# Patient Record
Sex: FEMALE | Race: BLACK OR AFRICAN AMERICAN | NOT HISPANIC OR LATINO | Employment: UNEMPLOYED | URBAN - METROPOLITAN AREA
[De-identification: names, ages, dates, MRNs, and addresses within clinical notes are randomized per-mention and may not be internally consistent; named-entity substitution may affect disease eponyms.]

---

## 2019-05-20 ENCOUNTER — OFFICE VISIT (OUTPATIENT)
Dept: FAMILY MEDICINE | Facility: CLINIC | Age: 29
End: 2019-05-20
Payer: COMMERCIAL

## 2019-05-20 ENCOUNTER — RECORDS - HEALTHEAST (OUTPATIENT)
Dept: ADMINISTRATIVE | Facility: OTHER | Age: 29
End: 2019-05-20

## 2019-05-20 VITALS
HEART RATE: 94 BPM | OXYGEN SATURATION: 96 % | TEMPERATURE: 98.7 F | WEIGHT: 241 LBS | DIASTOLIC BLOOD PRESSURE: 76 MMHG | SYSTOLIC BLOOD PRESSURE: 115 MMHG | RESPIRATION RATE: 16 BRPM

## 2019-05-20 DIAGNOSIS — Z12.4 ENCOUNTER FOR SCREENING FOR CERVICAL CANCER: ICD-10-CM

## 2019-05-20 DIAGNOSIS — Z32.00 PREGNANCY EXAMINATION OR TEST, PREGNANCY UNCONFIRMED: Primary | ICD-10-CM

## 2019-05-20 DIAGNOSIS — R42 DIZZINESS: ICD-10-CM

## 2019-05-20 DIAGNOSIS — Z11.3 ROUTINE SCREENING FOR STI (SEXUALLY TRANSMITTED INFECTION): ICD-10-CM

## 2019-05-20 DIAGNOSIS — R11.0 NAUSEA: ICD-10-CM

## 2019-05-20 DIAGNOSIS — Z87.820 HISTORY OF TRAUMATIC BRAIN INJURY: ICD-10-CM

## 2019-05-20 LAB
HCG UR QL: NEGATIVE
HIV 1+2 AB+HIV1 P24 AG SERPL QL IA: NEGATIVE

## 2019-05-20 NOTE — LETTER
May 24, 2019      [facility] [address]  [address]  [place] [address]        Dear Tri,    Your results for syphilis, HIV, chlamydia, and gonorrhoeae were all negative.    Please see below for your test results.    Resulted Orders   HCG Qualitative Urine (UPT)  ([facility])   Result Value Ref Range    HCG Qual Urine NEGATIVE Negative   Chlamydia/Gono Amplified ([facility])   Result Value Ref Range    Chlamydia trac,Amplified Prb Negative Negative    N gonorrhoeae,Amplified Prb Negative Negative    Narrative    Test performed by:  [facility] [address] [place] [address]   Syphilis Screen [place] ([facility])   Result Value Ref Range    Treponema Antibody (Syphilis) Negative Negative    Narrative    Test performed by:  [facility] [address] [place] [address]   HIV Ag/Ab Screen [place] ([facility])   Result Value Ref Range    HIV Antigen/Antibody Negative Negative    Narrative    Test performed by:  [facility] [address] [place] [address]  Method is Abbott HIV Ag/Ab for the detection of HIV p24 antigen, HIV-1   antibodies and HIV-2 antibodies.       If you have any questions, please call the clinic to make an appointment.    Sincerely,    Sanjeev Vila MD
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
## 2019-05-20 NOTE — PROGRESS NOTES
Preceptor Attestation:   Patient seen, evaluated and discussed with the resident. I have verified the content of the note, which accurately reflects my assessment of the patient and the plan of care.  Supervising Physician:Jonnie Bowers MD  [facility]

## 2019-05-21 LAB
C TRACH RRNA SPEC QL NAA+PROBE: NEGATIVE
N GONORRHOEA RRNA SPEC QL NAA+PROBE: NEGATIVE
TREPONEMA ANTIBODY (SYPHILIS): NEGATIVE

## 2019-05-22 NOTE — RESULT ENCOUNTER NOTE
Please call the patient and let her know that her syphilis, HIV, chlamydia, and gonorrhoeae were all negative. She was planning on going out of town so wanted to be notified by phone.    Thanks!

## 2019-05-24 ENCOUNTER — TELEPHONE (OUTPATIENT)
Dept: FAMILY MEDICINE | Facility: CLINIC | Age: 29
End: 2019-05-24

## 2019-05-24 NOTE — TELEPHONE ENCOUNTER
Normal results from 05/20/2019 given to patient. Gave results per MD documentation as Patient is going out of town. Patient understands.

## 2019-06-03 ENCOUNTER — RECORDS - HEALTHEAST (OUTPATIENT)
Dept: ADMINISTRATIVE | Facility: OTHER | Age: 29
End: 2019-06-03

## 2019-06-03 ENCOUNTER — OFFICE VISIT (OUTPATIENT)
Dept: FAMILY MEDICINE | Facility: CLINIC | Age: 29
End: 2019-06-03
Payer: COMMERCIAL

## 2019-06-03 VITALS
OXYGEN SATURATION: 96 % | RESPIRATION RATE: 18 BRPM | TEMPERATURE: 98.2 F | SYSTOLIC BLOOD PRESSURE: 117 MMHG | DIASTOLIC BLOOD PRESSURE: 79 MMHG | WEIGHT: 247 LBS | HEART RATE: 81 BPM

## 2019-06-03 DIAGNOSIS — Z87.820 HISTORY OF TRAUMATIC BRAIN INJURY: ICD-10-CM

## 2019-06-03 DIAGNOSIS — N76.0 BACTERIAL VAGINOSIS: ICD-10-CM

## 2019-06-03 DIAGNOSIS — Z12.4 ENCOUNTER FOR SCREENING FOR CERVICAL CANCER: ICD-10-CM

## 2019-06-03 DIAGNOSIS — Z00.00 ROUTINE GENERAL MEDICAL EXAMINATION AT A HEALTH CARE FACILITY: Primary | ICD-10-CM

## 2019-06-03 DIAGNOSIS — B96.89 BACTERIAL VAGINOSIS: ICD-10-CM

## 2019-06-03 DIAGNOSIS — R42 DIZZINESS: ICD-10-CM

## 2019-06-03 DIAGNOSIS — F32.A ANXIETY AND DEPRESSION: ICD-10-CM

## 2019-06-03 DIAGNOSIS — R11.0 NAUSEA: ICD-10-CM

## 2019-06-03 DIAGNOSIS — F41.9 ANXIETY AND DEPRESSION: ICD-10-CM

## 2019-06-03 DIAGNOSIS — N89.8 VAGINAL DISCHARGE: ICD-10-CM

## 2019-06-03 LAB
BACTERIA: NORMAL
CLUE CELLS: NORMAL
MOTILE TRICHOMONAS: NEGATIVE
ODOR: POSITIVE
PH WET PREP: NORMAL (ref 3.8–4.5)
WBC WET PREP: NORMAL (ref 2–5)
YEAST: NORMAL

## 2019-06-03 RX ORDER — METRONIDAZOLE 500 MG/1
500 TABLET ORAL 2 TIMES DAILY
Qty: 14 TABLET | Refills: 0 | Status: SHIPPED | OUTPATIENT
Start: 2019-06-03 | End: 2019-07-15

## 2019-06-03 RX ORDER — ONDANSETRON 4 MG/1
4 TABLET, FILM COATED ORAL EVERY 8 HOURS PRN
Qty: 30 TABLET | Refills: 0 | Status: SHIPPED | OUTPATIENT
Start: 2019-06-03 | End: 2020-04-29

## 2019-06-03 ASSESSMENT — ANXIETY QUESTIONNAIRES
3. WORRYING TOO MUCH ABOUT DIFFERENT THINGS: MORE THAN HALF THE DAYS
7. FEELING AFRAID AS IF SOMETHING AWFUL MIGHT HAPPEN: NOT AT ALL
6. BECOMING EASILY ANNOYED OR IRRITABLE: MORE THAN HALF THE DAYS
5. BEING SO RESTLESS THAT IT IS HARD TO SIT STILL: MORE THAN HALF THE DAYS
1. FEELING NERVOUS, ANXIOUS, OR ON EDGE: MORE THAN HALF THE DAYS
2. NOT BEING ABLE TO STOP OR CONTROL WORRYING: MORE THAN HALF THE DAYS
GAD7 TOTAL SCORE: 12

## 2019-06-03 ASSESSMENT — PATIENT HEALTH QUESTIONNAIRE - PHQ9: 5. POOR APPETITE OR OVEREATING: MORE THAN HALF THE DAYS

## 2019-06-03 NOTE — PATIENT INSTRUCTIONS
Preventive Health Recommendations  Female Ages 26 - 39  Yearly exam:   See your health care provider every year in order to    Review health changes.     Discuss preventive care.      Review your medicines if you your doctor has prescribed any.    Until age 30: Get a Pap test every three years (more often if you have had an abnormal result).    After age 30: Talk to your doctor about whether you should have a Pap test every 3 years or have a Pap test with HPV screening every 5 years.   You do not need a Pap test if your uterus was removed (hysterectomy) and you have not had cancer.  You should be tested each year for STDs (sexually transmitted diseases), if you're at risk.   Talk to your provider about how often to have your cholesterol checked.  If you are at risk for diabetes, you should have a diabetes test (fasting glucose).  Shots: Get a flu shot each year. Get a tetanus shot every 10 years.   Nutrition:     Eat at least 5 servings of fruits and vegetables each day.    Eat whole-grain bread, whole-wheat pasta and brown rice instead of white grains and rice.    Get adequate Calcium and Vitamin D.     Lifestyle    Exercise at least 150 minutes a week (30 minutes a day, 5 days of the week). This will help you control your weight and prevent disease.    Limit alcohol to one drink per day.    No smoking.     Wear sunscreen to prevent skin cancer.    See your dentist every six months for an exam and cleaning.        Referral for ( TEST )  :      Holter Monitor   LOCATION/PLACE/Provider :    [facility] [place]   DATE & TIME :     6-4-2019 at 3:00  PHONE :     [phone]  FAX :     [phone]  Appointment made by clinic staff/:    Crissy        Referral for ( TEST )  :      CT Head w/o Contrast   LOCATION/PLACE/Provider :    [facility]   DATE & TIME :     6-4-2019 at 4:00  PHONE :     [phone]  FAX :     [phone]  Appointment made by clinic staff/:    Crissy

## 2019-06-03 NOTE — LETTER
June 7, 2019      Lucinda Ji  [address]  [place] [address]        Dear Lucinda,    Your Pap smear was normal and will not need to be repeated until 2022. We discussed your wet prep results in clinic and treated you accordingly.    Please see below for your test results.    Resulted Orders   GYN Cytology ([facility])   Result Value Ref Range    Lab AP Case Report SEE RESULTS BELOW       Comment:      Gynecologic Cytology Report                       Case: [id number]                                     Authorizing Provider:  Adolfo Eid III, MD      Collected:             06/03/2019 1439              Ordering Location:      [facility] Lab Received:              06/04/2019 1024              First Screen:          Jessie Arellano CT                                                                             (ASCP)                                                                         Rescreen:              Arminda Moore CT                                                                              (ASCP)                                                                         Specimen:    SUREPATH PAP, SCREENING, Endocervical/cervical                                               Lab AP Gyn Interpretation SEE RESULTS BELOW Negative for squamous intraepithelial le      Comment:      Negative for squamous intraepithelial lesion or malignancy  Electronically signed by Arminda Moore CT (ASCP) on 6/5/2019 at  1:32   PM      Lab AP Malignant? Normal Normal    Lab AP Gyn Other Findings       Shift in vaginal mansi suggestive of bacterial vaginosis    Specimen adequacy:       Satisfactory for evaluation, endocervical/transformation zone component absent    HPV Reflex? Yes if Abnormal     High Risk? No     Last Mens Period 5/20/2019     Lab AP Abnormal Bleeding No     Lab AP Patient Status n/a     Lab AP Birth Control/Hormones None     Lab AP Previous Normal unknown     Lab AP  Previous Abnl none known     Lab AP Cervical Appearance normal     Narrative    Test performed by:  [facility]  [address] [place] [address]   Wet Prep ([facility])   Result Value Ref Range    Yeast Wet Prep None none    Motile Trichomonas Wet Prep Negative Negative    Clue Cells Wet Prep Present <20% NONE    WBC WET PREP 10-25 2 - 5    Bacteria Wet Prep Many None    pH Wet Prep Not performed 3.8 - 4.5    Odor Wet Prep Positive NONE       If you have any questions, please call the clinic to make an appointment.    Sincerely,    Sanjeev Vila MD
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
## 2019-06-04 ENCOUNTER — HOSPITAL ENCOUNTER (OUTPATIENT)
Dept: CT IMAGING | Facility: HOSPITAL | Age: 29
Discharge: HOME | End: 2019-06-04
Attending: FAMILY MEDICINE

## 2019-06-04 ENCOUNTER — HOSPITAL ENCOUNTER (OUTPATIENT)
Dept: CARDIOLOGY | Facility: HOSPITAL | Age: 29
Discharge: HOME | End: 2019-06-04
Attending: STUDENT IN AN ORGANIZED HEALTH CARE EDUCATION/TRAINING PROGRAM

## 2019-06-04 DIAGNOSIS — R42 DIZZINESS: ICD-10-CM

## 2019-06-04 DIAGNOSIS — Z87.820 HISTORY OF TRAUMATIC BRAIN INJURY: ICD-10-CM

## 2019-06-04 NOTE — PROGRESS NOTES
Preceptor Attestation:   Patient seen, evaluated and discussed with the resident. I personally viewed the EKG and agree with the interpretation documented by the resident. I have verified the content of the note, which accurately reflects my assessment of the patient and the plan of care.  Supervising Physician:Adolfo Eid MD  [facility]

## 2019-06-05 ENCOUNTER — RECORDS - HEALTHEAST (OUTPATIENT)
Dept: ADMINISTRATIVE | Facility: OTHER | Age: 29
End: 2019-06-05

## 2019-06-05 ENCOUNTER — DOCUMENTATION ONLY (OUTPATIENT)
Dept: PSYCHOLOGY | Facility: CLINIC | Age: 29
End: 2019-06-05

## 2019-06-05 LAB
CYTOLOGY CVX/VAG DOC THIN PREP: NORMAL
HIGH RISK?: NO
HPV REFLEX?: NORMAL
LAB AP ABNORMAL BLEEDING: NO
LAB AP BIRTH CONTROL/HORMONES: NORMAL
LAB AP CERVICAL APPEARANCE: NORMAL
LAB AP GYN OTHER FINDINGS: NORMAL
LAB AP PATIENT STATUS: NORMAL
LAB AP PREVIOUS ABNL: NORMAL
LAB AP PREVIOUS NORMAL: NORMAL
LAST MENS PERIOD: NORMAL
PATH REPORT.COMMENTS IMP SPEC: NORMAL
PATH REPORT.COMMENTS IMP SPEC: NORMAL
SPECIMEN ADEQUACY:: NORMAL

## 2019-06-05 NOTE — PROGRESS NOTES
Patient can be scheduled with any of the following for PTSD.     [facility]  [address]  [address]  [place], [place] [address]  [phone] Phone  [phone] Fax  Hours: M-F 7:30-5:30pm    [facility] [place] [facility]  [address]   [address]  [place] [address]  [phone] (for appointments)  Fax: [phone]  7:30 am - 5 pm M-F, appointments available on       [facility]  [address]  [address]  [place] [address]  [phone] Phone  [phone] Fax  - 7:30AM-7PM  Saturday 8AM-2PM    [facility]  [address] [place] [address] [address],   [place] [address]  [phone] Phone  [phone] Fax  -Th: 8:30-5pm  F: 8:30-4:30pm    [facility]  [address] [place] [address]  [phone] Phone  [phone] Fax  Monday-Friday: 9am -9pm  : 9am- 2pm        ===View-only below this line===    ----- Message -----  From: Sanjeev Vila MD  Sent: 6/3/2019   2:53 PM  To:  Mental Health  Subject: Order for LEONCIO MOREAU                [id number]               : 1990  F     [address] [place] [address]                           PCP: [id number]    [place] [address]                                CTR: [facility]        Name: LEONCIO MOREAU Date: 6/3/2019    Home: [phone]    Payor:              [employer]  Plan:                 [employer] [place]  Sponsor Code:       [id number]  Subscriber ID:      [id number]  Subscriber Name:    LEONCIO MOREAU  Subscriber Address: [address] [place] [address]    Effective From:     19  Effective To:         Group Number:       [id number]  Group Name  : Not Available      Date       Provider                   Department   Center         6/3/2019   [id number] VILA      [facility]    [facility]    Order Date:6/3/2019  Ordering User:SANJEEV BELL [[id number]]  Encounter Provider:Sanjeev Bell MD [[id number]]  Authorizing Provider: Adolfo Eid MD [[id number]]  Department:[facility][[id number]]    Ordering Provider NPI: [id number]  Adolfo Eid  [facility]~[address] [id number]  Phone: [phone]        Procedure Requested    9087     [facility] REFERRAL  -             [#[id number]]      Priority: Routine  Class: External referral      Comment:Use this form for behavioral health consults and assessments. The               referral coordinator will help to determine whether patients are               best served by clinic behavioral health staff or by community               providers.                              Type of referral(s) requested (indicate all t  hat apply):               Adult Psychotherapy--for diagnosis and non-pharmacological                treatment                              Reason for referral: anxiety, depression, PTSD following traumatic                death of fiance 6 years ago                              Currently receiving mental health services (if 'Yes', what                services and why today's referral?): No               Currently having suicidal thoughts: No                 Previous psych hospitalization: No                              Please provide data for below screening tools if available.                PHQ-9 Score: 10               GAD7 Score: 12               PC-PTSD Score:               Bipolar Screen:               [place] (ADHD):                MCHAT (Autism Screen):                Pediatric Symptom Checklist (PSC):                                needed: No               Language:   English    Associated Diagnoses      F41.9, F32.9 Anxiety and depression      Adult or Child/Adolescent:  Adult         Location:  [facility]                [id number]               : 1990  F      [address]                           PCP: [id number]    [place] [address]                                CTR: [facility]      Comments

## 2019-06-06 NOTE — RESULT ENCOUNTER NOTE
Please mail a letter to the patient with the following:    Ivonne,    Below are your lab results from your recent visit. Your Pap smear was normal and will not need to be repeated until 2022. We discussed your wet prep results in clinic and treated you accordingly.    If you have any further questions or concerns, please do not hesitate to reach out to my office.    I look forward to seeing you in the future!    Take care,  Sanjeev Vila MD

## 2019-06-10 ENCOUNTER — TELEPHONE (OUTPATIENT)
Dept: FAMILY MEDICINE | Facility: CLINIC | Age: 29
End: 2019-06-10

## 2019-06-10 ENCOUNTER — OFFICE VISIT (OUTPATIENT)
Dept: FAMILY MEDICINE | Facility: CLINIC | Age: 29
End: 2019-06-10
Payer: COMMERCIAL

## 2019-06-10 VITALS
OXYGEN SATURATION: 97 % | DIASTOLIC BLOOD PRESSURE: 80 MMHG | HEART RATE: 91 BPM | SYSTOLIC BLOOD PRESSURE: 123 MMHG | WEIGHT: 242 LBS | TEMPERATURE: 98.1 F | RESPIRATION RATE: 16 BRPM

## 2019-06-10 DIAGNOSIS — G43.111 INTRACTABLE MIGRAINE WITH AURA WITH STATUS MIGRAINOSUS: Primary | ICD-10-CM

## 2019-06-10 DIAGNOSIS — R42 DIZZINESS: ICD-10-CM

## 2019-06-10 DIAGNOSIS — Z87.820 HISTORY OF TRAUMATIC BRAIN INJURY: ICD-10-CM

## 2019-06-10 DIAGNOSIS — F32.A ANXIETY AND DEPRESSION: ICD-10-CM

## 2019-06-10 DIAGNOSIS — F41.9 ANXIETY AND DEPRESSION: ICD-10-CM

## 2019-06-10 RX ORDER — VENLAFAXINE 37.5 MG/1
37.5 TABLET ORAL DAILY
Qty: 60 TABLET | Refills: 0 | Status: SHIPPED | OUTPATIENT
Start: 2019-06-10 | End: 2019-07-15

## 2019-06-10 ASSESSMENT — ANXIETY QUESTIONNAIRES
7. FEELING AFRAID AS IF SOMETHING AWFUL MIGHT HAPPEN: NOT AT ALL
GAD7 TOTAL SCORE: 12
5. BEING SO RESTLESS THAT IT IS HARD TO SIT STILL: MORE THAN HALF THE DAYS
6. BECOMING EASILY ANNOYED OR IRRITABLE: MORE THAN HALF THE DAYS
1. FEELING NERVOUS, ANXIOUS, OR ON EDGE: MORE THAN HALF THE DAYS
IF YOU CHECKED OFF ANY PROBLEMS ON THIS QUESTIONNAIRE, HOW DIFFICULT HAVE THESE PROBLEMS MADE IT FOR YOU TO DO YOUR WORK, TAKE CARE OF THINGS AT HOME, OR GET ALONG WITH OTHER PEOPLE: SOMEWHAT DIFFICULT
2. NOT BEING ABLE TO STOP OR CONTROL WORRYING: MORE THAN HALF THE DAYS
3. WORRYING TOO MUCH ABOUT DIFFERENT THINGS: MORE THAN HALF THE DAYS

## 2019-06-10 ASSESSMENT — PATIENT HEALTH QUESTIONNAIRE - PHQ9
5. POOR APPETITE OR OVEREATING: MORE THAN HALF THE DAYS
SUM OF ALL RESPONSES TO PHQ QUESTIONS 1-9: 16

## 2019-06-10 NOTE — TELEPHONE ENCOUNTER
Given CT results via phone,normal, no significant abnormality seen. C/o continued headaches. Recommend an appt in clinic today. Ivonne agreed, scheduled an appt for this afternoon with PCP, per pt ok to wait until appt time. Lorena COVARRUBIAS

## 2019-06-10 NOTE — TELEPHONE ENCOUNTER
[facility] phone call message- patient requesting results:    Test: CT Results  Date of test: 6/4/19 or 6/5/19    Additional Comments: Patient states she had a CT to brain done on Tuesday or Wednesday of last week, Patient has not receive any call and wants to know if it is in to go over results. Patient states she is very anxious to know the CT results. Please call and advise.    OK to leave a message on voice mail? Yes    Primary language: English      needed? No    Call taken on Briseida 10, 2019 at 8:20 AM by Pedro Galaviz

## 2019-06-10 NOTE — PATIENT INSTRUCTIONS
Thank you for coming to clinic today, it has been my pleasure to serve you.    Your current medication list is printed and included with this document.  Please keep this list with you - it is helpful to bring this current list to any other medical appointments and if you ever go to the emergency room or hospital.    If you had lab testing today or will be undergoing other testing outside the clinic such as an imaging test, we will be sharing the results with you by mail, phone call, or MyChart whenever they are available and I have reviewed them.     The phone number we would call with results is # [phone] (home) . If this is not the best number to reach you, please call our clinic to have this updated.    If you need any refills, please call your pharmacy and they will contact us.    If you have any further concerns or wish to schedule another appointment, please call our office at [phone] during normal business hours (8AM-5PM, Monday-Friday)    Another useful resource we offer our patients here at [facility] is our after hours care line. If you ever have questions or concerns outside of regular business hours and are unsure if you should seek care at an Urgent Care or in the Emergency Department, one of our physicians is on-call 24 hours a day to assist you.  Just call our regular clinic number at [phone] and a staff member will page the on-call physician with your concern.  This physician will attempt to return your call promptly although they are also working in the hospital and other urgent medical tasks may delay this call back.    If you have a medical emergency, please call [phone].    Thank you for coming to [facility], I look forward to caring for you in the future!    Take care,    Sanjeev Vila MD

## 2019-06-10 NOTE — PROGRESS NOTES
Assessment and Plan   1. Intractable migraine with aura with status migrainosus  Will attempt trial of the below which will help with #2 as well. Almost daily headache, but could try a triptan for abortive therapy next time if Tylenol/Ibuprofen fail in addition to HA journal.  - venlafaxine (EFFEXOR) 37.5 MG tablet; Take 1 tablet (37.5 mg) by mouth daily  Dispense: 60 tablet; Refill: 0    2. Anxiety and depression  PHQ-9 of 16 and SANDRA-7 of 12 indicative of this.    3. History of traumatic brain injury  Normal CT result reviewed.    4. Dizziness  Completed Holter, needs to turn in still.    Follow up in 1 month.    Options for treatment and follow-up care were reviewed with the patient and/or guardian. Lucinda Ji and/or guardian engaged in the decision making process and verbalized understanding of the options discussed and agreed with the final plan.    Sanjeev Vila MD  [facility], PGY-2    Precepted with: Dr. Adolfo Parry.         HPI:   Lucinda Ji is a 28 year old female who presents to clinic today for   Chief Complaint   Patient presents with     Headache     recheck migraine.condition worsening since last visit. dizzy spells. anxiety attack this morning, concern of issue. mom  of brain tumor     Medication Reconciliation     complete     Her mother passed away from brain cancer at age 23 - unknown type.    Had an anxiety attack this morning after her scan. She did her Holter, but still needs to return it.    HA in the front of her forehead and at the base of the neck. Feels like it is pulsing. Gets blurry vision. HA sometimes last all day, but intermittent. Light and sound sensitive. Has tried Tylenol and Ibuprofen but none helped. Feels she has an aura, gets agitated.         Review of Systems:     A comprehensive 12 point review of systems was negative unless otherwise noted in the HPI.          PMHX:   Active Problems  List  Patient Active Problem List   Diagnosis     Nausea     Dizziness     History of traumatic brain injury     Encounter for screening for cervical cancer      Anxiety and depression     Intractable migraine with aura with status migrainosus     Active problem list reviewed and updated.    Current Medications  Current Outpatient Medications   Medication Sig Dispense Refill     metroNIDAZOLE (FLAGYL) 500 MG tablet Take 1 tablet (500 mg) by mouth 2 times daily for 7 days 14 tablet 0     ondansetron (ZOFRAN) 4 MG tablet Take 1 tablet (4 mg) by mouth every 8 hours as needed for nausea or vomiting 30 tablet 0     venlafaxine (EFFEXOR) 37.5 MG tablet Take 1 tablet (37.5 mg) by mouth daily 60 tablet 0     Medication list reviewed and updated.    Social History  Social History     Tobacco Use     Smoking status: Current Every Day Smoker     Years: 12.00     Types: Cigarettes     Smokeless tobacco: Never Used     Tobacco comment: 3 cigs per day   Substance Use Topics     Alcohol use: Yes     Comment: occasional     Drug use: Never     History   Drug Use Unknown     Allergies  No Known Allergies  Allergies and Medication Intolerances Updated         Physical Exam:     Vitals:    06/10/19 1553   BP: 123/80   Pulse: 91   Resp: 16   Temp: 98.1  F (36.7  C)   TempSrc: Oral   SpO2: 97%   Weight: 109.8 kg (242 lb)     There is no height or weight on file to calculate BMI.    GENERAL APPEARANCE: alert, appears stated age, no acute distress  HEENT: Eyes grossly normal to inspection, nares normal, and mouth and throat without erythema, ulcers, or lesions  RESP: lungs clear to auscultation - no rales, rhonchi, or wheezes  CV: regular rate and rhythm, no murmur, click, rub, or gallop  ABDOMEN: soft, nontender   MSK: extremities normal, no gross deformities noted, no lower extremity edema  SKIN: no suspicious lesions or rashes   NEURO: Normal strength and tone, sensory exam grossly normal, mentation appears intact and speech  normal  PSYCH: mood and affect normal

## 2019-06-10 NOTE — PROGRESS NOTES
Preceptor Attestation:   Patient seen, evaluated and discussed with the resident. I have verified the content of the note, which accurately reflects my assessment of the patient and the plan of care.   Supervising Physician:  Adolfo Parry MD

## 2019-06-11 ENCOUNTER — TELEPHONE (OUTPATIENT)
Dept: FAMILY MEDICINE | Facility: CLINIC | Age: 29
End: 2019-06-11

## 2019-06-11 ASSESSMENT — ANXIETY QUESTIONNAIRES: GAD7 TOTAL SCORE: 12

## 2019-06-11 NOTE — LETTER
June 11, 2019       TO: Lucinda ACOSTA [place]  [address]  [place] [address]         Dear Lucinda,      Here is the mental health referral information we had discussed 6/11/2019.  Please call with any additional questions, concerns or if you would like assistance with scheduling the appointment.      [facility]  [address]  [address]  [place], [place] [address]  [phone] Phone  [phone] Fax  Hours: M-F 7:30-5:30pm     [facility] [place]  [address]   [address]  [place] [address]  [phone] (for appointments)  Fax: [phone]  7:30 am - 5 pm M-F, appointments available on Saturdays        [facility]  [address]  [place] [address]  [phone] Phone  [phone] Fax  M-F 7:30AM-7PM  Saturday 8AM-2PM         [facility]  [address] [place] [address] [address],   [place] [address]  [phone] Phone  [phone] Fax  M-Th: 8:30-5pm  F: 8:30-4:30pm     [facility]  [address] [place] [address]  [phone] Phone  [phone] Fax  Monday-Friday: 9am -9pm  Saturdays: 9am- 2pm        Sincerely,      JAMMIE Kelly CMA  Home Health Care Coordinator  Direct Phone: [phone]

## 2019-06-11 NOTE — TELEPHONE ENCOUNTER
Out going call made to discuss mental health referral and assist with scheduling. Patient requested a letter be sent to her home addressed listed in chart for review to determine which facility would work best for her.

## 2019-06-18 NOTE — TELEPHONE ENCOUNTER
Out going follow up call made. Ivonne reports she did receive the letter that included the mental health referral. Ivonne confirmed she had received the letter and will schedule her own appointment. Patient will call should change her mind and want assistance.

## 2019-06-25 ASSESSMENT — ANXIETY QUESTIONNAIRES: GAD7 TOTAL SCORE: 12

## 2019-07-15 ENCOUNTER — OFFICE VISIT (OUTPATIENT)
Dept: FAMILY MEDICINE | Facility: CLINIC | Age: 29
End: 2019-07-15
Payer: COMMERCIAL

## 2019-07-15 VITALS
HEART RATE: 80 BPM | DIASTOLIC BLOOD PRESSURE: 72 MMHG | OXYGEN SATURATION: 97 % | RESPIRATION RATE: 18 BRPM | SYSTOLIC BLOOD PRESSURE: 112 MMHG | WEIGHT: 240 LBS | TEMPERATURE: 98.3 F

## 2019-07-15 DIAGNOSIS — Z72.51 UNPROTECTED SEXUAL INTERCOURSE: ICD-10-CM

## 2019-07-15 DIAGNOSIS — G43.111 INTRACTABLE MIGRAINE WITH AURA WITH STATUS MIGRAINOSUS: Primary | ICD-10-CM

## 2019-07-15 DIAGNOSIS — R42 DIZZINESS: ICD-10-CM

## 2019-07-15 LAB
BACTERIA: NORMAL
CLUE CELLS: NORMAL
HIV 1+2 AB+HIV1 P24 AG SERPL QL IA: NEGATIVE
MOTILE TRICHOMONAS: NEGATIVE
ODOR: NORMAL
PH WET PREP: >4.5 (ref 3.8–4.5)
WBC WET PREP: NORMAL (ref 2–5)
YEAST: NORMAL

## 2019-07-15 RX ORDER — SUMATRIPTAN 50 MG/1
50 TABLET, FILM COATED ORAL
Qty: 90 TABLET | Refills: 0 | Status: SHIPPED | OUTPATIENT
Start: 2019-07-15 | End: 2020-04-29

## 2019-07-15 SDOH — HEALTH STABILITY: MENTAL HEALTH: HOW OFTEN DO YOU HAVE A DRINK CONTAINING ALCOHOL?: MONTHLY OR LESS

## 2019-07-15 NOTE — PROGRESS NOTES
Assessment and Plan   1. Intractable migraine with aura and status migrainosus, follow-up  Can try sumatriptan 50 mg to have on hand for abortive therapy. Continue consistent exercise regimen. Did not start Venlafaxine due to concern over increased SI as counciled by the pharmacist - could be an option in the future.    2. Anxiety and depression  Stable at this time.     3. Dizziness  Refer to neurology for further workup as needed. Negative CT, Holter, and history not suggestive of a typical disease course - I feel these episodes are likely somewhat related to her anxiety/depression. Offered continued observation vs Neuro referral and she chose Neuro referral.    4. STD, BV, yeast   Sent for wet prep, GC culture, syphilis, HIV.    Wet prep negative for yeast, trichomonas. Moderate bacteria, clue cells <20%, 10-25 WBCs, pH >4.5, no odor.     Will await further results.    Follow up as needed, after seeing neurology.    Options for treatment and follow-up care were reviewed with the patient and/or guardian. Lucinda Ji and/or guardian engaged in the decision making process and verbalized understanding of the options discussed and agreed with the final plan.    I was present with the medical student who participated in the service and in the documentation of this note. I have verified the history and personally performed the physical exam and medical decision making, and have verified the content of the note, which accurately reflects my assessment of the patient and the plan of care.  Sanjeev Vila MD  [facility], PGY-3    Precepted patient with Dr. James Quijano       HPI:   Lucinda Ji is a 28 year old female who presents to clinic today for   Chief Complaint   Patient presents with     Follow Up     MIGRAINES. Patient did not start Venlafaxine due to the side effects of possible suicidal ideation. Patient states headaches are better now  "with exercise and keeping herself busy.      Medication Reconciliation     completed.      STD     Check.     Migraines:  Pharmacist said venlafaxine has suicidal ideation as side effect so patient did not pick it up. Patient has had decreased frequency of migraines recently. Before had 2x migraines per day, now reduced to 1x per week. Believes her increase in exercise is helping.    Mood:   Feels \"more free\" and recently got her house back, so feels a burden lifted off her shoulders. No panic attacks since last visit.    Dizziness:  Got Holter monitor results, within normal limits  Dizziness, especially when exercising, body shakes when stands up  Had prior normal head CT  Discussed if she wants to continue further workup, may refer to neurology    New concern:  Last week had unprotected sex with new partner, would like STI testing  Period ended yesterday so not concerned for pregnancy  No sx of vaginal discharge, itching         Review of Systems:     Constitutional, HEENT, cardiovascular, pulmonary, GI, musculoskeletal, neuro, skin, and psych systems are negative, except as otherwise noted.          PMHX:   Active Problems List  Patient Active Problem List   Diagnosis     Nausea     Dizziness     History of traumatic brain injury     Encounter for screening for cervical cancer      Anxiety and depression     Intractable migraine with aura with status migrainosus     Active problem list reviewed and updated.    Current Medications  Current Outpatient Medications   Medication Sig Dispense Refill     ondansetron (ZOFRAN) 4 MG tablet Take 1 tablet (4 mg) by mouth every 8 hours as needed for nausea or vomiting 30 tablet 0     SUMAtriptan (IMITREX) 50 MG tablet Take 1 tablet (50 mg) by mouth at onset of headache for migraine May repeat in 2 hours. Max 4 tablets/24 hours. 90 tablet 0     Medication list reviewed and updated.    Social History  Social History     Tobacco Use     Smoking status: Current Every Day Smoker "     Years: 12.00     Types: Cigarettes     Smokeless tobacco: Never Used     Tobacco comment: 3 cigs/day   Substance Use Topics     Alcohol use: Yes     Frequency: Monthly or less     Comment: occasional     Drug use: Yes     Types: Marijuana     History   Drug Use     Types: Marijuana     Family History  Family History   Problem Relation Age of Onset     Brain Tumor Mother      Heart Disease Father      Thyroid Disease Sister      Asthma Brother      Allergies  No Known Allergies         Physical Exam:     Vitals:    07/15/19 1407   BP: 112/72   Pulse: 80   Resp: 18   Temp: 98.3  F (36.8  C)   TempSrc: Oral   SpO2: 97%   Weight: 108.9 kg (240 lb)     There is no height or weight on file to calculate BMI.    GENERAL APPEARANCE: alert, appears stated age, no acute distress  HEENT: Eyes grossly normal to inspection, nares normal   MSK: moves all extremities normally and equally, no gross deformities noted  SKIN: no suspicious lesions or rashes   NEURO: Normal strength and tone, sensory exam grossly normal, mentation appears intact and speech normal  PSYCH: mood and affect normal/bright   GU_female: normal cervix without masses or discharge and whiff test negative

## 2019-07-15 NOTE — PROGRESS NOTES
I have personally reviewed the history and examination as documented by Dr. Vila.  I was present during key portions of the visit and agree with the assessment and plan as documented for 28 yr old female with migraines, depression here for follow-up, exertional dizziness. Reviewed tx regimen. Will refer to neuro per pt request for her dizziness. Precautions given. Anticipatory guidance given.     James Quijano MD  July 15, 2019  5:08 PM

## 2019-07-15 NOTE — NURSING NOTE
Chief Complaint   Patient presents with     Follow Up     MIGRAINES. Patient did not start Venlafaxine due to the side effects of possible suicidal ideation. Patient states headaches are better now with exercise and keeping herself busy.      Medication Reconciliation     completed.        /72   Pulse 80   Temp 98.3  F (36.8  C) (Oral)   Resp 18   Wt 108.9 kg (240 lb)   LMP 07/11/2019   SpO2 97%

## 2019-07-15 NOTE — LETTER
July 16, 2019      [facility]  [address]  [place] [address]        Dear Tri,    Below are your lab results from your recent visit. Your syphilis, HIV, chlamydia/gonorrhoeae, and wet prep were are negative (normal).    If you have any further questions or concerns, please do not hesitate to reach out to my office.    I look forward to seeing you in the future!    Resulted Orders   Wet Prep ([facility])   Result Value Ref Range    Yeast Wet Prep None none    Motile Trichomonas Wet Prep Negative Negative    Clue Cells Wet Prep Present <20% NONE    WBC WET PREP 10-25 2 - 5    Bacteria Wet Prep Moderate None    pH Wet Prep >4.5 3.8 - 4.5    Odor Wet Prep None NONE   Chlamydia/Gono Amplified ([facility])   Result Value Ref Range    Chlamydia trac,Amplified Prb Negative Negative    N gonorrhoeae,Amplified Prb Negative Negative    Narrative    Test performed by:  [facility] [address] [place] [address]   Syphilis Screen [place] (RPR/VDRL) ([facility])   Result Value Ref Range    Treponema Antibody (Syphilis) Negative Negative    Narrative    Test performed by:  [facility] [address] [place] [address]   HIV Ag/Ab Screen [place] ([facility])   Result Value Ref Range    HIV Antigen/Antibody Negative Negative    Narrative    Test performed by:  [facility] [address] [place] [address]  Method is Abbott HIV Ag/Ab for the detection of HIV p24 antigen, HIV-1   antibodies and HIV-2 antibodies.       If you have any questions, please call the clinic to make an appointment.    Sincerely,    Sanjeev Vila MD

## 2019-07-16 LAB
C TRACH RRNA CVX QL NAA+PROBE: NEGATIVE
N GONORRHOEA RRNA SPEC QL NAA+PROBE: NEGATIVE
TREPONEMA ANTIBODY (SYPHILIS): NEGATIVE

## 2019-07-16 NOTE — RESULT ENCOUNTER NOTE
Please mail a letter to the patient with the following:    Ivonne,    Below are your lab results from your recent visit. Your syphilis, HIV, chlamydia/gonorrhoeae, and wet prep were are negative (normal).    If you have any further questions or concerns, please do not hesitate to reach out to my office.    I look forward to seeing you in the future!    Take care,  Sanjeev Vila MD

## 2019-07-29 NOTE — PATIENT INSTRUCTIONS
Referral for :     Neurology    LOCATION/PLACE/Provider :    [facility]  DATE & TIME :    Location and primary have called patient, no success in scheduling patient for referral   PHONE :     [phone]  FAX :    [phone]  Appointment made by clinic staff/:    Pati

## 2020-03-25 ENCOUNTER — VIRTUAL VISIT (OUTPATIENT)
Dept: FAMILY MEDICINE | Facility: CLINIC | Age: 30
End: 2020-03-25
Payer: MEDICAID

## 2020-03-25 DIAGNOSIS — Z32.01 PREGNANCY TEST POSITIVE: Primary | ICD-10-CM

## 2020-03-25 RX ORDER — DIPHENHYDRAMINE HCL 25 MG
25-50 CAPSULE ORAL
COMMUNITY
Start: 2020-02-09 | End: 2020-04-29

## 2020-03-25 RX ORDER — PRENATAL VIT/IRON FUM/FOLIC AC 27MG-0.8MG
1 TABLET ORAL DAILY
Qty: 90 TABLET | Refills: 3 | Status: SHIPPED | OUTPATIENT
Start: 2020-03-25 | End: 2022-09-06

## 2020-03-25 RX ORDER — GUAIFENESIN AND DEXTROMETHORPHAN HYDROBROMIDE 100; 10 MG/5ML; MG/5ML
10 SOLUTION ORAL
COMMUNITY
Start: 2019-10-15 | End: 2020-04-29

## 2020-03-25 RX ORDER — ACETAMINOPHEN 325 MG/1
650 TABLET ORAL
COMMUNITY
Start: 2020-02-09 | End: 2024-09-23

## 2020-03-25 RX ORDER — ALBUTEROL SULFATE 90 UG/1
1-2 AEROSOL, METERED RESPIRATORY (INHALATION)
COMMUNITY
Start: 2020-02-09 | End: 2022-01-14

## 2020-03-25 NOTE — PROGRESS NOTES
"Family Medicine Telephone Visit Note           Telephone Visit Consent   Patient was verbally read the following and verbal consent was obtained.  \"I understand that I may revoke this request for a phone visit at any time.  This consent will automatically  3 months from the signed date and time.\"    Name person giving consent:  Patient   Date verbal consent given:  3/25/2020  Time verbal consent given:  1:40 PM          Chief Complaint   Patient presents with     Pregnancy Test     Postive home pregnancy test, Pt has been spotting for the last few days     Current Outpatient Medications   Medication Sig Dispense Refill     acetaminophen (TYLENOL) 325 MG tablet Take 650 mg by mouth       albuterol (PROAIR HFA/PROVENTIL HFA/VENTOLIN HFA) 108 (90 Base) MCG/ACT inhaler Inhale 1-2 puffs into the lungs       Dextromethorphan-guaiFENesin  MG/5ML syrup Take 10 mLs by mouth       diphenhydrAMINE (BENADRYL) 25 MG capsule Take 25-50 mg by mouth       Prenatal Vit-Fe Fumarate-FA (PRENATAL MULTIVITAMIN W/IRON) 27-0.8 MG tablet Take 1 tablet by mouth daily 90 tablet 3     ondansetron (ZOFRAN) 4 MG tablet Take 1 tablet (4 mg) by mouth every 8 hours as needed for nausea or vomiting (Patient not taking: Reported on 3/25/2020) 30 tablet 0     SUMAtriptan (IMITREX) 50 MG tablet Take 1 tablet (50 mg) by mouth at onset of headache for migraine May repeat in 2 hours. Max 4 tablets/24 hours. (Patient not taking: Reported on 3/25/2020) 90 tablet 0     No Known Allergies          HPI   Patients name: Ivonne  Appointment start time:  1:44 PM    Patient had a positive UPT.  Has been experiencing some bleeding but nothing heavy.  Had two miscarriages before.  Those were painful.  First one she felt like she was about to die.    Patient did the home test.  She was late 10-11 days.  No pain/N/V.      LMP .    Bleeding started 3 days ago.  Has not been a large amount.  Every time she goes to the bathroom she has noticed it " in the bowl when she urinates.  It has not soaked through any clothing.    Medications: None.  Was on ibuprofen and Valium for a muscle spasm but has not been using them since last week because they made her throw up.  She has not been taking a prenatal vitamin.  This is her first medical appointment for this pregnancy.    Patient is feeling shocked.  She is planning on keeping the pregnancy, however.  She has a 5 year old daughter.       used via [employer] or similar service.  number: None.          Assessment and Plan   (Z32.01) Pregnancy test positive  (primary encounter diagnosis)  Comment: Patient's home pregnancy test was positive and she is late for her period, indicating that she is likely pregnant.  Recommended following up in 2-4 weeks for initial dating US, as she is likely at about 6 weeks based upon LMP.  Recommended initial OB visit in late April/early May in the 10-12 week range.  Recommended starting prenatal vitamin.  Counseled that brief and minor spotting is often normal early on in a pregnancy, but that signs of miscarriage would be pain/cramping/increased bleeding.  Recommended that she call the clinic or go to the ED if she begins to bleed heavily, such as a pad per hour.  Plan: Prenatal Vit-Fe Fumarate-FA (PRENATAL         MULTIVITAMIN W/IRON) 27-0.8 MG tablet, US OB         <14 Weeks w Transvaginal Single          Refilled medications that would be required in the next 3 months.     After Visit Information:  Will print and mail AVS     Appointment end time: 2:08 PM  This is a telephone visit that took 21 minutes.      Clinician location:  [facility].    GREGG LANDA    Precepted patient with Dr. Deny Claudio.

## 2020-03-25 NOTE — PROGRESS NOTES
Preceptor Attestation:   Patient discussed with the resident. I have verified the content of the note, which accurately reflects my assessment of the patient and the plan of care.  Supervising Physician:Deny Claudio MD  [facility]

## 2020-04-16 ENCOUNTER — OFFICE VISIT (OUTPATIENT)
Dept: FAMILY MEDICINE | Facility: CLINIC | Age: 30
End: 2020-04-16
Payer: COMMERCIAL

## 2020-04-16 VITALS
OXYGEN SATURATION: 99 % | RESPIRATION RATE: 18 BRPM | SYSTOLIC BLOOD PRESSURE: 123 MMHG | TEMPERATURE: 98.2 F | DIASTOLIC BLOOD PRESSURE: 80 MMHG | HEART RATE: 68 BPM

## 2020-04-16 DIAGNOSIS — Z32.01 POSITIVE URINE PREGNANCY TEST: ICD-10-CM

## 2020-04-16 DIAGNOSIS — Z32.01 PREGNANCY TEST POSITIVE: ICD-10-CM

## 2020-04-16 DIAGNOSIS — N93.9 VAGINAL BLEEDING: Primary | ICD-10-CM

## 2020-04-16 LAB — B-HCG SERPL-ACNC: 6 MLU/ML (ref 0–4)

## 2020-04-16 NOTE — PATIENT INSTRUCTIONS
Patient Education     Understanding Miscarriage: During a Miscarriage  No two miscarriages are alike. Because of this, your healthcare provider will talk with you about the most appropriate treatment for you. If you re in good health and early in the pregnancy, your body may expel all the pregnancy tissue on its own. If your body doesn t expel all the tissue, your healthcare provider may recommend treatment to prevent infection and severe bleeding (hemorrhaging).  What happens during miscarriage  Some miscarriages happen without any signs or symptoms. Most miscarriages, however, start with bleeding and cramping, which may increase over time. The cramps may get very strong. This is normal when a miscarriage is happening. Cramping widens the passage (cervix) that tissue from the uterus must pass through to leave your body. Your healthcare provider may ask you for a sample of the tissue for lab testing. This is to make sure that the cells being shed from your body are normal.  Diagnosis  To confirm the miscarriage, your healthcare provider will do a pelvic exam. Your healthcare provider might order a blood test to measure the levels of a pregnancy hormone (hCG).  He or she may also have you get an ultrasound test to find out if all of the tissue has passed from the uterus. If a miscarriage happens very early in the pregnancy, an ultrasound is not needed.  Treatment  If any tissue remains in the uterus, your healthcare provider may suggest the following measures depending on your particular situation:    Medicine. This is prescribed for you to take at home. The medicine causes the uterus to expel any remaining tissue. Take the medicine exactly as directed.    Dilation and curettage (D & C). This procedure is done in your healthcare provider s office or at the hospital. You are given medicine to prevent pain or to allow you to relax or sleep during the procedure. The healthcare provider uses instruments to widen the  cervix (dilate). Tissue and blood that line the uterus are then removed (curettage).  Be sure you talk to your healthcare provider about the risks and benefits of these treatments.  If you have Rh-negative blood  If your blood is Rh negative, you may need treatment with Rho(D) immune globulin. This injection prevents substances in your blood from attacking the baby s blood during a future pregnancy. Your healthcare provider can tell you more.   Follow-up care  Keep all follow-up appointments. These are needed to make sure that you are healing well. During these visits, mention if you re feeling very sad or depressed. Your healthcare provider can suggest counseling or other resources to help you.  When to seek medical care  Contact your healthcare provider if you have any of the following:    Severe pain in the stomach, pelvis, or low back    Vaginal discharge that has a bad odor    Bleeding that soaks a new sanitary pad each hour    Fever of 100.4 F (38 C) or higher, or as directed by your healthcare provider   Date Last Reviewed: 6/1/2016 2000-2019 [employer]. [address] [place] [address]. All rights reserved. This information is not intended as a substitute for professional medical care. Always follow your healthcare professional's instructions.

## 2020-04-16 NOTE — PROGRESS NOTES
HPI:       Lucinda Ji is a 29 year old  female who presents for follow up of concern(s) listed below    1) positive UPT with vaginal bleeding  -Recap: LMP 2/10/20.  Late by 10 days for this period.  Patient had positive UPT.  Seen by Dr. Ramirez on 3/25.  Vaginal spotting with small clots from 3/22-3/28.  Patient scheduled for <14 week US.  -Interval Hx:  --Three past miscarriages, and this didn't feel like that  --Increased appetite, periods of nausea, vomiting with strong smells, breast tenderness, more aggarwal, dizziness  --Denies abdominal pain, shortness of breath, chest pain, palpitations, lightheadedness, headache, new vaginal bleeding  --no ectopic history  --History of uterine fibroids, gonorrhea and chlamydia  --no past instrumentation           PMHX:     Patient Active Problem List   Diagnosis     Nausea     Dizziness     History of traumatic brain injury     Encounter for screening for cervical cancer      Anxiety and depression     Intractable migraine with aura with status migrainosus       No past surgical history on file.    Current Outpatient Medications   Medication Sig Dispense Refill     albuterol (PROAIR HFA/PROVENTIL HFA/VENTOLIN HFA) 108 (90 Base) MCG/ACT inhaler Inhale 1-2 puffs into the lungs       Prenatal Vit-Fe Fumarate-FA (PRENATAL MULTIVITAMIN W/IRON) 27-0.8 MG tablet Take 1 tablet by mouth daily 90 tablet 3     acetaminophen (TYLENOL) 325 MG tablet Take 650 mg by mouth       Dextromethorphan-guaiFENesin  MG/5ML syrup Take 10 mLs by mouth       diphenhydrAMINE (BENADRYL) 25 MG capsule Take 25-50 mg by mouth       ondansetron (ZOFRAN) 4 MG tablet Take 1 tablet (4 mg) by mouth every 8 hours as needed for nausea or vomiting (Patient not taking: Reported on 3/25/2020) 30 tablet 0     SUMAtriptan (IMITREX) 50 MG tablet Take 1 tablet (50 mg) by mouth at onset of headache for migraine May repeat in 2 hours. Max 4 tablets/24 hours. (Patient not taking: Reported on 3/25/2020) 90  tablet 0        No Known Allergies    Social History     Socioeconomic History     Marital status: Single     Spouse name: Not on file     Number of children: 1     Years of education: Not on file     Highest education level: Not on file   Occupational History     Occupation: unemployed   Social Needs     Financial resource strain: Not on file     Food insecurity     Worry: Not on file     Inability: Not on file     Transportation needs     Medical: Not on file     Non-medical: Not on file   Tobacco Use     Smoking status: Former Smoker     Years: 12.00     Types: Cigarettes     Smokeless tobacco: Never Used     Tobacco comment: 3 cigs/day   Substance and Sexual Activity     Alcohol use: Yes     Frequency: Monthly or less     Comment: occasional     Drug use: Yes     Types: Marijuana     Sexual activity: Yes     Partners: Male     Birth control/protection: Condom   Lifestyle     Physical activity     Days per week: Not on file     Minutes per session: Not on file     Stress: Not on file   Relationships     Social connections     Talks on phone: Not on file     Gets together: Not on file     Attends Yarsanism service: Not on file     Active member of club or organization: Not on file     Attends meetings of clubs or organizations: Not on file     Relationship status: Not on file     Intimate partner violence     Fear of current or ex partner: Not on file     Emotionally abused: Not on file     Physically abused: Not on file     Forced sexual activity: Not on file   Other Topics Concern     Not on file   Social History Narrative     Not on file              Review of Systems:     7-point ROS reviewed and negative unless otherwise noted in HPI            Physical Exam:     Vitals:    04/16/20 1437   BP: 123/80   Pulse: 68   Resp: 18   Temp: 98.2  F (36.8  C)   TempSrc: Oral   SpO2: 99%     There is no height or weight on file to calculate BMI.    GENERAL: healthy, alert and no distress  EYES: Eyes grossly normal to  inspection  HENT: nose and mouth without ulcers or lesions  NECK: no asymmetry, masses, or scars  RESP: breathing and speaking comfortably, normal RR  CV: acyanotic  MS: extremities normal- no gross deformities noted  SKIN: no suspicious lesions or rashes  NEURO: mentation intact, speech normal and A&Ox3  PSYCH: affect/mood appropriate      Assessment and Plan     (N93.9) Vaginal bleeding  (primary encounter diagnosis)  (Z32.01) Positive urine pregnancy test  Comment: Painless vaginal bleeding with positive UPT.  Confirmation TVUS showed no gestation sac or fetal heartbeat.  No products of conception seen either.  VS WNL.  Discussed possible diagnoses, prognoses, and options for treatment.  Will check serum B-HCG and go from there to determine when to recheck.    Plan:   -Beta-HCG Quantitative ([facility])  -Recheck date depending on value        Options for treatment and follow-up care were reviewed with the patient and/or guardian. Tri S Garrison and/or guardian engaged in the decision making process and verbalized understanding of the options discussed and agreed with the final plan.      Abhi Gusman MD    Precepted today with: Dr. Martinez

## 2020-04-17 ENCOUNTER — TELEPHONE (OUTPATIENT)
Dept: FAMILY MEDICINE | Facility: CLINIC | Age: 30
End: 2020-04-17

## 2020-04-17 NOTE — TELEPHONE ENCOUNTER
[facility] phone call message- general phone call:    Reason for call: Patient called stating she was seen yesterday and is wondering if her results are back and would like to know them. Please call and advise.    Return call needed: Yes    OK to leave a message on voice mail? Yes    Primary language: English      needed? No    Call taken on April 17, 2020 at 3:22 PM by Yareli Lozano

## 2020-04-18 DIAGNOSIS — Z32.01 PREGNANCY TEST POSITIVE: Primary | ICD-10-CM

## 2020-04-18 DIAGNOSIS — N93.9 VAGINAL BLEEDING: ICD-10-CM

## 2020-04-19 NOTE — TELEPHONE ENCOUNTER
Spoke with Ivonne.    Told Ivonne results, and that this makes miscarriage more likely considering atypical, painless vaginal bleeding after positive UPT.  However, will recheck CG this coming Thursday to chart out change.  It is still possible for early IUP or ectopic, although it is much less likely.    Patient understands this and will come back this week for blood work.

## 2020-04-20 ENCOUNTER — TELEPHONE (OUTPATIENT)
Dept: FAMILY MEDICINE | Facility: CLINIC | Age: 30
End: 2020-04-20

## 2020-04-20 NOTE — PROGRESS NOTES
Preceptor Attestation:  Patient's case reviewed and discussed with the resident, Abhi Gusman MD, and I personally evaluated the patient. I agree with written assessment and plan of care.    Supervising Physician:  Savita Martinez MD   [facility]

## 2020-04-24 DIAGNOSIS — Z32.01 PREGNANCY TEST POSITIVE: ICD-10-CM

## 2020-04-24 DIAGNOSIS — N93.9 VAGINAL BLEEDING: ICD-10-CM

## 2020-04-24 LAB — B-HCG SERPL-ACNC: 361 MLU/ML (ref 0–4)

## 2020-04-27 ENCOUNTER — TELEPHONE (OUTPATIENT)
Dept: FAMILY MEDICINE | Facility: CLINIC | Age: 30
End: 2020-04-27

## 2020-04-27 DIAGNOSIS — Z32.01 POSITIVE URINE PREGNANCY TEST: Primary | ICD-10-CM

## 2020-04-27 DIAGNOSIS — N93.9 VAGINAL BLEEDING: ICD-10-CM

## 2020-04-28 DIAGNOSIS — Z32.01 POSITIVE URINE PREGNANCY TEST: ICD-10-CM

## 2020-04-28 DIAGNOSIS — N93.9 VAGINAL BLEEDING: ICD-10-CM

## 2020-04-28 LAB — B-HCG SERPL-ACNC: 1733 MLU/ML (ref 0–4)

## 2020-04-28 NOTE — RESULT ENCOUNTER NOTE
"Attempted to call patient, but got only voicemail.  Left VM.  Please call with results.     \"Your pregnancy hormone has actually continued to increase, making a pregnancy inside the uterus much more likely.  The bleeding may have been from implantation of the embryo into the uterus wall.  We will need you to come back as soon as possible to clinic for a lab visit to check the level again.  Once the hormone is high enough, we can recheck an ultrasound to see baby.\"     Abhi Gusman MD"

## 2020-04-28 NOTE — TELEPHONE ENCOUNTER
Called patient, but no answer.  Left message for patient to call back clinic for lab result.    Patient's serum B-HCG has increased appropriately for an IUP.  She should have a recheck tomorrow (4/28) or whenever next available.  Will need to continue to get serial B-HCG labs until around [id number], at which point a TVUS can be attempted again.  Need to know also if having vaginal bleeding again or abd/pelvic pain.    Order placed for serum B-HCG

## 2020-04-29 ENCOUNTER — OFFICE VISIT (OUTPATIENT)
Dept: FAMILY MEDICINE | Facility: CLINIC | Age: 30
End: 2020-04-29
Payer: COMMERCIAL

## 2020-04-29 ENCOUNTER — TELEPHONE (OUTPATIENT)
Dept: FAMILY MEDICINE | Facility: CLINIC | Age: 30
End: 2020-04-29

## 2020-04-29 VITALS
TEMPERATURE: 98.7 F | RESPIRATION RATE: 16 BRPM | HEART RATE: 91 BPM | WEIGHT: 267.6 LBS | SYSTOLIC BLOOD PRESSURE: 104 MMHG | BODY MASS INDEX: 43.01 KG/M2 | HEIGHT: 66 IN | OXYGEN SATURATION: 99 % | DIASTOLIC BLOOD PRESSURE: 74 MMHG

## 2020-04-29 DIAGNOSIS — E66.01 CLASS 3 SEVERE OBESITY DUE TO EXCESS CALORIES WITHOUT SERIOUS COMORBIDITY WITH BODY MASS INDEX (BMI) OF 40.0 TO 44.9 IN ADULT (H): ICD-10-CM

## 2020-04-29 DIAGNOSIS — E66.813 CLASS 3 SEVERE OBESITY DUE TO EXCESS CALORIES WITHOUT SERIOUS COMORBIDITY WITH BODY MASS INDEX (BMI) OF 40.0 TO 44.9 IN ADULT (H): ICD-10-CM

## 2020-04-29 DIAGNOSIS — Z32.01 POSITIVE URINE PREGNANCY TEST: Primary | ICD-10-CM

## 2020-04-29 DIAGNOSIS — Z32.00 POSSIBLE PREGNANCY, NOT YET CONFIRMED: Primary | ICD-10-CM

## 2020-04-29 LAB — B-HCG SERPL-ACNC: 2114 MLU/ML (ref 0–4)

## 2020-04-29 ASSESSMENT — MIFFLIN-ST. JEOR: SCORE: 1955.58

## 2020-04-29 NOTE — PROGRESS NOTES
Assessment and Plan   1. Possible pregnancy, not yet confirmed  Beta-HCG trend as per HPI - appears that she is indeed pregnant although we need to confirm this. Educated her that she should act as though she is pregnant in terms of medications, behaviors, etc - she is already on a prenatal.  - Beta-HCG Quantitative ([facility])  - US OB TRANSVAGINAL; Future    2. Class 3 severe obesity due to excess calories without serious comorbidity with body mass index (BMI) of 40.0 to 44.9 in adult (H)  Problem list updated with the above.    Follow up tomorrow for US.    Options for treatment and follow-up care were reviewed with the patient and/or guardian. Lucinda Ji and/or guardian engaged in the decision making process and verbalized understanding of the options discussed and agreed with the final plan.    Sanjeev Vila MD  [facility], PGY-3    Precepted patient with Dr. Joie Pérez       HPI:   Lucinda Ji is a 29 year old female who presents to clinic today for   Chief Complaint   Patient presents with     Vaginal Bleeding     bleeding has stopped, pt stated she is suppose to get an ultra sound done here today     Medication Reconciliation     needs attention     LMP 2/10/2020 but she developed vaginal bleeding around 3/20. Bleeding lasted for a couple of days, but then stopped.    Beta hCG 6  -> 361  -> 1,733 . US on  did not visualize IUP or EUP, just a fibroid uterus.    Patient has had 2 miscarriages - 4.5 months and 6-7 weeks. These were her first 2 pregnancies at 14 and 16 years of age.    Third pregnancy was an , unsure if medical or instrumental.    Fourth and fifth pregnancies were also abortions, she thinks they might have had to do a D&C for these.    She has a 6 year old daughter at home. This was her most recent one.    She is indifferent to this pregnancy in terms of if it is desired or not.          Review of Systems:     Constitutional, HEENT, cardiovascular, pulmonary, GI, musculoskeletal, neuro, skin, and psych systems are negative, except as otherwise noted.          PMHX:   Active Problems List  Patient Active Problem List   Diagnosis     Nausea     Dizziness     History of traumatic brain injury     Encounter for screening for cervical cancer      Anxiety and depression     Intractable migraine with aura with status migrainosus     Active problem list reviewed and updated.    Current Medications  Current Outpatient Medications   Medication Sig Dispense Refill     acetaminophen (TYLENOL) 325 MG tablet Take 650 mg by mouth       albuterol (PROAIR HFA/PROVENTIL HFA/VENTOLIN HFA) 108 (90 Base) MCG/ACT inhaler Inhale 1-2 puffs into the lungs       Prenatal Vit-Fe Fumarate-FA (PRENATAL MULTIVITAMIN W/IRON) 27-0.8 MG tablet Take 1 tablet by mouth daily 90 tablet 3     Dextromethorphan-guaiFENesin  MG/5ML syrup Take 10 mLs by mouth       diphenhydrAMINE (BENADRYL) 25 MG capsule Take 25-50 mg by mouth       ondansetron (ZOFRAN) 4 MG tablet Take 1 tablet (4 mg) by mouth every 8 hours as needed for nausea or vomiting (Patient not taking: Reported on 3/25/2020) 30 tablet 0     SUMAtriptan (IMITREX) 50 MG tablet Take 1 tablet (50 mg) by mouth at onset of headache for migraine May repeat in 2 hours. Max 4 tablets/24 hours. (Patient not taking: Reported on 3/25/2020) 90 tablet 0     Medication list reviewed and updated.    Social History  Social History     Tobacco Use     Smoking status: Former Smoker     Years: 12.00     Types: Cigarettes     Smokeless tobacco: Never Used     Tobacco comment: 3 cigs/day   Substance Use Topics     Alcohol use: Yes     Frequency: Monthly or less     Comment: occasional     Drug use: Yes     Types: Marijuana     History   Drug Use     Types: Marijuana     Family History  Family History   Problem Relation Age of Onset     Brain Tumor Mother      Heart Disease Father      Thyroid  "Disease Sister      Asthma Brother      Allergies  No Known Allergies       Physical Exam:     Vitals:    04/29/20 1021   BP: 104/74   Pulse: 91   Resp: 16   Temp: 98.7  F (37.1  C)   TempSrc: Oral   SpO2: 99%   Weight: 121.4 kg (267 lb 9.6 oz)   Height: 1.676 m (5' 6\")     Body mass index is 43.19 kg/m .    GENERAL APPEARANCE: alert, appears stated age, no acute distress  HEENT: Eyes grossly normal to inspection, nares normal  RESP: lungs clear to auscultation - no rales, rhonchi, or wheezes  CV: regular rate and rhythm, no murmur, click, rub, or gallop  ABDOMEN: soft, obese, nontender   MSK: extremities normal, no gross deformities noted, no lower extremity edema  NEURO: sensory exam grossly normal, mentation appears intact and speech normal  PSYCH: mood and affect normal/bright      "

## 2020-04-29 NOTE — TELEPHONE ENCOUNTER
Spoke with patient.    B-HCG level points towards likely IUP.  Patient has no bleeding or abd pain.  Patient already contacted about TVUS scheduling.  Again, patient wants to go forward with pregnancy.    Placed order for TVUS.

## 2020-04-30 NOTE — RESULT ENCOUNTER NOTE
Called and discussed result with patient - still indicates probable pregnancy.    All questions answered.    Sanjeev Vila MD  [facility], PGY-3

## 2020-05-01 NOTE — PROGRESS NOTES
Preceptor Attestation:  Patient's case reviewed and discussed with  Patient seen and discussed with the resident..  I agree with written assessment and plan of care.  Supervising Physician:  Joie Pérez MD  [facility]

## 2020-05-07 DIAGNOSIS — Z32.01 POSITIVE URINE PREGNANCY TEST: ICD-10-CM

## 2020-07-28 ENCOUNTER — OFFICE VISIT (OUTPATIENT)
Dept: FAMILY MEDICINE | Facility: CLINIC | Age: 30
End: 2020-07-28
Payer: COMMERCIAL

## 2020-07-28 ENCOUNTER — TELEPHONE (OUTPATIENT)
Dept: FAMILY MEDICINE | Facility: CLINIC | Age: 30
End: 2020-07-28

## 2020-07-28 VITALS
OXYGEN SATURATION: 99 % | HEART RATE: 98 BPM | RESPIRATION RATE: 18 BRPM | BODY MASS INDEX: 41.93 KG/M2 | TEMPERATURE: 98 F | SYSTOLIC BLOOD PRESSURE: 123 MMHG | DIASTOLIC BLOOD PRESSURE: 85 MMHG | WEIGHT: 259.8 LBS

## 2020-07-28 DIAGNOSIS — N89.8 VAGINAL DISCHARGE: Primary | ICD-10-CM

## 2020-07-28 LAB
BACTERIA: NORMAL
CLUE CELLS: NORMAL
MOTILE TRICHOMONAS: NEGATIVE
ODOR: NORMAL
PH WET PREP: 5.3
WBC WET PREP: <2 (ref 2–5)
YEAST: NORMAL

## 2020-07-28 NOTE — TELEPHONE ENCOUNTER
Left message for patient to call me back, will need to complete OB intake and start prenatal care as patient would like to continue receiving prenatal care from [facility]. Has not had prenatal care thus far in pregnancy. Lorena COVARRUBIAS

## 2020-07-28 NOTE — PROGRESS NOTES
Assessment and Plan   ***    Follow up in {Follow Up:178618} {CDB Follow Up 2:014263}.    Options for treatment and follow-up care were reviewed with the patient and/or guardian. Lucinda Ji and/or guardian engaged in the decision making process and verbalized understanding of the options discussed and agreed with the final plan.    Kalpesh Hand DO, MEMO  [facility], PGY-2    Precepted patient with {[facility] Preceptors:701067}       HPI:   Lucinda Ji is a 29 year old female who presents to clinic today for   Chief Complaint   Patient presents with     Vaginal Problem     vaginal discharge     ***    {:978097}         Review of Systems:     A complete 12 point ROS was negative unless otherwise noted in HPI.          PMHX:   Active Problems List  Patient Active Problem List   Diagnosis     Nausea     Dizziness     History of traumatic brain injury     Encounter for screening for cervical cancer      Anxiety and depression     Intractable migraine with aura with status migrainosus     Class 3 severe obesity due to excess calories without serious comorbidity with body mass index (BMI) of 40.0 to 44.9 in adult (H)       Current Medications  Current Outpatient Medications   Medication Sig Dispense Refill     Prenatal Vit-Fe Fumarate-FA (PRENATAL MULTIVITAMIN W/IRON) 27-0.8 MG tablet Take 1 tablet by mouth daily 90 tablet 3     acetaminophen (TYLENOL) 325 MG tablet Take 650 mg by mouth       albuterol (PROAIR HFA/PROVENTIL HFA/VENTOLIN HFA) 108 (90 Base) MCG/ACT inhaler Inhale 1-2 puffs into the lungs         Social History  Social History     Tobacco Use     Smoking status: Former Smoker     Years: 12.00     Types: Cigarettes     Smokeless tobacco: Never Used     Tobacco comment: 3 cigs/day   Substance Use Topics     Alcohol use: Yes     Frequency: Monthly or less     Comment: occasional     Drug use: Yes     Types: Marijuana     History  "  Drug Use     Types: Marijuana       Family History  Family History   Problem Relation Age of Onset     Brain Tumor Mother      Heart Disease Father      Thyroid Disease Sister      Asthma Brother        Allergies  No Known Allergies         Physical Exam:     Vitals:    07/28/20 1144   BP: 123/85   Pulse: 98   Resp: 18   Temp: 98  F (36.7  C)   TempSrc: Oral   SpO2: 99%   Weight: 117.8 kg (259 lb 12.8 oz)     Body mass index is 41.93 kg/m .    GENERAL APPEARANCE: alert, appears stated age, no acute distress  HEENT: Eyes grossly normal to inspection, nares normal, and mouth and throat without erythema, ulcers, or lesions  RESP: lungs clear to auscultation - no rales, rhonchi, or wheezes  CV: regular rate and rhythm, no murmur, click, rub, or gallop  ABDOMEN: soft, nontender   MSK: extremities normal, no gross deformities noted, no lower extremity edema  SKIN: no suspicious lesions or rashes   NEURO: Normal strength and tone, sensory exam grossly normal, mentation appears intact and speech normal  PSYCH: mood and affect normal/bright   ***    {EXAM -OPEN/NORMAL/ABNL:709092::\"GENERAL APPEARANCE: healthy, alert and no distress,\"}      "

## 2020-07-28 NOTE — PROGRESS NOTES
Assessment and Plan     (N89.8) Vaginal discharge  (primary encounter diagnosis)  Comment: Cervix closed and some white fluid on speculum exam. No concern for  at this time. Will call patient with results.  Plan: Wet Prep ([facility]), Chlamydia/Gono Amplified         ([facility])    Pregnant:   - Follow up for 1st OB in 1 week        Options for treatment and follow-up care were reviewed with the patient and/or guardian. Lucinda Ji and/or guardian engaged in the decision making process and verbalized understanding of the options discussed and agreed with the final plan.    Kalpesh Hand DO, MEMO  [facility], PGY-2    Precepted patient with Dr. Damaris Thomas       HPI:   Lucinda Ji is a 29 year old female who presents to clinic today for   Chief Complaint   Patient presents with     Vaginal Problem     vaginal discharge     Pimple near vagina   - appeared 2 weeks ago  - painful     Vaginal discharge:  - this AM after bath   - no cramps, no bleeding  - clear fluid  - no odor  - SA last on     Denies F, dysuria           Review of Systems:     A complete 12 point ROS was negative unless otherwise noted in HPI.          PMHX:   Active Problems List  Patient Active Problem List   Diagnosis     Nausea     Dizziness     History of traumatic brain injury     Encounter for screening for cervical cancer      Anxiety and depression     Intractable migraine with aura with status migrainosus     Class 3 severe obesity due to excess calories without serious comorbidity with body mass index (BMI) of 40.0 to 44.9 in adult (H)       Current Medications  Current Outpatient Medications   Medication Sig Dispense Refill     Prenatal Vit-Fe Fumarate-FA (PRENATAL MULTIVITAMIN W/IRON) 27-0.8 MG tablet Take 1 tablet by mouth daily 90 tablet 3     acetaminophen (TYLENOL) 325 MG tablet Take 650 mg by mouth       albuterol (PROAIR HFA/PROVENTIL HFA/VENTOLIN  HFA) 108 (90 Base) MCG/ACT inhaler Inhale 1-2 puffs into the lungs         Social History  Social History     Tobacco Use     Smoking status: Former Smoker     Years: 12.00     Types: Cigarettes     Smokeless tobacco: Never Used     Tobacco comment: 3 cigs/day   Substance Use Topics     Alcohol use: Yes     Frequency: Monthly or less     Comment: occasional     Drug use: Yes     Types: Marijuana     History   Drug Use     Types: Marijuana       Family History  Family History   Problem Relation Age of Onset     Brain Tumor Mother      Heart Disease Father      Thyroid Disease Sister      Asthma Brother        Allergies  No Known Allergies         Physical Exam:     Vitals:    07/28/20 1144   BP: 123/85   Pulse: 98   Resp: 18   Temp: 98  F (36.7  C)   TempSrc: Oral   SpO2: 99%   Weight: 117.8 kg (259 lb 12.8 oz)     Body mass index is 41.93 kg/m .    GENERAL APPEARANCE: alert, appears stated age, no acute distress  HEENT: Eyes grossly normal to inspection, nares normal, and mouth and throat without erythema, ulcers, or lesions  RESP: lungs clear to auscultation - no rales, rhonchi, or wheezes  CV: regular rate and rhythm, no murmur, click, rub, or gallop  ABDOMEN: obese, soft, nontender   MSK: extremities normal, no gross deformities noted, no lower extremity edema  SKIN: no suspicious lesions or rashes   : Cervix closed on speculum exam. Some white fluid noted however.  NEURO: Normal strength and tone, sensory exam grossly normal, mentation appears intact and speech normal  PSYCH: mood and affect normal/bright

## 2020-07-31 ENCOUNTER — TELEPHONE (OUTPATIENT)
Dept: FAMILY MEDICINE | Facility: CLINIC | Age: 30
End: 2020-07-31

## 2020-07-31 LAB
C TRACH RRNA SPEC QL NAA+PROBE: NEGATIVE
N GONORRHOEA RRNA SPEC QL NAA+PROBE: NEGATIVE

## 2020-07-31 NOTE — TELEPHONE ENCOUNTER
[facility] phone call message- patient requesting results:    Test: Lab    Date of test: 07/28/2020    Additional Comments: Calling to get her lab result. Please call and advise.     OK to leave a message on voice mail?     Primary language: English      needed? No    Call taken on July 31, 2020 at 10:36 AM by Rober Lozano

## 2020-07-31 NOTE — TELEPHONE ENCOUNTER
Patient returning Sultana's call, let her know that she is not available right now and will call her back in 15 mins. She also called and I did a different message for her lab results as she wants to know her result. Please call and advise.

## 2020-07-31 NOTE — TELEPHONE ENCOUNTER
OB intake completed with patient via phone on 2020 at 11am. Lucinda (Ivonne) is a 28 yo, American female,  history of two miscarriages( age 14 and 16) and three elective  (age 19, 21 and 24). First born was full term, uncomplicated pregnancy, delivered via . Current pregnancy was unplanned but desired by patient, per pt unknown how involved fob/boyfriend will be. FOB has been informed of pregnancy, Ivonne does not want to disclose name/ information about FOB.  FOB/boyfriend also has children of his own with another women. Of note: boyfriend has a 5 years old son who has not been able to walk since age appropriate.  Ivonne does not know the medical background of boyfriend's son nor much about FOB side of the family. Otherwise no known genetic disorders in Ivonne's side of the family. Ivonne reports being diagnosed with hypertension at age 22 prescribed anti- hypertensive x only 1 month.  States blood pressures have been within normal range since not on medication.  LMP unknown, has had early dating US done at [facility] in May 2020 @ 6 weeks 2 days gestation, [facility]ENCION based on US is 2020. Ivonne has not yet any prenatal care thus far in pregnancy.  Has been seen in ED x 3, in May and July for hyperemesis. Ivonne has been established with [facility] since 2020.  Will be seen by Dr Hand for prenatal care, NOB is scheduled 2020.     Average Risk Category  No significant risk factors: No    At Risk Category (up to 3)  Teen pregnancy: No  Poor social situation: No  Domestic abuse: No  Financial difficulties: No  Smoker: No  H/O  deliver: No  H/O drug abuse: No  Non-English speaking: No  Advanced maternal age: No  GDM risks: No  Previous C/S: No  H/O PIH: No  H/O STIs: Yes,reports with different partner, dx Trichomonas and Chlamydia > 10 years ago.  H/O mental health concerns: No  Onset care > 20 weeks: No  Other: NONE    High Risk Category (4 or more At Risk or)  Diabetes/GDM: No  Multiple gestation: No  Chronic  hypertension: No  Significant hx of asthma: No  Fetal demise > 20 weeks: No  Positive tox screen: No  Current mental health treatment: No  Other: NONE    Risk: Average Risk   Date determined: 7/31/2020  Lorena COVARRUBIAS

## 2020-07-31 NOTE — TELEPHONE ENCOUNTER
Patient informed ,results not available yet. Please call when resulted. Thank you.  Lorena COVARRUBIAS

## 2020-08-05 ENCOUNTER — OFFICE VISIT (OUTPATIENT)
Dept: FAMILY MEDICINE | Facility: CLINIC | Age: 30
End: 2020-08-05
Payer: COMMERCIAL

## 2020-08-05 VITALS
DIASTOLIC BLOOD PRESSURE: 80 MMHG | TEMPERATURE: 97.9 F | HEART RATE: 69 BPM | RESPIRATION RATE: 24 BRPM | WEIGHT: 256 LBS | BODY MASS INDEX: 41.32 KG/M2 | OXYGEN SATURATION: 97 % | SYSTOLIC BLOOD PRESSURE: 143 MMHG

## 2020-08-05 DIAGNOSIS — H53.8 BLURRED VISION: ICD-10-CM

## 2020-08-05 DIAGNOSIS — O21.9 NAUSEA AND VOMITING DURING PREGNANCY PRIOR TO 22 WEEKS GESTATION: Primary | ICD-10-CM

## 2020-08-05 DIAGNOSIS — R42 DIZZINESS: ICD-10-CM

## 2020-08-05 NOTE — PROGRESS NOTES
Assessment and Plan     OB w/ Vomiting/Blurry vision/Abdominal Pain:  Vomiting for a couple weeks, now with dizziness/blurry vision and abdominal pain that is new as of this AM. /80 in clinic. Given concern for pre-eclampsia and hyperemesis gravidarum, amongst others, I feel it's best to transfer her to [facility] for further work up.  - Send pt to ED at [facility].    OB GA 19w1d  - ASCENCION 12/29/20  - Today was supposed to be first prenatal visit, so will need to reschedule.     Options for treatment and follow-up care were reviewed with the patient and/or guardian. Lucinda Ji and/or guardian engaged in the decision making process and verbalized understanding of the options discussed and agreed with the final plan.    Kalpesh Hand DO, MEMO  [facility], PGY-2    Precepted patient with Dr. Antony Stanley       HPI:   Lucinda Ji is a 29 year old female who presents to clinic today for First OB Visit.     First OB Visit today, ASCENCION 12/19/20, however patient is complaining of terrible dizziness/light headedness/blurry vision that began this AM. She did drive her self to clinic. Endorses weeks of intermittent N/V, but has worsened in past week. Has tried unisom but hasn't helped much. Endorses abdominal pain, mid/epigastric starting today, 7/10, achey/sharp.     Pt did vomit about 750mL in the room, looked very uncomfortable, having to change positions frequently, and did urinate herself.    Denies F, CP, SOB, changes in hearing, numbness/tingling, changes in GI/, or any other concerns.           Review of Systems:     A complete 12 point ROS was negative unless otherwise noted in HPI.          PMHX:   Active Problems List  Patient Active Problem List   Diagnosis     Nausea     Dizziness     History of traumatic brain injury     Encounter for screening for cervical cancer      Anxiety and depression     Intractable migraine  with aura with status migrainosus     Class 3 severe obesity due to excess calories without serious comorbidity with body mass index (BMI) of 40.0 to 44.9 in adult (H)       Current Medications  Current Outpatient Medications   Medication Sig Dispense Refill     acetaminophen (TYLENOL) 325 MG tablet Take 650 mg by mouth       albuterol (PROAIR HFA/PROVENTIL HFA/VENTOLIN HFA) 108 (90 Base) MCG/ACT inhaler Inhale 1-2 puffs into the lungs       Prenatal Vit-Fe Fumarate-FA (PRENATAL MULTIVITAMIN W/IRON) 27-0.8 MG tablet Take 1 tablet by mouth daily 90 tablet 3       Social History  Social History     Tobacco Use     Smoking status: Former Smoker     Years: 12.00     Types: Cigarettes     Smokeless tobacco: Never Used     Tobacco comment: 3 cigs/day   Substance Use Topics     Alcohol use: Yes     Frequency: Monthly or less     Comment: occasional     Drug use: Yes     Types: Marijuana     History   Drug Use     Types: Marijuana       Family History  Family History   Problem Relation Age of Onset     Brain Tumor Mother      Heart Disease Father      Thyroid Disease Sister      Asthma Brother        Allergies  No Known Allergies         Physical Exam:     Vitals:    08/05/20 1020   BP: (!) 143/80   BP Location: Right arm   Patient Position: Sitting   Cuff Size: Adult Large   Pulse: 69   Resp: 24   Temp: 97.9  F (36.6  C)   TempSrc: Oral   SpO2: 97%   Weight: 116.1 kg (256 lb)     Body mass index is 41.32 kg/m .    GENERAL APPEARANCE: Alert, diaphoretic, uncomfortable, vomiting  CV: regular rate and rhythm  ABDOMEN: TTP mid/epigastric  MSK: no lower extremity edema  SKIN: no suspicious lesions or rashes   NEURO: Normal strength and tone, sensory exam grossly normal, mentation appears intact and speech normal  PSYCH: mood and affect normal/bright

## 2020-08-12 NOTE — PROGRESS NOTES
Preceptor Attestation:  Patient's case reviewed and discussed with Seth Hand MD resident and I evaluated the patient. I agree with written assessment and plan of care.  Supervising Physician:  Antony Stanley MD, MD [facility]  [facility]

## 2020-08-13 ENCOUNTER — OFFICE VISIT (OUTPATIENT)
Dept: FAMILY MEDICINE | Facility: CLINIC | Age: 30
End: 2020-08-13
Payer: COMMERCIAL

## 2020-08-13 VITALS
OXYGEN SATURATION: 97 % | RESPIRATION RATE: 22 BRPM | HEART RATE: 106 BPM | DIASTOLIC BLOOD PRESSURE: 72 MMHG | BODY MASS INDEX: 40.43 KG/M2 | WEIGHT: 257.6 LBS | TEMPERATURE: 98.4 F | HEIGHT: 67 IN | SYSTOLIC BLOOD PRESSURE: 106 MMHG

## 2020-08-13 DIAGNOSIS — Z32.01 PREGNANCY TEST POSITIVE: Primary | ICD-10-CM

## 2020-08-13 DIAGNOSIS — Z32.01 PREGNANCY TEST POSITIVE: ICD-10-CM

## 2020-08-13 DIAGNOSIS — Z34.82 ENCOUNTER FOR SUPERVISION OF OTHER NORMAL PREGNANCY IN SECOND TRIMESTER: ICD-10-CM

## 2020-08-13 DIAGNOSIS — R11.2 NON-INTRACTABLE VOMITING WITH NAUSEA, UNSPECIFIED VOMITING TYPE: ICD-10-CM

## 2020-08-13 LAB
BILIRUBIN UR: ABNORMAL MG/DL
BLOOD UR: ABNORMAL MG/DL
GLUCOSE URINE: NEGATIVE
HCT VFR BLD AUTO: 40 % (ref 35–47)
HEMOGLOBIN: 12.8 G/DL (ref 11.7–15.7)
HIV 1+2 AB+HIV1 P24 AG SERPL QL IA: NEGATIVE
KETONES UR QL: ABNORMAL MG/DL
LEUKOCYTE ESTERASE UR: NEGATIVE
MCH RBC QN AUTO: 29.6 PG (ref 26.5–35)
MCHC RBC AUTO-ENTMCNC: 32 G/DL (ref 32–36)
MCV RBC AUTO: 92.3 FL (ref 78–100)
NITRITE UR QL STRIP: NEGATIVE MG/DL
PH UR STRIP: 7 [PH] (ref 4.5–8)
PLATELET # BLD AUTO: 368 K/UL (ref 150–450)
PROTEIN UR: ABNORMAL MG/DL
RBC # BLD AUTO: 4.33 M/UL (ref 3.8–5.2)
SP GR UR STRIP: 1.02 (ref 1–1.03)
UROBILINOGEN UR STRIP-ACNC: 1 E.U./DL
WBC # BLD AUTO: 8.2 K/UL (ref 4–11)

## 2020-08-13 ASSESSMENT — MIFFLIN-ST. JEOR: SCORE: 1918.71

## 2020-08-13 NOTE — LETTER
August 20, 2020      [facility] DAVE Ji  [id number]  [place] [address]        Dear ,    We are writing to inform you of your test results.    I have personally reviewed the results of your prenatal laboratory testing. They are within normal limits. No further testing is needed at this time.    Resulted Orders   ABO/Rh Type-HML ([facility])   Result Value Ref Range    ABO/Rh(D) B POS     Repeat ABO/Rh Typing ([facility]) B POS     Narrative    Test performed by:   [facility]  [address], [place] [address]   Antibody Screen ([facility])   Result Value Ref Range    Antibody Screen Negative Negative    Narrative    Test performed by:   [facility]  [address], [place] [address]   Chlamydia/Gono Amplified ([facility])   Result Value Ref Range    Chlamydia trac,Amplified Prb Negative Negative    N gonorrhoeae,Amplified Prb Negative Negative    Narrative    Test performed by:  [facility]  [address] [place] [address]   CBC with Plt ([facility])   Result Value Ref Range    WBC 8.2 4.0 - 11.0 K/uL    RBC 4.33 3.80 - 5.20 M/uL    Hemoglobin 12.8 11.7 - 15.7 g/dL    Hematocrit 40.0 35.0 - 47.0 %    MCV 92.3 78.0 - 100.0 fL    MCH 29.6 26.5 - 35.0 pg    MCHC 32.0 32.0 - 36.0 g/dL    Platelets 368.0 150.0 - 450.0 K/uL   Culture Urine ([facility])   Result Value Ref Range    Culture SEE RESULTS BELOW       Comment:      CULTURE, URINE   SOURCE: Urine, Clean Catch   CULTURE RESULTS:    Mixture of urogenital organisms with    Org 1: 10,000-50,000 col/ml Group B Streptococcus      Narrative    Test performed by:  [facility]  [address] [place] [address]   Hepatitis B Surface Ag ([facility])   Result Value Ref Range    Hepatitis B Surface Antigen Negative Negative    Narrative    Test performed by:  [facility] [address] [place] [address]   HIV Ag/Ab Screen [place] ([facility])   Result Value Ref Range    HIV Antigen/Antibody Negative Negative    Narrative    Test  performed by:  [facility]  [address] [place] [address]  Method is Abbott HIV Ag/Ab for the detection of HIV p24 antigen, HIV-1   antibodies and HIV-2 antibodies.   Rubella  IgG ([facility])   Result Value Ref Range    Rubella IgG Positive     Narrative    Test performed by:  [facility] [address] [place] [address]  Negative: Absence of detectable rubella virus IgG antibodies. A negative   result presumes that immunity has not been acquired.   Equivocal: Suggest recollection.  Positive: Considered positive for IgG antibodies to rubella virus.   Syphilis Screen [place] ([facility])   Result Value Ref Range    Treponema Antibody (Syphilis) Negative Negative    Narrative    Test performed by:  [facility] [address] [place] [address]   Urinalysis([facility])   Result Value Ref Range    Specific Gravity Urine 1.025 1.005 - 1.030    pH Urine 7.0 4.5 - 8.0    Leukocyte Esterase UR NEGATIVE NEGATIVE    Nitrite Urine NEGATIVE NEGATIVE mg/dL    Protein UR 1+ (A) NEGATIVE mg/dL    Glucose Urine NEGATIVE NEGATIVE    Ketones Urine 2+ (A) NEGATIVE mg/dL    Urobilinogen mg/dL 1.0 E.U./dL    Bilirubin UR 1+ (A) NEGATIVE mg/dL    Blood UR 1+ (A) NEGATIVE mg/dL       If you have any questions or concerns, please call the clinic at the number listed above.       Sincerely,        Kathy Butler MD

## 2020-08-13 NOTE — PROGRESS NOTES
"Hospitalization Follow-up Visit         HPI       Hospital Follow-up Visit:    Hospital:  [facility]   Date of Visit: 8/5/2020  Reason(s) for Visit: Nausea/emesis    Summary of hospitalization:  [facility] discharge summary reviewed  Patient states that she had been vomiting every other day. Patient is 20 weeks 2 days pregnant, and she has been struggling with nausea and emesis pretty severely. Patient also notes that she has been having \"spasms\" that come from the back of her buttock all the way down the back of her leg consistent with sciatica. During ED visit, all vitals were stable and within normal limits.Labs all looked stable. Patient was then instructed to continue taking B6 and unisom and was then prescribed unisom. Patient ran out of unisom which usually helps her sleep and has not been able to sleep since then. Patient takes reglan in the mornings when the nausea is really bad, and this helps. Patient feels that her nausea has improved slightly, though \"when it comes up, it comes up.\" Patient was initially 267 lb prior to pregnancy, and then she dropped to 257 when she first became pregnant, and she has been at this steady weight since then. Patient has emesis every day, multiple times daily. Patient is able to drink fluids and drinks plenty of water. Patient is definitely able to still eat, and eating helps her nausea.   Diagnostic Tests/Treatments reviewed.  Follow up needed: none  Other Healthcare Providers Involved in Patient s Care:         None  Update since discharge: stable.   Plan of care communicated with patient            No  was used for  this visit.             Review of Systems:   CONSTITUTIONAL: no fatigue, no unexpected change in weight  SKIN: no worrisome rashes, no worrisome moles, no worrisome lesions  EYES: no acute vision problems or changes  ENT: no ear problems, no mouth problems, no throat problems  RESP: no significant cough, no shortness of breath  CV: no chest " "pain, no palpitations, no new or worsening peripheral edema  GI: no nausea, no vomiting, no constipation, no diarrhea            Physical Exam:     Vitals:    08/13/20 1447   BP: 106/72   BP Location: Right arm   Patient Position: Sitting   Cuff Size: Adult Large   Pulse: 106   Resp: 22   Temp: 98.4  F (36.9  C)   TempSrc: Oral   SpO2: 97%   Weight: 116.8 kg (257 lb 9.6 oz)   Height: 1.69 m (5' 6.54\")     Body mass index is 40.91 kg/m .    GENERAL: healthy, alert and no distress  RESP: lungs clear to auscultation - no rales, no rhonchi, no wheezes  CV: regular rates and rhythm, normal S1 S2, no S3 or S4 and no murmur, no click or rub -  ABDOMEN: soft, no tenderness, no  hepatosplenomegaly, no masses; uterine fundus at approximately 20 cm  MS: extremities- no gross deformities noted, no edema  SKIN: no suspicious lesions, no rashes  NEURO: strength and tone- normal, sensory exam- grossly normal, mentation- intact, speech- normal, reflexes- symmetric  PSYCH: Alert and oriented times 3; speech- coherent , normal rate and volume; able to articulate logical thoughts, able to abstract reason, no tangential thoughts, no hallucinations or delusions, affect- normal         Results:     Results from the last 24 hours   Results for orders placed or performed in visit on 08/13/20 (from the past 24 hour(s))   CBC with Plt ([facility])   Result Value Ref Range    WBC 8.2 4.0 - 11.0 K/uL    RBC 4.33 3.80 - 5.20 M/uL    Hemoglobin 12.8 11.7 - 15.7 g/dL    Hematocrit 40.0 35.0 - 47.0 %    MCV 92.3 78.0 - 100.0 fL    MCH 29.6 26.5 - 35.0 pg    MCHC 32.0 32.0 - 36.0 g/dL    Platelets 368.0 150.0 - 450.0 K/uL   Urinalysis([facility])   Result Value Ref Range    Specific Gravity Urine 1.025 1.005 - 1.030    pH Urine 7.0 4.5 - 8.0    Leukocyte Esterase UR NEGATIVE NEGATIVE    Nitrite Urine NEGATIVE NEGATIVE mg/dL    Protein UR 1+ (A) NEGATIVE mg/dL    Glucose Urine NEGATIVE NEGATIVE    Ketones Urine 2+ (A) NEGATIVE mg/dL    Urobilinogen mg/dL " 1.0 E.U./dL    Bilirubin UR 1+ (A) NEGATIVE mg/dL    Blood UR 1+ (A) NEGATIVE mg/dL       Assessment and Plan      Tri was seen today for er f/u and medication reconciliation.    Diagnoses and all orders for this visit:    Pregnancy test positive  -     Cancel: US OB > 14 Weeks  -     ABO/Rh Type-HML ([facility])  -     Antibody Screen ([facility])  -     Chlamydia/Gono Amplified ([facility])  -     CBC with Plt ([facility])  -     Culture Urine ([facility])  -     Hepatitis B Surface Ag ([facility])  -     HIV Ag/Ab Screen [place] ([facility])  -     Rubella  IgG ([facility])  -     Syphilis Screen [place] ([facility])  -     Urinalysis([facility])    Encounter for supervision of other normal pregnancy in second trimester    Non-intractable vomiting with nausea, unspecified vomiting type        1. Nausea and emesis: Patient was seen in clinic and then sent to the ER for nausea and emesis on 8/5/2020, presumed hyperemesis at that time. Labs and vitals all within normal limits then, and patient discharged with reglan in addition to B6 and unisom, which she was previously taking. Patient states that her nausea and emesis continues but is manageable with these medications, and she is able to hydrate adequately and eat normally. No additional weight loss.   - Continue with reglan, vitamin B6, and Unisom  - Will follow up as needed if nausea/emesis worsens or she is no longer able to eat and drink normally    2. Supervision of normal pregnancy: Patient follows with Dr. Burleson but was unable to get her prenatal labs last time as she was vomiting and sent to the ED. Still has not had her 20 week US.  - Fetal anatomy ultrasound ordered  - Prenatal labs ordered as listed above    E&M code to be billed if TCM cannot be: 13691    Type of decision making: Moderate complexity (02692)      Options for treatment and follow-up care were reviewed with the patient  Tri S Garrison   engaged in the decision making process and verbalized  understanding of the options discussed and agreed with the final plan.      Kathy Butler MD      Precepted with Dr. Quijano.

## 2020-08-14 LAB
ABO + RH BLD: NORMAL
BLD GP AB SCN SERPL QL: NEGATIVE
HBV SURFACE AG SERPL QL IA: NEGATIVE
REPEAT ABO/RH TYPING (HML): NORMAL
RUBV IGG SERPL QL IA: POSITIVE
TREPONEMA ANTIBODY (SYPHILIS): NEGATIVE

## 2020-08-15 LAB — CULTURE: NORMAL

## 2020-08-18 LAB
C TRACH RRNA SPEC QL NAA+PROBE: NEGATIVE
N GONORRHOEA RRNA SPEC QL NAA+PROBE: NEGATIVE

## 2020-08-18 NOTE — PROGRESS NOTES
Preceptor Attestation:   Patient seen, evaluated and discussed with the resident. I have verified the content of the note, which accurately reflects my assessment of the patient and the plan of care.  Supervising Physician:Damaris Thomas MD  [facility]

## 2020-08-19 ENCOUNTER — HOSPITAL ENCOUNTER (OUTPATIENT)
Dept: OBGYN | Facility: HOSPITAL | Age: 30
Discharge: HOME | End: 2020-08-19
Attending: FAMILY MEDICINE | Admitting: FAMILY MEDICINE

## 2020-08-19 DIAGNOSIS — O23.599 BACTERIAL VAGINOSIS IN PREGNANCY: ICD-10-CM

## 2020-08-19 DIAGNOSIS — R11.2 INTRACTABLE NAUSEA AND VOMITING: ICD-10-CM

## 2020-08-19 DIAGNOSIS — B96.89 BACTERIAL VAGINOSIS IN PREGNANCY: ICD-10-CM

## 2020-08-19 DIAGNOSIS — R82.71 GROUP B STREPTOCOCCAL BACTERIURIA: ICD-10-CM

## 2020-08-19 DIAGNOSIS — R11.2 NON-INTRACTABLE VOMITING WITH NAUSEA, UNSPECIFIED VOMITING TYPE: ICD-10-CM

## 2020-08-19 LAB
ALBUMIN UR-MCNC: ABNORMAL MG/DL
AMORPH CRY #/AREA URNS HPF: ABNORMAL /[HPF]
AMPHETAMINES UR QL SCN: ABNORMAL
APPEARANCE UR: ABNORMAL
BACTERIA #/AREA URNS HPF: ABNORMAL HPF
BARBITURATES UR QL: ABNORMAL
BENZODIAZ UR QL: ABNORMAL
BILIRUB UR QL STRIP: NEGATIVE
CANNABINOIDS UR QL SCN: ABNORMAL
CLUE CELLS: ABNORMAL
COCAINE UR QL: ABNORMAL
COLOR UR AUTO: YELLOW
CREAT UR-MCNC: 256.5 MG/DL
ETHANOL UR CFM-MCNC: <10 MG/DL
GLUCOSE UR STRIP-MCNC: NEGATIVE MG/DL
HGB UR QL STRIP: ABNORMAL
KETONES UR STRIP-MCNC: NEGATIVE MG/DL
LEUKOCYTE ESTERASE UR QL STRIP: NEGATIVE
METHADONE UR QL SCN: ABNORMAL
MUCOUS THREADS #/AREA URNS LPF: ABNORMAL LPF
NITRATE UR QL: NEGATIVE
OPIATES UR QL SCN: ABNORMAL
OXYCODONE UR QL: ABNORMAL
PCP UR QL SCN: ABNORMAL
PH UR STRIP: 8.5 [PH] (ref 4.5–8)
RBC #/AREA URNS AUTO: ABNORMAL HPF
SP GR UR STRIP: 1.02 (ref 1–1.03)
SQUAMOUS #/AREA URNS AUTO: ABNORMAL LPF
TRICHOMONAS, WET PREP: ABNORMAL
UROBILINOGEN UR STRIP-ACNC: ABNORMAL
WBC #/AREA URNS AUTO: ABNORMAL HPF
YEAST, WET PREP: ABNORMAL

## 2020-08-19 NOTE — RESULT ENCOUNTER NOTE
Please mail the results to the patient:     Dear Ms. Ji,    I have personally reviewed the results of your prenatal laboratory testing. They are within normal limits. No further testing is needed at this time.    Sincerely,    Kathy Butler MD

## 2020-08-20 ENCOUNTER — TELEPHONE (OUTPATIENT)
Dept: FAMILY MEDICINE | Facility: CLINIC | Age: 30
End: 2020-08-20

## 2020-08-20 NOTE — TELEPHONE ENCOUNTER
[facility] phone call message- medication clarification/question:    Full Medication Name: Bladder Infection Medication      Question: Patient states she was told in the emergency room that she has a bladder infection. She states medication was prescribed but pharmacy states it's not covered by insurance. She states pharmacy states to call clinic to have medication changed to an alternative medication. Please call and advise.     Pharmacy confirmed as [id number] - [place], [place] - [address] AT [facility]: Yes    OK to leave a message on voice mail? Yes    Primary language: English      needed? No    Call taken on August 20, 2020 at 9:44 AM by Eve Baeza

## 2020-08-20 NOTE — TELEPHONE ENCOUNTER
Will route to PCP who will be in clinic this afternoon. Will call pharmacy to see which medication is not covered. Lorena COVARRUBIAS

## 2020-08-20 NOTE — TELEPHONE ENCOUNTER
Received ED discharge summary for follow up.  Tri was seen in ED for Group B streptococcal bacteriuria, BV and nausea/vomiting in pregnancy. Tri has been seen in ED a few times in the last two months.  On 8/19/2020 she was prescribed Metronidazole oral for BV. Tried contacting Tri to assist scheduling follow up in clinic before the week end, would recommend sooner follow up as one of the common side effect of Metronidazole is nausea, concerned this may increase nausea/ vomiting secondary to pregnancy.     Left a message for Tri to call me back. Lorena COVARRUBIAS

## 2020-08-20 NOTE — TELEPHONE ENCOUNTER
Called [facility] to verify which medication is not covered. Pharmacist informs me Cephalexin 500 mg #15 taking 1 capsule three times a day was phoned in on 8/19/2020. No copay charge. Tri informed of above information. No further action is needed.  Lorena COVARRUBIAS

## 2020-08-21 ENCOUNTER — OFFICE VISIT (OUTPATIENT)
Dept: FAMILY MEDICINE | Facility: CLINIC | Age: 30
End: 2020-08-21
Payer: COMMERCIAL

## 2020-08-21 VITALS
SYSTOLIC BLOOD PRESSURE: 118 MMHG | DIASTOLIC BLOOD PRESSURE: 70 MMHG | RESPIRATION RATE: 20 BRPM | WEIGHT: 257 LBS | HEART RATE: 89 BPM | OXYGEN SATURATION: 100 % | TEMPERATURE: 98 F | BODY MASS INDEX: 40.82 KG/M2

## 2020-08-21 DIAGNOSIS — F12.10 MILD TETRAHYDROCANNABINOL (THC) ABUSE: ICD-10-CM

## 2020-08-21 DIAGNOSIS — O23.599 BACTERIAL VAGINOSIS IN PREGNANCY: ICD-10-CM

## 2020-08-21 DIAGNOSIS — R11.0 NAUSEA: Primary | ICD-10-CM

## 2020-08-21 DIAGNOSIS — B96.89 BACTERIAL VAGINOSIS IN PREGNANCY: ICD-10-CM

## 2020-08-21 DIAGNOSIS — R82.71 GROUP B STREPTOCOCCAL BACTERIURIA: ICD-10-CM

## 2020-08-21 LAB — CARBOXYTHC UR-MCNC: >500 NG/ML

## 2020-08-21 RX ORDER — DIPHENHYDRAMINE HCL 25 MG
25 TABLET ORAL EVERY 6 HOURS PRN
Qty: 12 TABLET | Refills: 0 | Status: SHIPPED | OUTPATIENT
Start: 2020-08-21 | End: 2022-09-06

## 2020-08-21 NOTE — PROGRESS NOTES
Preceptor Attestation:   Patient seen and discussed with the resident. Assessment and plan reviewed with resident and agreed upon.   Supervising Physician:  Jorge Roque MD  Family Medicine

## 2020-08-21 NOTE — PROGRESS NOTES
"       HPI:       Lucinda Ji is a 29 year old  female   at 21w3d who presents for follow up of hospital L&D triage visit on 20. Highlights from previous visit below. Since her previous visit:     -Abdominal/pelvic pain much improved.   -Still gets lower back spasms but fewer and less severe. Has not been using acetaminophen.   -Regarding bacteriuria, cefpodoxime was switched to cephalexin (due to insurance coverage). She picked up the medication and is taking it.   -Regarding the bacterial vaginosis, she is taking her metronidazole. She does feel nauseous with it but it is tolerable.   -Regarding her nausea and vomiting, nausea slightly worse with metronidazole but tolerable. Worse just prior to bedtime and some trouble sleeping. She has her anti-emetics as previously prescribed, and they are controlling her nausea satisfactorily.       Seen at [facility] L&D triage by myself on 20. Note below:  OBSTETRICS TRIAGE ASSESSMENT NOTE  Lucinda Ji is a 29 y.o.  at 21w1d gestation based on LMP and ultrasound who has presented to maternity care for further evaluation of abdominal/pelvic/back pain. Patient reports \"crampy\" mostly LLQ abdominal and back pain that started when she woke up from a nap. Regarding her back pain, it will sometimes shoot down her whole leg, and this is consistent with her known sciatica. Progressively over the evening and into this morning, she developed pelvic discomfort and was concerned that her \"baby was dropping down\". Given previous lost pregnancy (at about age 14), she was concerned for the same. Did not try any pain medication or other intervention. No abdominal trauma. No bleeding, leakage of fluids, contractions. Baby is moving normally. Urine is cloudy but no hematuria or dysuria. One episode of nausea/vomiting this morning; this is much better controlled since she started receiving medication from the ED/[facility]. Denies fever or chills. No known COVID exposure. " Seems to have a cough when getting out of the shower but otherwise not much of cough. No chest pain, shortness of breath, headache, vision changes, leg swelling.   She did have a urine culture done with her initial prenatal labs that came back positive for ~50,000 col/ml of group B strep. This has not been treated to this point.   Of note, she has a previous episode of BV during this pregnancy. She denies marijuana and other substance use.     ASSESSMENT/PLAN:   29 y.o.  at 21w1d gestation presenting to labor & delivery for abdominal/back/pelvic pain.     #Acute abdominal/back/pelvic pain   Patient presenting with less than 1 day of acute pain as above. Differential diagnosis is broad and includes but is not limited to fetal loss, pelvic inflammatory disease, ovarian pathology, bowel pathology such as diverticulitis, renal stone, pyelonephritis, among others. With previous reassuring ultrasound, cervical examination consistent with no progression of labor, and reassuring fetal heart tones today, low concern for fetal loss. Overtly inflammatory/infectious cause, such as diverticulitis or pyelonephritis, is felt to be less likely in the absence of fever at home or here. Wet prep obtained today showing clue cells, likely consistent with BV (as below). This is likely a large contributor to her current pain/symptoms. Possible signs of infection seen on UA, though UA today is contaminated with squamous cells. Still, cystitis may be contributing and will treat as below. Other causes of pain felt to be less likely / harm outweighs benefit of working up further. Regarding pain management, patient was well-controlled here without intervention. She slept multiple times without complaint of pain. She was urged to take acetaminophen (not exceeding 3g) at home. Acetaminophen script offered but declined as patient has enough at home.      #Bacterial vaginosis  No obvious vaginal discharge, though pain pattern is potentially  consistent with BV. Wet prep obtained, revealing clue cells. Given this, ordered Flagyl as below to treat likely BV.   -Flagyl 500 mg two times a day for 7 days.      #Bacteriuria of pregnancy    No obvious urinary symptoms, though some suprapubic tenderness on exam here today. UA here is contaminated with squamous epithelium but previous culture from clinic is positive for ~50,000 col/ml of Group B Strep. Factors for and against cystitis but in setting of previous positive culture with current suprapubic tenderness (in the setting of current pregnancy), we elected to treat with antibiotics here today. She does have back pain, but again, without fever, low suspicion for pyelonephritis.   -Cefpodoxime 100 mg two times a day for 7 days.      #Persistent nausea/vomiting related to pregnancy   #THC in urine   Patient initially denied all substance use, including marijuana though when this came back positive on UDS, she admitted to using marijuana for her nausea. She denies other substance use and no further evidence of other substances on UDS. It was stated that marijuana is not medically recommended for anti-emetic effect. Patient verbalized understanding.   She has struggled with severe nausea/vomiting in this pregnancy, though improved since she started taking medications prescribed as an outpatient. She reports Unisom as helping but has run out. I had tried to fill this for her but only Benadryl available. This was sent to her pharmacy, as well.          History:     Patient Active Problem List   Diagnosis     Nausea     Dizziness     History of traumatic brain injury     Encounter for screening for cervical cancer      Anxiety and depression     Intractable migraine with aura with status migrainosus     Class 3 severe obesity due to excess calories without serious comorbidity with body mass index (BMI) of 40.0 to 44.9 in adult (H)       Current Outpatient Medications   Medication Sig Dispense Refill      acetaminophen (TYLENOL) 325 MG tablet Take 650 mg by mouth       albuterol (PROAIR HFA/PROVENTIL HFA/VENTOLIN HFA) 108 (90 Base) MCG/ACT inhaler Inhale 1-2 puffs into the lungs       Prenatal Vit-Fe Fumarate-FA (PRENATAL MULTIVITAMIN W/IRON) 27-0.8 MG tablet Take 1 tablet by mouth daily 90 tablet 3       Social History     Social History Narrative     Not on file         No Known Allergies    No results found for this or any previous visit (from the past 24 hour(s)).         Review of Systems:     10 point ROS neg other than the symptoms noted above in the HPI.          Physical Exam:     Vitals:    08/21/20 1300   BP: 118/70   Pulse: 89   Resp: 20   Temp: 98  F (36.7  C)   SpO2: 100%   Weight: 116.6 kg (257 lb)     Body mass index is 40.82 kg/m .    Gen: Pleasant and comfortable. No acute distress  HEENT: No scleral icterus. moist mucous membranes.   CV: regular rate and rhythm without murmur.   Lungs: clear to ausculation, good air movement throughout  Abdomen: Gravid, non-tender, fundus just inferior to umbilicus. No tenderness to palpation.   MSK: no tenderness to palpation of left lower back / buttock.   Extremities: no lower extremity edema    Fetal heart tones 130.          Assessment and Plan:     Patient is a 29 year old female seen for Hospital L&D triage follow up:     #Acute abdominal/back/pelvic pain, resolved    Patient had presented with less than 1 day of acute pain. Unknown etiology, though perhaps BV/bacteruria/cystitis contributing as below. Low suspicion for other more serious etiologies (i.e. diverticulitis, colitis, ovarian pathology, among others). Pain is now much improved and well-managed, even without acetaminophen. With reassuring fetal heart tones today, continued low concern for fetal loss.      #Bacterial vaginosis  No obvious vaginal discharge, though previous pain pattern is potentially consistent with BV. Wet prep obtained, revealing clue cells at the hospital. Given this, Flagyl was  ordered 500 mg two times a day for 7 days. Patient is compliant and slightly nauseous but tolerating.      #Bacteriuria of pregnancy    No obvious urinary symptoms, though had some suprapubic tenderness exam at the hospital. UA at that time was contaminated with squamous epithelium but previous culture from clinic is positive for ~50,000 col/ml of Group B Strep. This was discussed with the patient and treatment of the bacteruria was recommended. She was agreeable to this. Cefpodoxime was initially prescribed but not covered by insurance. She was prescribed Keflex and is taking this now, as well.      #Persistent nausea/vomiting related to pregnancy   #THC in urine   Patient initially denied all substance use, including marijuana though when this came back positive on UDS, she admitted to using marijuana for her nausea. She denies other substance use and no further evidence of other substances on UDS. It was stated that marijuana is not medically recommended for anti-emetic effect. Patient verbalized understanding.   She has struggled with severe nausea/vomiting in this pregnancy, though improved since she started taking medications previously prescribed. She reports Unisom as helping but has run out. I had tried to fill this for her but only Benadryl available. This was sent to her pharmacy, as well.     Follow up: Set up for 9/2/20 for routine prenatal visit. Suggest glucose tolerance test at 24 weeks (rather than 28 weeks), given previous lost pregnancies.      Options for treatment and follow-up care were reviewed with the patient and/or guardian. Tri S Garrison and/or guardian engaged in the decision making process and verbalized understanding of the options discussed and agreed with the final plan.    Roxie Prado DO   [facility] Family Medicine Resident   Pager#[phone]    Precepted with: Jorge Roque MD.

## 2020-08-25 ENCOUNTER — TELEPHONE (OUTPATIENT)
Dept: FAMILY MEDICINE | Facility: CLINIC | Age: 30
End: 2020-08-25

## 2020-08-25 NOTE — TELEPHONE ENCOUNTER
Called to follow up on how she is feeling or doing with taking Metronidazole. Is she experiencing any worsening of nausea and vomiting with taking Metronidazole? Left a message for Tri to call me back. Will also assist in scheduling follow up with Dr Hand for 9/2/2020. Lorena COVARRUBIAS

## 2020-08-28 NOTE — TELEPHONE ENCOUNTER
"Spoke with Ivonne who informs me she had to go into ED yesterday morning. Woke up and felt had difficulty breathing. Taking Metronidazole without difficulty. \"Feeling better today\".  Ivonne agrees to schedule follow up with Dr Hand for 9/2/2020. Discussed due to her unwell feeling which brings her quite frequently to ED, would recommend more often follow up in clinic to ensure she is doing well and feeling ok for remaining of pregnancy. Lorena COVARRUBIAS  "

## 2020-09-02 ENCOUNTER — OFFICE VISIT (OUTPATIENT)
Dept: FAMILY MEDICINE | Facility: CLINIC | Age: 30
End: 2020-09-02
Payer: COMMERCIAL

## 2020-09-02 VITALS
OXYGEN SATURATION: 97 % | WEIGHT: 262 LBS | RESPIRATION RATE: 16 BRPM | HEIGHT: 67 IN | SYSTOLIC BLOOD PRESSURE: 124 MMHG | DIASTOLIC BLOOD PRESSURE: 80 MMHG | TEMPERATURE: 98 F | HEART RATE: 95 BPM | BODY MASS INDEX: 41.12 KG/M2

## 2020-09-02 DIAGNOSIS — O09.90 SUPERVISION OF HIGH RISK PREGNANCY, ANTEPARTUM: Primary | ICD-10-CM

## 2020-09-02 PROBLEM — Z87.09 HISTORY OF ASTHMA: Status: ACTIVE | Noted: 2020-05-26

## 2020-09-02 PROBLEM — R82.71 GBS BACTERIURIA: Status: ACTIVE | Noted: 2020-07-23

## 2020-09-02 RX ORDER — LANOLIN ALCOHOL/MO/W.PET/CERES
50 CREAM (GRAM) TOPICAL DAILY
Qty: 60 TABLET | Refills: 3 | Status: SHIPPED | OUTPATIENT
Start: 2020-09-02 | End: 2020-10-26

## 2020-09-02 RX ORDER — METOCLOPRAMIDE 5 MG/1
5 TABLET ORAL 2 TIMES DAILY PRN
Qty: 30 TABLET | Refills: 2 | Status: SHIPPED | OUTPATIENT
Start: 2020-09-02 | End: 2021-09-22

## 2020-09-02 ASSESSMENT — MIFFLIN-ST. JEOR: SCORE: 1946.05

## 2020-09-02 NOTE — PROGRESS NOTES
Preceptor Attestation:  Patient's case reviewed and discussed with Seth Hand MD resident and I evaluated the patient. I agree with written assessment and plan of care.    Will arrange for follow up urine culture.  Supervising Physician:  RADHA ALEXANDRA MD  [facility]

## 2020-09-02 NOTE — PROGRESS NOTES
"Lucinda Ji is a 29 year old  female who presents to clinic for a follow up OB visit. She is currently Unknown with an estimated date of delivery of Dec 29, 2020 via US. She denies headache, chest pain, shortness of breath, abdominal pain/contractions, vaginal bleeding, or abnormal discharge. She has felt baby move.     New concerns today: None    OB History    Para Term  AB Living   7 1 1 0 3 1   SAB TAB Ectopic Multiple Live Births   2 0 0 0 0      # Outcome Date GA Lbr Daquan/2nd Weight Sex Delivery Anes PTL Lv   7 Current            6             5             4 Term            3 AB            2 SAB            1 SAB                Physical exam:  /80 (BP Location: Right arm, Cuff Size: Adult Large)   Pulse 95   Temp 98  F (36.7  C) (Oral)   Resp 16   Ht 1.702 m (5' 7\")   Wt 118.8 kg (262 lb)   LMP 02/10/2020   SpO2 97%   BMI 41.04 kg/m      General: alert, female in no acute distress  Abdomen: gravid uterus appropriate for gestation age at 22 cm above pubic symphysis, non-tender, FHTs WNL  Extremities: no edema    Assessment/Plan:  Patient Active Problem List   Diagnosis     Nausea     Dizziness     History of traumatic brain injury     Encounter for screening for cervical cancer      Anxiety and depression     Intractable migraine with aura with status migrainosus     Class 3 severe obesity due to excess calories without serious comorbidity with body mass index (BMI) of 40.0 to 44.9 in adult (H)       Fetal survey ultrasound completed previously.   Patient is not planning to complete childbirth education classes.   If boy, would to have him circumcised.  Discussed pre-term labor signs and symptoms and when to call/come in.   Discussed expectations for gestational diabetes screen to be completed at next visit.    N/V:   Has been in ED a few times for this. Last visit with me about 1 month ago, she had to be sent to the ED due to this from clinic. Reglan, and B6 help. "   - Refilled Reglan PRN  - Refilled B6 every day     THC use:  - Advised not to use for N/V, as it can actually cause the opposite of desired effect.   - Gave referral to Mothers     Follow up in 4 weeks for routine prenatal visit.     Kalpesh Hand DO, MBA  [facility] Family Medicine Resident    Precepted with: Hilaria Elliott MD

## 2020-09-10 ENCOUNTER — HOSPITAL ENCOUNTER (OUTPATIENT)
Dept: OBGYN | Facility: HOSPITAL | Age: 30
Discharge: HOME | End: 2020-09-10
Attending: FAMILY MEDICINE | Admitting: FAMILY MEDICINE

## 2020-09-10 DIAGNOSIS — R11.2 NON-INTRACTABLE VOMITING WITH NAUSEA, UNSPECIFIED VOMITING TYPE: ICD-10-CM

## 2020-09-10 DIAGNOSIS — N94.9 ROUND LIGAMENT PAIN: ICD-10-CM

## 2020-09-10 DIAGNOSIS — F41.9 ANXIETY: ICD-10-CM

## 2020-09-10 LAB
ABO/RH(D): NORMAL
ALBUMIN SERPL-MCNC: 2.8 G/DL (ref 3.5–5)
ALBUMIN SERPL-MCNC: 2.8 G/DL (ref 3.5–5)
ALBUMIN UR-MCNC: ABNORMAL MG/DL
ALP SERPL-CCNC: 96 U/L (ref 45–120)
ALP SERPL-CCNC: 96 U/L (ref 45–120)
ALT SERPL W P-5'-P-CCNC: 18 U/L (ref 0–45)
ALT SERPL W P-5'-P-CCNC: 18 U/L (ref 0–45)
AMPHETAMINES UR QL SCN: ABNORMAL
ANION GAP SERPL CALCULATED.3IONS-SCNC: 8 MMOL/L (ref 5–18)
ANTIBODY SCREEN: NEGATIVE
APPEARANCE UR: ABNORMAL
APTT PPP: 29 SECONDS (ref 24–37)
AST SERPL W P-5'-P-CCNC: 18 U/L (ref 0–40)
AST SERPL W P-5'-P-CCNC: 18 U/L (ref 0–40)
BACTERIA #/AREA URNS HPF: ABNORMAL HPF
BARBITURATES UR QL: ABNORMAL
BASOPHILS # BLD AUTO: 0 THOU/UL (ref 0–0.2)
BASOPHILS NFR BLD AUTO: 0 % (ref 0–2)
BENZODIAZ UR QL: ABNORMAL
BILIRUB DIRECT SERPL-MCNC: 0.1 MG/DL
BILIRUB SERPL-MCNC: 0.3 MG/DL (ref 0–1)
BILIRUB SERPL-MCNC: 0.3 MG/DL (ref 0–1)
BILIRUB UR QL STRIP: NEGATIVE
BUN SERPL-MCNC: 4 MG/DL (ref 8–22)
CALCIUM SERPL-MCNC: 9 MG/DL (ref 8.5–10.5)
CANNABINOIDS UR QL SCN: ABNORMAL
CHLORIDE BLD-SCNC: 105 MMOL/L (ref 98–107)
CLUE CELLS: NORMAL
CO2 SERPL-SCNC: 22 MMOL/L (ref 22–31)
COCAINE UR QL: ABNORMAL
COLOR UR AUTO: YELLOW
CREAT SERPL-MCNC: 0.58 MG/DL (ref 0.6–1.1)
CREAT UR-MCNC: 107.1 MG/DL
EOSINOPHIL # BLD AUTO: 0.2 THOU/UL (ref 0–0.4)
EOSINOPHIL NFR BLD AUTO: 2 % (ref 0–6)
ERYTHROCYTE [DISTWIDTH] IN BLOOD BY AUTOMATED COUNT: 13.2 % (ref 11–14.5)
ETHANOL UR CFM-MCNC: <10 MG/DL
FIBRONECTIN FETAL VAG QL: NEGATIVE
GFR SERPL CREATININE-BSD FRML MDRD: >60 ML/MIN/1.73M2
GLUCOSE BLD-MCNC: 99 MG/DL (ref 70–125)
GLUCOSE UR STRIP-MCNC: NEGATIVE MG/DL
HCT VFR BLD AUTO: 37.3 % (ref 35–47)
HGB BLD-MCNC: 12.3 G/DL (ref 12–16)
HGB UR QL STRIP: ABNORMAL
IMM GRANULOCYTES # BLD: 0.1 THOU/UL
IMM GRANULOCYTES NFR BLD: 1 %
INR PPP: 0.95 (ref 0.9–1.1)
KETONES UR STRIP-MCNC: NEGATIVE MG/DL
LEUKOCYTE ESTERASE UR QL STRIP: NEGATIVE
LYMPHOCYTES # BLD AUTO: 1.4 THOU/UL (ref 0.8–4.4)
LYMPHOCYTES NFR BLD AUTO: 16 % (ref 20–40)
MCH RBC QN AUTO: 30.4 PG (ref 27–34)
MCHC RBC AUTO-ENTMCNC: 33 G/DL (ref 32–36)
MCV RBC AUTO: 92 FL (ref 80–100)
METHADONE UR QL SCN: ABNORMAL
MONOCYTES # BLD AUTO: 0.4 THOU/UL (ref 0–0.9)
MONOCYTES NFR BLD AUTO: 5 % (ref 2–10)
MUCOUS THREADS #/AREA URNS LPF: ABNORMAL LPF
NEUTROPHILS # BLD AUTO: 6.6 THOU/UL (ref 2–7.7)
NEUTROPHILS NFR BLD AUTO: 77 % (ref 50–70)
NITRATE UR QL: NEGATIVE
OPIATES UR QL SCN: ABNORMAL
OXYCODONE UR QL: ABNORMAL
PCP UR QL SCN: ABNORMAL
PH UR STRIP: 8.5 [PH] (ref 4.5–8)
PLATELET # BLD AUTO: 362 THOU/UL (ref 140–440)
PMV BLD AUTO: 9.1 FL (ref 8.5–12.5)
POTASSIUM BLD-SCNC: 3.7 MMOL/L (ref 3.5–5)
PROT SERPL-MCNC: 7 G/DL (ref 6–8)
PROT SERPL-MCNC: 7 G/DL (ref 6–8)
RBC # BLD AUTO: 4.04 MILL/UL (ref 3.8–5.4)
RBC #/AREA URNS AUTO: ABNORMAL HPF
SARS-COV-2 PCR COMMENT: NORMAL
SARS-COV-2 RNA SPEC QL NAA+PROBE: NEGATIVE
SARS-COV-2 VIRUS SPECIMEN SOURCE: NORMAL
SODIUM SERPL-SCNC: 135 MMOL/L (ref 136–145)
SP GR UR STRIP: 1.01 (ref 1–1.03)
SQUAMOUS #/AREA URNS AUTO: ABNORMAL LPF
TRICHOMONAS, WET PREP: NORMAL
UROBILINOGEN UR STRIP-ACNC: ABNORMAL
WBC #/AREA URNS AUTO: ABNORMAL HPF
WBC: 8.6 THOU/UL (ref 4–11)
YEAST, WET PREP: NORMAL

## 2020-09-10 ASSESSMENT — MIFFLIN-ST. JEOR: SCORE: 1929.71

## 2020-09-11 ENCOUNTER — TELEPHONE (OUTPATIENT)
Dept: FAMILY MEDICINE | Facility: CLINIC | Age: 30
End: 2020-09-11

## 2020-09-11 ENCOUNTER — COMMUNICATION - HEALTHEAST (OUTPATIENT)
Dept: SCHEDULING | Facility: CLINIC | Age: 30
End: 2020-09-11

## 2020-09-11 LAB
ALLERGIC TO PENICILLIN: NO
C TRACH DNA SPEC QL PROBE+SIG AMP: NEGATIVE
CARBOXYTHC UR-MCNC: 412 NG/ML
GP B STREP DNA SPEC QL NAA+PROBE: POSITIVE
N GONORRHOEA DNA SPEC QL NAA+PROBE: NEGATIVE

## 2020-09-14 NOTE — TELEPHONE ENCOUNTER
Encounter opened to document an attempt to contact patient for EDF follow up. An appt has been scheduled for Ivonne, 9/17/2020. Lorena COVARRUBIAS

## 2020-09-17 ENCOUNTER — NURSE TRIAGE (OUTPATIENT)
Dept: FAMILY MEDICINE | Facility: CLINIC | Age: 30
End: 2020-09-17

## 2020-09-17 ENCOUNTER — HOSPITAL ENCOUNTER (OUTPATIENT)
Dept: OBGYN | Facility: HOSPITAL | Age: 30
Discharge: HOME | End: 2020-09-17
Attending: FAMILY MEDICINE | Admitting: FAMILY MEDICINE

## 2020-09-17 DIAGNOSIS — R11.2 NON-INTRACTABLE VOMITING WITH NAUSEA, UNSPECIFIED VOMITING TYPE: ICD-10-CM

## 2020-09-17 LAB
ALBUMIN UR-MCNC: ABNORMAL MG/DL
APPEARANCE UR: ABNORMAL
BACTERIA #/AREA URNS HPF: ABNORMAL HPF
BILIRUB UR QL STRIP: NEGATIVE
COLOR UR AUTO: YELLOW
GLUCOSE UR STRIP-MCNC: NEGATIVE MG/DL
HGB UR QL STRIP: ABNORMAL
KETONES UR STRIP-MCNC: ABNORMAL MG/DL
LEUKOCYTE ESTERASE UR QL STRIP: NEGATIVE
MUCOUS THREADS #/AREA URNS LPF: ABNORMAL LPF
NITRATE UR QL: NEGATIVE
PH UR STRIP: 7.5 [PH] (ref 4.5–8)
RBC #/AREA URNS AUTO: ABNORMAL HPF
SP GR UR STRIP: 1.02 (ref 1–1.03)
SQUAMOUS #/AREA URNS AUTO: ABNORMAL LPF
UROBILINOGEN UR STRIP-ACNC: ABNORMAL
WBC #/AREA URNS AUTO: ABNORMAL HPF

## 2020-09-17 NOTE — TELEPHONE ENCOUNTER
"    Additional Information    Negative: Vaginal bleeding and pregnant < 20 weeks    Negative: Abdominal pain and pregnant < 20 weeks    Negative: Headache is the main complaint    Negative: Sounds like a life-threatening emergency to the triager    Negative: Vomiting red blood or black (coffee ground) material    Negative: Insulin-dependent diabetes (Type I) and glucose > 400 mg/dL (22 mmol/L)    Negative: Recent head injury (within last 3 days)    Negative: Recent abdominal injury (within last 3 days)    Negative: Severe pain in one eye    SEVERE vomiting (e.g., > 10 times / day) and present > 8 hours    Answer Assessment - Initial Assessment Questions  1. VOMITING SEVERITY: \"How many times have you vomited  in the past 24 hours?\"      SEVERE:  8 or more times/day, vomits everything or nearly everything, weight loss         2. ONSET: ongoing, Reglan and VitB6 taken but comes back up via vomit    3. FLUIDS: \"What fluids or food have you vomited up today?\" \"Are you able to keep any liquids down?\"      Not able to keep anything down    4. TREATMENT: \"What have you been doing so far to treat this?\"      Unable to allow Reglan and Vitamin B6 to be effective, vomit back out after take medication.    5. DEHYDRATION: c/o lightheadedness and reported weakness    6. PREGNANCY: \"How many weeks pregnant are you?\" \"How has the pregnancy been going?\"      25w 2d  7. ASCENCION: 12/29/2020    8. MEDICATIONS: Reglan and Vitamin B6    9. OTHER SYMPTOMS: c/o dizziness, lightheadedness and weakness. Tearful on the phone.    Protocols used: PREGNANCY - MORNING SICKNESS (NAUSEA AND VOMITING OF PREGNANCY)-A-[place]      "

## 2020-10-06 ENCOUNTER — HOSPITAL ENCOUNTER (OUTPATIENT)
Dept: OBGYN | Facility: HOSPITAL | Age: 30
Discharge: HOME | End: 2020-10-06
Attending: FAMILY MEDICINE | Admitting: FAMILY MEDICINE

## 2020-10-06 DIAGNOSIS — O23.42 URINARY TRACT INFECTION IN MOTHER DURING SECOND TRIMESTER OF PREGNANCY: ICD-10-CM

## 2020-10-06 LAB
ALBUMIN SERPL-MCNC: 2.8 G/DL (ref 3.5–5)
ALBUMIN UR-MCNC: ABNORMAL MG/DL
ALP SERPL-CCNC: 116 U/L (ref 45–120)
ALT SERPL W P-5'-P-CCNC: 11 U/L (ref 0–45)
AMPHETAMINES UR QL SCN: ABNORMAL
ANION GAP SERPL CALCULATED.3IONS-SCNC: 10 MMOL/L (ref 5–18)
APPEARANCE UR: ABNORMAL
AST SERPL W P-5'-P-CCNC: 14 U/L (ref 0–40)
BACTERIA #/AREA URNS HPF: ABNORMAL HPF
BARBITURATES UR QL: ABNORMAL
BASOPHILS # BLD AUTO: 0 THOU/UL (ref 0–0.2)
BASOPHILS NFR BLD AUTO: 0 % (ref 0–2)
BENZODIAZ UR QL: ABNORMAL
BILIRUB SERPL-MCNC: 0.6 MG/DL (ref 0–1)
BILIRUB UR QL STRIP: ABNORMAL
BUN SERPL-MCNC: 5 MG/DL (ref 8–22)
CALCIUM SERPL-MCNC: 9.2 MG/DL (ref 8.5–10.5)
CANNABINOIDS UR QL SCN: ABNORMAL
CHLORIDE BLD-SCNC: 106 MMOL/L (ref 98–107)
CLUE CELLS: NORMAL
CO2 SERPL-SCNC: 21 MMOL/L (ref 22–31)
COCAINE UR QL: ABNORMAL
COLOR UR AUTO: YELLOW
CREAT SERPL-MCNC: 0.58 MG/DL (ref 0.6–1.1)
CREAT UR-MCNC: 352.1 MG/DL
EOSINOPHIL # BLD AUTO: 0.1 THOU/UL (ref 0–0.4)
EOSINOPHIL NFR BLD AUTO: 1 % (ref 0–6)
ERYTHROCYTE [DISTWIDTH] IN BLOOD BY AUTOMATED COUNT: 12.8 % (ref 11–14.5)
FIBRONECTIN FETAL VAG QL: NEGATIVE
GFR SERPL CREATININE-BSD FRML MDRD: >60 ML/MIN/1.73M2
GLUCOSE BLD-MCNC: 82 MG/DL (ref 70–125)
GLUCOSE UR STRIP-MCNC: NEGATIVE MG/DL
HCT VFR BLD AUTO: 37 % (ref 35–47)
HGB BLD-MCNC: 12.2 G/DL (ref 12–16)
HGB UR QL STRIP: ABNORMAL
IMM GRANULOCYTES # BLD: 0.1 THOU/UL
IMM GRANULOCYTES NFR BLD: 1 %
KETONES UR STRIP-MCNC: ABNORMAL MG/DL
LEUKOCYTE ESTERASE UR QL STRIP: ABNORMAL
LYMPHOCYTES # BLD AUTO: 1.9 THOU/UL (ref 0.8–4.4)
LYMPHOCYTES NFR BLD AUTO: 20 % (ref 20–40)
MCH RBC QN AUTO: 30.2 PG (ref 27–34)
MCHC RBC AUTO-ENTMCNC: 33 G/DL (ref 32–36)
MCV RBC AUTO: 92 FL (ref 80–100)
METHADONE UR QL SCN: ABNORMAL
MONOCYTES # BLD AUTO: 0.5 THOU/UL (ref 0–0.9)
MONOCYTES NFR BLD AUTO: 5 % (ref 2–10)
MUCOUS THREADS #/AREA URNS LPF: ABNORMAL LPF
NEUTROPHILS # BLD AUTO: 7.2 THOU/UL (ref 2–7.7)
NEUTROPHILS NFR BLD AUTO: 73 % (ref 50–70)
NITRATE UR QL: NEGATIVE
OPIATES UR QL SCN: ABNORMAL
OXYCODONE UR QL: ABNORMAL
PCP UR QL SCN: ABNORMAL
PH UR STRIP: 8 [PH] (ref 4.5–8)
PLATELET # BLD AUTO: 357 THOU/UL (ref 140–440)
PMV BLD AUTO: 8.9 FL (ref 8.5–12.5)
POTASSIUM BLD-SCNC: 3.7 MMOL/L (ref 3.5–5)
PROT SERPL-MCNC: 7.3 G/DL (ref 6–8)
RBC # BLD AUTO: 4.04 MILL/UL (ref 3.8–5.4)
RBC #/AREA URNS AUTO: ABNORMAL HPF
RENAL EPI CELLS #/AREA URNS HPF: ABNORMAL LPF
SODIUM SERPL-SCNC: 137 MMOL/L (ref 136–145)
SP GR UR STRIP: 1.02 (ref 1–1.03)
SQUAMOUS #/AREA URNS AUTO: ABNORMAL LPF
TRICHOMONAS, WET PREP: NORMAL
UROBILINOGEN UR STRIP-ACNC: ABNORMAL
WBC #/AREA URNS AUTO: ABNORMAL HPF
WBC: 9.8 THOU/UL (ref 4–11)
YEAST, WET PREP: NORMAL

## 2020-10-06 ASSESSMENT — MIFFLIN-ST. JEOR: SCORE: 1947.85

## 2020-10-07 LAB — BACTERIA SPEC CULT: NORMAL

## 2020-10-15 ENCOUNTER — OFFICE VISIT (OUTPATIENT)
Dept: FAMILY MEDICINE | Facility: CLINIC | Age: 30
End: 2020-10-15
Payer: COMMERCIAL

## 2020-10-15 VITALS
OXYGEN SATURATION: 99 % | WEIGHT: 288.6 LBS | SYSTOLIC BLOOD PRESSURE: 128 MMHG | BODY MASS INDEX: 45.3 KG/M2 | HEART RATE: 96 BPM | HEIGHT: 67 IN | DIASTOLIC BLOOD PRESSURE: 80 MMHG

## 2020-10-15 DIAGNOSIS — Z09 HOSPITAL DISCHARGE FOLLOW-UP: Primary | ICD-10-CM

## 2020-10-15 DIAGNOSIS — Z13.9 SCREENING FOR CONDITION: ICD-10-CM

## 2020-10-15 PROBLEM — N94.9 ROUND LIGAMENT PAIN: Status: ACTIVE | Noted: 2020-09-10

## 2020-10-15 LAB — GLU GEST SCREEN 1HR 50G: 104 (ref 0–129)

## 2020-10-15 PROCEDURE — 36415 COLL VENOUS BLD VENIPUNCTURE: CPT | Performed by: STUDENT IN AN ORGANIZED HEALTH CARE EDUCATION/TRAINING PROGRAM

## 2020-10-15 PROCEDURE — 82950 GLUCOSE TEST: CPT | Performed by: STUDENT IN AN ORGANIZED HEALTH CARE EDUCATION/TRAINING PROGRAM

## 2020-10-15 PROCEDURE — 99213 OFFICE O/P EST LOW 20 MIN: CPT | Mod: GC | Performed by: STUDENT IN AN ORGANIZED HEALTH CARE EDUCATION/TRAINING PROGRAM

## 2020-10-15 RX ORDER — PROMETHAZINE HYDROCHLORIDE 25 MG/1
TABLET ORAL
COMMUNITY
Start: 2020-09-02 | End: 2023-08-21

## 2020-10-15 RX ORDER — DEXTROMETHORPHAN HBR. AND GUAIFENESIN 10; 100 MG/5ML; MG/5ML
10 SOLUTION ORAL
COMMUNITY
Start: 2019-10-15 | End: 2021-03-12

## 2020-10-15 RX ORDER — NITROFURANTOIN 25; 75 MG/1; MG/1
CAPSULE ORAL
COMMUNITY
Start: 2020-07-10 | End: 2021-03-12

## 2020-10-15 RX ORDER — METRONIDAZOLE 500 MG/1
TABLET ORAL
COMMUNITY
Start: 2020-08-19 | End: 2022-09-06

## 2020-10-15 ASSESSMENT — MIFFLIN-ST. JEOR: SCORE: 2061.71

## 2020-10-15 NOTE — PROGRESS NOTES
Assessment and Plan   (Z09) Hospital discharge follow-up  (primary encounter diagnosis)  Comment: Patient seems like she is having some issues with anxiety which pre-dated pregnancy.  She has adequate anti-nausea medication and feels like she is at her baseline for this problem at this time.  We discussed anxiety and I provided counseling on it and recommended follow-up on this with her PCP as well as follow-up prenatal visit.  Plan: Recommended f/u for anxiety.  No need for medication changes or refills at this time.    (Z13.9) Screening for condition  Comment: OGTT due, patient will complete today.  Plan: Glucose Challenge  1 Hr  ([facility])          Follow up in 1 Week(s).    Options for treatment and follow-up care were reviewed with the patient and/or guardian. Lucinda Ji and/or guardian engaged in the decision making process and verbalized understanding of the options discussed and agreed with the final plan.    Anish Ramirez MD  [facility], PGY-2    Precepted patient with Dr. James Quijano       HPI:   Lucinda Ji is a 30 year old female who presents to clinic today for   Chief Complaint   Patient presents with     Prenatal Care     [place]     RECHECK     follow up ed     Patient was in the ED on Monday for N/V but states she thinks it was more anxiety.  She was discharged without additional therapy after some fluids and anti-emetics.  She has been vomiting about once every day but otherwise feels like her nausea is controlled on her current medication regimen and does not require a refill.  She was prescribed a medication for anxiety at one point but never picked it up because she was concerned about side effects.         Review of Systems:     10 point ROS negative except as noted in HPI.         PMHX:   Active Problems List  Patient Active Problem List   Diagnosis     Nausea     Dizziness     History of traumatic brain injury      Encounter for screening for cervical cancer      Anxiety and depression     Intractable migraine with aura with status migrainosus     Class 3 severe obesity due to excess calories without serious comorbidity with body mass index (BMI) of 40.0 to 44.9 in adult (H)     GBS bacteriuria     History of asthma     Round ligament pain     Active problem list reviewed and updated.    Current Medications  Current Outpatient Medications   Medication Sig Dispense Refill     dextromethorphan-guaiFENesin (TUSSIN DM)  MG/5ML liquid Take 10 mLs by mouth       doxylamine (UNISOM) 25 MG TABS tablet Take 25 mg by mouth       acetaminophen (TYLENOL) 325 MG tablet Take 650 mg by mouth       albuterol (PROAIR HFA/PROVENTIL HFA/VENTOLIN HFA) 108 (90 Base) MCG/ACT inhaler Inhale 1-2 puffs into the lungs       diphenhydrAMINE (BENADRYL) 25 MG tablet Take 1 tablet (25 mg) by mouth every 6 hours as needed for sleep or other (Nausea) (Patient not taking: Reported on 9/2/2020) 12 tablet 0     metoclopramide (REGLAN) 5 MG tablet Take 1 tablet (5 mg) by mouth 2 times daily as needed (nausea vomiting) 30 tablet 2     metroNIDAZOLE (FLAGYL) 500 MG tablet        nitroFURantoin macrocrystal-monohydrate (MACROBID) 100 MG capsule TK 1 C PO BID FOR 5 DAYS       Prenatal Vit-Fe Fumarate-FA (PRENATAL MULTIVITAMIN W/IRON) 27-0.8 MG tablet Take 1 tablet by mouth daily 90 tablet 3     promethazine (PHENERGAN) 25 MG tablet        vitamin B6 (PYRIDOXINE) 50 MG TABS Take 1 tablet (50 mg) by mouth daily 60 tablet 3     Medication list reviewed and updated.    Social History  Social History     Tobacco Use     Smoking status: Former Smoker     Years: 12.00     Types: Cigarettes     Smokeless tobacco: Never Used   Substance Use Topics     Alcohol use: Not Currently     Frequency: Monthly or less     Comment: occasional     Drug use: Not Currently     Types: Marijuana     History   Drug Use Unknown       Family History  Family History   Problem Relation Age  "of Onset     Brain Tumor Mother      Heart Disease Father      Thyroid Disease Sister      Asthma Brother        Allergies  No Known Allergies         Physical Exam:     Vitals:    10/15/20 1554   BP: 128/80   Pulse: 96   SpO2: 99%   Weight: 130.9 kg (288 lb 9.6 oz)   Height: 1.702 m (5' 7\")     Body mass index is 45.2 kg/m .    GENERAL APPEARANCE: alert, appears stated age, no acute distress  HEENT: Eyes grossly normal to inspection, nares normal, and mouth and throat without erythema, ulcers, or lesions  RESP: lungs clear to auscultation - no rales, rhonchi, or wheezes  CV: regular rate and rhythm, no murmurs  ABDOMEN: soft, nontender, gravid  MSK: extremities normal, no gross deformities noted, no lower extremity edema  SKIN: no suspicious lesions or rashes   NEURO: Normal strength and tone, sensory exam grossly normal, mentation appears intact and speech normal  PSYCH: mood and affect appropriate  "

## 2020-10-15 NOTE — PROGRESS NOTES
I have personally reviewed the history and examination as documented by Dr. Ramirez.  I was present during key portions of the visit and agree with the assessment and plan as documented for 30 yr old  @ 29w2d here for ED follow-up for nausea. Now stable. Checking 1hr glucola today. CBC at ED was normal. Precautions given. Prenatal visit scheduled for next week.     James Quijano MD  October 15, 2020  4:11 PM

## 2020-10-18 ENCOUNTER — HOSPITAL ENCOUNTER (OUTPATIENT)
Dept: OBGYN | Facility: HOSPITAL | Age: 30
Discharge: HOME | End: 2020-10-18
Attending: FAMILY MEDICINE | Admitting: FAMILY MEDICINE

## 2020-10-18 LAB
ALBUMIN SERPL-MCNC: 2.6 G/DL (ref 3.5–5)
ALBUMIN UR-MCNC: ABNORMAL MG/DL
ALP SERPL-CCNC: 118 U/L (ref 45–120)
ALT SERPL W P-5'-P-CCNC: 18 U/L (ref 0–45)
ANION GAP SERPL CALCULATED.3IONS-SCNC: 9 MMOL/L (ref 5–18)
APPEARANCE UR: CLEAR
AST SERPL W P-5'-P-CCNC: 18 U/L (ref 0–40)
BACTERIA #/AREA URNS HPF: ABNORMAL HPF
BASOPHILS # BLD AUTO: 0 THOU/UL (ref 0–0.2)
BASOPHILS NFR BLD AUTO: 1 % (ref 0–2)
BILIRUB SERPL-MCNC: 0.5 MG/DL (ref 0–1)
BILIRUB UR QL STRIP: NEGATIVE
BUN SERPL-MCNC: 4 MG/DL (ref 8–22)
CALCIUM SERPL-MCNC: 8.8 MG/DL (ref 8.5–10.5)
CHLORIDE BLD-SCNC: 106 MMOL/L (ref 98–107)
CO2 SERPL-SCNC: 21 MMOL/L (ref 22–31)
COLOR UR AUTO: YELLOW
CREAT SERPL-MCNC: 0.56 MG/DL (ref 0.6–1.1)
EOSINOPHIL # BLD AUTO: 0.3 THOU/UL (ref 0–0.4)
EOSINOPHIL NFR BLD AUTO: 3 % (ref 0–6)
ERYTHROCYTE [DISTWIDTH] IN BLOOD BY AUTOMATED COUNT: 13.1 % (ref 11–14.5)
GFR SERPL CREATININE-BSD FRML MDRD: >60 ML/MIN/1.73M2
GLUCOSE BLD-MCNC: 85 MG/DL (ref 70–125)
GLUCOSE UR STRIP-MCNC: NEGATIVE MG/DL
HCT VFR BLD AUTO: 37 % (ref 35–47)
HGB BLD-MCNC: 12.2 G/DL (ref 12–16)
HGB UR QL STRIP: ABNORMAL
IMM GRANULOCYTES # BLD: 0 THOU/UL
IMM GRANULOCYTES NFR BLD: 1 %
KETONES UR STRIP-MCNC: ABNORMAL MG/DL
LEUKOCYTE ESTERASE UR QL STRIP: NEGATIVE
LYMPHOCYTES # BLD AUTO: 1.7 THOU/UL (ref 0.8–4.4)
LYMPHOCYTES NFR BLD AUTO: 19 % (ref 20–40)
MCH RBC QN AUTO: 30.6 PG (ref 27–34)
MCHC RBC AUTO-ENTMCNC: 33 G/DL (ref 32–36)
MCV RBC AUTO: 93 FL (ref 80–100)
MONOCYTES # BLD AUTO: 0.6 THOU/UL (ref 0–0.9)
MONOCYTES NFR BLD AUTO: 6 % (ref 2–10)
MUCOUS THREADS #/AREA URNS LPF: ABNORMAL LPF
NEUTROPHILS # BLD AUTO: 6.1 THOU/UL (ref 2–7.7)
NEUTROPHILS NFR BLD AUTO: 70 % (ref 50–70)
NITRATE UR QL: NEGATIVE
PH UR STRIP: 8 [PH] (ref 4.5–8)
PLATELET # BLD AUTO: 347 THOU/UL (ref 140–440)
PMV BLD AUTO: 9.2 FL (ref 8.5–12.5)
POTASSIUM BLD-SCNC: 3.8 MMOL/L (ref 3.5–5)
PROT SERPL-MCNC: 6.9 G/DL (ref 6–8)
RBC # BLD AUTO: 3.99 MILL/UL (ref 3.8–5.4)
RBC #/AREA URNS AUTO: ABNORMAL HPF
SODIUM SERPL-SCNC: 136 MMOL/L (ref 136–145)
SP GR UR STRIP: 1.01 (ref 1–1.03)
SQUAMOUS #/AREA URNS AUTO: ABNORMAL LPF
UROBILINOGEN UR STRIP-ACNC: ABNORMAL
WBC #/AREA URNS AUTO: ABNORMAL HPF
WBC: 8.7 THOU/UL (ref 4–11)

## 2020-10-20 LAB
BACTERIA SPEC CULT: ABNORMAL
BACTERIA SPEC CULT: ABNORMAL
C TRACH DNA SPEC QL PROBE+SIG AMP: NEGATIVE
N GONORRHOEA DNA SPEC QL NAA+PROBE: NEGATIVE

## 2020-10-25 NOTE — PROGRESS NOTES
History   Lucinda Ji is a 30 year old  female who presents to clinic for a follow up OB visit.   - 30w6d with ASCENCION Dec 29, 2020  - No headache, chest pain, shortness of breath, abdominal pain/contractions, vaginal bleeding, or abnormal discharge. has been feeling baby move every day.     Current concerns:  - None.     Nausea/vomiting:   - Has been to ED for N/V multiple times during pregnancy. Most recently .  - Current medications: Reglan PRN and Vitamin B6   - Was using Unisom with relief but ran out of medication  - Patient reports still feeling nauseated and vomiting x1 day  - Feels like medications are helping     Wt Readings from Last 4 Encounters:   10/26/20 117 kg (258 lb)   10/15/20 130.9 kg (288 lb 9.6 oz)   20 118.8 kg (262 lb)   20 116.6 kg (257 lb)     THC:  - Was previously using to control nausea, discouraged at visit on  as it can have opposite effect  - No longer using it     Anxiety:   - Evaluated in ED on 10/12 for anxiety about vomiting   - States she was having panic attacks while she tried to stop herself from vomiting   - Improved with Lorazepam   - No further panic attacks     Medical and social history and medications reviewed with patient.   Exam   LMP 02/10/2020   General: NAD  Abdomen: Measuring 31 cm. FHT tones 120. Baby in breech position.    See flowsheet for abdominal, gyn, extremities, neuro exams  Medical Decision-Making     Patient Active Problem List   Diagnosis     Nausea     Dizziness     History of traumatic brain injury     Encounter for screening for cervical cancer      Anxiety and depression     Intractable migraine with aura with status migrainosus     Class 3 severe obesity due to excess calories without serious comorbidity with body mass index (BMI) of 40.0 to 44.9 in adult (H)     GBS bacteriuria     History of asthma     Round ligament pain     - TDAP given (27+wk). Declines TDAP at this visit- would like to have it at next visit.   - Rhogam  not indicated.  - Discussed signs and symptoms of  labor..     Labor/postpartum planning  - Breastfeeding: Bottle-feeding. She has tried breast-feeding with previous children and did not like it.   - Contraception: Does want more kids. Prefers to use condoms. Does not want to use BC. Is familiar with other types and does   - Delivery: She is interested in having a water birth. Referral to [facility] for this and encouraged her to make appointment quickly given delivery approaching.     1. Supervision of high risk pregnancy, antepartum  Will follow up in 2 weeks with her OB Dr. Burleson. Mentioned she is pretty set on having a water birth delivery. We discussed that we do not do water births, and she was wondering if we could refer to a system that does. Will refer to [facility].   - vitamin B6 (PYRIDOXINE) 50 MG TABS; Take 1 tablet (50 mg) by mouth 2 times daily  Dispense: 60 tablet; Refill: 3  - OB/GYN REFERRAL; Future    2. Nausea  Continues to have nausea throughout pregnancy. Will increase B6 to BID and will refill unisom as this helped at the beginning of pregnancy.   - vitamin B6 (PYRIDOXINE) 50 MG TABS; Take 1 tablet (50 mg) by mouth 2 times daily  Dispense: 60 tablet; Refill: 3  - doxylamine (UNISOM) 25 MG TABS tablet; Take 0.5 tablets (12.5 mg) by mouth 2 times daily  Dispense: 30 tablet; Refill: 3    3. Engagement of fetus in breech position, single or unspecified fetus  On quicklook US, baby in breech presentation. This should be checked at next visit as well.       Follow up: 2 weeks for routine prenatal visit  Readdress: Baby in breech position at this visit- refer to OB at 37 weeks for version if not corrected. TDAP at next visit- declined today. Follow-up on nausea/vomiting. Check if patient has had [facility] appointment for water birth discussion.     I was present with the medical student who participated in the service and in the documentation of this note. I have verified the history and personally  performed the physical exam and medical decision making, and have verified the content of the note, which accurately reflects my assessment of the patient and the plan of care    Arminda Barrera, MS3    Vic Bridges MD  [facility] Resident, PGY3    Options for treatment and follow-up care were reviewed with the patient and/or guardian. Tri S Garrison and/or guardian engaged in the decision making process and verbalized understanding of the options discussed and agreed with the final plan.    Preceptor Attestation:  I saw and evaluated the patient.  I reviewed the resident physician's history, exam, and treatment plan; and I agree with the documentation by the resident physician.  Supervising Physician:  Dean Kessler MD

## 2020-10-26 ENCOUNTER — OFFICE VISIT (OUTPATIENT)
Dept: FAMILY MEDICINE | Facility: CLINIC | Age: 30
End: 2020-10-26
Payer: COMMERCIAL

## 2020-10-26 VITALS
HEART RATE: 97 BPM | OXYGEN SATURATION: 100 % | BODY MASS INDEX: 40.41 KG/M2 | DIASTOLIC BLOOD PRESSURE: 75 MMHG | SYSTOLIC BLOOD PRESSURE: 114 MMHG | TEMPERATURE: 97.9 F | RESPIRATION RATE: 20 BRPM | WEIGHT: 258 LBS

## 2020-10-26 DIAGNOSIS — R11.0 NAUSEA: ICD-10-CM

## 2020-10-26 DIAGNOSIS — O09.90 SUPERVISION OF HIGH RISK PREGNANCY, ANTEPARTUM: Primary | ICD-10-CM

## 2020-10-26 PROCEDURE — 99207 PR PRENATAL VISIT: CPT | Mod: GC | Performed by: STUDENT IN AN ORGANIZED HEALTH CARE EDUCATION/TRAINING PROGRAM

## 2020-10-26 RX ORDER — LANOLIN ALCOHOL/MO/W.PET/CERES
50 CREAM (GRAM) TOPICAL 2 TIMES DAILY
Qty: 60 TABLET | Refills: 3 | Status: SHIPPED | OUTPATIENT
Start: 2020-10-26 | End: 2023-08-21

## 2020-10-27 ENCOUNTER — COMMUNICATION - HEALTHEAST (OUTPATIENT)
Dept: ADMINISTRATIVE | Facility: CLINIC | Age: 30
End: 2020-10-27

## 2020-10-27 ENCOUNTER — APPOINTMENT (OUTPATIENT)
Dept: ULTRASOUND IMAGING | Facility: CLINIC | Age: 30
End: 2020-10-27
Attending: OBSTETRICS & GYNECOLOGY
Payer: COMMERCIAL

## 2020-10-27 ENCOUNTER — HOSPITAL ENCOUNTER (OUTPATIENT)
Facility: CLINIC | Age: 30
End: 2020-10-27
Admitting: OBSTETRICS & GYNECOLOGY
Payer: COMMERCIAL

## 2020-10-27 ENCOUNTER — HOSPITAL ENCOUNTER (OUTPATIENT)
Facility: CLINIC | Age: 30
Discharge: HOME | End: 2020-10-27
Attending: OBSTETRICS & GYNECOLOGY | Admitting: OBSTETRICS & GYNECOLOGY
Payer: COMMERCIAL

## 2020-10-27 ENCOUNTER — HOSPITAL ENCOUNTER (OUTPATIENT)
Dept: OBGYN | Facility: HOSPITAL | Age: 30
Discharge: HOME | End: 2020-10-27
Attending: FAMILY MEDICINE | Admitting: FAMILY MEDICINE

## 2020-10-27 VITALS
SYSTOLIC BLOOD PRESSURE: 126 MMHG | RESPIRATION RATE: 18 BRPM | DIASTOLIC BLOOD PRESSURE: 68 MMHG | TEMPERATURE: 98.4 F | HEART RATE: 112 BPM

## 2020-10-27 LAB
LABORATORY COMMENT REPORT: NORMAL
RUPTURE OF FETAL MEMBRANES BY ROM PLUS: NEGATIVE
SARS-COV-2 RNA SPEC QL NAA+PROBE: NEGATIVE
SARS-COV-2 RNA SPEC QL NAA+PROBE: NORMAL
SPECIMEN SOURCE: NORMAL
SPECIMEN SOURCE: NORMAL

## 2020-10-27 PROCEDURE — U0003 INFECTIOUS AGENT DETECTION BY NUCLEIC ACID (DNA OR RNA); SEVERE ACUTE RESPIRATORY SYNDROME CORONAVIRUS 2 (SARS-COV-2) (CORONAVIRUS DISEASE [COVID-19]), AMPLIFIED PROBE TECHNIQUE, MAKING USE OF HIGH THROUGHPUT TECHNOLOGIES AS DESCRIBED BY CMS-2020-01-R: HCPCS | Performed by: STUDENT IN AN ORGANIZED HEALTH CARE EDUCATION/TRAINING PROGRAM

## 2020-10-27 PROCEDURE — 76815 OB US LIMITED FETUS(S): CPT | Mod: 26 | Performed by: RADIOLOGY

## 2020-10-27 PROCEDURE — C9803 HOPD COVID-19 SPEC COLLECT: HCPCS

## 2020-10-27 PROCEDURE — 99202 OFFICE O/P NEW SF 15 MIN: CPT | Mod: GC | Performed by: OBSTETRICS & GYNECOLOGY

## 2020-10-27 PROCEDURE — 76815 OB US LIMITED FETUS(S): CPT

## 2020-10-27 PROCEDURE — G0463 HOSPITAL OUTPT CLINIC VISIT: HCPCS | Mod: 25

## 2020-10-27 RX ORDER — DIPHENOXYLATE HCL/ATROPINE 2.5-.025MG
2 TABLET ORAL EVERY 4 HOURS PRN
Status: CANCELLED | OUTPATIENT
Start: 2020-10-27

## 2020-10-27 RX ORDER — ALBUTEROL SULFATE 90 UG/1
1-2 AEROSOL, METERED RESPIRATORY (INHALATION) EVERY 4 HOURS PRN
Status: CANCELLED | OUTPATIENT
Start: 2020-10-27

## 2020-10-27 RX ORDER — DIPHENOXYLATE HCL/ATROPINE 2.5-.025MG
2 TABLET ORAL ONCE
Status: CANCELLED | OUTPATIENT
Start: 2020-10-27 | End: 2020-10-27

## 2020-10-27 RX ORDER — ACETAMINOPHEN 325 MG/1
650 TABLET ORAL EVERY 4 HOURS PRN
Status: CANCELLED | OUTPATIENT
Start: 2020-10-27

## 2020-10-27 ASSESSMENT — MIFFLIN-ST. JEOR: SCORE: 1907.03

## 2020-10-27 ASSESSMENT — ACTIVITIES OF DAILY LIVING (ADL)
FALL_HISTORY_WITHIN_LAST_SIX_MONTHS: NO
TOILETING_ISSUES: NO

## 2020-10-27 NOTE — PLAN OF CARE
Data: Patient presented to [facility] at 1720.   Reason for maternal/fetal assessment per patient is possible IUFD.  Patient is a . Prenatal record reviewed.      OB History    Para Term  AB Living   7 1 1 0 5 1   SAB TAB Ectopic Multiple Live Births   3 2 0 0 1      # Outcome Date GA Lbr Daquan/2nd Weight Sex Delivery Anes PTL Lv   7 Current            6 Term 2015        LENI   5 TAB 2011     AB, COMPLETE         Birth Comments: elective    4 TAB 2009     AB, COMPLETE         Birth Comments: elective    3 SAB  7w0d    SAB      2 SAB  22w0d       FD      Birth Comments: resulting with D & C   1 SAB            . Medical history:   Past Medical History:   Diagnosis Date     2012   Gestational Age 31w0d. VSS. States feel fetal movement present. Patient denies cramping, backache, vaginal discharge, pelvic pressure, UTI symptoms, GI problems, bloody show, vaginal bleeding, edema, headache, visual disturbances, epigastric or URQ pain, abdominal pain, rupture of membranes. Support persons are present.  Action:  Triage assessment completed.  Response: Dr. Han informed of arrival.

## 2020-10-27 NOTE — PATIENT INSTRUCTIONS
Faxed referral, prenatal episode, labs and all ultrasounds to [facility] [phone] as they have midwives who perform water births. Spoke with  to confirm they provide this type of delivery. Confirmed and their deliveries are done at [facility] and or [facility].  did iterate there is one tub only at [facility] and [facility] and plan will be for water birth but depending on vacancy and availability at the time of delivery, plan may change. Per  they will call patient to schedule an appointment.    Called Ivonne, informed her of above information and remaining of prenatal care will be done by [facility], located in [place] on [facility] [address]

## 2020-10-28 NOTE — DISCHARGE INSTRUCTIONS
If you change your mind about timing of induction(want to move the date up), please call labor and delivery at [phone]. The [facility] doctors will be taking care of you.

## 2020-10-28 NOTE — PROGRESS NOTES
Obstetrics Triage Note    Lucinda Ji  : 1990  MRN: [id number]    HPI:  Lucinda Ji is a 30 year old  female at 31w0d here for second opinion regarding recent diagnosis of IUFD at [facility]. The patient went into triage at [facility] today for evaluation of rupture of membranes. The ROM+ was negative but she was diagnosed with fetal demise by formal US confirming no fetal cardiac activity.  The patient was continuing to feel fetal movement so she came here for a second evaluation.     After repeat US confirming findings of IUFD, the patient expressed desire to have time to process this information and return on 10/30 for induction. Endorses intermittent abdominal pain but no regular contractions.       OB History    Para Term  AB Living   7 1 1 0 5 1   SAB TAB Ectopic Multiple Live Births   3 2 0 0 1      # Outcome Date GA Lbr Daquan/2nd Weight Sex Delivery Anes PTL Lv   7 Current            6 Term         LENI   5 TAB 2011     AB, COMPLETE         Birth Comments: elective    4 TAB      AB, COMPLETE         Birth Comments: elective    3 SAB  7w0d    SAB      2 SAB  22w0d       FD      Birth Comments: resulting with D & C   1 SAB                ROS:  Negative except as mentioned in HPI.    PMH:  Past Medical History:   Diagnosis Date     2012       PSHx:  No past surgical history on file.    Medications:  Medications Prior to Admission   Medication Sig Dispense Refill Last Dose     acetaminophen (TYLENOL) 325 MG tablet Take 650 mg by mouth        albuterol (PROAIR HFA/PROVENTIL HFA/VENTOLIN HFA) 108 (90 Base) MCG/ACT inhaler Inhale 1-2 puffs into the lungs        dextromethorphan-guaiFENesin (TUSSIN DM)  MG/5ML liquid Take 10 mLs by mouth        diphenhydrAMINE (BENADRYL) 25 MG tablet Take 1 tablet (25 mg) by mouth every 6 hours as needed for sleep or other (Nausea) (Patient not taking: Reported on 2020) 12 tablet 0      doxylamine (UNISOM)  25 MG TABS tablet Take 0.5 tablets (12.5 mg) by mouth 2 times daily 30 tablet 3      metoclopramide (REGLAN) 5 MG tablet Take 1 tablet (5 mg) by mouth 2 times daily as needed (nausea vomiting) 30 tablet 2      metroNIDAZOLE (FLAGYL) 500 MG tablet         nitroFURantoin macrocrystal-monohydrate (MACROBID) 100 MG capsule TK 1 C PO BID FOR 5 DAYS        Prenatal Vit-Fe Fumarate-FA (PRENATAL MULTIVITAMIN W/IRON) 27-0.8 MG tablet Take 1 tablet by mouth daily 90 tablet 3      promethazine (PHENERGAN) 25 MG tablet         vitamin B6 (PYRIDOXINE) 50 MG TABS Take 1 tablet (50 mg) by mouth 2 times daily 60 tablet 3        Allergies:  No Known Allergies    Physical Exam:   Patient Vitals for the past 24 hrs:   BP Temp Temp src Pulse Resp   10/27/20 1718 126/68 98.4  F (36.9  C) Oral 112 18       Gen: resting comfortably, in NAD  CV: Tachycardic   Pulm: no increased work of breathing      Labs:  Results for orders placed or performed during the hospital encounter of 10/27/20   US OB >14 Weeks Limited wo Fetal Measurement     Status: None    Narrative    EXAMINATION: US OB >14 WEEKS LIMITED WO FETAL MEASUREMENT, 10/27/2020  5:46 PM     COMPARISON: None.    HISTORY: confirm IUFD. Gestational age: 31 weeks, 0 days. An exam was  done at outside hospital where no fetal heart tones were present.    FINDINGS:     Fetal Presentation:  Fetal lie: Cephalic  Fetal motion: Absent  Placental location: Anterior  No fetal heart tones are present.      Impression    IMPRESSION:      1.  Confirmation of fetal demise with no heart tones present. Cephalic  lie with anterior placenta.    Findings discussed with Nasreen Han at 6:00 PM on 10/27/2020 by  Dr. Tim Reed.    I have personally reviewed the examination and initial interpretation  and I agree with the findings.    JEANNE ROSAS MD       Assessment/Plan:   Lucinda Ji is a 30 year old female  at 31w0d who is here for confirmation of IUFD diagnosed at outside hospital.  Ultrasound performed here confirms IUFD and patient is electing to undergo induction here on 10/30.   - COVID collected in anticipation of admission   - Discussed warning signs and indication to return to care.    Staffed with Dr. Han.    Melissa Rios MD  Obstetrics & Gynecology, PGY-2  10/27/20 8:05 PM     Physician Attestation   I, RAEGAN HAN MD, saw this patient with the resident and agree with the resident/fellow's findings and plan of care as documented in the note.      I personally reviewed vital signs and imaging.    Key findings: pt here from another hospital to have 2nd opinion on IUFD. This was confirmed with formal ultrasound today. 31 wk cephalic without cardiac activity. She is not having any symptoms of labor or infection. Would like to go home and figure out how to tell family, return later this week for induction. Knows she can call and move up induction date if desires. Questions answered and expressed condolences. Will get social work involved when she returns and covid test sent today.    RAEGAN HAN MD  Date of Service (when I saw the patient): 10/27/20

## 2020-10-29 ENCOUNTER — HOSPITAL ENCOUNTER (OUTPATIENT)
Facility: CLINIC | Age: 30
End: 2020-10-29
Attending: OBSTETRICS & GYNECOLOGY | Admitting: OBSTETRICS & GYNECOLOGY
Payer: COMMERCIAL

## 2020-10-30 ENCOUNTER — COMMUNICATION - HEALTHEAST (OUTPATIENT)
Dept: SCHEDULING | Facility: CLINIC | Age: 30
End: 2020-10-30

## 2020-11-06 ENCOUNTER — OFFICE VISIT (OUTPATIENT)
Dept: FAMILY MEDICINE | Facility: CLINIC | Age: 30
End: 2020-11-06
Payer: COMMERCIAL

## 2020-11-06 VITALS
OXYGEN SATURATION: 98 % | SYSTOLIC BLOOD PRESSURE: 103 MMHG | DIASTOLIC BLOOD PRESSURE: 63 MMHG | HEART RATE: 126 BPM | RESPIRATION RATE: 20 BRPM | TEMPERATURE: 98.6 F

## 2020-11-06 DIAGNOSIS — O03.9 SPONTANEOUS ABORTION: Primary | ICD-10-CM

## 2020-11-06 LAB — GLUCOSE CASUAL: 131 MG/DL (ref 51–200)

## 2020-11-06 PROCEDURE — 36415 COLL VENOUS BLD VENIPUNCTURE: CPT | Performed by: STUDENT IN AN ORGANIZED HEALTH CARE EDUCATION/TRAINING PROGRAM

## 2020-11-06 PROCEDURE — 99214 OFFICE O/P EST MOD 30 MIN: CPT | Mod: GC | Performed by: STUDENT IN AN ORGANIZED HEALTH CARE EDUCATION/TRAINING PROGRAM

## 2020-11-06 PROCEDURE — 82947 ASSAY GLUCOSE BLOOD QUANT: CPT | Performed by: STUDENT IN AN ORGANIZED HEALTH CARE EDUCATION/TRAINING PROGRAM

## 2020-11-06 NOTE — PROGRESS NOTES
HPI:       Lucinda Ji is a 30 year old  female with PMH of IUFD recently undergone an induction and D&C and  who presents for follow up of this issue.    IUFD on 10/30 with induction of labor with vaginal delivery of demised fetus and D&C and discharged on 10/31.      Patient presented to clinic and a rapid response was called in the lobby. Patient felt dizzy and pre-syncopal at the time, no vertigo. She states she is eating and drinking well. She denies any chest pain, no palpitations. She states that she is not experiencing a large amount of vaginal blood loss and describes it as light. Patient states that she is still experiencing contraction like pain that is 10/10, the same pain she had during her admission. She also staes that it is worse in the right lower quadrant and radiates to R buttocks and down right groin. She denies fevers or chills.            PMHX:     Patient Active Problem List   Diagnosis     Nausea     Dizziness     History of traumatic brain injury     Encounter for screening for cervical cancer      Anxiety and depression     Intractable migraine with aura with status migrainosus     Class 3 severe obesity due to excess calories without serious comorbidity with body mass index (BMI) of 40.0 to 44.9 in adult (H)     GBS bacteriuria     History of asthma     Round ligament pain       Current Outpatient Medications   Medication Sig Dispense Refill     acetaminophen (TYLENOL) 325 MG tablet Take 650 mg by mouth       albuterol (PROAIR HFA/PROVENTIL HFA/VENTOLIN HFA) 108 (90 Base) MCG/ACT inhaler Inhale 1-2 puffs into the lungs       dextromethorphan-guaiFENesin (TUSSIN DM)  MG/5ML liquid Take 10 mLs by mouth       diphenhydrAMINE (BENADRYL) 25 MG tablet Take 1 tablet (25 mg) by mouth every 6 hours as needed for sleep or other (Nausea) (Patient not taking: Reported on 9/2/2020) 12 tablet 0     doxylamine (UNISOM) 25 MG TABS tablet Take 0.5 tablets (12.5 mg) by mouth 2 times daily 30  tablet 3     metoclopramide (REGLAN) 5 MG tablet Take 1 tablet (5 mg) by mouth 2 times daily as needed (nausea vomiting) 30 tablet 2     metroNIDAZOLE (FLAGYL) 500 MG tablet        nitroFURantoin macrocrystal-monohydrate (MACROBID) 100 MG capsule TK 1 C PO BID FOR 5 DAYS       Prenatal Vit-Fe Fumarate-FA (PRENATAL MULTIVITAMIN W/IRON) 27-0.8 MG tablet Take 1 tablet by mouth daily 90 tablet 3     promethazine (PHENERGAN) 25 MG tablet        vitamin B6 (PYRIDOXINE) 50 MG TABS Take 1 tablet (50 mg) by mouth 2 times daily 60 tablet 3        No Known Allergies    Results for orders placed or performed in visit on 20 (from the past 24 hour(s))   Glucose Casual ([facility])   Result Value Ref Range    Glucose Casual 131.0 51.0 - 200.0 mg/dL            Review of Systems:     Review of systems negative unless stated in HPI           Physical Exam:     Vitals:    20 1435 20 1438   BP: 103/63    BP Location:  Right arm   Pulse: 126    Resp:  20   Temp: 98.6  F (37  C) 98.6  F (37  C)   TempSrc: Oral Oral   SpO2: 99% 98%     There is no height or weight on file to calculate BMI.    GENERAL APPEARANCE: healthy, alert, in distress, doubled over due to pain  MS: extremities normal- no gross deformities noted  SKIN: no suspicious lesions or rashes  NEURO: Normal strength and tone, sensory exam grossly normal, mentation appears intact and speech normal    Labs:  Results for DENILSON MOREAU (MRN [id number]) as of 2020 14:55   Ref. Range 2020 14:50   Glucose Casual Latest Ref Range: 51.0 - 200.0 mg/dL 131.0     Assessment and Plan     1. Spontaneous   - Glucose 131.   Sent patient to the ED by EMS. Spoke with the [facility] ED physician. Recommended to get access, start IVF, no need for labs as they will not be back in time. Not hypoglycemic, patient with soft BP, tachycardic.    Precepted today with: MD Brenda Crump MD  [facility] Resident PGY-2  [facility]  [place]  Pager: [phone]

## 2020-11-10 NOTE — PROGRESS NOTES
I have personally reviewed the history and examination as documented by Dr. Hernandez.  I was present during key portions of the visit and agree with the assessment and plan as documented for 30 yr old female with recent dx of miscarraige here for inc abdominal pain, lightheadedness. Near syncopal episode in our waiting room. Vitals/ blood glucose stable.  Ambulance called to transport her to the ED.    James Quijano MD  November 10, 2020  3:57 PM

## 2020-12-13 ENCOUNTER — HEALTH MAINTENANCE LETTER (OUTPATIENT)
Age: 30
End: 2020-12-13

## 2020-12-22 ENCOUNTER — OFFICE VISIT (OUTPATIENT)
Dept: FAMILY MEDICINE | Facility: CLINIC | Age: 30
End: 2020-12-22
Payer: COMMERCIAL

## 2020-12-22 VITALS
OXYGEN SATURATION: 99 % | RESPIRATION RATE: 22 BRPM | DIASTOLIC BLOOD PRESSURE: 84 MMHG | BODY MASS INDEX: 41.47 KG/M2 | WEIGHT: 264.8 LBS | HEART RATE: 68 BPM | TEMPERATURE: 98.3 F | SYSTOLIC BLOOD PRESSURE: 128 MMHG

## 2020-12-22 DIAGNOSIS — R11.0 NAUSEA: Primary | ICD-10-CM

## 2020-12-22 DIAGNOSIS — R19.7 DIARRHEA, UNSPECIFIED TYPE: ICD-10-CM

## 2020-12-22 LAB
SARS-COV-2 RNA SPEC QL NAA+PROBE: NOT DETECTED
SPECIMEN SOURCE: NORMAL

## 2020-12-22 PROCEDURE — 99213 OFFICE O/P EST LOW 20 MIN: CPT | Mod: GC | Performed by: STUDENT IN AN ORGANIZED HEALTH CARE EDUCATION/TRAINING PROGRAM

## 2020-12-22 PROCEDURE — 87635 SARS-COV-2 COVID-19 AMP PRB: CPT | Performed by: STUDENT IN AN ORGANIZED HEALTH CARE EDUCATION/TRAINING PROGRAM

## 2020-12-22 NOTE — PROGRESS NOTES
HPI:       Lucinda Ji is a 30 year old  female with unrelated PMH who presents for diarrhea.    Usually has looser bowel movements.  Has had a lot more diarrhea in the last few days.  Patient thinks this is improving spontaneously.   Denies cough, shortness of breath, fevers, chills, loss of taste/smell.  No recent COVID exposures.  Wants to go to a  tomorrow for her best friend.  Patient has had unprotected sex recently and is wanting to discuss her concerns.           PMHX:     Patient Active Problem List   Diagnosis     Nausea     Dizziness     History of traumatic brain injury     Encounter for screening for cervical cancer      Anxiety and depression     Intractable migraine with aura with status migrainosus     Class 3 severe obesity due to excess calories without serious comorbidity with body mass index (BMI) of 40.0 to 44.9 in adult (H)     GBS bacteriuria     History of asthma     Round ligament pain       Current Outpatient Medications   Medication Sig Dispense Refill     acetaminophen (TYLENOL) 325 MG tablet Take 650 mg by mouth       albuterol (PROAIR HFA/PROVENTIL HFA/VENTOLIN HFA) 108 (90 Base) MCG/ACT inhaler Inhale 1-2 puffs into the lungs       dextromethorphan-guaiFENesin (TUSSIN DM)  MG/5ML liquid Take 10 mLs by mouth       diphenhydrAMINE (BENADRYL) 25 MG tablet Take 1 tablet (25 mg) by mouth every 6 hours as needed for sleep or other (Nausea) (Patient not taking: Reported on 2020) 12 tablet 0     doxylamine (UNISOM) 25 MG TABS tablet Take 0.5 tablets (12.5 mg) by mouth 2 times daily 30 tablet 3     metoclopramide (REGLAN) 5 MG tablet Take 1 tablet (5 mg) by mouth 2 times daily as needed (nausea vomiting) 30 tablet 2     metroNIDAZOLE (FLAGYL) 500 MG tablet        nitroFURantoin macrocrystal-monohydrate (MACROBID) 100 MG capsule TK 1 C PO BID FOR 5 DAYS       Prenatal Vit-Fe Fumarate-FA (PRENATAL MULTIVITAMIN W/IRON) 27-0.8 MG tablet Take 1 tablet by mouth daily 90 tablet 3      promethazine (PHENERGAN) 25 MG tablet        vitamin B6 (PYRIDOXINE) 50 MG TABS Take 1 tablet (50 mg) by mouth 2 times daily 60 tablet 3        No Known Allergies    No results found for this or any previous visit (from the past 24 hour(s)).         Review of Systems:   12 point review of systems negative unless stated in HPI.           Physical Exam:     Vitals:    20 1620   BP: 128/84   BP Location: Right arm   Patient Position: Sitting   Cuff Size: Adult Large   Pulse: 68   Resp: 22   Temp: 98.3  F (36.8  C)   TempSrc: Oral   SpO2: 99%   Weight: 120.1 kg (264 lb 12.8 oz)     Body mass index is 41.47 kg/m .    GENERAL APPEARANCE: healthy, alert and no distress  EYES: Eyes grossly normal to inspection,  PERRL  RESP: Breathing unlabored on room air  MS: extremities normal- no gross deformities noted  SKIN: no suspicious lesions or rashes  NEURO: Normal strength and tone, sensory exam grossly normal, mentation appears intact and speech normal  PSYCH: mood and affect normal/bright      Assessment and Plan     Lucinda Ji is a 30 year-old female with no relevant PMH presenting to clinic with the following concern: diarrhea and abdominal pain.    (R11.0) Nausea  (primary encounter diagnosis)  (R19.7) Diarrhea, unspecified type  Comment: Patient has what sounds like worsening of chronic diarrhea over the last few days. Also has had recent unprotected sex and is interested in discussing this at additional visit. Discussed with patient that she does not have many of the classic symptoms of COVID but many people present with GI symptoms only, and it is of paramount importance for her to get tested for COVID, especially if she is planning on going to a  tomorrow.  Plan: COVID-19 Virus PCR [facility] ([facility])        Schedule patient for follow up visit to discuss possible STI testing within the next few days.        Options for treatment and follow-up care were reviewed with the patient and/or guardian. Lucinda Ji  and/or guardian engaged in the decision making process and verbalized understanding of the options discussed and agreed with the final plan.    Kathy Butler MD      Precepted today with: Yuli Figueroa MD

## 2020-12-22 NOTE — PROGRESS NOTES
Preceptor Attestation:   Patient seen, evaluated and discussed with the resident. I have verified the content of the note, which accurately reflects my assessment of the patient and the plan of care.  Supervising Physician:Yuil Figueroa MD  [facility]

## 2021-03-12 ENCOUNTER — OFFICE VISIT (OUTPATIENT)
Dept: FAMILY MEDICINE | Facility: CLINIC | Age: 31
End: 2021-03-12
Payer: COMMERCIAL

## 2021-03-12 VITALS
HEART RATE: 82 BPM | TEMPERATURE: 97.8 F | SYSTOLIC BLOOD PRESSURE: 116 MMHG | WEIGHT: 259.6 LBS | DIASTOLIC BLOOD PRESSURE: 76 MMHG | BODY MASS INDEX: 40.74 KG/M2 | OXYGEN SATURATION: 96 % | HEIGHT: 67 IN | RESPIRATION RATE: 20 BRPM

## 2021-03-12 DIAGNOSIS — B37.31 YEAST INFECTION OF THE VAGINA: Primary | ICD-10-CM

## 2021-03-12 DIAGNOSIS — Z11.3 ROUTINE SCREENING FOR STI (SEXUALLY TRANSMITTED INFECTION): Primary | ICD-10-CM

## 2021-03-12 LAB
BACTERIA: NORMAL
CLUE CELLS: NORMAL
HCG UR QL: NEGATIVE
HIV 1+2 AB+HIV1 P24 AG SERPL QL IA: NEGATIVE
MOTILE TRICHOMONAS: NEGATIVE
ODOR: NORMAL
PH WET PREP: NORMAL (ref 3.8–4.5)
WBC WET PREP: NORMAL (ref 2–5)
YEAST: PRESENT

## 2021-03-12 PROCEDURE — 87210 SMEAR WET MOUNT SALINE/INK: CPT | Performed by: STUDENT IN AN ORGANIZED HEALTH CARE EDUCATION/TRAINING PROGRAM

## 2021-03-12 PROCEDURE — 81025 URINE PREGNANCY TEST: CPT | Performed by: STUDENT IN AN ORGANIZED HEALTH CARE EDUCATION/TRAINING PROGRAM

## 2021-03-12 PROCEDURE — 36415 COLL VENOUS BLD VENIPUNCTURE: CPT | Performed by: STUDENT IN AN ORGANIZED HEALTH CARE EDUCATION/TRAINING PROGRAM

## 2021-03-12 PROCEDURE — 99213 OFFICE O/P EST LOW 20 MIN: CPT | Mod: GC | Performed by: STUDENT IN AN ORGANIZED HEALTH CARE EDUCATION/TRAINING PROGRAM

## 2021-03-12 RX ORDER — FLUCONAZOLE 150 MG/1
150 TABLET ORAL ONCE
Qty: 1 TABLET | Refills: 0 | Status: SHIPPED | OUTPATIENT
Start: 2021-03-12 | End: 2021-03-12

## 2021-03-12 ASSESSMENT — MIFFLIN-ST. JEOR: SCORE: 1930.17

## 2021-03-12 NOTE — PROGRESS NOTES
Preceptor Attestation:  Patient's case reviewed and discussed with the resident, Kathy Butler MD, and I personally evaluated the patient. I agree with written assessment and plan of care.    Supervising Physician:  Savita Martinez MD   [facility]

## 2021-03-12 NOTE — LETTER
March 17, 2021      [facility] DAVE Ji  [id number]  [place] [address]        Dear ,    We are writing to inform you of your test results.    Attached are the results of your STI testing. All are within normal limits. No further testing is necessary at this time.    Resulted Orders   HCG Qualitative Urine (UPT)  ([facility])   Result Value Ref Range    HCG Qual Urine Negative Negative   Chlamydia/Gono Amplified ([facility])   Result Value Ref Range    Chlamydia trac,Amplified Prb Negative Negative    N gonorrhoeae,Amplified Prb Negative Negative    Narrative    Test performed by:  [facility]  [address] [place] [address]   Wet Prep ([facility])   Result Value Ref Range    Yeast Wet Prep Present none    Motile Trichomonas Wet Prep Negative Negative    Clue Cells Wet Prep Present >20% NONE    WBC WET PREP None 2 - 5    Bacteria Wet Prep Few None    pH Wet Prep Not performed 3.8 - 4.5    Odor Wet Prep None NONE   Syphilis Screen [place] ([facility])   Result Value Ref Range    Treponema Antibody (Syphilis) Negative Negative    Narrative    Test performed by:  [facility]  [address] [place] [address]   HIV Ag/Ab Screen [place] ([facility])   Result Value Ref Range    HIV Antigen/Antibody Negative Negative    Narrative    Test performed by:  [facility] [address] [place] [address]  Method is Abbott HIV Ag/Ab for the detection of HIV p24 antigen, HIV-1   antibodies and HIV-2 antibodies.       If you have any questions or concerns, please call the clinic at the number listed above.       Sincerely,      Kathy Butler MD

## 2021-03-12 NOTE — RESULT ENCOUNTER NOTE
Called patient and informed her of the results for the positive yeast on her wet prep. Will send oral fluconazole to her pharmacy for treatment of this.    Also recommended that patient retest for pregnancy in another 2 weeks to ensure that it is negative, as it may still be too early to tell.    Kathy Butler MD

## 2021-03-12 NOTE — PROGRESS NOTES
"     HPI:       Lucinda Ji is a 30 year old  female with PMH of anxiety/depression who presents for STI check and pregnancy test.    Last unprotected intercourse was yesterday. Patient has been feeling \"icky\" for the last few weeks. She states that her period \"came for one day and then left\" on March 3rd, 9 days ago. Flow was very light like spotting. Denies fevers, chills, body aches. Patient is having breast tenderness. Denies abnormal vaginal discharge. She has been urinating quite frequently.          PMHX:     Patient Active Problem List   Diagnosis     Nausea     Dizziness     History of traumatic brain injury     Encounter for screening for cervical cancer      Anxiety and depression     Intractable migraine with aura with status migrainosus     Class 3 severe obesity due to excess calories without serious comorbidity with body mass index (BMI) of 40.0 to 44.9 in adult (H)     GBS bacteriuria     History of asthma     Round ligament pain       Current Outpatient Medications   Medication Sig Dispense Refill     acetaminophen (TYLENOL) 325 MG tablet Take 650 mg by mouth       albuterol (PROAIR HFA/PROVENTIL HFA/VENTOLIN HFA) 108 (90 Base) MCG/ACT inhaler Inhale 1-2 puffs into the lungs       diphenhydrAMINE (BENADRYL) 25 MG tablet Take 1 tablet (25 mg) by mouth every 6 hours as needed for sleep or other (Nausea) 12 tablet 0     metoclopramide (REGLAN) 5 MG tablet Take 1 tablet (5 mg) by mouth 2 times daily as needed (nausea vomiting) 30 tablet 2     vitamin B6 (PYRIDOXINE) 50 MG TABS Take 1 tablet (50 mg) by mouth 2 times daily 60 tablet 3     dextromethorphan-guaiFENesin (TUSSIN DM)  MG/5ML liquid Take 10 mLs by mouth       doxylamine (UNISOM) 25 MG TABS tablet Take 0.5 tablets (12.5 mg) by mouth 2 times daily (Patient not taking: Reported on 3/12/2021) 30 tablet 3     metroNIDAZOLE (FLAGYL) 500 MG tablet        nitroFURantoin macrocrystal-monohydrate (MACROBID) 100 MG capsule TK 1 C PO BID FOR 5 DAYS   "     Prenatal Vit-Fe Fumarate-FA (PRENATAL MULTIVITAMIN W/IRON) 27-0.8 MG tablet Take 1 tablet by mouth daily (Patient not taking: Reported on 3/12/2021) 90 tablet 3     promethazine (PHENERGAN) 25 MG tablet          Social History     Socioeconomic History     Marital status: Single     Spouse name: Not on file     Number of children: 1     Years of education: Not on file     Highest education level: Not on file   Occupational History     Occupation: unemployed   Social Needs     Financial resource strain: Not on file     Food insecurity     Worry: Not on file     Inability: Not on file     Transportation needs     Medical: Not on file     Non-medical: Not on file   Tobacco Use     Smoking status: Former Smoker     Years: 12.00     Types: Cigarettes     Smokeless tobacco: Never Used   Substance and Sexual Activity     Alcohol use: Not Currently     Frequency: Monthly or less     Comment: occasional     Drug use: Not Currently     Types: Marijuana     Sexual activity: Yes     Partners: Male     Birth control/protection: Condom   Lifestyle     Physical activity     Days per week: Not on file     Minutes per session: Not on file     Stress: Not on file   Relationships     Social connections     Talks on phone: Not on file     Gets together: Not on file     Attends Jew service: Not on file     Active member of club or organization: Not on file     Attends meetings of clubs or organizations: Not on file     Relationship status: Not on file     Intimate partner violence     Fear of current or ex partner: Not on file     Emotionally abused: Not on file     Physically abused: Not on file     Forced sexual activity: Not on file   Other Topics Concern     Not on file   Social History Narrative     Not on file        No Known Allergies    No results found for this or any previous visit (from the past 24 hour(s)).         Review of Systems:   12 point review of systems negative unless stated in HPI.          Physical  "Exam:     Vitals:    03/12/21 0930   BP: 116/76   Pulse: 82   Resp: 20   Temp: 97.8  F (36.6  C)   TempSrc: Oral   SpO2: 96%   Weight: 117.8 kg (259 lb 9.6 oz)   Height: 1.702 m (5' 7\")     Body mass index is 40.66 kg/m .    GENERAL APPEARANCE: healthy, alert and no distress.  EYES: Eyes grossly normal to inspection,  PERRL  RESP: Breathing unlabored on room air  : External labia appear normal. Physiologic discharge present on speculum exam. No cervical tenderness and appearance normal.  MS: extremities normal- no gross deformities noted  SKIN: no suspicious lesions or rashes  NEURO: Normal strength and tone, sensory exam grossly normal, mentation appears intact and speech normal  PSYCH: mood and affect normal/bright      Assessment and Plan     Ivonne Ji is a 30 year-old female with PMH of anxiety/depression and recent fetal demise presenting to clinic with the following concern: STI check and pregnancy test.    (Z11.3) Routine screening for STI (sexually transmitted infection)  (primary encounter diagnosis)  Comment: Patient has had unprotected sex several times in the last month and is here for pregnancy test and STI testing. She is also reporting symptoms consistent with pregnancy including nausea, breast tenderness, and spotting. No infectious symptoms or discharge that is concerning for infection, but given unprotected sex it is reasonable to test for these things.  Plan:   - HCG Qualitative Urine (UPT)  ([facility]) - negative, patient informed   - Chlamydia/Gono Amplified ([facility])  - Wet Prep ([facility])  - Syphilis Screen [place] ([facility])  - HIV Ag/Ab Screen [place] ([facility])    Options for treatment and follow-up care were reviewed with the patient and/or guardian. Lucinda Ji and/or guardian engaged in the decision making process and verbalized understanding of the options discussed and agreed with the final plan.    Kathy Butler MD      Precepted today with: Savita Martinez MD    "

## 2021-03-13 LAB — TREPONEMA ANTIBODY (SYPHILIS): NEGATIVE

## 2021-03-16 LAB
C TRACH RRNA CVX QL NAA+PROBE: NEGATIVE
N GONORRHOEA RRNA SPEC QL NAA+PROBE: NEGATIVE

## 2021-03-17 NOTE — RESULT ENCOUNTER NOTE
Please mail the results to the patient:    Dear. Ms Ji,    Attached are the results of your STI testing. All are within normal limits. No further testing is necessary at this time.    Sincerely,    Kathy Butler MD

## 2021-06-05 VITALS — WEIGHT: 263 LBS | BODY MASS INDEX: 42.27 KG/M2 | HEIGHT: 66 IN

## 2021-06-05 VITALS — HEIGHT: 66 IN | BODY MASS INDEX: 41.46 KG/M2 | WEIGHT: 258 LBS

## 2021-06-05 VITALS — BODY MASS INDEX: 42.91 KG/M2 | WEIGHT: 267 LBS | HEIGHT: 66 IN

## 2021-06-10 ENCOUNTER — OFFICE VISIT (OUTPATIENT)
Dept: FAMILY MEDICINE | Facility: CLINIC | Age: 31
End: 2021-06-10
Payer: COMMERCIAL

## 2021-06-10 VITALS
HEART RATE: 108 BPM | DIASTOLIC BLOOD PRESSURE: 77 MMHG | SYSTOLIC BLOOD PRESSURE: 119 MMHG | WEIGHT: 278 LBS | BODY MASS INDEX: 39.8 KG/M2 | TEMPERATURE: 98.1 F | RESPIRATION RATE: 16 BRPM | OXYGEN SATURATION: 96 % | HEIGHT: 70 IN

## 2021-06-10 DIAGNOSIS — R30.0 DYSURIA: Primary | ICD-10-CM

## 2021-06-10 DIAGNOSIS — N30.00 ACUTE CYSTITIS WITHOUT HEMATURIA: ICD-10-CM

## 2021-06-10 PROBLEM — O36.4XX0 FETAL DEMISE, GREATER THAN 22 WEEKS, ANTEPARTUM, SINGLE GESTATION: Status: ACTIVE | Noted: 2020-10-29

## 2021-06-10 PROBLEM — R82.71 GBS BACTERIURIA: Status: RESOLVED | Noted: 2020-07-23 | Resolved: 2021-06-10

## 2021-06-10 PROBLEM — O36.4XX0 FETAL DEMISE, GREATER THAN 22 WEEKS, ANTEPARTUM, SINGLE GESTATION: Status: RESOLVED | Noted: 2020-10-29 | Resolved: 2021-06-10

## 2021-06-10 PROBLEM — D25.9 UTERINE LEIOMYOMA: Status: ACTIVE | Noted: 2020-11-06

## 2021-06-10 PROBLEM — K44.9 HIATAL HERNIA: Status: ACTIVE | Noted: 2020-11-06

## 2021-06-10 LAB
BACTERIA: NORMAL
BILIRUBIN UR: NEGATIVE MG/DL
BLOOD UR: ABNORMAL MG/DL
CASTS: NORMAL /LPF
CLARITY, URINE: CLEAR
COLOR UR: YELLOW
CRYSTAL URINE: NORMAL /LPF
EPITHELIAL CELLS UR: NORMAL /LPF (ref 0–2)
GLUCOSE URINE: NEGATIVE
HCG UR QL: NEGATIVE
KETONES UR QL: NEGATIVE MG/DL
LEUKOCYTE ESTERASE UR: ABNORMAL
MUCOUS URINE: NORMAL LPF
NITRITE UR QL STRIP: NEGATIVE MG/DL
PH UR STRIP: 6.5 [PH] (ref 4.5–8)
PROTEIN UR: ABNORMAL MG/DL
RBC URINE: NORMAL /HPF
SP GR UR STRIP: 1.02 (ref 1–1.03)
UROBILINOGEN UR STRIP-ACNC: ABNORMAL E.U./DL
WBC URINE: NORMAL /HPF

## 2021-06-10 PROCEDURE — 81001 URINALYSIS AUTO W/SCOPE: CPT | Performed by: STUDENT IN AN ORGANIZED HEALTH CARE EDUCATION/TRAINING PROGRAM

## 2021-06-10 PROCEDURE — 99213 OFFICE O/P EST LOW 20 MIN: CPT | Mod: GC | Performed by: STUDENT IN AN ORGANIZED HEALTH CARE EDUCATION/TRAINING PROGRAM

## 2021-06-10 PROCEDURE — 81025 URINE PREGNANCY TEST: CPT | Performed by: STUDENT IN AN ORGANIZED HEALTH CARE EDUCATION/TRAINING PROGRAM

## 2021-06-10 ASSESSMENT — MIFFLIN-ST. JEOR: SCORE: 2059.38

## 2021-06-10 NOTE — PROGRESS NOTES
Dr Jeff attending a birth in the department. Updated regarding lab results and will see patient once delivery is complete. Tri informed of lab results. She is coping better and trying to rest. Still experiencing some low back discomfort.

## 2021-06-10 NOTE — PROGRESS NOTES
"Tri arrived to L&D for evaluation at 21w1d of cramping, pelvic and rectal pressure \"like the baby is coming\" since yesterday. States she decided to come in after she was unable to sleep last night and the pain was still present this morning. She denies LOF, vaginal bleeding, recent intercourse, any abdominal or pelvic trauma, fever, chills, shortness of breath, headache, visual changes, headache, vaginal discharge or discomfort or UTI-type symptoms. Dr Stephens requested and is at bedside. External monitors applied and vital signs obtained. SVE as noted. Discussed plan of care with Tri and specimens obtained and sent to lab. Monitors removed and Tri was settled in bed with warm blankets and dim lighting-preferring to try and sleep if able while lab results are pending.  "

## 2021-06-10 NOTE — PROGRESS NOTES
Assessment and Plan     UTI:  Couple of weeks with frequent urination and pressure sensation of bladder. No fever, or flank pain. Does have some increased pressure sensation when palpating bladder. UA positive for leukocytes. UPT negative. Symptoms not concerning for STI.  - Bactrim   - Follow-up in 1 week if no resolution    Options for treatment and follow-up care were reviewed with the patient and/or guardian. Lucinda Ji and/or guardian engaged in the decision making process and verbalized understanding of the options discussed and agreed with the final plan.    Kalpesh Hand DO, MBA  [facility], PGY-2    Precepted patient with Dr. Deny Claudio       HPI:   Lucinda Ji is a 30 year old female who presents to clinic today for   Chief Complaint   Patient presents with     STD     frequent urination- always urine present- pain is pressure on bladder     Medication Reconciliation     Urinary problem:  - Pressure on bladder for a couple weeks. Feels like peeing even when not?  - No leaking  - 2 weeks now  - Sexually active 1 week ago, no condoms, no vaginal discharge/bleeding/itching  - Increased urination frequency, some dysuria  - No meds  - similar to previous UTIs     Denies Fever, Chest Pain, shortness of breath , changes in vision/hearing/GI//diet, numbness/tingling, or any other concerns.         PMHX:   Active Problems List  Patient Active Problem List   Diagnosis     Nausea     Dizziness     History of traumatic brain injury     Encounter for screening for cervical cancer      Anxiety and depression     Intractable migraine with aura with status migrainosus     Class 3 severe obesity due to excess calories without serious comorbidity with body mass index (BMI) of 40.0 to 44.9 in adult (H)     GBS bacteriuria     History of asthma     Round ligament pain       Current Medications  Current Outpatient Medications   Medication Sig  "Dispense Refill     acetaminophen (TYLENOL) 325 MG tablet Take 650 mg by mouth       albuterol (PROAIR HFA/PROVENTIL HFA/VENTOLIN HFA) 108 (90 Base) MCG/ACT inhaler Inhale 1-2 puffs into the lungs       diphenhydrAMINE (BENADRYL) 25 MG tablet Take 1 tablet (25 mg) by mouth every 6 hours as needed for sleep or other (Nausea) 12 tablet 0     doxylamine (UNISOM) 25 MG TABS tablet Take 0.5 tablets (12.5 mg) by mouth 2 times daily (Patient not taking: Reported on 3/12/2021) 30 tablet 3     metoclopramide (REGLAN) 5 MG tablet Take 1 tablet (5 mg) by mouth 2 times daily as needed (nausea vomiting) 30 tablet 2     metroNIDAZOLE (FLAGYL) 500 MG tablet        Prenatal Vit-Fe Fumarate-FA (PRENATAL MULTIVITAMIN W/IRON) 27-0.8 MG tablet Take 1 tablet by mouth daily (Patient not taking: Reported on 3/12/2021) 90 tablet 3     promethazine (PHENERGAN) 25 MG tablet        vitamin B6 (PYRIDOXINE) 50 MG TABS Take 1 tablet (50 mg) by mouth 2 times daily 60 tablet 3       Social History  Social History     Tobacco Use     Smoking status: Former Smoker     Years: 12.00     Types: Cigarettes     Smokeless tobacco: Never Used   Substance Use Topics     Alcohol use: Not Currently     Frequency: Monthly or less     Comment: occasional     Drug use: Not Currently     Types: Marijuana     History   Drug Use Unknown       Family History  Family History   Problem Relation Age of Onset     Brain Tumor Mother      Heart Disease Father      Thyroid Disease Sister      Asthma Brother        Allergies  No Known Allergies         Physical Exam:     Vitals:    06/10/21 1623   BP: 119/77   BP Location: Right arm   Patient Position: Sitting   Cuff Size: Adult Large   Pulse: 108   Resp: 16   Temp: 98.1  F (36.7  C)   TempSrc: Oral   SpO2: 96%   Weight: 126.1 kg (278 lb)   Height: 1.775 m (5' 9.88\")     Body mass index is 40.02 kg/m .    GENERAL APPEARANCE: alert, appears stated age, no acute distress  RESP: lungs clear to auscultation - no rales, rhonchi, " "or wheezes  CV: regular rate and rhythm, no murmur, click, rub, or gallop  ABDOMEN: soft, nontender, but some \"pressure\" sensation on palpation  MSK: extremities normal, no gross deformities noted, no lower extremity edema  SKIN: no suspicious lesions or rashes   NEURO: mentation appears intact and speech normal  PSYCH: mood and affect normal/bright       "

## 2021-06-10 NOTE — PROGRESS NOTES
Reviewed discharge orders, medications, warning signs and follow up plan of care. Tri verbalized understanding and was discharged ambulatory to home.

## 2021-06-10 NOTE — PROGRESS NOTES
Preceptor Attestation:   Patient seen, evaluated and discussed with the resident. I have verified the content of the note, which accurately reflects my assessment of the patient and the plan of care.  Supervising Physician:Deny Claudio MD  [facility]

## 2021-06-11 RX ORDER — SULFAMETHOXAZOLE/TRIMETHOPRIM 800-160 MG
1 TABLET ORAL 2 TIMES DAILY
Qty: 6 TABLET | Refills: 0 | Status: SHIPPED | OUTPATIENT
Start: 2021-06-11 | End: 2022-09-06

## 2021-06-11 NOTE — DISCHARGE SUMMARY
Continue taking Reglan as needed for nausea/vomiting.  Take Tylenol as needed for pain.  Take Phenergan as needed to sleep or for anxiety (prescription provided).  Continue to ensure good hydration.  Follow-up with [facility] in 1-2 weeks.

## 2021-06-11 NOTE — PROGRESS NOTES
Progress Note    Assessment/Plan   at 24 weeks and 2 days presented this am by ambulance with worsening abdominal pain, nausea and vomiting.  No evidence of  labor or abruption.  On admit, given her discomfort betamethasone, magnesium and labs were initiated.  Since then she is feeling better.  Normal CBC, normal LFTs, neg wet prep, neg FFN, neg UA.  Suspect musculoskeletal.  Case reviewed with primary ob providers, [facility].  Will send home with vistaril.  Active Problems:    Round ligament pain      Subjective  Patient presents by ambulance with pain that worsened overnight, not improved with tylenol.  Comes in waves and is 10/10 at its worst.  Does endorse decreased appetite and emesis.  Denies bleeding, denies loss of fluid, denies dysuria.  Has had this pain before.  Good fm.  No recent COVID exposures.    Objective    Vital signs in last 24 hours  Temp:  [97.7  F (36.5  C)-97.8  F (36.6  C)] 97.8  F (36.6  C)  Heart Rate:  [54-90] 81  Resp:  [16-20] 16  BP: ()/(49-75) 131/66  Weight:   (!) 263 lb (119.3 kg)    Intake/Output last 3 shifts  No intake/output data recorded.  Intake/Output this shift:  I/O this shift:  In: 360 [P.O.:360]  Out: 300 [Emesis/NG output:300]    Physical Exam  Alert, uncomfortable, throwing up  CV RRR  Resp CTA  Ab obese, tender in left lower quadrant  No CVA tenderness  Bedside usn live fetus in transverse presentation, normal FHR  SVE closed/firm /high    Pertinent Labs   Lab Results: personally reviewed.      Time at bedside 30 minutes.     Joie Celis MD FACOG  [facility]  [phone]

## 2021-06-11 NOTE — CONSULTS
SW consulted for [employer] screen. SW reviewed results which are positive for THC. SW faxed results to [facility] per mandated reporting guidelines. SW will follow for plan of care.  STEPH Camara, LICSW

## 2021-06-11 NOTE — PROGRESS NOTES
"Tri sitting in high fowlers in bed on phone. States she has no pain or nausea at this time. 360 ml of clear liquids in with no nausea or emesis. States \"I wanna wait and eat at home\" and went on to describe wanting to stop an get egg rolls on her way home. This writer sat at bedside holding EFM on for tracing due to active fetus. No uterine activity per palpation or reported by patient. Tri states she has had this pain before in her prior pregnancy as well. She did wince once while this writer was at bedside and admitted to an isolated sharp shooting pain in her LLQ that radiated to her groin then immediately resolved with no intervention. Blood pressures as noted. Dr Barkley in department and updated regarding level of comfort, reviewed EFM tracing, PO intake and blood pressures. Tri states her next appointment in the clinic is on 10/2/20. Will provide update to Dr Celis.   "

## 2021-06-11 NOTE — PROGRESS NOTES
Tri sleeping at frequent intervals and snoring while this writer at bedside but easily arouses to voice. She denies any discomfort or nausea at this time. She is eating italian ice and drinking apple juice without difficulty. Magnesium sulfate turned off at 1206 as ordered. Pulse oximetry removed.

## 2021-06-11 NOTE — PROGRESS NOTES
Patient came in via ambulance. Stated Left sided lower pain, constant since last night. Severe 10/10 cramping pain. Patient also nauseated and vomiting. Dr. Celis found heart tones on the ultra sound right away and found cervix to be closed. Doppler found heart tones to be in the 140's,  Abdomen soft. Patient stated former smoker and current use of THC. Labs and swabs collected. Resident paged and is at bedside.

## 2021-06-11 NOTE — PROGRESS NOTES
"Care assumed from SATISH GREEN. Tri sleeping when this writer entered the room. Tri awoke to adjust external monitors and perform assessment. Tri was restless in bed and repositioning every few seconds while this writer was trying to adjust EFM. Able to auscultate FHR in mid-130s before Tri stated she needed to use the bathroom \"right away.\" Up to bathroom with stand-by assist and voided large amount of urine before hat was able to be placed in toilet for I & 0. Returned to bed and settled with warm blankets. Vital signs obtained and external monitors applied, assessment as noted. IV patent and infusing. Tri denies shortness of breath, chest pain, nausea, vomiting, headache,visual changes or any contractions or abdominal pain at this time. Discussed plan of care for continued fetal surveillance and turning off magnesium sulfate at noon as ordered earlier this morning. Tri is agreeable to plan of care.   "

## 2021-06-11 NOTE — PROGRESS NOTES
0935: Patient roomed, Dr Barkley Resident at bedside for initial assessment. Patient in tears when up trying to move into bed. Points to lower abdomen on each side and states has been intense since last night when baby moved. Denies any trauma, intercourse, bleeding or increase in discharge. Has had occasional nausea and vomiting since this started. Doptones achieved per Resident, Resident finished assessment and consulting with Faculty for Treatment POC for anticipated intense round ligament pains. No previous Uterine or abdominal incisions. Patient states feels fine to rest in room while we consult for POC.

## 2021-06-11 NOTE — PROGRESS NOTES
Patient discharged home to keep schedule appointment for tomorrow morning. States feels much better in regards to the pain. Was sleeping in room for the past hour, RN woke patient for discharge instruction. Abdominal binder placed, patient stated it helped with discomfort. Prescription for vistaril given, patient verbalized understanding, questions and concerns answered.     While wheeling patient back down to ER where her vehicle is parked had multiple episodes of rapid onset emesis. Clear water fluid expelled with forceful emesis. Discussed with patient the option of continuing to be discharged or to come up to be assessed further. Patient wants to go home but says would prefer to rest more upstairs following emesis. Dr Barkley informed of re arrival to room. POC rest and give Zofran in 15-20min since patient is feeling better following emesis. Will watch and reassess prior to attempting to discharge again.

## 2021-06-11 NOTE — PROGRESS NOTES
In to see how patient is feeling post Zofran, states better but wishes to continue to sleep a bit longer. Will reassess for discharge again at 1300.

## 2021-06-11 NOTE — PROGRESS NOTES
Patient sleeping soundly. Updated Dr Barkley. POC proceed with patients discharge. Tolerated wheel chair ride, reiterated discharge instructions, patient verbalized understanding, questions and concerns answered.

## 2021-06-12 NOTE — PROGRESS NOTES
Liter of IV fluid complete upon my arrival for this shift.  Patient reports feeling well enough to return home.  She reports she has an appointment with [facility] already scheduled for next week.  Call placed to Dr. Graham.  Patient status given.  Orders received for discharge and for patient to call clinic in AM to move appointment up to sometime this week. Patient is discharged home ambulatory and is escorted to front entrance where her ride is awaiting her.

## 2021-06-12 NOTE — TELEPHONE ENCOUNTER
Pt wants to transfer her care to us. Pt desires a waterbirth.  [facility] will be faxing her records to [facility]. Referral will say [facility] but [facility] does not do waterbirth so clinic staff said just ignore that. Can records be reviewed first and OK'd for transfer before we call pt to schedule her?

## 2021-06-12 NOTE — PROGRESS NOTES
"Lucinda presents to [facility] from ED with c/o dizziness, nausea and vomiting and lower abdominal pain.  Upon arrival she had emesis and involuntarily voided with emesis.  Unable to collect urine at this time due to this.  EFM applied and able to auscultate heart tones in 140's-150's but difficult to trace due to maternal habitus and patient's frequent movements.  Denies cough, shortness of breath or exposure to COVID.  Denies LOF or bleeding or intercourse within the last 24 hours.  Abdomen soft to palpation but she describes pain of 10/10 in lower abdomen. She describes pain as constant but it seems to be exacerbated in waves.  She is a poor historian and it is difficult to see when the onset of symptoms occurred.  She reports nausea and vomiting yesterday but describes the pain as starting \"earlier\".  Dr. Graham called to evaluate.  VE as noted and FFN and wet prep collected.  GBS was positive in urine during pregnancy.  Urine collected eventually and sent for UA/UC and urine tox.  Unable to start IV and PICC team paged.  To US at 1810.  Blood work drawn and pending at this time.  "

## 2021-06-12 NOTE — PROGRESS NOTES
Dr Grahma updated on patient status. Presenting with nausea and lower abdominal pains and spasms. Denies contractions, positive fetal movement noted per patient and palpated per RN. Difficult to trace at 29weeks while mother in fetal position per comfort on her side. Patient Is able to have rest periods without discomfort where she talks and laughs with the RN. States she has had this all pregnancy (see previous notes) and has had this with her previous pregnancy which was much worse then this pregnancy. Dr Graham states she saw her earlier this month when she presented with similar symptoms. Patient states has had intercourse with a new partner last month, urine GC&Clamydia ordered, patient voided on way in, POC to collect urine 2hrs post previous void. MD reviewing chart and placing orders. Requesting call with results and will come see patient at that time. Patient appears to be resting more comfortably now, warm blankets given, verbalized understanding of POC, will try to rest until urine collection and MD comes to see pending results.

## 2021-06-12 NOTE — PROGRESS NOTES
Pt expressing desire to go home. Dr. Toro present, and is recommending pt to wait until [facility] results have returned.

## 2021-06-12 NOTE — PROGRESS NOTES
[facility] FAMILY MEDICINE RESIDENCY PROGRESS NOTE - OB TRIAGE NOTE      Chief Complaint: <principal problem not specified>     OBSTETRICAL / DATING HISTORY:  Estimated Date of Delivery: Estimated Date of Delivery: 20  Gestational Age: 29w5d    PRENATAL HISTORY:  Clinic: [facility]  Number of visits: 3 + (last JAKE visit 2020); last seen at [facility] on 10/15/2020 for Hospital F/u  Glucose test: Passed 1-hour GTT on 10/15/2020 at [facility]   GBS: note done    OB History    Para Term  AB Living   7 1 1   5 1   SAB TAB Ectopic Multiple Live Births   2 3     1      # Outcome Date GA Lbr Daquan/2nd Weight Sex Delivery Anes PTL Lv   7 Current            6 Term 14    F    LENI   5 TAB            4 TAB            3 TAB            2 SAB            1 SAB                HPI:      SUBJECTIVE  Tri DAVE Ji is a 30 y.o.  at 29w5d of gestation based on dating ultrasound with ASCENCION 2020 who has presented to maternity care for further evaluation of acute lower abdominal pain and nausea that started earlier today.      -How many episodes of abdominal pain? Several admissions to labor & delivery and ED for these symptoms  -Location: lower without radiation  -Quality: burning  -Quantity: 10/10 in intensity  -Aggravating factors: unknown  -Alleviating factors: unknown  -Associated symptoms: nausea and vomiting  -Vaginal bleeding? none    Fetal movement is present.  No vaginal bleeding no leakage    Review of Systems  A 12 point comprehensive review of systems was negative except as noted.       PRENATAL CARE: Seen by Dr. Seth Quintanilla at [facility].    PAST MEDICAL HISTORY: Gastritis, Axiety    HISTORY OF COMPLICATIONS : None     PAST SURGICAL HISTORY: Reviewed and none    MEDICATIONS:  Prenatal vitamins, antiemetics     SOCIAL HISTORY:   -Smoking: None  -Alcohol: None  -Illicit drug use: None    OBJECTIVE: PHYSICAL EXAMINATION  /79    GENERAL:  Pleasant pregnant female,  alert, well groomed.  LUNGS:  Clear to auscultation.   BREAST:  Symmetrical. No masses noted. No skin or nipple changes or axillary nodes.   HEART:  RRR without murmur.   ABDOMEN: Soft without masses , tenderness or organomegaly.  No CVA tenderness. No scars. Fundus palpable above symphysis pubis. FHT 140s  MUSCULOSKELETAL:  Full range of motion.   EXTREMITIES:  No edema. No significant varicosities.     OBGyn Exam    Fetal assessment:  -Heart Rate baseline classification: Baseline Range/Rate: 145  -Variability: Variability: Moderate Variability 6-25bpm    Cervical exam: deferred since no active vaginal dis  Uterine activity:  -Mode: Monitor Mode: External, Palpation  -Contraction frequency: Contraction Frequency (min): None Noted on EFM   -Resting Tone Palpated: Resting Tone Palpated: Soft    LAB RESULTS: Prenatal labs:   Hemoglobin   Date Value Ref Range Status   10/18/2020 12.2 12.0 - 16.0 g/dL Final       ASSESSMENT/PLAN:    Lucinda Ji is a 30 y.o. year  female at 29w5d weeks Not in labor. Fetal heart tones are reassuring. Laboratory work-up was reassuring: CBC with without evidence of leukocytosis to suggest systemic infectious/inflammatory process; CMP without evidence of acidosis, electrolyte derangement, or acute kidney injury. UA not suggestive of UTI in pregnancy. Abdominal pain and nausea most likely related to hyperemesis gravidarum.     Plan:  -Give 1 L of normal saline  -Tylenol  -One time dose of hydroxyzine 25 mg  -Most likely there is NO indication currently to hospitalize the patient; instead, she can follow-up with her PCP.   -Will reassess in 1-2 hours    Precepted/discussed patient with Dr. Jessica Graham MD, MPH (PGY 3)  [facility] Family Medicine Resident  Pager: [phone]  10/18/2020  6:07 PM    ADDENDUM at 1930 hrs  Lucinda is feeling much better and reports improvement of her symptoms. She is requesting to go home    /76  (Patient Position: Semi-hoffman, Cuff Size: Adult Large)   Pulse 100   Temp 98  F (36.7  C) (Oral)   Resp 20   SpO2 100%       Plan:  -No indication for hospitalization; patient is stable to go home  -No indication for additional medication  -Encouraged follow-up with Dr. Seth Hand at [facility] (JAKE and urine culture) in 2-4 days  -Anticipatory guidance given  -Continue with prenatals    Matt Graham MD, MPH (PGY 3)  [facility] Family Medicine Resident  Pager: [phone]  10/18/2020  7:30 PM

## 2021-06-12 NOTE — PROGRESS NOTES
Patient states she feels a little better after the zofran. I informed her that we will give her the complete liter of fluids. Dr Graham will come in and talk to her soon about the results of her ultrasound and lab work.

## 2021-06-12 NOTE — PROGRESS NOTES
1630: In to see patient about obtaining urine samples. Patient appears to be resting comfortably, stated would not be able to pee and does not want to get up. MD notified and states will come see patient following collection and giving MD hand off report. Encouraged patient to try for sample to better assess patients pains and medical presentations. Ambulated to restroom standby assist with moaning noted with movement. Was able to give dirty GC&Chlamydia sample and cleaned then to give UA sample. Returned to bed and comforted with new warm blankets patient returning to rest. appears comfortable at this time

## 2021-06-12 NOTE — PROGRESS NOTES
Dr. Toro and Dr. Leija present in room. Pt states desire to go to [facility] to get a second opinion for evaluation of IUFD, as she states she has been feeling fetal movements. Negative Rom+ results, risks and benefits of leaving hospital discussed with pt per Dr. Leija. Pt states she would still like to get a second opinion. Pt discharged.

## 2021-06-12 NOTE — PROGRESS NOTES
[facility] MEDICINE RESIDENCY PROGRESS NOTE - OB TRIAGE NOTE      Chief Complaint: Abdominal pain   OBSTETRICAL / DATING HISTORY:  Estimated Date of Delivery: Estimated Date of Delivery: 20  Gestational Age: 28w0d    PRENATAL HISTORY:  Clinic: [facility]   Number of visits: 2 +  Glucose test: not done  GBS: not done    OB History    Para Term  AB Living   7 1 1   5 1   SAB TAB Ectopic Multiple Live Births   2 3     1      # Outcome Date GA Lbr Daquan/2nd Weight Sex Delivery Anes PTL Lv   7 Current            6 Term 14    F    LENI   5 TAB            4 TAB            3 TAB            2 SAB            1 SAB                HPI:      SUBJECTIVE  Lucinda Ji is a 30 y.o.  at 28w0d of gestation based on unknown with ASCENCION 2020 who has presented to maternity care for further evaluation of acute lower abdominal pain that started around 1500 hrs.  She admits to nausea, vomiting, and headache.  She denies fever,chest pain, shortness of breath, no vaginal bleeding, no contractions/severe cramping, no leakage of fluid, headache, vision changes.  Fetal movement is present.    -Onset: since around 3pm  -How many episodes of abdominal pain? 1st time  -Location: lower abdominal, without radiation  -Quality: shooting  -Quantity: 10/10 in intensity  -Aggravating factors: none  -Alleviating factors: none  -Associated symptoms: nausea and vomiting  -Cheese hotdogs this AM, and vomited the food out  -Previous episodes: none; feels less compared to previous pregnancy  -History of sick contacts? no  -Any recent travels? no  -Last BM this morning  -No history of C-sec  -No history of trauma    Review of Systems  A 12 point comprehensive review of systems was negative except as noted.       PRENATAL CARE: Seen by Dr. Seth Hand at [facility].    PAST MEDICAL HISTORY: Gastritis    PAST SURGICAL HISTORY: Reviewed and none    MEDICATIONS:  Prenatal vitamins    SOCIAL HISTORY:   -Smoking:  "None  -Alcohol: None  -Illicit drug use: none    OBJECTIVE: PHYSICAL EXAMINATION  Temp:  [98.1  F (36.7  C)] 98.1  F (36.7  C)  Heart Rate:  [111] 111  Resp:  [20] 20  BP: (121)/(68) 121/68     /68 (Patient Position: Lying, Cuff Size: Adult Large)   Pulse (!) 111   Temp 98.1  F (36.7  C) (Oral)   Resp 20   Ht 5' 6\" (1.676 m)   Wt (!) 267 lb (121.1 kg)   BMI 43.09 kg/m     GENERAL:  Agonizing in pain, tearful  SKIN:  Warm and dry, without lesions or rashes   LUNGS:  Clear to auscultation.   HEART:  RRR without murmur.   ABDOMEN: Soft without masses , tenderness or organomegaly.  No CVA tenderness. Fundus palpable above symphysis pubis. -150s  MUSCULOSKELETAL:  Full range of motion.   EXTREMITIES:  No edema. No significant varicosities.   GENITALIA:  Normal appearing anatomy, no lesions noted.  CERVIX:  Closed posterior thick      OBGyn Exam  Membrane Status: Membrane Status: Intact  Fetal assessment: 140-150  Uterine activity:  -Mode: Monitor Mode: Palpation, External  -Contraction frequency: Contraction Frequency (min): none   -Resting Tone Palpated: Resting Tone Palpated: Soft    LAB RESULTS: Prenatal labs:   Hemoglobin   Date Value Ref Range Status   2020 12.9 12.0 - 16.0 g/dL Final       ASSESSMENT/PLAN:    Lucinda Ji is a 30 y.o. year old  female at 28w0d weeks not in labor but presents with acute abdominal pain. Vitals reassuring. Physical examination significant for: tenderness to palpation of the lower abdomen; vaginal exam revealed a closed cervix with no active bleeding from the cervix and no blood seen in the vaginal canal - so unlikely threatened . Differential diagnosis is broad but includes: ovarian cyst, ovarian torsion, appendicitis, urinary tract infection, PID.    Plan:  -Will order: Fetal fibronectin, wet prep, UA/UC, CBC, CMP   -Imaging ordered: OB US, BPP, abdominal US  -Tylenol for the abdominal pain  -Zofran   -Will update the note as the above results " come in and discuss with attending    Matt Graham MD, MPH (PGY 3)  [facility] Family Medicine Resident  Pager: [phone]  10/6/2020  5:31 PM    ADDENDUM: 10/6/2020  8:20 PM  Discussed the patient concerns and following lab results with Dr. Escobar    LAB REVIEW:  Results from last 7 days   Lab Units 10/06/20  1813   LN-SODIUM mmol/L 137   LN-POTASSIUM mmol/L 3.7   LN-CHLORIDE mmol/L 106   LN-CO2 mmol/L 21*   LN-BLOOD UREA NITROGEN mg/dL 5*   LN-CREATININE mg/dL 0.58*   LN-CALCIUM mg/dL 9.2   LN-ALBUMIN g/dL 2.8*   LN-PROTEIN TOTAL g/dL 7.3   LN-BILIRUBIN TOTAL mg/dL 0.6   LN-ALKALINE PHOSPHATASE U/L 116   LN-ALT (SGPT) U/L 11   LN-AST (SGOT) U/L 14     Results from last 7 days   Lab Units 10/06/20  1813   LN-WHITE BLOOD CELL COUNT thou/uL 9.8   LN-HEMOGLOBIN g/dL 12.2   LN-HEMATOCRIT % 37.0   LN-PLATELET COUNT thou/uL 357     Urine dipstick shows positive for RBC's (25-50), WBC 5-10, and positive for leukocytes.      OBJECTIVE    Vital signs in last 24 hours: Temp:  [98.1  F (36.7  C)] 98.1  F (36.7  C)  Heart Rate:  [111] 111  Resp:  [20] 20  BP: (121)/(68) 121/68     Weight: (!) 267 lb (121.1 kg)      ASSESSMENT/PLAN:  Lucinda Ji is a 30 y.o. year old  female at 28w0d weeks not in labor but presents with acute abdominal pain. Vitals reassuring. UA suggestive of UTI in pregnancy. Difficult to explain the sudden onset of abdominal pain, but will give IVF in view of emesis and nausea    1. Give 1 L of RL  2. Tylenol  3. PO Amoxicillin 875 mg two times a day for 5 days  4. If symptoms persist, will reach out to OB  5. Will consider discharge once the patient is stable  6. Continue with management and monitor vitals per floor protocol  7. Feel free to contact us if any other issues arise    Matt Graham MD, MPH (PGY 3)  [facility] Family Medicine Resident  Pager: [phone]  10/6/2020  8:20 PM        ADDENDUM: 10/6/2020  8:50  PM  Lucinda still reports abdominal pain, slightly improved. Given Zofran. Has not taken acetaminophen due to concerns of vomiting it out. IV RL is running. She appears more comfortable then before.    OBJECTIVE    Vital signs in last 24 hours: Temp:  [98.1  F (36.7  C)] 98.1  F (36.7  C)  Heart Rate:  [111] 111  Resp:  [20] 20  BP: (121)/(68) 121/68     Weight: (!) 267 lb (121.1 kg)  Intake/Output last 3 shift No intake/output data recorded.      Intake/Output this shift:No intake/output data recorded.    ASSESSMENT/PLAN:  1. IM hydroxyzine 25 mg now  2. Encouraged patient to take Tylenol, she agreed  3. She can get a ride home considering she is taking hydroxyzine  4. Will observe for another hour     Matt Graham MD, MPH (PGY 3)  [facility] Medicine Resident  Pager: [phone]  10/6/2020  8:50 PM        ADDENDUM: 10/6/2020  10:33 PM  Tri reports improvement of symptoms. She feels like she is able to go home now.    ASSESSMENT/PLAN:    Lucinda Ji is a 30 y.o. year old  female at 28w0d weeks not in labor but presented with acute abdominal pain. This has resolved with Tylenol, IVF, and hydroxyzine. Vitals reassuring. UA concerning of UTI in pregnancy. Urine culture pending     Plan  -No indication for hospitalization  -Will prescribe PO Amoxicillin 875 mg two times a day for 5 days  -Follow-up on urine culture with her PCP (Dr. Seth Hand) at [facility]   -Communicated with Dr. Seth Hand via phone & text to update him of his patient  -Anticipatory guidance given   -Continue taking prenatal vitamin daily.  -Healthy habits including not using tobacco or alcohol, exercising regularly and maintaining healthy diet  -Information given on tips for dealing with nausea, healthy habits, exposures, safety, prenatal appointment schedule, and when to call the doctor.    Matt Graham MD, MPH (PGY 3)  [facility] Medicine  Resident  Pager: [phone]  10/6/2020  10:34 PM          Matt Graham MD, MPH (PGY 3)  [facility] Family Medicine Resident  Pager: [phone]  10/6/2020  10:33 PM

## 2021-06-12 NOTE — PROGRESS NOTES
Patient presented via ambulance for loss of fluid around noon today.  I was called as IHOB to triage as RN's had difficulty obtaining FHT's.  BSUS performed by me, no visible fetal heart tones, no color flow seen.  Subjectively normal fluid, no fetal movements seen.  Discussed with patient concern that fetal heart tones were no longer present.  Stat formal US performed, I was present when sonographer performed US confirming the absence of heart tones.  Discussed findings with patient, discussed that the baby no longer had a heart beat meaning that it has passed away.  Patient appropriately tearful and calling family.  Patient is established with [facility], discussed with Dr Escobar and they will continue management of IUFD.    MD Abi

## 2021-06-12 NOTE — TELEPHONE ENCOUNTER
Records received from [facility].  Per clinic CNM, patient ineligible for water birth d/t BMI.  Patient is currently admitted to [facility] to try calling patient tomorrow, 10/28/2020, if discharged.

## 2021-06-12 NOTE — PROGRESS NOTES
Pt arrived via ambulance for evaluation of possible PPROM. Pt states she started feeling leaking of fluid around 1200 and has had some contractions starting since then. Denies having any bleeding. This writer having difficult time finding fht on monitor and on ultrasound. Ob resident called to room, Dr. Cottrell called to room to assess.

## 2021-06-12 NOTE — PROGRESS NOTES
Tri presents from ED with c/o constant abdominal pain.  She had incident of emesis and involuntary urination while in transport.  Brought directly to patient room, monitors applied and turned over patient care to Ely Peters RN

## 2021-06-12 NOTE — PROGRESS NOTES
Rom+ which was recently collected has been sent under direction of Dr. Cottrell. Dr. Cottrell discussing plan of care with Dr. Toro, ob resident, and Dr. Leija.

## 2021-06-12 NOTE — TELEPHONE ENCOUNTER
Per Care Everywhere IUFD documented 10/27/20. No further action on transfer of care is warranted at this time.

## 2021-06-29 NOTE — PROGRESS NOTES
"Progress Notes by Stiven Barkley MD at 9/10/2020  9:49 AM     Author: Stiven Barkley MD Service: Resident Author Type: Resident    Filed: 9/10/2020  1:59 PM Date of Service: 9/10/2020  9:49 AM Status: Attested    : Stiven Barkley MD (Resident) Cosigner: Spencer Leija MD at 9/10/2020  3:47 PM    Attestation signed by Spencer Leija MD at 9/10/2020  3:47 PM                     OBSTETRICS TRIAGE ASSESSMENT NOTE    Lucinda Ji is a 29 y.o.  at 24w2d gestation based on LMP who has presented to maternity care for further evaluation of abdominal pain and nausea/vomiting. States it started somewhat abruptly last night; describes it as a severe crampy, nonradiating sensation in her left groin. Pain is worse with movement and better with rest. Shortly after the pain started she began experience nausea with multiple bouts of non bloody vomiting.  She tried taking some tylenol and some zofran which she states helped to some extent but still had symptoms. She rested in bed throughout the night but decided she couldn't take it anymore and came to the ED.   Per chart review, patient has presented for nausea/vomiting and lower abdominal pain ~8 times since May; each episode being determined to be 2/2 hyperemesis gravidarum and patient being sent home on antiemetics. She states that she feels like this left groin/lower abdominal pain is more severe than the lower abdominal pain has been in the past though. At home she typically takes 1 Zofran daily as needed.  She additionally endorses marijuana and cigarette however states that she stopped both last week.   No bleeding, leakage of fluids, contractions; has been baby move.  She says before this started she had normal appetite and has been drinking and eating \"plenty.\" Lucinda denies any constipation, diarrhea, blood in the stool, dysuria, hematuria, change in urinary frequency, fever/chills.  She denies history of kidney stones, abdominal " surgeries, or preeclampsia, however does state that she had multiple small fibroids on an MRI that was done earlier this year. Has history of treated bacterial vaginosis during this pregnancy with remote hx of GC/chlamydia.    [place] recently? No      PRENATAL CARE  Seen by Dr. Kathy Butler at [facility].    OB History        7    Para   1    Term   1            AB   5    Living   1       SAB   2    TAB   3    Ectopic        Multiple        Live Births   1                 PAST MEDICAL HISTORY  Past Medical History:   Diagnosis Date   ? Asthma    ? Bacterial vaginosis in pregnancy 2020   ? Gastritis    ? Nausea & vomiting 2020   ? Obesity    ? Pregnancy 2020       PAST SURGICAL HISTORY   History reviewed. No pertinent surgical history.    MEDICATIONS    Current Facility-Administered Medications:   ?  betamethasone acetate-betamethasone sodium phosphate injection 12 mg (CELESTONE), 12 mg, Intramuscular, Q24H, Joie Celis MD, 12 mg at 09/10/20 0832  ?  calcium gluconate 100 mg/mL (10%) injection 1 g, 1 g, Intravenous, PRN, Joie Celis MD  ?  lactated Ringers, 0-500 mL/hr, Intravenous, Continuous, Joie Celis MD, Last Rate: 500 mL/hr at 09/10/20 0910, 500 mL/hr at 09/10/20 0910  ?  magnesium sulfate IV infusion 40 g/1000 ml, 2 g/hr, Intravenous, Continuous, Joie Celis MD, Last Rate: 50 mL/hr at 09/10/20 0945, 2 g/hr at 09/10/20 0945  ?  naloxone injection 0.2-0.4 mg (NARCAN), 0.2-0.4 mg, Intravenous, PRN **OR** naloxone injection 0.2-0.4 mg (NARCAN), 0.2-0.4 mg, Intramuscular, PRN, Joie Celis MD    SOCIAL HISTORY:   Social History     Socioeconomic History   ? Marital status: Single     Spouse name: Not on file   ? Number of children: Not on file   ? Years of education: Not on file   ? Highest education level: Not on file   Occupational History   ? Not on file   Social Needs   ? Financial resource strain: Not  "on file   ? Food insecurity     Worry: Not on file     Inability: Not on file   ? Transportation needs     Medical: Not on file     Non-medical: Not on file   Tobacco Use   ? Smoking status: Former Smoker   ? Smokeless tobacco: Never Used   ? Tobacco comment: last week 9-3-20   Substance and Sexual Activity   ? Alcohol use: Not Currently     Frequency: Never   ? Drug use: Yes     Types: Marijuana   ? Sexual activity: Yes     Partners: Male   Lifestyle   ? Physical activity     Days per week: Not on file     Minutes per session: Not on file   ? Stress: Not on file   Relationships   ? Social connections     Talks on phone: Not on file     Gets together: Not on file     Attends Sikhism service: Not on file     Active member of club or organization: Not on file     Attends meetings of clubs or organizations: Not on file     Relationship status: Not on file   ? Intimate partner violence     Fear of current or ex partner: Not on file     Emotionally abused: Not on file     Physically abused: Not on file     Forced sexual activity: Not on file   Other Topics Concern   ? Not on file   Social History Narrative   ? Not on file       PHYSICAL EXAMINATION   /60   Pulse 75   Temp 97.7  F (36.5  C) (Oral)   Resp 20   Ht 5' 6\" (1.676 m)   Wt (!) 263 lb (119.3 kg)   SpO2 99%   BMI 42.45 kg/m    Gen: appears comfortable, no acute distress  CV: regular rate and rhythm, no murmur appreciated  Lungs: clear to ausculation, good air movement throughout  Abdomen: Gravid, non-tender fundus  Extremities: no lower extremity edema  Cervix: 0/-/-  Membranes: are intact  FHT: Category 1 tracing; baseline 140 with moderate variability, no accelerations, no decelerations  Contractions: none    LAB RESULTS  Personally reviewed.  Recent Results (from the past 24 hour(s))   Collect and send fetal fibronectin sample if indicated for any patient who is between 22-34 weeks gestation with complaints of contractions with sterile speculum " exam.    Collection Time: 09/10/20  7:56 AM   Result Value Ref Range    Fetal Fibronectin Negative Negative   Wet Prep, Vaginal    Collection Time: 09/10/20  7:56 AM    Specimen: Genital   Result Value Ref Range    Yeast Result No yeast seen No yeast seen    Trichomonas No Trichomonas seen No Trichomonas seen    Clue Cells, Wet Prep No Clue cells seen No Clue cells seen   Urinalysis-UC if Indicated    Collection Time: 09/10/20  7:57 AM   Result Value Ref Range    Color, UA Yellow Colorless, Yellow, Straw, Light Yellow    Clarity, UA Cloudy (!) Clear    Glucose, UA Negative Negative    Bilirubin, UA Negative Negative    Ketones, UA Negative Negative, 60 mg/dL    Specific Gravity, UA 1.014 1.001 - 1.030    Blood, UA Trace (!) Negative    pH, UA 8.5 (H) 4.5 - 8.0    Protein, UA Trace (!) Negative mg/dL    Urobilinogen, UA <2.0 E.U./dL <2.0 E.U./dL, 2.0 E.U./dL    Nitrite, UA Negative Negative    Leukocytes, UA Negative Negative    Bacteria, UA None Seen None Seen hpf    RBC, UA 0-2 None Seen, 0-2 hpf    WBC, UA 0-5 None Seen, 0-5 hpf    Squam Epithel, UA 25-50 (!) None Seen, 0-5 lpf    Mucus, UA Few (!) None Seen lpf   Drug Abuse 1+, Urine    Collection Time: 09/10/20  8:11 AM   Result Value Ref Range    Amphetamines Screen Negative Screen Negative    Benzodiazepines Screen Negative Screen Negative    Opiates Screen Negative Screen Negative    Phencyclidine Screen Negative Screen Negative    THC (!) Screen Negative     Screen Positive-Confirmatory test automatically ordered per OB protocol    Barbiturates Screen Negative Screen Negative    Cocaine Metabolite Screen Negative Screen Negative    Methadone Screen Negative Screen Negative    Oxycodone Screen Negative Screen Negative    Creatinine, Urine 107.1 mg/dL   Alcohol, Ethyl, Urine Screen    Collection Time: 09/10/20  8:11 AM   Result Value Ref Range    Alcohol, Urine <10 None detected mg/dL   Hepatic Profile    Collection Time: 09/10/20  8:27 AM   Result Value Ref  Range    Bilirubin, Total 0.3 0.0 - 1.0 mg/dL    Bilirubin, Direct 0.1 <=0.5 mg/dL    Protein, Total 7.0 6.0 - 8.0 g/dL    Albumin 2.8 (L) 3.5 - 5.0 g/dL    Alkaline Phosphatase 96 45 - 120 U/L    AST 18 0 - 40 U/L    ALT 18 0 - 45 U/L   Comprehensive Metabolic Panel    Collection Time: 09/10/20  8:27 AM   Result Value Ref Range    Sodium 135 (L) 136 - 145 mmol/L    Potassium 3.7 3.5 - 5.0 mmol/L    Chloride 105 98 - 107 mmol/L    CO2 22 22 - 31 mmol/L    Anion Gap, Calculation 8 5 - 18 mmol/L    Glucose 99 70 - 125 mg/dL    BUN 4 (L) 8 - 22 mg/dL    Creatinine 0.58 (L) 0.60 - 1.10 mg/dL    GFR MDRD Af Amer >60 >60 mL/min/1.73m2    GFR MDRD Non Af Amer >60 >60 mL/min/1.73m2    Bilirubin, Total 0.3 0.0 - 1.0 mg/dL    Calcium 9.0 8.5 - 10.5 mg/dL    Protein, Total 7.0 6.0 - 8.0 g/dL    Albumin 2.8 (L) 3.5 - 5.0 g/dL    Alkaline Phosphatase 96 45 - 120 U/L    AST 18 0 - 40 U/L    ALT 18 0 - 45 U/L   APTT(PTT)    Collection Time: 09/10/20  8:27 AM   Result Value Ref Range    PTT 29 24 - 37 seconds   INR    Collection Time: 09/10/20  8:27 AM   Result Value Ref Range    INR 0.95 0.90 - 1.10   Type and Screen    Collection Time: 09/10/20  8:27 AM   Result Value Ref Range    ABORh B POS     Antibody Screen Negative Negative   HM1 (CBC with Diff)    Collection Time: 09/10/20  8:27 AM   Result Value Ref Range    WBC 8.6 4.0 - 11.0 thou/uL    RBC 4.04 3.80 - 5.40 mill/uL    Hemoglobin 12.3 12.0 - 16.0 g/dL    Hematocrit 37.3 35.0 - 47.0 %    MCV 92 80 - 100 fL    MCH 30.4 27.0 - 34.0 pg    MCHC 33.0 32.0 - 36.0 g/dL    RDW 13.2 11.0 - 14.5 %    Platelets 362 140 - 440 thou/uL    MPV 9.1 8.5 - 12.5 fL    Neutrophils % 77 (H) 50 - 70 %    Lymphocytes % 16 (L) 20 - 40 %    Monocytes % 5 2 - 10 %    Eosinophils % 2 0 - 6 %    Basophils % 0 0 - 2 %    Immature Granulocyte % 1 (H) <=0 %    Neutrophils Absolute 6.6 2.0 - 7.7 thou/uL    Lymphocytes Absolute 1.4 0.8 - 4.4 thou/uL    Monocytes Absolute 0.4 0.0 - 0.9 thou/uL     Eosinophils Absolute 0.2 0.0 - 0.4 thou/uL    Basophils Absolute 0.0 0.0 - 0.2 thou/uL    Immature Granulocyte Absolute 0.1 (H) <=0.0 thou/uL       ASSESSMENT:  Lucinda Ji is a 29 y.o. year old at 24w2d weeks in Not in labor., presenting with n/v and left groin pain consistent with round ligament pain with concomitant hyperemesis gravidarum vs less likely kidney stone vs less likely uterine fibroid degeneration vs much less likely acute diverticulitis. Bedside abdominal ultrasound reassuring for benign process; patient not in labor.  Patient did not receive full dose of betamethasone; in future if patient returns for , ensure that full dose is given as this was incomplete dose.    PLAN:    Continue antiemetics as needed and tylenol with instructions to follow-up in clinic if re-experiencing pain    Continue taking daily B6     Prescribed vistaril 50mg q6h as needed    Do not give additional betamethasone dose in 24 hours, just note it was incomplete dose for future reference    [facility] 1-2 weeks    Stiven Barkley MD  Family Medicine PGY-1    Precepted patient with Dr. Brennon Leija who agrees with the plan above.

## 2021-06-29 NOTE — PROGRESS NOTES
"Progress Notes by Stiven Barkley MD at 2020 10:03 AM     Author: Stiven Barkley MD Service: Resident Author Type: Resident    Filed: 2020 11:20 AM Date of Service: 2020 10:03 AM Status: Attested    : Stiven Barkley MD (Resident)    Related Notes: Original Note by Stiven Barkley MD (Resident) filed at 2020 11:19 AM    Cosigner: Jessica Tavera DO at 2020 12:24 PM    Attestation signed by Jessica Tavera DO at 2020 12:24 PM    Faculty Supervision of Residents    I have examined this patient and the medical care has been evaluated and discussed with the resident.  I am in agreement with resident documentation on  2020 which reflects our discussion and decision making.  Patient was having significant pain upon arrival to the floor, she was rocking on hands and knees, her pain resolved with tylenol and belly band    Jessica Tavera                  OBSTETRICS TRIAGE ASSESSMENT NOTE    Lucinda Ji is a 30 y.o.  at 25w2d gestation based on LMP who has presented to maternity care for evaluation of lower bilateral abdominal/groin pain and nausea/vomiting. Lucinda says pain started abruptly last night; pain is severe and is described as a \"knot like sensation in both groins that doesn't radiate.\" Pain is worse with movement and better with rest. Nausea and vomiting started shortly after pain started; states she had multiple bouts of non bloody vomiting but hasn't been vomiting this morning- just nausea.  She tried taking a phenergan but was unable to keep it down. Was supposed to have a clinic appointment at [facility] today at 1:30pm but she called stating she needed it moved up because of the pain- they told her they couldn't move it up and so she would have to go to the ED.   Per chart review, patient has presented for nausea/vomiting and lower abdominal pain ~9 times since May; each episode being determined to be 2/2 hyperemesis gravidarum and patient " being sent home on antiemetics. Most recently was seen by this provider at [facility] for same complaints on 09/10/2020. Extensive work-up was done at that time with no findings to suggest alternative diagnosis and patient was discharged with instructions to take tylenol and phenergan as needed and to follow-up with PCP in a week. Patient was discharged feeling much better and proceeded to go get egg rolls.  No bleeding, leakage of fluids, contractions; has been baby move.  She says before this started she had normal appetite and has been drinking/eating like normal. Tri denies any constipation, diarrhea, blood in the stool, dysuria, hematuria, change in urinary frequency, fever/chills; last bowel movement was normal and occurred this morning. No history of kidney stones, abdominal surgeries/ hx, or preeclampsia. Is known to have multiple small fibroids on an MRI that was done earlier this year. Has history of treated bacterial vaginosis during this pregnancy with remote hx of GC/chlamydia. Urine culture grew GBS 20. She had been using marijuana and cigarettes earlier in her pregnancy but states that she stopped both 2 weeks ago.      [place] recently? No    PRENATAL CARE  Seen by Dr. Kathy Butler at [facility].    OB History        7    Para   1    Term   1            AB   5    Living   1       SAB   2    TAB   3    Ectopic        Multiple        Live Births   1                 PAST MEDICAL HISTORY  Past Medical History:   Diagnosis Date   ? Asthma    ? Bacterial vaginosis in pregnancy 2020   ? Gastritis    ? GBS bacteriuria 8/10/2020   ? Nausea & vomiting 2020   ? Obesity    ? Pregnancy 2020       PAST SURGICAL HISTORY   Past Surgical History:   Procedure Laterality Date   ? NO PAST SURGERIES  2020       MEDICATIONS    Current Facility-Administered Medications:   ?  acetaminophen tablet 650 mg (TYLENOL), 650 mg, Oral, Q4H PRN, Stiven Barkley,  MD, 650 mg at 09/17/20 1007  ?  lactated Ringers, 0-500 mL/hr, Intravenous, Continuous, Spencer Leija MD    SOCIAL HISTORY:   Social History     Socioeconomic History   ? Marital status: Single     Spouse name: Not on file   ? Number of children: Not on file   ? Years of education: Not on file   ? Highest education level: Not on file   Occupational History   ? Not on file   Social Needs   ? Financial resource strain: Not on file   ? Food insecurity     Worry: Not on file     Inability: Not on file   ? Transportation needs     Medical: Not on file     Non-medical: Not on file   Tobacco Use   ? Smoking status: Former Smoker   ? Smokeless tobacco: Never Used   ? Tobacco comment: last week 9-3-20   Substance and Sexual Activity   ? Alcohol use: Not Currently     Frequency: Never   ? Drug use: Not Currently     Types: Marijuana     Comment: States she stopped smoking marijuana 9/05/2020   ? Sexual activity: Not Currently     Partners: Male   Lifestyle   ? Physical activity     Days per week: Not on file     Minutes per session: Not on file   ? Stress: Not on file   Relationships   ? Social connections     Talks on phone: Not on file     Gets together: Not on file     Attends Uats[employer] service: Not on file     Active member of club or organization: Not on file     Attends meetings of clubs or organizations: Not on file     Relationship status: Not on file   ? Intimate partner violence     Fear of current or ex partner: Not on file     Emotionally abused: Not on file     Physically abused: Not on file     Forced sexual activity: Not on file   Other Topics Concern   ? Not on file   Social History Narrative   ? Not on file       PHYSICAL EXAMINATION   There were no vitals taken for this visit.  Gen: appears uncomfortable with occasional grimaces 2/2 pain  CV: RRR   Lungs: good air movement throughout, no respiratory distress  Abdomen: Gravid, non-tender fundus; minimal tenderness in pubic region, tenderness over  groin bilaterally  Extremities: no lower extremity edema, grossly normal  Cervix: 0   Membranes: are intact  FHT: Category 1 tracing; baseline 150 with moderate variability, no decelerations  Contractions: none    LAB RESULTS  Personally reviewed.  No results found for this or any previous visit (from the past 24 hour(s)).    ASSESSMENT:  Lucinda Ji is a 30 y.o. year old at 25w2d weeks in Not in labor., presenting with groin/lower abdominal pain and nausea/vomiting consistent with round ligament pain with concomitant hyperemesis gravidarum.     Received 1000mg PO Tylenol and 50mg PO vistaril that was quickly and significantly improved symptoms.    PLAN:    UA with reflex cx     Belly band for round ligament pain; to take home with her    D/c home with vistaril and tylenol as needed, instructed to discontinue phenergan    [facility]  [facility] PGY-1    Precepted patient with Dr. Jessica Tavera who agrees with the plan above.

## 2021-06-29 NOTE — PROGRESS NOTES
"Progress Notes by Juan Pablo Prado DO at 2020  1:24 PM     Author: Juan Pablo Prado DO Service: Resident Author Type: Resident    Filed: 2020  4:58 PM Date of Service: 2020  1:24 PM Status: Attested    : Juan Pablo Prado DO (Resident) Cosigner: Luther Hutchison MD at 2020  7:15 AM    Attestation signed by Luther Hutchison MD at 2020  7:15 AM    I agree                OBSTETRICS TRIAGE ASSESSMENT NOTE  Lucinda Ji is a 29 y.o.  at 21w1d gestation based on LMP and ultrasound who has presented to maternity care for further evaluation of abdominal/pelvic/back pain. Patient reports \"crampy\" mostly LLQ abdominal and back pain that started when she woke up from a nap. Regarding her back pain, it will sometimes shoot down her whole leg, and this is consistent with her known sciatica. Progressively over the evening and into this morning, she developed pelvic discomfort and was concerned that her \"baby was dropping down\". Given previous lost pregnancy (at about age 14), she was concerned for the same. Did not try any pain medication or other intervention. No abdominal trauma. No bleeding, leakage of fluids, contractions. Baby is moving normally. Urine is cloudy but no hematuria or dysuria. One episode of nausea/vomiting this morning; this is much better controlled since she started receiving medication from the ED/[facility]. Denies fever or chills. No known COVID exposure. Seems to have a cough when getting out of the shower but otherwise not much of cough. No chest pain, shortness of breath, headache, vision changes, leg swelling.     She did have a urine culture done with her initial prenatal labs that came back positive for ~50,000 col/ml of group B strep. This has not been treated to this point.     Of note, she has a previous episode of BV during this pregnancy. She denies marijuana and other substance use.     PRENATAL CARE  Seen by Dr. Burleson and Dr. Butler at [facility] " clinic.    OB History        7    Para   1    Term   1            AB   5    Living   1       SAB   2    TAB   3    Ectopic        Multiple        Live Births   1               Ultrasound at 9 weeks: Single living intrauterine gestation at 9 weeks 0 days, EDC 2020.    PAST MEDICAL HISTORY  Past Medical History:   Diagnosis Date   ? Asthma    ? Bacterial vaginosis in pregnancy 2020   ? Gastritis    ? Nausea & vomiting 2020   ? Obesity    ? Pregnancy 2020       PAST SURGICAL HISTORY   History reviewed. No pertinent surgical history.    MEDICATIONS  No current facility-administered medications for this encounter.     Current Outpatient Medications:   ?  acetaminophen (TYLENOL) 325 MG tablet, Take 650 mg by mouth every 6 (six) hours as needed for pain., Disp: , Rfl:   ?  albuterol (PROAIR HFA;PROVENTIL HFA;VENTOLIN HFA) 90 mcg/actuation inhaler, Inhale 2 puffs every 6 (six) hours as needed for wheezing., Disp: , Rfl:   ?  metoclopramide (REGLAN) 10 MG tablet, Take 1 tablet (10 mg total) by mouth every 6 (six) hours as needed for nausea., Disp: 30 tablet, Rfl: 0  ?  PRENATAL VITAMIN 27 mg iron- 0.8 mg Tab tablet, Take 1 tablet by mouth daily., Disp: , Rfl:   ?  promethazine (PHENERGAN) 25 MG suppository, Insert 1 suppository (25 mg total) into the rectum every 6 (six) hours as needed for nausea., Disp: 12 suppository, Rfl: 0  ?  cefpodoxime (VANTIN) 100 MG tablet, Take 1 tablet (100 mg total) by mouth 2 (two) times a day for 7 days., Disp: 14 tablet, Rfl: 0  ?  diphenhydrAMINE (BENADRYL) 25 mg capsule, Take 1 capsule (25 mg total) by mouth every 6 (six) hours as needed for itching or sleep., Disp: , Rfl:   ?  metoclopramide (REGLAN) 10 MG tablet, Take 1 tablet (10 mg total) by mouth every 6 (six) hours as needed for nausea., Disp: 24 tablet, Rfl: 0  ?  metroNIDAZOLE (FLAGYL) 500 MG tablet, Take 1 tablet (500 mg total) by mouth 2 (two) times a day for 7 days., Disp: 14 tablet, Rfl:  0  ?  ondansetron (ZOFRAN) 4 MG tablet, Take 1 tablet (4 mg total) by mouth every 6 (six) hours., Disp: 20 tablet, Rfl: 0    SOCIAL HISTORY:   Social History     Socioeconomic History   ? Marital status: Single     Spouse name: Not on file   ? Number of children: Not on file   ? Years of education: Not on file   ? Highest education level: Not on file   Occupational History   ? Not on file   Social Needs   ? Financial resource strain: Not on file   ? Food insecurity     Worry: Not on file     Inability: Not on file   ? Transportation needs     Medical: Not on file     Non-medical: Not on file   Tobacco Use   ? Smoking status: Never Smoker   ? Smokeless tobacco: Never Used   Substance and Sexual Activity   ? Alcohol use: Not Currently     Frequency: Never   ? Drug use: Never   ? Sexual activity: Yes     Partners: Male   Lifestyle   ? Physical activity     Days per week: Not on file     Minutes per session: Not on file   ? Stress: Not on file   Relationships   ? Social connections     Talks on phone: Not on file     Gets together: Not on file     Attends Yarsanism service: Not on file     Active member of club or organization: Not on file     Attends meetings of clubs or organizations: Not on file     Relationship status: Not on file   ? Intimate partner violence     Fear of current or ex partner: Not on file     Emotionally abused: Not on file     Physically abused: Not on file     Forced sexual activity: Not on file   Other Topics Concern   ? Not on file   Social History Narrative   ? Not on file       PHYSICAL EXAMINATION   /65 (Patient Position: Semi-hoffman, Cuff Size: Adult Regular)   Pulse 91   Temp 97.9  F (36.6  C) (Oral)   Resp 18   Gen: Initially tearful in discomfort, but eventually relaxed and appears comfortable. No acute distress  HEENT: Mild bilateral conjunctivitis. No scleral icterus.   CV: regular rate and rhythm without murmur  Lungs: clear to ausculation, good air movement  throughout  Abdomen: Gravid, non-tender, fundus just inferior to umbilicus. Mild tenderness to palpation of LLQ / suprapubic area.   MSK: mild tenderness to palpation of left lower back / buttock.   Cervix: Dilatation (cm): 0cm/Effacement (%): 0%/    Per RN: cervix is non-dilated, posterior, thick.   Extremities: no lower extremity edema    FETAL HEART MONITORING   Category I strip with baseline ~150. Some possible variable decelerations in FHR but no contractions so not technically meeting criteria.     CONTRACTIONS  None.     LAB RESULTS  Personally reviewed.  Recent Results (from the past 24 hour(s))   Collect and send wet prep vaginal specimen as indicated by prenatal history and/or patient complaints    Collection Time: 08/19/20  9:03 AM    Specimen: Genital   Result Value Ref Range    Yeast Result No yeast seen No yeast seen    Trichomonas No Trichomonas seen No Trichomonas seen    Clue Cells, Wet Prep Clue cells seen (!) No Clue cells seen   Drug Abuse 1+, Urine    Collection Time: 08/19/20  9:07 AM   Result Value Ref Range    Amphetamines Screen Negative Screen Negative    Benzodiazepines Screen Negative Screen Negative    Opiates Screen Negative Screen Negative    Phencyclidine Screen Negative Screen Negative    THC (!) Screen Negative     Screen Positive (Confirmation available on request)    Barbiturates Screen Negative Screen Negative    Cocaine Metabolite Screen Negative Screen Negative    Methadone Screen Negative Screen Negative    Oxycodone Screen Negative Screen Negative    Creatinine, Urine 256.5 mg/dL   Alcohol, Ethyl, Urine Screen    Collection Time: 08/19/20  9:07 AM   Result Value Ref Range    Alcohol, Urine <10 None detected mg/dL   Urinalysis-UC if Indicated    Collection Time: 08/19/20  9:07 AM   Result Value Ref Range    Color, UA Yellow Colorless, Yellow, Straw, Light Yellow    Clarity, UA Turbid (!) Clear    Glucose, UA Negative Negative    Bilirubin, UA Negative Negative    Ketones, UA  Negative Negative, 60 mg/dL    Specific Gravity, UA 1.020 1.001 - 1.030    Blood, UA Small (!) Negative    pH, UA 8.5 (H) 4.5 - 8.0    Protein, UA 30 mg/dL (!) Negative mg/dL    Urobilinogen, UA <2.0 E.U./dL <2.0 E.U./dL, 2.0 E.U./dL    Nitrite, UA Negative Negative    Leukocytes, UA Negative Negative    Bacteria, UA None Seen None Seen hpf    RBC, UA 5-10 (!) None Seen, 0-2 hpf    WBC, UA 0-5 None Seen, 0-5 hpf    Squam Epithel, UA 25-50 (!) None Seen, 0-5 lpf    Amorphous, UA Many (!) None Seen    Mucus, UA Moderate (!) None Seen lpf       ASSESSMENT/PLAN:   29 y.o.  at 21w1d gestation presenting to labor & delivery for abdominal/back/pelvic pain.    #Acute abdominal/back/pelvic pain   Patient presenting with less than 1 day of acute pain as above. Differential diagnosis is broad and includes but is not limited to fetal loss, pelvic inflammatory disease, ovarian pathology, bowel pathology such as diverticulitis, renal stone, pyelonephritis, among others. With previous reassuring ultrasound, cervical examination consistent with no progression of labor, and reassuring fetal heart tones today, low concern for fetal loss. Overtly inflammatory/infectious cause, such as diverticulitis or pyelonephritis, is felt to be less likely in the absence of fever at home or here. Wet prep obtained today showing clue cells, likely consistent with BV (as below). This is likely a large contributor to her current pain/symptoms. Possible signs of infection seen on UA, though UA today is contaminated with squamous cells. Still, cystitis may be contributing and will treat as below. Other causes of pain felt to be less likely / harm outweighs benefit of working up further. Regarding pain management, patient was well-controlled here without intervention. She slept multiple times without complaint of pain. She was urged to take acetaminophen (not exceeding 3g) at home. Acetaminophen script offered but declined as patient has enough  at home.     #Bacterial vaginosis  No obvious vaginal discharge, though pain pattern is potentially consistent with BV. Wet prep obtained, revealing clue cells. Given this, ordered Flagyl as below to treat likely BV.   -Flagyl 500 mg two times a day for 7 days.     #Bacteriuria of pregnancy    No obvious urinary symptoms, though some suprapubic tenderness on exam here today. UA here is contaminated with squamous epithelium but previous culture from clinic is positive for ~50,000 col/ml of Group B Strep. Factors for and against cystitis but in setting of previous positive culture with current suprapubic tenderness (in the setting of current pregnancy), we elected to treat with antibiotics here today. She does have back pain, but again, without fever, low suspicion for pyelonephritis.   -Cefpodoxime 100 mg two times a day for 7 days.     #Persistent nausea/vomiting related to pregnancy   #THC in urine   Patient initially denied all substance use, including marijuana though when this came back positive on UDS, she admitted to using marijuana for her nausea. She denies other substance use and no further evidence of other substances on UDS. It was stated that marijuana is not medically recommended for anti-emetic effect. Patient verbalized understanding.   She has struggled with severe nausea/vomiting in this pregnancy, though improved since she started taking medications prescribed as an outpatient. She reports Unisom as helping but has run out. I had tried to fill this for her but only Benadryl available. This was sent to her pharmacy, as well.     Roxie Prado DO  [facility] Resident   Pager #: [phone]    Precepted patient with  Dr. Hutchison.

## 2021-07-02 NOTE — DISCHARGE SUMMARY
Discharge Summary by Stiven Barkley MD at 9/17/2020 11:09 AM     Author: Stiven Barkley MD Service: Resident Author Type: Resident    Filed: 9/17/2020 11:21 AM Date of Service: 9/17/2020 11:09 AM Status: Attested    : Stiven Barkley MD (Resident)    Related Notes: Original Note by Stiven Barkley MD (Resident) filed at 9/17/2020 11:13 AM    Cosigner: Jessica Tavera DO at 9/17/2020 12:26 PM    Attestation signed by Jessica Tavera DO at 9/17/2020 12:26 PM    Faculty Supervision of Residents    I have examined this patient and the medical care has been evaluated and discussed with the resident.  I am in agreement with resident documentation on  9/17/2020 which reflects our discussion and decision making.    Jessica Tavera                  Tri, pain is likely due to Round ligament pain as we discussed. Please see below for the plan that we talked about today:     Start taking Vistaril as needed for nausea/vomiting. Stop taking phenergan.  May take tylenol as needed for pain as prescribed.  Wear belly band for round ligament pain.  Follow-up with your primary care provider at [facility] tomorrow as scheduled.  Please return if you have worsening or change of pain, or if you are having uncontrolled vomiting/poor hydration.    Stiven Barkley MD

## 2021-07-08 ENCOUNTER — OFFICE VISIT (OUTPATIENT)
Dept: FAMILY MEDICINE | Facility: CLINIC | Age: 31
End: 2021-07-08
Payer: COMMERCIAL

## 2021-07-08 VITALS
BODY MASS INDEX: 41.32 KG/M2 | SYSTOLIC BLOOD PRESSURE: 121 MMHG | DIASTOLIC BLOOD PRESSURE: 80 MMHG | RESPIRATION RATE: 16 BRPM | TEMPERATURE: 98 F | HEART RATE: 109 BPM | WEIGHT: 287 LBS | OXYGEN SATURATION: 99 %

## 2021-07-08 DIAGNOSIS — R30.0 DYSURIA: ICD-10-CM

## 2021-07-08 DIAGNOSIS — Z11.3 ROUTINE SCREENING FOR STI (SEXUALLY TRANSMITTED INFECTION): Primary | ICD-10-CM

## 2021-07-08 DIAGNOSIS — B37.31 YEAST INFECTION OF THE VAGINA: ICD-10-CM

## 2021-07-08 LAB
BACTERIA: NORMAL
BILIRUBIN UR: ABNORMAL MG/DL
BLOOD UR: ABNORMAL MG/DL
CLARITY, URINE: CLEAR
CLUE CELLS: NORMAL
COLOR UR: YELLOW
GLUCOSE URINE: NEGATIVE
HCG UR QL: NEGATIVE
KETONES UR QL: ABNORMAL MG/DL
LEUKOCYTE ESTERASE UR: ABNORMAL
MOTILE TRICHOMONAS: NEGATIVE
NITRITE UR QL STRIP: NEGATIVE MG/DL
ODOR: NORMAL
PH UR STRIP: 8.5 [PH] (ref 4.5–8)
PH WET PREP: NORMAL (ref 3.8–4.5)
PROTEIN UR: ABNORMAL MG/DL
SP GR UR STRIP: 1.02 (ref 1–1.03)
UROBILINOGEN UR STRIP-ACNC: ABNORMAL E.U./DL
WBC WET PREP: NORMAL (ref 2–5)
YEAST: PRESENT

## 2021-07-08 PROCEDURE — 87210 SMEAR WET MOUNT SALINE/INK: CPT | Performed by: STUDENT IN AN ORGANIZED HEALTH CARE EDUCATION/TRAINING PROGRAM

## 2021-07-08 PROCEDURE — 99213 OFFICE O/P EST LOW 20 MIN: CPT | Mod: GC | Performed by: STUDENT IN AN ORGANIZED HEALTH CARE EDUCATION/TRAINING PROGRAM

## 2021-07-08 PROCEDURE — 36415 COLL VENOUS BLD VENIPUNCTURE: CPT | Performed by: STUDENT IN AN ORGANIZED HEALTH CARE EDUCATION/TRAINING PROGRAM

## 2021-07-08 PROCEDURE — 81003 URINALYSIS AUTO W/O SCOPE: CPT | Mod: 59 | Performed by: STUDENT IN AN ORGANIZED HEALTH CARE EDUCATION/TRAINING PROGRAM

## 2021-07-08 PROCEDURE — 81025 URINE PREGNANCY TEST: CPT | Performed by: STUDENT IN AN ORGANIZED HEALTH CARE EDUCATION/TRAINING PROGRAM

## 2021-07-08 RX ORDER — FLUCONAZOLE 150 MG/1
150 TABLET ORAL ONCE
Qty: 1 TABLET | Refills: 0 | Status: SHIPPED | OUTPATIENT
Start: 2021-07-08 | End: 2021-07-08

## 2021-07-08 RX ORDER — SULFAMETHOXAZOLE/TRIMETHOPRIM 800-160 MG
1 TABLET ORAL 2 TIMES DAILY
Qty: 6 TABLET | Refills: 0 | Status: SHIPPED | OUTPATIENT
Start: 2021-07-08 | End: 2022-09-06

## 2021-07-08 NOTE — PROGRESS NOTES
Preceptor Attestation:   Patient seen, evaluated and discussed with the resident. I have verified the content of the note, which accurately reflects my assessment of the patient and the plan of care.  Supervising Physician:Yuli Figueroa MD  [facility]

## 2021-07-08 NOTE — PROGRESS NOTES
"     HPI:       Lucinda Ji is a 30 year old female with PMH significant for recurrent UTIs who presents for: STI/UTI concerns     Dysuria  For the past 2-3 days patient has been experiencing urinary frequency, dysuria, and lower abdominal pain. She describes the abdominal pain as a sharp crampy intermittent pain which is not provoked or improved by movement or position changes. She has also noticed her urine feels \"hot\" and has been feeling warm and feverish for the past 2 days. She has also noticed that her labia hurt and she has increased quantities of thick viscous discharge which \"does not smell right\". She has also felt nauseous and dizzy and vomited from her nausea yesterday. She is sexually active with 1 partner and they do not use barrier protection. She states that he went to the doctor last week for evaluation of epididymitis and she is concerned he has given her an STI. She has not made any changes to her personal hygiene, or laundry detergent patterns and does not have any blood in her urine or bloody discharge. She has not noticed any rashes near her genitals and does not have any back pain.       PMHX:     Patient Active Problem List   Diagnosis     History of traumatic brain injury     Encounter for screening for cervical cancer      Anxiety and depression     Intractable migraine with aura with status migrainosus     Class 3 severe obesity due to excess calories without serious comorbidity with body mass index (BMI) of 40.0 to 44.9 in adult (H)     History of asthma     Round ligament pain     Hiatal hernia     Uterine leiomyoma       Current Outpatient Medications   Medication Sig Dispense Refill     acetaminophen (TYLENOL) 325 MG tablet Take 650 mg by mouth       albuterol (PROAIR HFA/PROVENTIL HFA/VENTOLIN HFA) 108 (90 Base) MCG/ACT inhaler Inhale 1-2 puffs into the lungs       diphenhydrAMINE (BENADRYL) 25 MG tablet Take 1 tablet (25 mg) by mouth every 6 hours as needed for sleep or other " (Nausea) 12 tablet 0     doxylamine (UNISOM) 25 MG TABS tablet Take 0.5 tablets (12.5 mg) by mouth 2 times daily (Patient not taking: Reported on 3/12/2021) 30 tablet 3     metoclopramide (REGLAN) 5 MG tablet Take 1 tablet (5 mg) by mouth 2 times daily as needed (nausea vomiting) 30 tablet 2     metroNIDAZOLE (FLAGYL) 500 MG tablet        Prenatal Vit-Fe Fumarate-FA (PRENATAL MULTIVITAMIN W/IRON) 27-0.8 MG tablet Take 1 tablet by mouth daily (Patient not taking: Reported on 3/12/2021) 90 tablet 3     promethazine (PHENERGAN) 25 MG tablet        sulfamethoxazole-trimethoprim (BACTRIM DS) 800-160 MG tablet Take 1 tablet by mouth 2 times daily 6 tablet 0     vitamin B6 (PYRIDOXINE) 50 MG TABS Take 1 tablet (50 mg) by mouth 2 times daily 60 tablet 3     Social History: Reviewed (see above)    Family History: Reviewed     No Known Allergies    Results for orders placed or performed in visit on 07/08/21 (from the past 24 hour(s))   Urinalysis, Micro If ([facility])   Result Value Ref Range    Specific Gravity Urine 1.020 1.005 - 1.030    pH Urine 8.5 (H) 4.5 - 8.0    Leukocyte Esterase UR Trace (A) NEGATIVE    Nitrite Urine Negative NEGATIVE mg/dL    Protein UR 1+ (A) NEGATIVE mg/dL    Glucose Urine Negative NEGATIVE    Ketones Urine Trace (A) NEGATIVE mg/dL    Urobilinogen mg/dL 1.0 E.U./dL (A) 0.2 E.U./dL E.U./dL    Bilirubin UR 1+ (A) NEGATIVE mg/dL    Blood UR Trace-intact (A) NEGATIVE mg/dL    Color Urine Yellow     Clarity, urine Clear     Narrative    QNS for micro. Jarrod add UC if necessary. Thanks, [facility]   HCG Qualitative Urine (UPT)  ([facility])   Result Value Ref Range    HCG Qual Urine Negative Negative            Review of Systems:   10 point ROS negative except noted in above in HPI         Physical Exam:     Vitals:    07/08/21 1311   BP: 121/80   Pulse: 109   Resp: 16   Temp: 98  F (36.7  C)   TempSrc: Oral   SpO2: 99%   Weight: 130.2 kg (287 lb)     Body mass index is 41.32 kg/m .      GENERAL APPEARANCE:  healthy, alert and no distress,  EYES: Eyes grossly normal to inspection,  PERRL  RESP: lungs clear to auscultation - no rales, rhonchi or wheezes  CV: regular rate and rhythm,  and no murmur, click,  rub or gallop  ABDOMEN: soft, nontender, without hepatosplenomegaly or masses, no CVA tenderness  GU_female: normal cervix, adnexae, and uterus without masses. White mucus present. No cervical motion tenderness  SKIN: no suspicious lesions or rashes  NEURO: Normal strength and tone, sensory exam grossly normal, mentation appears intact and speech normal  PSYCH: mood and affect normal/bright      Assessment and Plan   Ivonne is a 30 year old female with past medical history of UTI presenting in clinic today for evaluation of UTI or STI.    Dysuria  Patient has symptoms of dysuria, urinary frequency, and feeling feverish and nauseous along with physical exam symptoms of bilateral lower abdominal pain. Her UA showed trace leukocyte esterase and small amounts of blood in her urine which could indicate UTI. Patient also has increased vaginal discharge and has concerns about having contracted an STI from her partner who she does not use barrier contraception with. Patient did not have cervical motion tenderness and cervix appeared healthy and normal.  - lab testing (see below), treatment to be determined based on results  - patient counseled on hygiene habits, told to return to clinic if symptoms worsen    (Z11.3) Routine screening for STI (sexually transmitted infection)  (primary encounter diagnosis)  Comment: sexually active not using barrier protection, concern for STI  Plan: HCG Qualitative Urine (UPT)  ([facility]), Wet Prep        ([facility]), Syphilis Screen [place] ([email or web address]),        HIV Ag/Ab Screen [place] ([email or web address]),         Chlamydia Trachomatis by PCR ([facility]),         Neisseria Gonorrhoeae by PCR ([facility])  -Will notify patient of results.      (R30.0) Dysuria  Comment: Will treat for now as UTI, given blood  in urine and symptoms. Leukocyte esterase minimally elevated.   Plan: Urinalysis, Micro If ([facility]),         sulfamethoxazole-trimethoprim (BACTRIM DS)         800-160 MG tablet twice daily for three days    (B37.3) Yeast infection of the vagina  Comment: Wet prep positive for yeast.  Plan: fluconazole (DIFLUCAN) 150 MG tablet x1            Return to clinic in 1-2 weeks if symptoms not resolved, earlier if symptoms worsening.       Options for treatment and follow-up care were reviewed with the patient and/or guardian. Tri S Garrison and/or guardian engaged in the decision making process and verbalized understanding of the options discussed and agreed with the final plan.    Ivette Jacobo MS4    I, Alex Anderson, have seen and evaluated this patient with the student. I agree with the findings and assessment/plan.    Alex Anderson, DO  [facility] Family Medicine Resident PGY-2  Pager: [phone]      Precepted today with: Dr. Figueroa

## 2021-07-09 LAB
C TRACH DNA SPEC QL NAA+PROBE: NEGATIVE
HIV 1+2 AB+HIV1 P24 AG SERPL QL IA: NEGATIVE
N GONORRHOEA DNA SPEC QL NAA+PROBE: NEGATIVE
SPECIMEN SOURCE: NORMAL
SPECIMEN SOURCE: NORMAL
TREPONEMA ANTIBODY (SYPHILIS): NEGATIVE

## 2021-08-31 NOTE — PROGRESS NOTES
Lower abd and back pain x months with vomiting 2-3x a week.    Tylenol 650mg and 25 mg IM Vistaril given for rest. And pain relief . Will recheck patients status in 1 hour patient in 1 hour after iv fluids are complete and medication has time to work.

## 2021-09-22 ENCOUNTER — OFFICE VISIT (OUTPATIENT)
Dept: FAMILY MEDICINE | Facility: CLINIC | Age: 31
End: 2021-09-22
Payer: COMMERCIAL

## 2021-09-22 VITALS
RESPIRATION RATE: 22 BRPM | HEIGHT: 68 IN | OXYGEN SATURATION: 95 % | WEIGHT: 289 LBS | HEART RATE: 105 BPM | TEMPERATURE: 98 F | BODY MASS INDEX: 43.8 KG/M2 | SYSTOLIC BLOOD PRESSURE: 105 MMHG | DIASTOLIC BLOOD PRESSURE: 72 MMHG

## 2021-09-22 DIAGNOSIS — R30.0 DYSURIA: Primary | ICD-10-CM

## 2021-09-22 DIAGNOSIS — R11.0 NAUSEA: ICD-10-CM

## 2021-09-22 DIAGNOSIS — Z32.00 POSSIBLE PREGNANCY, NOT YET CONFIRMED: ICD-10-CM

## 2021-09-22 LAB
ALBUMIN UR-MCNC: NEGATIVE MG/DL
APPEARANCE UR: CLEAR
BACTERIA #/AREA URNS HPF: ABNORMAL /HPF
BILIRUB UR QL STRIP: NEGATIVE
COLOR UR AUTO: YELLOW
GLUCOSE UR STRIP-MCNC: NEGATIVE MG/DL
HCG UR QL: NEGATIVE
HGB UR QL STRIP: ABNORMAL
KETONES UR STRIP-MCNC: NEGATIVE MG/DL
LEUKOCYTE ESTERASE UR QL STRIP: ABNORMAL
NITRATE UR QL: POSITIVE
PH UR STRIP: 6.5 [PH] (ref 5–7)
RBC #/AREA URNS AUTO: ABNORMAL /HPF
SP GR UR STRIP: 1.02 (ref 1–1.03)
SQUAMOUS #/AREA URNS AUTO: ABNORMAL /LPF
UROBILINOGEN UR STRIP-ACNC: 0.2 E.U./DL
WBC #/AREA URNS AUTO: ABNORMAL /HPF

## 2021-09-22 PROCEDURE — 87491 CHLMYD TRACH DNA AMP PROBE: CPT | Performed by: FAMILY MEDICINE

## 2021-09-22 PROCEDURE — 87591 N.GONORRHOEAE DNA AMP PROB: CPT | Performed by: FAMILY MEDICINE

## 2021-09-22 PROCEDURE — 99213 OFFICE O/P EST LOW 20 MIN: CPT | Mod: GC | Performed by: STUDENT IN AN ORGANIZED HEALTH CARE EDUCATION/TRAINING PROGRAM

## 2021-09-22 PROCEDURE — 81001 URINALYSIS AUTO W/SCOPE: CPT | Performed by: FAMILY MEDICINE

## 2021-09-22 PROCEDURE — 81025 URINE PREGNANCY TEST: CPT | Performed by: FAMILY MEDICINE

## 2021-09-22 RX ORDER — METOCLOPRAMIDE 5 MG/1
5 TABLET ORAL 2 TIMES DAILY PRN
Qty: 30 TABLET | Refills: 2 | Status: SHIPPED | OUTPATIENT
Start: 2021-09-22 | End: 2023-08-21

## 2021-09-22 ASSESSMENT — MIFFLIN-ST. JEOR: SCORE: 2066.78

## 2021-09-22 NOTE — PROGRESS NOTES
Assessment and Plan       Urinary Problem:  Increased urinary frequency, and dysuria for a couple weeks. Frequent nausea with some episodes of vomiting. Possibly some white vaginal discharge but doesn't think more than normal. No fevers, changes in GI, sick contacts. Hx of UTI and yeast infections. Minimal pelvic pain last week and maybe just associated with menstruation as LMP last week.   - UPT, UA, G/C, Wet prep. Will call with results.  - Metoclopramide     Neck Pain:  Has improved with tylenol. Feels like tight muscles. No trauma.   - Follow-up and discuss tomorrow     Healthcare Maintenance:  - Discuss COVID vaccine again at follow up  - Wellness/Preventative visit 1 month    Options for treatment and follow-up care were reviewed with the patient and/or guardian. Lucinda Ji and/or guardian engaged in the decision making process and verbalized understanding of the options discussed and agreed with the final plan.    Kalpesh Hand DO, MEMO  [facility], PGY-3    Precepted patient with Dr. Antony Stanley       HPI:   Lucinda Ji is a 31 year old female who presents to clinic today for   Chief Complaint   Patient presents with     Nausea     2 weeks     Urinary Problem     urinary frequency      STD     Std Check     Refill Request     metoclopramide     Nausea:  - couple weeks sometimes with vomit  - all foods  - noticed urinary ferquency, some dysuria  - hx of UTI, feels like that  - marijuana helps nausea  - some lower abdominal cramps  - LMP 9/13 she thinks      Denies Fever, Chest Pain, shortness of breath , changes in vision/hearing/GI/diet, numbness/tingling, or any other concerns.         PMHX:   Active Problems List  Patient Active Problem List   Diagnosis     History of traumatic brain injury     Encounter for screening for cervical cancer      Anxiety and depression     Intractable migraine with aura with status migrainosus     Class 3  severe obesity due to excess calories without serious comorbidity with body mass index (BMI) of 40.0 to 44.9 in adult (H)     History of asthma     Round ligament pain     Hiatal hernia     Uterine leiomyoma       Current Medications  Current Outpatient Medications   Medication Sig Dispense Refill     acetaminophen (TYLENOL) 325 MG tablet Take 650 mg by mouth       albuterol (PROAIR HFA/PROVENTIL HFA/VENTOLIN HFA) 108 (90 Base) MCG/ACT inhaler Inhale 1-2 puffs into the lungs       diphenhydrAMINE (BENADRYL) 25 MG tablet Take 1 tablet (25 mg) by mouth every 6 hours as needed for sleep or other (Nausea) 12 tablet 0     metoclopramide (REGLAN) 5 MG tablet Take 1 tablet (5 mg) by mouth 2 times daily as needed (nausea vomiting) 30 tablet 2     doxylamine (UNISOM) 25 MG TABS tablet Take 0.5 tablets (12.5 mg) by mouth 2 times daily (Patient not taking: Reported on 3/12/2021) 30 tablet 3     metroNIDAZOLE (FLAGYL) 500 MG tablet  (Patient not taking: Reported on 9/22/2021)       Prenatal Vit-Fe Fumarate-FA (PRENATAL MULTIVITAMIN W/IRON) 27-0.8 MG tablet Take 1 tablet by mouth daily (Patient not taking: Reported on 3/12/2021) 90 tablet 3     promethazine (PHENERGAN) 25 MG tablet  (Patient not taking: Reported on 9/22/2021)       sulfamethoxazole-trimethoprim (BACTRIM DS) 800-160 MG tablet Take 1 tablet by mouth 2 times daily (Patient not taking: Reported on 9/22/2021) 6 tablet 0     sulfamethoxazole-trimethoprim (BACTRIM DS) 800-160 MG tablet Take 1 tablet by mouth 2 times daily (Patient not taking: Reported on 9/22/2021) 6 tablet 0     vitamin B6 (PYRIDOXINE) 50 MG TABS Take 1 tablet (50 mg) by mouth 2 times daily (Patient not taking: Reported on 9/22/2021) 60 tablet 3       Social History  Social History     Tobacco Use     Smoking status: Former Smoker     Years: 12.00     Types: Cigarettes     Smokeless tobacco: Never Used   Substance Use Topics     Alcohol use: Not Currently     Comment: occasional     Drug use: Not  "Currently     Types: Marijuana     History   Drug Use Unknown       Family History  Family History   Problem Relation Age of Onset     Brain Tumor Mother      Heart Disease Father      Thyroid Disease Sister      Asthma Brother      Cancer Mother      Hypertension Father      Asthma Brother        Allergies  No Known Allergies         Physical Exam:     Vitals:    09/22/21 1029   BP: 105/72   BP Location: Right arm   Patient Position: Sitting   Cuff Size: Adult Large   Pulse: 105   Resp: 22   Temp: 98  F (36.7  C)   TempSrc: Oral   SpO2: 95%   Weight: 131.1 kg (289 lb)   Height: 1.715 m (5' 7.52\")     Body mass index is 44.57 kg/m .    GENERAL APPEARANCE: alert, appears stated age, no acute distress  HEENT: Eyes grossly normal to inspection, nares normal, and mouth and throat without erythema, ulcers, or lesions  RESP: lungs clear to auscultation - no rales, rhonchi, or wheezes  CV: regular rate and rhythm, no murmur, click, rub, or gallop  ABDOMEN: soft, nontender   MSK: extremities normal, no gross deformities noted, no lower extremity edema  SKIN: no suspicious lesions or rashes   NEURO: Normal strength and tone, sensory exam grossly normal, mentation appears intact and speech normal  PSYCH: mood and affect normal/bright       "

## 2021-09-23 LAB
C TRACH DNA SPEC QL NAA+PROBE: NEGATIVE
N GONORRHOEA DNA SPEC QL NAA+PROBE: NEGATIVE

## 2021-09-23 RX ORDER — SULFAMETHOXAZOLE/TRIMETHOPRIM 800-160 MG
1 TABLET ORAL 2 TIMES DAILY
Qty: 6 TABLET | Refills: 0 | Status: SHIPPED | OUTPATIENT
Start: 2021-09-23 | End: 2022-09-06

## 2021-09-26 ENCOUNTER — HEALTH MAINTENANCE LETTER (OUTPATIENT)
Age: 31
End: 2021-09-26

## 2021-11-10 ENCOUNTER — OFFICE VISIT (OUTPATIENT)
Dept: FAMILY MEDICINE | Facility: CLINIC | Age: 31
End: 2021-11-10
Payer: COMMERCIAL

## 2021-11-10 VITALS
DIASTOLIC BLOOD PRESSURE: 79 MMHG | HEART RATE: 95 BPM | HEIGHT: 67 IN | OXYGEN SATURATION: 97 % | RESPIRATION RATE: 18 BRPM | BODY MASS INDEX: 44.73 KG/M2 | WEIGHT: 285 LBS | SYSTOLIC BLOOD PRESSURE: 124 MMHG | TEMPERATURE: 98.3 F

## 2021-11-10 DIAGNOSIS — R11.0 NAUSEA: Primary | ICD-10-CM

## 2021-11-10 DIAGNOSIS — J34.89 RHINORRHEA: ICD-10-CM

## 2021-11-10 PROBLEM — N94.9 ROUND LIGAMENT PAIN: Status: RESOLVED | Noted: 2020-09-10 | Resolved: 2021-11-10

## 2021-11-10 LAB — HCG UR QL: NEGATIVE

## 2021-11-10 PROCEDURE — U0003 INFECTIOUS AGENT DETECTION BY NUCLEIC ACID (DNA OR RNA); SEVERE ACUTE RESPIRATORY SYNDROME CORONAVIRUS 2 (SARS-COV-2) (CORONAVIRUS DISEASE [COVID-19]), AMPLIFIED PROBE TECHNIQUE, MAKING USE OF HIGH THROUGHPUT TECHNOLOGIES AS DESCRIBED BY CMS-2020-01-R: HCPCS | Performed by: STUDENT IN AN ORGANIZED HEALTH CARE EDUCATION/TRAINING PROGRAM

## 2021-11-10 PROCEDURE — 81025 URINE PREGNANCY TEST: CPT | Performed by: STUDENT IN AN ORGANIZED HEALTH CARE EDUCATION/TRAINING PROGRAM

## 2021-11-10 PROCEDURE — 99213 OFFICE O/P EST LOW 20 MIN: CPT | Mod: GC | Performed by: STUDENT IN AN ORGANIZED HEALTH CARE EDUCATION/TRAINING PROGRAM

## 2021-11-10 PROCEDURE — U0005 INFEC AGEN DETEC AMPLI PROBE: HCPCS | Performed by: STUDENT IN AN ORGANIZED HEALTH CARE EDUCATION/TRAINING PROGRAM

## 2021-11-10 RX ORDER — ONDANSETRON 4 MG/1
4 TABLET, ORALLY DISINTEGRATING ORAL EVERY 8 HOURS PRN
Qty: 10 TABLET | Refills: 3 | Status: SHIPPED | OUTPATIENT
Start: 2021-11-10 | End: 2023-08-21

## 2021-11-10 RX ORDER — CETIRIZINE HYDROCHLORIDE 10 MG/1
10 TABLET ORAL DAILY
Qty: 30 TABLET | Refills: 3 | Status: SHIPPED | OUTPATIENT
Start: 2021-11-10 | End: 2022-09-06

## 2021-11-10 RX ORDER — FLUTICASONE PROPIONATE 50 MCG
1 SPRAY, SUSPENSION (ML) NASAL DAILY
Qty: 18.2 ML | Refills: 3 | Status: SHIPPED | OUTPATIENT
Start: 2021-11-10 | End: 2023-08-21

## 2021-11-10 ASSESSMENT — MIFFLIN-ST. JEOR: SCORE: 2039.25

## 2021-11-10 NOTE — PROGRESS NOTES
"  Assessment and Plan     Nausea:  Has been worse since Monday, but on and off for awhile now. Had it really bad during pregnancy recently. Some coughing at night with phlegm. Does endorse smoking THC. Of note, thought to have Marijuana hyperemesis previously. Will rule out covid, pregnancy, and treat for likely underlying post nasal drip, but recommended to stop smoking marijuana as this is likely causing this or at least making it worse.  - UPT  - Covid test  - Zyrtec, Flonase  - Zofran       Options for treatment and follow-up care were reviewed with the patient and/or guardian. Lucinda Ji and/or guardian engaged in the decision making process and verbalized understanding of the options discussed and agreed with the final plan.    Kalpesh Hand DO, MBA  [facility], PGY-3    Precepted patient with Dr. Deny Claudio       HPI:   Lucinda Ji is a 31 year old female who presents to clinic today for   Chief Complaint   Patient presents with     Nausea     feeling nauseous since monday- has thrown up a few times- having \"weird\" headaches that come sparatically- eyes feel watery when headaches occur- denies any new life stressors- LMP 11/1/21     Medication Reconciliation     completed        Nauseated:  - 1 week  - headaches  - watery eyes   - some vomiting   - cough  - did have runny nose 2 weeks   - Nothing makes better  - Unsure what makes worse  - Smoking THC daily   - Sexually active without protection    Denies Fever, Chest Pain, shortness of breath , changes in vision/hearing//diet, abdominal pain, numbness/tingling, or any other concerns.         PMHX:   Active Problems List  Patient Active Problem List   Diagnosis     History of traumatic brain injury     Encounter for screening for cervical cancer     Anxiety and depression     Intractable migraine with aura with status migrainosus     Class 3 severe obesity due to excess calories " without serious comorbidity with body mass index (BMI) of 40.0 to 44.9 in adult (H)     History of asthma     Round ligament pain     Hiatal hernia     Uterine leiomyoma       Current Medications  Current Outpatient Medications   Medication Sig Dispense Refill     acetaminophen (TYLENOL) 325 MG tablet Take 650 mg by mouth       albuterol (PROAIR HFA/PROVENTIL HFA/VENTOLIN HFA) 108 (90 Base) MCG/ACT inhaler Inhale 1-2 puffs into the lungs       diphenhydrAMINE (BENADRYL) 25 MG tablet Take 1 tablet (25 mg) by mouth every 6 hours as needed for sleep or other (Nausea) 12 tablet 0     metoclopramide (REGLAN) 5 MG tablet Take 1 tablet (5 mg) by mouth 2 times daily as needed (nausea vomiting) 30 tablet 2     doxylamine (UNISOM) 25 MG TABS tablet Take 0.5 tablets (12.5 mg) by mouth 2 times daily (Patient not taking: Reported on 3/12/2021) 30 tablet 3     metroNIDAZOLE (FLAGYL) 500 MG tablet  (Patient not taking: Reported on 9/22/2021)       Prenatal Vit-Fe Fumarate-FA (PRENATAL MULTIVITAMIN W/IRON) 27-0.8 MG tablet Take 1 tablet by mouth daily (Patient not taking: Reported on 3/12/2021) 90 tablet 3     promethazine (PHENERGAN) 25 MG tablet  (Patient not taking: Reported on 9/22/2021)       sulfamethoxazole-trimethoprim (BACTRIM DS) 800-160 MG tablet Take 1 tablet by mouth 2 times daily 6 tablet 0     sulfamethoxazole-trimethoprim (BACTRIM DS) 800-160 MG tablet Take 1 tablet by mouth 2 times daily (Patient not taking: Reported on 9/22/2021) 6 tablet 0     sulfamethoxazole-trimethoprim (BACTRIM DS) 800-160 MG tablet Take 1 tablet by mouth 2 times daily (Patient not taking: Reported on 9/22/2021) 6 tablet 0     vitamin B6 (PYRIDOXINE) 50 MG TABS Take 1 tablet (50 mg) by mouth 2 times daily (Patient not taking: Reported on 9/22/2021) 60 tablet 3       Social History  Social History     Tobacco Use     Smoking status: Former Smoker     Years: 12.00     Types: Cigarettes     Smokeless tobacco: Never Used   Substance Use Topics  "    Alcohol use: Not Currently     Comment: occasional     Drug use: Not Currently     Types: Marijuana     History   Drug Use Unknown       Family History  Family History   Problem Relation Age of Onset     Brain Tumor Mother      Heart Disease Father      Thyroid Disease Sister      Asthma Brother      Cancer Mother      Hypertension Father      Asthma Brother        Allergies  No Known Allergies         Physical Exam:     Vitals:    11/10/21 1455   BP: 124/79   Pulse: 95   Resp: 18   Temp: 98.3  F (36.8  C)   TempSrc: Oral   SpO2: 97%   Weight: 129.3 kg (285 lb)   Height: 1.7 m (5' 6.93\")     Body mass index is 44.73 kg/m .    GENERAL APPEARANCE: alert, appears stated age, no acute distress  HEENT: Eyes grossly normal to inspection, nares normal, and mouth and throat without erythema, ulcers, or lesions  RESP: lungs clear to auscultation - no rales, rhonchi, or wheezes  CV: regular rate and rhythm, no murmur, click, rub, or gallop  ABDOMEN: soft, nontender   MSK: extremities normal, no gross deformities noted, no lower extremity edema  SKIN: no suspicious lesions or rashes   NEURO: Normal strength and tone, sensory exam grossly normal, mentation appears intact and speech normal  PSYCH: mood and affect normal/bright       "

## 2021-11-11 ENCOUNTER — TELEPHONE (OUTPATIENT)
Dept: FAMILY MEDICINE | Facility: CLINIC | Age: 31
End: 2021-11-11
Payer: COMMERCIAL

## 2021-11-11 LAB — SARS-COV-2 RNA RESP QL NAA+PROBE: POSITIVE

## 2021-11-11 NOTE — TELEPHONE ENCOUNTER
Patient called for lab results from yesterday's visit.  Notified patient that COVID was positive and she should be receiving a call from a [facility] RN for further instruction.  I also informed her that pregnancy was negative and if there was any further follow up she would hear back from Dr Hand.

## 2022-01-14 DIAGNOSIS — Z87.09 HISTORY OF ASTHMA: Primary | ICD-10-CM

## 2022-01-14 RX ORDER — ALBUTEROL SULFATE 90 UG/1
1-2 AEROSOL, METERED RESPIRATORY (INHALATION) EVERY 4 HOURS PRN
Qty: 18 G | Refills: 11 | Status: SHIPPED | OUTPATIENT
Start: 2022-01-14 | End: 2023-05-01

## 2022-01-14 NOTE — TELEPHONE ENCOUNTER
[facility] phone call message- patient requesting a refill:    Full Medication Name:   albuterol (PROAIR HFA/PROVENTIL HFA/VENTOLIN HFA) 108 (90 Base) MCG/ACT inhaler    Dose:   Sig - Route: Inhale 1-2 puffs into the lungs - Inhalation    Pharmacy confirmed as   [id number] - [place], [place] - [address] [place] [address]  Phone: [phone] Fax: [phone]  : Yes    Additional Comments:   Patient needs this refilled because she is out      OK to leave a message on voice mail? Yes    Primary language: English      needed? No    Call taken on January 14, 2022 at 2:41 PM by Wanda Vickers

## 2022-01-16 ENCOUNTER — HEALTH MAINTENANCE LETTER (OUTPATIENT)
Age: 32
End: 2022-01-16

## 2022-04-27 ENCOUNTER — OFFICE VISIT (OUTPATIENT)
Dept: FAMILY MEDICINE | Facility: CLINIC | Age: 32
End: 2022-04-27
Payer: COMMERCIAL

## 2022-04-27 VITALS
SYSTOLIC BLOOD PRESSURE: 93 MMHG | WEIGHT: 293 LBS | DIASTOLIC BLOOD PRESSURE: 68 MMHG | OXYGEN SATURATION: 97 % | BODY MASS INDEX: 45.99 KG/M2 | TEMPERATURE: 98.2 F | RESPIRATION RATE: 18 BRPM | HEART RATE: 88 BPM

## 2022-04-27 DIAGNOSIS — N76.0 BACTERIAL VAGINOSIS: ICD-10-CM

## 2022-04-27 DIAGNOSIS — B96.89 BACTERIAL VAGINOSIS: ICD-10-CM

## 2022-04-27 DIAGNOSIS — B37.31 CANDIDIASIS OF VAGINA: ICD-10-CM

## 2022-04-27 DIAGNOSIS — R10.31 ABDOMINAL PAIN, RIGHT LOWER QUADRANT: Primary | ICD-10-CM

## 2022-04-27 LAB
ALBUMIN SERPL-MCNC: 3.3 G/DL (ref 3.5–5)
ALP SERPL-CCNC: 78 U/L (ref 45–120)
ALT SERPL W P-5'-P-CCNC: 12 U/L (ref 0–45)
ANION GAP SERPL CALCULATED.3IONS-SCNC: 9 MMOL/L (ref 5–18)
AST SERPL W P-5'-P-CCNC: 13 U/L (ref 0–40)
BASOPHILS # BLD AUTO: 0.1 10E3/UL (ref 0–0.2)
BASOPHILS NFR BLD AUTO: 1 %
BILIRUB SERPL-MCNC: 0.4 MG/DL (ref 0–1)
BUN SERPL-MCNC: 6 MG/DL (ref 8–22)
CALCIUM SERPL-MCNC: 9.1 MG/DL (ref 8.5–10.5)
CHLORIDE BLD-SCNC: 106 MMOL/L (ref 98–107)
CLUE CELLS: PRESENT
CO2 SERPL-SCNC: 23 MMOL/L (ref 22–31)
CREAT SERPL-MCNC: 0.74 MG/DL (ref 0.6–1.1)
EOSINOPHIL # BLD AUTO: 0.4 10E3/UL (ref 0–0.7)
EOSINOPHIL NFR BLD AUTO: 6 %
ERYTHROCYTE [DISTWIDTH] IN BLOOD BY AUTOMATED COUNT: 13.3 % (ref 10–15)
GFR SERPL CREATININE-BSD FRML MDRD: >90 ML/MIN/1.73M2
GLUCOSE BLD-MCNC: 96 MG/DL (ref 70–125)
HCG UR QL: NEGATIVE
HCT VFR BLD AUTO: 37.5 % (ref 35–47)
HGB BLD-MCNC: 12 G/DL (ref 11.7–15.7)
HIV 1+2 AB+HIV1 P24 AG SERPL QL IA: NEGATIVE
IMM GRANULOCYTES # BLD: 0 10E3/UL
IMM GRANULOCYTES NFR BLD: 0 %
LYMPHOCYTES # BLD AUTO: 2.4 10E3/UL (ref 0.8–5.3)
LYMPHOCYTES NFR BLD AUTO: 35 %
MCH RBC QN AUTO: 28.6 PG (ref 26.5–33)
MCHC RBC AUTO-ENTMCNC: 32 G/DL (ref 31.5–36.5)
MCV RBC AUTO: 90 FL (ref 78–100)
MONOCYTES # BLD AUTO: 0.6 10E3/UL (ref 0–1.3)
MONOCYTES NFR BLD AUTO: 9 %
NEUTROPHILS # BLD AUTO: 3.5 10E3/UL (ref 1.6–8.3)
NEUTROPHILS NFR BLD AUTO: 50 %
PLATELET # BLD AUTO: 333 10E3/UL (ref 150–450)
POTASSIUM BLD-SCNC: 4.1 MMOL/L (ref 3.5–5)
PROT SERPL-MCNC: 7.1 G/DL (ref 6–8)
RBC # BLD AUTO: 4.19 10E6/UL (ref 3.8–5.2)
SODIUM SERPL-SCNC: 138 MMOL/L (ref 136–145)
TRICHOMONAS, WET PREP: ABNORMAL
WBC # BLD AUTO: 7 10E3/UL (ref 4–11)
WBC'S/HIGH POWER FIELD, WET PREP: ABNORMAL
YEAST, WET PREP: PRESENT

## 2022-04-27 PROCEDURE — 87591 N.GONORRHOEAE DNA AMP PROB: CPT | Performed by: STUDENT IN AN ORGANIZED HEALTH CARE EDUCATION/TRAINING PROGRAM

## 2022-04-27 PROCEDURE — 86780 TREPONEMA PALLIDUM: CPT | Performed by: STUDENT IN AN ORGANIZED HEALTH CARE EDUCATION/TRAINING PROGRAM

## 2022-04-27 PROCEDURE — 85025 COMPLETE CBC W/AUTO DIFF WBC: CPT | Performed by: STUDENT IN AN ORGANIZED HEALTH CARE EDUCATION/TRAINING PROGRAM

## 2022-04-27 PROCEDURE — 36415 COLL VENOUS BLD VENIPUNCTURE: CPT | Performed by: STUDENT IN AN ORGANIZED HEALTH CARE EDUCATION/TRAINING PROGRAM

## 2022-04-27 PROCEDURE — 80053 COMPREHEN METABOLIC PANEL: CPT | Performed by: STUDENT IN AN ORGANIZED HEALTH CARE EDUCATION/TRAINING PROGRAM

## 2022-04-27 PROCEDURE — 87210 SMEAR WET MOUNT SALINE/INK: CPT | Performed by: STUDENT IN AN ORGANIZED HEALTH CARE EDUCATION/TRAINING PROGRAM

## 2022-04-27 PROCEDURE — 99214 OFFICE O/P EST MOD 30 MIN: CPT | Mod: GC | Performed by: STUDENT IN AN ORGANIZED HEALTH CARE EDUCATION/TRAINING PROGRAM

## 2022-04-27 PROCEDURE — 81025 URINE PREGNANCY TEST: CPT | Performed by: STUDENT IN AN ORGANIZED HEALTH CARE EDUCATION/TRAINING PROGRAM

## 2022-04-27 PROCEDURE — 87389 HIV-1 AG W/HIV-1&-2 AB AG IA: CPT | Performed by: STUDENT IN AN ORGANIZED HEALTH CARE EDUCATION/TRAINING PROGRAM

## 2022-04-27 PROCEDURE — 87491 CHLMYD TRACH DNA AMP PROBE: CPT | Performed by: STUDENT IN AN ORGANIZED HEALTH CARE EDUCATION/TRAINING PROGRAM

## 2022-04-27 RX ORDER — METRONIDAZOLE 500 MG/1
500 TABLET ORAL 2 TIMES DAILY
Qty: 14 TABLET | Refills: 0 | Status: SHIPPED | OUTPATIENT
Start: 2022-04-27 | End: 2022-05-04

## 2022-04-27 RX ORDER — OMEPRAZOLE 40 MG/1
40 CAPSULE, DELAYED RELEASE ORAL DAILY
Qty: 30 CAPSULE | Refills: 0 | Status: SHIPPED | OUTPATIENT
Start: 2022-04-27 | End: 2022-06-08

## 2022-04-27 RX ORDER — FLUCONAZOLE 150 MG/1
150 TABLET ORAL ONCE
Qty: 1 TABLET | Refills: 0 | Status: SHIPPED | OUTPATIENT
Start: 2022-04-27 | End: 2022-04-27

## 2022-04-27 NOTE — PROGRESS NOTES
I have personally reviewed the history and examination as documented by Dr. Hand.  I was present during key portions of the visit and agree with the assessment and plan as documented for 31 yr old female here for R-sided abd pain/flank pain. Check U/A, labwork, STI testing. Precautions given. Anticipatory guidance given.     James Quijano MD  April 27, 2022  2:08 PM

## 2022-04-27 NOTE — PROGRESS NOTES
Assessment and Plan     Right Abdominal Pain:  For the past 3 days. Initially intermittent, but becoming more constant. Right middle/lower abd, notes bilateral back pain as well, and describes it becoming more anterior and lower. Also notes n/v and epigastric pain with PO intermittently, and diarrhea which has resolved. No dysuria. Does report maybe some minimal change in vaginal clear discharge from baseline. Ddx includes but not limited to viral gastroenteritis, nephrolithiasis, appendicitis, gall bladder pathology, pregnancy, std, etc. Abdomen soft, non-ttp, no guarding, no flank pain, negative murphys, negative mcburneys point, negative psoas, negative obturator sign. Patient appears non-toxic. Vitals WNL. Most likely gastroenteritis, however will obtain labs. Will consider imaging pending labs.  - UA  - CMP, CBC  - Hcg   - STD screening per below  - PPI  - Tylenol  - Consider further workup with US vs CT, pending labs and/or if doesn't improve over the next couple days     STD Screening:  Currently having possibly slight increased clear vaginal discharge. Doesn't trust partner.   - Gonorrhea/Chlamydia  - Syphillis   - HIV   - Wet prep      Options for treatment and follow-up care were reviewed with the patient and/or guardian. Lucinda Ji and/or guardian engaged in the decision making process and verbalized understanding of the options discussed and agreed with the final plan.    Kalpesh Hand DO, MBA  [facility], PGY-3    Precepted patient with Dr. James Quijano       HPI:   Lucinda Ji is a 31 year old female who presents to clinic today for   Chief Complaint   Patient presents with     Physical     1 weeks ago started having right side abdominal pain that wont go away. Is also having nausea after eating.      STD     STD check has been having occassional vaginal itching, denies rash or redness, denies dysuria. Slightly more discharge than  normal. LMP 4/11/22.        ABd pain:  - cramping right, comes and goes initially but now consistent.   - 2/3 days now  - diarrhea improving  - pain worsening   - soaking in tub helps   - tylenol not helping   - eating makes N/V  - slight increase clear vaginal dischare      Denies Fever, Chest Pain, shortness of breath , changes in vision/hearing/GI//diet, abdominal pain, numbness/tingling, or any other concerns.         PMHX:   Active Problems List  Patient Active Problem List   Diagnosis     History of traumatic brain injury     Encounter for screening for cervical cancer     Anxiety and depression     Intractable migraine with aura with status migrainosus     Class 3 severe obesity due to excess calories without serious comorbidity with body mass index (BMI) of 40.0 to 44.9 in adult (H)     History of asthma     Hiatal hernia     Uterine leiomyoma       Current Medications  Current Outpatient Medications   Medication Sig Dispense Refill     metroNIDAZOLE (FLAGYL) 500 MG tablet Take 1 tablet (500 mg) by mouth 2 times daily for 7 days 14 tablet 0     omeprazole (PRILOSEC) 40 MG DR capsule Take 1 capsule (40 mg) by mouth daily 30 capsule 0     acetaminophen (TYLENOL) 325 MG tablet Take 650 mg by mouth       albuterol (PROAIR HFA/PROVENTIL HFA/VENTOLIN HFA) 108 (90 Base) MCG/ACT inhaler Inhale 1-2 puffs into the lungs every 4 hours as needed for shortness of breath / dyspnea or wheezing 18 g 11     cetirizine (ZYRTEC) 10 MG tablet Take 1 tablet (10 mg) by mouth daily 30 tablet 3     diphenhydrAMINE (BENADRYL) 25 MG tablet Take 1 tablet (25 mg) by mouth every 6 hours as needed for sleep or other (Nausea) 12 tablet 0     doxylamine (UNISOM) 25 MG TABS tablet Take 0.5 tablets (12.5 mg) by mouth 2 times daily (Patient not taking: Reported on 3/12/2021) 30 tablet 3     fluticasone (FLONASE) 50 MCG/ACT nasal spray Spray 1 spray into both nostrils daily 18.2 mL 3     metoclopramide (REGLAN) 5 MG tablet Take 1 tablet (5  mg) by mouth 2 times daily as needed (nausea vomiting) 30 tablet 2     metroNIDAZOLE (FLAGYL) 500 MG tablet  (Patient not taking: Reported on 9/22/2021)       ondansetron (ZOFRAN-ODT) 4 MG ODT tab Take 1 tablet (4 mg) by mouth every 8 hours as needed for nausea 10 tablet 3     Prenatal Vit-Fe Fumarate-FA (PRENATAL MULTIVITAMIN W/IRON) 27-0.8 MG tablet Take 1 tablet by mouth daily (Patient not taking: Reported on 3/12/2021) 90 tablet 3     promethazine (PHENERGAN) 25 MG tablet  (Patient not taking: Reported on 9/22/2021)       sulfamethoxazole-trimethoprim (BACTRIM DS) 800-160 MG tablet Take 1 tablet by mouth 2 times daily 6 tablet 0     sulfamethoxazole-trimethoprim (BACTRIM DS) 800-160 MG tablet Take 1 tablet by mouth 2 times daily (Patient not taking: Reported on 9/22/2021) 6 tablet 0     sulfamethoxazole-trimethoprim (BACTRIM DS) 800-160 MG tablet Take 1 tablet by mouth 2 times daily (Patient not taking: Reported on 9/22/2021) 6 tablet 0     vitamin B6 (PYRIDOXINE) 50 MG TABS Take 1 tablet (50 mg) by mouth 2 times daily (Patient not taking: Reported on 9/22/2021) 60 tablet 3       Social History  Social History     Tobacco Use     Smoking status: Former Smoker     Years: 12.00     Types: Cigarettes     Smokeless tobacco: Never Used   Substance Use Topics     Alcohol use: Not Currently     Comment: occasional     Drug use: Not Currently     Types: Marijuana     History   Drug Use Unknown       Family History  Family History   Problem Relation Age of Onset     Brain Tumor Mother      Heart Disease Father      Thyroid Disease Sister      Asthma Brother      Cancer Mother      Hypertension Father      Asthma Brother        Allergies  No Known Allergies         Physical Exam:     Vitals:    04/27/22 1344   BP: 93/68   Pulse: 88   Resp: 18   Temp: 98.2  F (36.8  C)   TempSrc: Oral   SpO2: 97%   Weight: 132.9 kg (293 lb)     Body mass index is 45.99 kg/m .    GENERAL APPEARANCE: alert, appears stated age, no acute  distress  HEENT: Eyes grossly normal to inspection, nares normal, and mouth and throat without erythema, ulcers, or lesions  RESP: lungs clear to auscultation - no rales, rhonchi, or wheezes  CV: regular rate and rhythm, no murmur, click, rub, or gallop  ABDOMEN: soft, non-ttp, no guarding/rigidity, no flank pain, negative murphys, negative mcburneys point, negative psoas, negative obturator sign  MSK: extremities normal, no gross deformities noted, no lower extremity edema  SKIN: no suspicious lesions or rashes   NEURO: Normal strength and tone, sensory exam grossly normal, mentation appears intact and speech normal  PSYCH: mood and affect normal/bright

## 2022-04-28 LAB
C TRACH DNA SPEC QL NAA+PROBE: NEGATIVE
N GONORRHOEA DNA SPEC QL NAA+PROBE: NEGATIVE
T PALLIDUM AB SER QL: NONREACTIVE

## 2022-05-18 ENCOUNTER — OFFICE VISIT (OUTPATIENT)
Dept: FAMILY MEDICINE | Facility: CLINIC | Age: 32
End: 2022-05-18
Payer: COMMERCIAL

## 2022-05-18 VITALS
TEMPERATURE: 97.9 F | WEIGHT: 292 LBS | RESPIRATION RATE: 20 BRPM | HEART RATE: 89 BPM | SYSTOLIC BLOOD PRESSURE: 121 MMHG | HEIGHT: 67 IN | OXYGEN SATURATION: 99 % | DIASTOLIC BLOOD PRESSURE: 77 MMHG | BODY MASS INDEX: 45.83 KG/M2

## 2022-05-18 DIAGNOSIS — E66.01 CLASS 3 SEVERE OBESITY DUE TO EXCESS CALORIES WITHOUT SERIOUS COMORBIDITY WITH BODY MASS INDEX (BMI) OF 40.0 TO 44.9 IN ADULT (H): ICD-10-CM

## 2022-05-18 DIAGNOSIS — R10.31 ABDOMINAL PAIN, RIGHT LOWER QUADRANT: Primary | ICD-10-CM

## 2022-05-18 DIAGNOSIS — D25.9 UTERINE LEIOMYOMA, UNSPECIFIED LOCATION: ICD-10-CM

## 2022-05-18 DIAGNOSIS — E66.813 CLASS 3 SEVERE OBESITY DUE TO EXCESS CALORIES WITHOUT SERIOUS COMORBIDITY WITH BODY MASS INDEX (BMI) OF 40.0 TO 44.9 IN ADULT (H): ICD-10-CM

## 2022-05-18 DIAGNOSIS — R35.89 POLYURIA: ICD-10-CM

## 2022-05-18 LAB — HBA1C MFR BLD: 5.5 % (ref 0–5.6)

## 2022-05-18 PROCEDURE — 83036 HEMOGLOBIN GLYCOSYLATED A1C: CPT | Performed by: FAMILY MEDICINE

## 2022-05-18 PROCEDURE — 99214 OFFICE O/P EST MOD 30 MIN: CPT | Performed by: FAMILY MEDICINE

## 2022-05-18 PROCEDURE — 36415 COLL VENOUS BLD VENIPUNCTURE: CPT | Performed by: FAMILY MEDICINE

## 2022-05-18 RX ORDER — NAPROXEN 500 MG/1
500 TABLET ORAL 2 TIMES DAILY WITH MEALS
Qty: 28 TABLET | Refills: 0 | Status: SHIPPED | OUTPATIENT
Start: 2022-05-18 | End: 2022-09-06

## 2022-05-18 NOTE — PROGRESS NOTES
Assessment & Plan   Lucinda Ji is a 31 year old female with pmhx including fibroid, obesity, who is seen today for abdominal pain    1. Abdominal pain, right lower quadrant    2. Uterine leiomyoma, unspecified location    3. Class 3 severe obesity due to excess calories without serious comorbidity with body mass index (BMI) of 40.0 to 44.9 in adult (H)    4. Polyuria      As far as her abdominal pain, suspect this is related to fibroid seen on her imaging given size and location.  No findings of appendicitis or nephrolithiasis.  She has been having more frequent stooling but this may be related to local inflammation.  Recommended trial of NSAIDs with naproxen given the cramps.  Also discussed consideration for referral to gynecology but she wishes to trial medical therapy first.    With the polyuria she has been experiencing in addition to significant obesity, did discuss the possibility of diabetes presenting with nausea and abdominal pain and did obtain A1c.  This was found to be in the normal range.     I spent a total of 38 minutes on the day of the visit.   Time spent doing chart review, history and exam, documentation and further activities per the note    Benjamin Rosenstein, MD, MA  [facility] [place] Subjective Teri is a 31 year old who presents for the following    Chief Complaint   Patient presents with     stomach pain      For a couple weeks that come and go.  Patient decline PHQ-9     HPI     Seen for similar symptoms at [facility] ED 4/27/22.  At that time, work-up notable for normal BMP, normal blood counts, UA with moderate blood and increased specific gravity, negative urine pregnancy test.  She did have a CT abdomen pelvis which showed primarily 5 cm fibroid with possible evidence of degeneration as compared to prior imaging.  There are no findings of appendicitis, nephrolithiasis, or other concerning findings.  Was discharged with  "antiemetics and follow-up plan.    Today, she describes that she feels like she is having cramping.  She is having significant cramping with her period that occurred on 5/10/2022, however continues to have this cramping.  She did say she usually has a heavier period than this one seemed fairly light.  She says she does not usually have significant cramps in the 1 she has ongoing do feel similar to what she had during her cycle.  She describes them as being in her lower abdomen, favoring the right side but points to both sides.  Does also at times feel some bloating.  The pain comes and goes without any specific provoking factors.  Sometimes she feels like she just moves around and it happens.  She feels nauseated quite often though with no vomiting.  No significant retrosternal pain, bitter sour taste in her mouth.  She has been having more frequent bowel movements, stating she has been having them 3-4 times a day.  The ongoing for the last couple weeks.  Prior to that she was having fairly normal bowel movements daily.    She has had no fevers or chills.  Her appetite seems normal, says she eats too much.  She has had no melena or hematochezia.  Feels like she is urinating excessively but no polydipsia.  No dysuria or hematuria.        Objective    /77   Pulse 89   Temp 97.9  F (36.6  C) (Oral)   Resp 20   Ht 1.71 m (5' 7.32\")   Wt 132.5 kg (292 lb)   LMP 04/11/2022   SpO2 99%   BMI 45.30 kg/m    Body mass index is 45.3 kg/m .  Physical Exam     General: Awake, seated comfortably, appears well and NAD   CV: RRR, normal S1/S2, no murmurs/rubs/gallops  Pulm: Normal WOB, lungs CTAB, no wheezes or crackles, good air movement   GI: Abdomen obese, soft, mild RLQ tenderness with deep palpation, BS active  Neuro: Alert, answering questions appropriately, normal thought processes; Normal Gait     Results for orders placed or performed in visit on 05/18/22   Hemoglobin A1c     Status: Normal   Result Value Ref " Range    Hemoglobin A1C 5.5 0.0 - 5.6 %     From ED visit 4/27/22    EXAM: CT ABDOMEN AND PELVIS WITH CONTRAST   LOCATION: [facility]   DATE/TIME: 4/28/2022 12:06 AM     INDICATION: Right lower quadrant abdominal pain. Persistent vomiting.   COMPARISON: 11/6/2020.     TECHNIQUE: CT scan of the abdomen and pelvis was performed following injection of IV contrast. Multiplanar reformats were obtained. Dose reduction techniques were used.   CONTRAST: 100 mL iohexol 350.     FINDINGS:     LOWER CHEST: Unremarkable.     HEPATOBILIARY: Unremarkable.     SPLEEN: Unremarkable.     PANCREAS: Unremarkable.     ADRENAL GLANDS: Unremarkable.     KIDNEYS/BLADDER: No suspicious renal lesions.     BOWEL: Small hiatal hernia. Normal appendix.     LYMPH NODES: Unremarkable.     PELVIC ORGANS: A 5.7 x 5.6 x 5.4 cm circumscribed ovoid fluid-attenuation structure with a thin wall containing a few punctate calcifications is present in the right hemipelvis, abutting the right side of the uterus (series 2 image 114). This is at the location of what was a larger hypoenhancing uterine mass on the comparison study dated 11/6/2020 and likely represents the sequela of a degenerated uterine fibroid. There are a few other smaller uterine fibroids.     MUSCULOSKELETAL: No acute findings.     OTHER: A very small amount of free fluid in the pelvis.     IMPRESSION:   1.  A very small amount of nonspecific free fluid in the pelvis.   2.  No other convincing cause of acute pain identified in the abdomen or pelvis. Normal appendix.

## 2022-05-18 NOTE — PROGRESS NOTES
I have personally reviewed the history and examination as documented by Dr. Butler.  I was present during key portions of the visit and agree with the assessment and plan as documented for 29 yr old female @ 20w 2d here for hospital follow-up of hyperemesis. Now stable. Has not initiated any prenatal care. U/S performed today in office by our ultrasonographer. Prenatal labs sent. Intake performed by our RN. Will setup initial OB visit. Precautions given. Anticipatory guidance given.    James Quijano MD  August 14, 2020  8:19 AM      independent

## 2022-05-20 ENCOUNTER — HOSPITAL ENCOUNTER (EMERGENCY)
Facility: HOSPITAL | Age: 32
Discharge: HOME | End: 2022-05-21
Admitting: STUDENT IN AN ORGANIZED HEALTH CARE EDUCATION/TRAINING PROGRAM
Payer: COMMERCIAL

## 2022-05-20 ENCOUNTER — APPOINTMENT (OUTPATIENT)
Dept: ULTRASOUND IMAGING | Facility: HOSPITAL | Age: 32
End: 2022-05-20
Payer: COMMERCIAL

## 2022-05-20 ENCOUNTER — APPOINTMENT (OUTPATIENT)
Dept: CT IMAGING | Facility: HOSPITAL | Age: 32
End: 2022-05-20
Payer: COMMERCIAL

## 2022-05-20 DIAGNOSIS — D25.9 FIBROID UTERUS: ICD-10-CM

## 2022-05-20 DIAGNOSIS — R10.31 RLQ ABDOMINAL PAIN: ICD-10-CM

## 2022-05-20 LAB
ALBUMIN SERPL-MCNC: 3.5 G/DL (ref 3.5–5)
ALBUMIN UR-MCNC: NEGATIVE MG/DL
ALP SERPL-CCNC: 86 U/L (ref 45–120)
ALT SERPL W P-5'-P-CCNC: 11 U/L (ref 0–45)
ANION GAP SERPL CALCULATED.3IONS-SCNC: 10 MMOL/L (ref 5–18)
APPEARANCE UR: CLEAR
AST SERPL W P-5'-P-CCNC: 13 U/L (ref 0–40)
BACTERIA #/AREA URNS HPF: ABNORMAL /HPF
BASOPHILS # BLD AUTO: 0.1 10E3/UL (ref 0–0.2)
BASOPHILS NFR BLD AUTO: 1 %
BILIRUB SERPL-MCNC: 0.5 MG/DL (ref 0–1)
BILIRUB UR QL STRIP: NEGATIVE
BUN SERPL-MCNC: 10 MG/DL (ref 8–22)
CALCIUM SERPL-MCNC: 10.1 MG/DL (ref 8.5–10.5)
CHLORIDE BLD-SCNC: 106 MMOL/L (ref 98–107)
CO2 SERPL-SCNC: 25 MMOL/L (ref 22–31)
COLOR UR AUTO: ABNORMAL
CREAT SERPL-MCNC: 0.85 MG/DL (ref 0.6–1.1)
EOSINOPHIL # BLD AUTO: 0.6 10E3/UL (ref 0–0.7)
EOSINOPHIL NFR BLD AUTO: 6 %
ERYTHROCYTE [DISTWIDTH] IN BLOOD BY AUTOMATED COUNT: 13.8 % (ref 10–15)
GFR SERPL CREATININE-BSD FRML MDRD: >90 ML/MIN/1.73M2
GLUCOSE BLD-MCNC: 77 MG/DL (ref 70–125)
GLUCOSE UR STRIP-MCNC: NEGATIVE MG/DL
HCG SERPL-ACNC: <2 MLU/ML (ref 0–4)
HCT VFR BLD AUTO: 41.7 % (ref 35–47)
HGB BLD-MCNC: 13.2 G/DL (ref 11.7–15.7)
HGB UR QL STRIP: ABNORMAL
IMM GRANULOCYTES # BLD: 0 10E3/UL
IMM GRANULOCYTES NFR BLD: 0 %
KETONES UR STRIP-MCNC: NEGATIVE MG/DL
LEUKOCYTE ESTERASE UR QL STRIP: ABNORMAL
LIPASE SERPL-CCNC: 18 U/L (ref 0–52)
LYMPHOCYTES # BLD AUTO: 2.7 10E3/UL (ref 0.8–5.3)
LYMPHOCYTES NFR BLD AUTO: 32 %
MCH RBC QN AUTO: 28.9 PG (ref 26.5–33)
MCHC RBC AUTO-ENTMCNC: 31.7 G/DL (ref 31.5–36.5)
MCV RBC AUTO: 91 FL (ref 78–100)
MONOCYTES # BLD AUTO: 0.5 10E3/UL (ref 0–1.3)
MONOCYTES NFR BLD AUTO: 5 %
MUCOUS THREADS #/AREA URNS LPF: PRESENT /LPF
NEUTROPHILS # BLD AUTO: 4.8 10E3/UL (ref 1.6–8.3)
NEUTROPHILS NFR BLD AUTO: 56 %
NITRATE UR QL: NEGATIVE
NRBC # BLD AUTO: 0 10E3/UL
NRBC BLD AUTO-RTO: 0 /100
PH UR STRIP: 6.5 [PH] (ref 5–7)
PLATELET # BLD AUTO: 371 10E3/UL (ref 150–450)
POTASSIUM BLD-SCNC: 4 MMOL/L (ref 3.5–5)
PROT SERPL-MCNC: 7.9 G/DL (ref 6–8)
RBC # BLD AUTO: 4.56 10E6/UL (ref 3.8–5.2)
RBC URINE: 4 /HPF
SODIUM SERPL-SCNC: 141 MMOL/L (ref 136–145)
SP GR UR STRIP: 1.02 (ref 1–1.03)
SQUAMOUS EPITHELIAL: 7 /HPF
UROBILINOGEN UR STRIP-MCNC: <2 MG/DL
WBC # BLD AUTO: 8.6 10E3/UL (ref 4–11)
WBC URINE: 2 /HPF

## 2022-05-20 PROCEDURE — 76830 TRANSVAGINAL US NON-OB: CPT

## 2022-05-20 PROCEDURE — 36415 COLL VENOUS BLD VENIPUNCTURE: CPT | Performed by: STUDENT IN AN ORGANIZED HEALTH CARE EDUCATION/TRAINING PROGRAM

## 2022-05-20 PROCEDURE — 96374 THER/PROPH/DIAG INJ IV PUSH: CPT | Mod: 59

## 2022-05-20 PROCEDURE — 80053 COMPREHEN METABOLIC PANEL: CPT | Performed by: STUDENT IN AN ORGANIZED HEALTH CARE EDUCATION/TRAINING PROGRAM

## 2022-05-20 PROCEDURE — 250N000011 HC RX IP 250 OP 636: Performed by: PHYSICIAN ASSISTANT

## 2022-05-20 PROCEDURE — 99285 EMERGENCY DEPT VISIT HI MDM: CPT | Mod: 25

## 2022-05-20 PROCEDURE — 83690 ASSAY OF LIPASE: CPT | Performed by: STUDENT IN AN ORGANIZED HEALTH CARE EDUCATION/TRAINING PROGRAM

## 2022-05-20 PROCEDURE — 85014 HEMATOCRIT: CPT | Performed by: STUDENT IN AN ORGANIZED HEALTH CARE EDUCATION/TRAINING PROGRAM

## 2022-05-20 PROCEDURE — 81001 URINALYSIS AUTO W/SCOPE: CPT | Performed by: STUDENT IN AN ORGANIZED HEALTH CARE EDUCATION/TRAINING PROGRAM

## 2022-05-20 PROCEDURE — 74177 CT ABD & PELVIS W/CONTRAST: CPT

## 2022-05-20 PROCEDURE — 84702 CHORIONIC GONADOTROPIN TEST: CPT | Performed by: STUDENT IN AN ORGANIZED HEALTH CARE EDUCATION/TRAINING PROGRAM

## 2022-05-20 PROCEDURE — 250N000011 HC RX IP 250 OP 636: Performed by: STUDENT IN AN ORGANIZED HEALTH CARE EDUCATION/TRAINING PROGRAM

## 2022-05-20 PROCEDURE — 96375 TX/PRO/DX INJ NEW DRUG ADDON: CPT

## 2022-05-20 RX ORDER — ONDANSETRON 4 MG/1
4 TABLET, ORALLY DISINTEGRATING ORAL EVERY 8 HOURS PRN
Qty: 15 TABLET | Refills: 0 | Status: SHIPPED | OUTPATIENT
Start: 2022-05-20 | End: 2022-05-23

## 2022-05-20 RX ORDER — KETOROLAC TROMETHAMINE 30 MG/ML
15 INJECTION, SOLUTION INTRAMUSCULAR; INTRAVENOUS ONCE
Status: COMPLETED | OUTPATIENT
Start: 2022-05-20 | End: 2022-05-20

## 2022-05-20 RX ORDER — IOPAMIDOL 755 MG/ML
100 INJECTION, SOLUTION INTRAVASCULAR ONCE
Status: COMPLETED | OUTPATIENT
Start: 2022-05-20 | End: 2022-05-20

## 2022-05-20 RX ORDER — ONDANSETRON 2 MG/ML
4 INJECTION INTRAMUSCULAR; INTRAVENOUS ONCE
Status: COMPLETED | OUTPATIENT
Start: 2022-05-20 | End: 2022-05-20

## 2022-05-20 RX ORDER — OXYCODONE HYDROCHLORIDE 5 MG/1
5 TABLET ORAL EVERY 6 HOURS PRN
Qty: 10 TABLET | Refills: 0 | Status: SHIPPED | OUTPATIENT
Start: 2022-05-20 | End: 2022-05-23

## 2022-05-20 RX ADMIN — ONDANSETRON 4 MG: 2 INJECTION INTRAMUSCULAR; INTRAVENOUS at 22:32

## 2022-05-20 RX ADMIN — HYDROMORPHONE HYDROCHLORIDE 0.5 MG: 1 INJECTION, SOLUTION INTRAMUSCULAR; INTRAVENOUS; SUBCUTANEOUS at 22:33

## 2022-05-20 RX ADMIN — KETOROLAC TROMETHAMINE 15 MG: 30 INJECTION, SOLUTION INTRAMUSCULAR; INTRAVENOUS at 21:28

## 2022-05-20 RX ADMIN — IOPAMIDOL 100 ML: 755 INJECTION, SOLUTION INTRAVENOUS at 22:08

## 2022-05-20 ASSESSMENT — ENCOUNTER SYMPTOMS
SHORTNESS OF BREATH: 0
VOMITING: 0
DIARRHEA: 1
CHILLS: 0
ABDOMINAL PAIN: 1
FREQUENCY: 1
DYSURIA: 0
NAUSEA: 1
COUGH: 0
FEVER: 0

## 2022-05-20 NOTE — ED TRIAGE NOTES
Presents by medics from own home. Severe abd pain. All over. Hx hypertension and asthma. Does have fibroids. Tylenol and ibuprofen not helping. Seen by Dr. Jeter in triage.     Triage Assessment     Row Name 05/20/22 6651       Triage Assessment (Adult)    Airway WDL WDL

## 2022-05-20 NOTE — ED PROVIDER NOTES
EMERGENCY DEPARTMENT ENCOUNTER      NAME: Lucinda Ji  AGE: 31 year old female  YOB: 1990  MRN: [id number]  EVALUATION DATE & TIME: No admission date for patient encounter.    PCP: Seth Hand    ED PROVIDER: Dede Yeh PA-C      Chief Complaint   Patient presents with     Abdominal Pain         FINAL IMPRESSION:  1. RLQ abdominal pain    2. Fibroid uterus          ED COURSE & MEDICAL DECISION MAKIN:54 PM I introduced myself to patient, performed initial HPI and examination.   10:24 PM Patient requesting more pain medicines.   10:29 PM CT shows large right adnexal cyst at 5.0x5.2 cm impressing on or arising from right uterine wall, unchanged and considerably smaller compared to 2020. However, with Right sided abdominal pain I am concerned for ovarian torsion. Ultrasound obtained.   11:47 PM Ultrasound shows multiple uterine fibroids with the largest on the right aspect of the uterus.  Bilateral ovaries are unremarkable with good blood flow.  Ultimately structure identified on CT thought to be adnexal is ultimately the largest uterine fibroid.  Will discuss results with patient and plan for discharge.  with no recent narcotic prescriptions.     31 year old female with PMH anxiety, obesity, TBI presents to the Emergency Department for evaluation of abdominal pain.  Reports sudden right sided pain earlier today.  Mostly sharp, feels different than pain she felt in the past.  Does have some nausea.  Urinary frequency without dysuria.  No changes in bowel movements.  No prior abdominal surgeries.  Patient does have a history of abdominal pain and was last evaluated end of April for right lower quadrant abdominal pain.  CT at that time was negative.  Pain thought to be related to a uterine fibroid.  She followed up in clinic with plan for NSAIDs, referral to OB/GYN if symptoms persisted.    Differential includes gastritis/gastroenteritis, cholelithiasis, cholecystitis,  appendicitis, ovarian cyst/torsion, uterine fibroid, pyelonephritis, ureterolithiasis, and others.    VSS, afebrile  Exam with right sided abdominal tenderness, became tearful with palpation.   Labs with no leukocytosis, no anemia. No notable electrolyte derangement. Creatinine is WNL. LFT and Lipase WNL. UA not consistent with infection.    CT shows large right adnexal cyst at 5.0x5.2 cm impressing on or arising from right uterine wall, unchanged and considerably smaller compared to 11/6/2020. With this I am primarily concerned for ovarian torsion, Ultrasound obtained shows multiple uterine fibroids and ultimately no cyst on the ovary.  Good blood flow bilaterally.    Presentation is most consistent with uterine fibroids.  No evidence of more insidious etiology.  Pain is controlled in the emergency department.  Discussed plan for discharge.  [facility] database look up with no recent narcotic prescriptions and I think a small prescription of oxycodone is reasonable.  We will also place referral for OB/GYN and given contact information for a local clinic.  Instructed on at home management and red flag/indications to return the emergency department.  All questions were answered to the best my ability and patient is agreeable with plan.        MEDICATIONS GIVEN IN THE EMERGENCY:  Medications   ketorolac (TORADOL) injection 15 mg (15 mg Intravenous Given 5/20/22 2128)   iopamidol (ISOVUE-370) solution 100 mL (100 mLs Intravenous Given 5/20/22 2208)   HYDROmorphone (DILAUDID) injection 0.5 mg (0.5 mg Intravenous Given 5/20/22 2233)   ondansetron (ZOFRAN) injection 4 mg (4 mg Intravenous Given 5/20/22 2232)       NEW PRESCRIPTIONS STARTED AT TODAY'S ER VISIT  New Prescriptions    ONDANSETRON (ZOFRAN ODT) 4 MG ODT TAB    Take 1 tablet (4 mg) by mouth every 8 hours as needed for nausea    OXYCODONE (ROXICODONE) 5 MG TABLET    Take 1 tablet (5 mg) by mouth every 6 hours as needed for pain           =================================================================    HPI    Patient information was obtained from: Patient    Use of : N/A         Lucinda Ji is a 31 year old female with a pertinent history of TBI, migraines, uterine leimyoma who presents to this ED for evaluation of abdominal pain.    Per chart review,   Patient was last seen 4/27 in the ED for abdominal pain (RLQ), CT performed which showed a very small amount of nonspecific free fluid in the pelvis, otherwise negative with normal appendix. Followed up in clinic 5/18. Suspected that pain is related to her fibroid with trial of naproxen and referral to OBGYN.     Per patient,   The patient reports sharp abdominal pain since around 1130 this morning. Her pain was worse after bowel movements. She has had some looser stools in the last few days, but has been straining more today. She notes this pain is different than pain she has felt in the past. She has not tried any medicines for pain today, but has been taking ibuprofen/Tylenol for pain during prior days which has not helped. She also endorses nausea without vomiting.  She reports some urinary frequency, but denies any associated dysuria; her urinary frequency has been ongoing for a while now. Denies fevers, chills, shortness of breath, chest pain, cough, or any other complaints at this time.  No history of prior abdominal surgeries.      REVIEW OF SYSTEMS   Review of Systems   Constitutional: Negative for chills and fever.   Respiratory: Negative for cough and shortness of breath.    Cardiovascular: Negative for chest pain.   Gastrointestinal: Positive for abdominal pain, diarrhea (loose stools) and nausea. Negative for vomiting.   Genitourinary: Positive for frequency. Negative for dysuria.   All other systems reviewed and are negative.       PAST MEDICAL HISTORY:  Past Medical History:   Diagnosis Date     Asthma      Bacterial vaginosis in pregnancy 05/26/2020     Fetal demise,  greater than 22 weeks, antepartum, single gestation 10/29/2020     Gastritis 2012     Gastritis      GBS bacteriuria 7/23/2020     GBS bacteriuria 8/10/2020     Nausea & vomiting 05/26/2020     Obesity      Pregnancy 05/26/2020     Round ligament pain 9/10/2020       PAST SURGICAL HISTORY:  Past Surgical History:   Procedure Laterality Date     NO PAST SURGERIES  09/17/2020       CURRENT MEDICATIONS:    ondansetron (ZOFRAN ODT) 4 MG ODT tab  oxyCODONE (ROXICODONE) 5 MG tablet  acetaminophen (TYLENOL) 325 MG tablet  albuterol (PROAIR HFA/PROVENTIL HFA/VENTOLIN HFA) 108 (90 Base) MCG/ACT inhaler  cetirizine (ZYRTEC) 10 MG tablet  diphenhydrAMINE (BENADRYL) 25 MG tablet  doxylamine (UNISOM) 25 MG TABS tablet  fluticasone (FLONASE) 50 MCG/ACT nasal spray  metoclopramide (REGLAN) 5 MG tablet  metroNIDAZOLE (FLAGYL) 500 MG tablet  naproxen (NAPROSYN) 500 MG tablet  omeprazole (PRILOSEC) 40 MG DR capsule  ondansetron (ZOFRAN-ODT) 4 MG ODT tab  Prenatal Vit-Fe Fumarate-FA (PRENATAL MULTIVITAMIN W/IRON) 27-0.8 MG tablet  promethazine (PHENERGAN) 25 MG tablet  sulfamethoxazole-trimethoprim (BACTRIM DS) 800-160 MG tablet  sulfamethoxazole-trimethoprim (BACTRIM DS) 800-160 MG tablet  sulfamethoxazole-trimethoprim (BACTRIM DS) 800-160 MG tablet  vitamin B6 (PYRIDOXINE) 50 MG TABS        ALLERGIES:  No Known Allergies    FAMILY HISTORY:  Family History   Problem Relation Age of Onset     Brain Tumor Mother      Heart Disease Father      Thyroid Disease Sister      Asthma Brother      Cancer Mother      Hypertension Father      Asthma Brother        SOCIAL HISTORY:   Social History     Socioeconomic History     Marital status: Single     Number of children: 1   Occupational History     Occupation: unemployed   Tobacco Use     Smoking status: Former Smoker     Years: 12.00     Types: Cigarettes     Smokeless tobacco: Never Used   Substance and Sexual Activity     Alcohol use: Not Currently     Comment: occasional     Drug use: Not  "Currently     Types: Marijuana     Sexual activity: Yes     Partners: Male     Birth control/protection: Condom       VITALS:  /56   Pulse 75   Temp 98.4  F (36.9  C) (Oral)   Resp 20   Ht 1.676 m (5' 6\")   Wt 132.5 kg (292 lb)   LMP 05/10/2022   SpO2 96%   BMI 47.13 kg/m      PHYSICAL EXAM    Constitutional: Well developed, Well nourished, NAD, GCS 15   HENT: Normocephalic, Atraumatic.   Neck- Supple, Nontender  Eyes: Conjunctiva normal  Respiratory: No respiratory distress, speaking in full sentences. Normal breath sounds  Cardiovascular: Normal heart rate, Regular rhythm, No murmurs.    GI: Soft, right sided abdominal tenderness, worse mid and RLQ  Musculoskeletal: No deformities, Moves all extremities equally.   Integument: Warm, Dry, No erythema, ecchymosis, or rash.  Neurologic: Alert & oriented x 3, Normal sensory function. No focal deficits.   Psychiatric: Affect normal, Judgment normal, Mood normal. Cooperative.      LAB:  All pertinent labs reviewed and interpreted.  Results for orders placed or performed during the hospital encounter of 05/20/22   CT Abdomen Pelvis w Contrast    Impression    IMPRESSION:   1.  Large right adnexal cyst is unchanged from 04/27/2022, and considerably smaller compared to 11/06/2020.  2.  No other findings to account for the patient's abdominal pain.   US Pelvis Cmplt w Transvag & Doppler LmtPel Duplex Limited    Impression    IMPRESSION:    1.  Multiple uterine fibroids as detailed above.    2.  Endometrial stripe thickness within normal limits at 9 mm.    3.  Both ovaries are within normal limits.    4.  Trace amount of free fluid in the pelvis.    5.  The structure identified as a right ovarian cyst on the CT scan of 5/20/2022 actually corresponds with the largest uterine fibroid in the right aspect of the uterus and does not represent an ovarian cyst.         Comprehensive metabolic panel   Result Value Ref Range    Sodium 141 136 - 145 mmol/L    Potassium " 4.0 3.5 - 5.0 mmol/L    Chloride 106 98 - 107 mmol/L    Carbon Dioxide (CO2) 25 22 - 31 mmol/L    Anion Gap 10 5 - 18 mmol/L    Urea Nitrogen 10 8 - 22 mg/dL    Creatinine 0.85 0.60 - 1.10 mg/dL    Calcium 10.1 8.5 - 10.5 mg/dL    Glucose 77 70 - 125 mg/dL    Alkaline Phosphatase 86 45 - 120 U/L    AST 13 0 - 40 U/L    ALT 11 0 - 45 U/L    Protein Total 7.9 6.0 - 8.0 g/dL    Albumin 3.5 3.5 - 5.0 g/dL    Bilirubin Total 0.5 0.0 - 1.0 mg/dL    GFR Estimate >90 >60 mL/min/1.73m2   Result Value Ref Range    Lipase 18 0 - 52 U/L   HCG quantitative pregnancy   Result Value Ref Range    hCG Quantitative <2 0 - 4 mlU/mL   UA with Microscopic reflex to Culture    Specimen: Urine, Midstream   Result Value Ref Range    Color Urine Light Yellow Colorless, Straw, Light Yellow, Yellow    Appearance Urine Clear Clear    Glucose Urine Negative Negative mg/dL    Bilirubin Urine Negative Negative    Ketones Urine Negative Negative mg/dL    Specific Gravity Urine 1.023 1.001 - 1.030    Blood Urine 0.06 mg/dL (A) Negative    pH Urine 6.5 5.0 - 7.0    Protein Albumin Urine Negative Negative mg/dL    Urobilinogen Urine <2.0 <2.0 mg/dL    Nitrite Urine Negative Negative    Leukocyte Esterase Urine 25 Caro/uL (A) Negative    Bacteria Urine Few (A) None Seen /HPF    Mucus Urine Present (A) None Seen /LPF    RBC Urine 4 (H) <=2 /HPF    WBC Urine 2 <=5 /HPF    Squamous Epithelials Urine 7 (H) <=1 /HPF   CBC with platelets and differential   Result Value Ref Range    WBC Count 8.6 4.0 - 11.0 10e3/uL    RBC Count 4.56 3.80 - 5.20 10e6/uL    Hemoglobin 13.2 11.7 - 15.7 g/dL    Hematocrit 41.7 35.0 - 47.0 %    MCV 91 78 - 100 fL    MCH 28.9 26.5 - 33.0 pg    MCHC 31.7 31.5 - 36.5 g/dL    RDW 13.8 10.0 - 15.0 %    Platelet Count 371 150 - 450 10e3/uL    % Neutrophils 56 %    % Lymphocytes 32 %    % Monocytes 5 %    % Eosinophils 6 %    % Basophils 1 %    % Immature Granulocytes 0 %    NRBCs per 100 WBC 0 <1 /100    Absolute Neutrophils 4.8 1.6 -  8.3 10e3/uL    Absolute Lymphocytes 2.7 0.8 - 5.3 10e3/uL    Absolute Monocytes 0.5 0.0 - 1.3 10e3/uL    Absolute Eosinophils 0.6 0.0 - 0.7 10e3/uL    Absolute Basophils 0.1 0.0 - 0.2 10e3/uL    Absolute Immature Granulocytes 0.0 <=0.4 10e3/uL    Absolute NRBCs 0.0 10e3/uL       RADIOLOGY:  Reviewed all pertinent imaging. Please see official radiology report.  US Pelvis Cmplt w Transvag & Doppler LmtPel Duplex Limited   Final Result   IMPRESSION:     1.  Multiple uterine fibroids as detailed above.      2.  Endometrial stripe thickness within normal limits at 9 mm.      3.  Both ovaries are within normal limits.      4.  Trace amount of free fluid in the pelvis.      5.  The structure identified as a right ovarian cyst on the CT scan of 5/20/2022 actually corresponds with the largest uterine fibroid in the right aspect of the uterus and does not represent an ovarian cyst.               CT Abdomen Pelvis w Contrast   Final Result   IMPRESSION:    1.  Large right adnexal cyst is unchanged from 04/27/2022, and considerably smaller compared to 11/06/2020.   2.  No other findings to account for the patient's abdominal pain.          EKG:    None    PROCEDURES:   None      I, Izabel Yepez, am serving as a scribe to document services personally performed by Dede Yeh PA-C based on my observation and the provider's statements to me. I, Dede Yeh PA-C, attest that Izabel Yepez is acting in a scribe capacity, has observed my performance of the services and has documented them in accordance with my direction.    Dede Yeh PA-C  Emergency Medicine  [facility] EMERGENCY DEPARTMENT  [address] [address]  [phone]             Dede Yeh PA-C  05/20/22 0078

## 2022-05-20 NOTE — ED PROVIDER NOTES
"ED Provider In Triage Note  [facility]  Encounter Date: May 20, 2022    No chief complaint on file.      Brief HPI:   Lucinda Ji is a 31 year old female presenting to the Emergency Department with a chief complaint of abd pain for the last few weeks, Got much worse today. No vaginal bleeding or discharge. Normal BM.     Brief Physical Exam:  BP 99/66   Pulse 110   Temp 98.4  F (36.9  C) (Oral)   Resp 18   Ht 1.676 m (5' 6\")   Wt 132.5 kg (292 lb)   LMP 04/11/2022   SpO2 98%   BMI 47.13 kg/m    General: Non-toxic appearing  HEENT: Atraumatic  Resp: No respiratory distress  Abdomen: Non-peritoneal  Neuro: Alert, oriented, answers questions appropriately  Psych: Behavior appropriate      Plan Initiated in Triage:  No orders of the defined types were placed in this encounter.      PIT Dispo:   Return to lobby while awaiting workup and ED bed availability    Adam Jeter DO on 5/20/2022 at 5:49 PM    Patient was evaluated by the Physician in Triage due to a limitation of available rooms in the Emergency Department. A plan of care was discussed based on the information obtained on the initial evaluation and patient was consuled to return back to the Emergency Department lobby after this initial evalutaiton until results were obtained or a room became available in the Emergency Department. Patient was counseled not to leave prior to receiving the results of their workup.     Adam Jeter DO  [facility] EMERGENCY DEPARTMENT  [address] [place] [id number]  [phone]     Adam Jeter DO  05/20/22 1750    "

## 2022-05-21 VITALS
TEMPERATURE: 98.4 F | DIASTOLIC BLOOD PRESSURE: 68 MMHG | WEIGHT: 292 LBS | SYSTOLIC BLOOD PRESSURE: 140 MMHG | HEART RATE: 61 BPM | BODY MASS INDEX: 46.93 KG/M2 | OXYGEN SATURATION: 98 % | RESPIRATION RATE: 20 BRPM | HEIGHT: 66 IN

## 2022-05-21 NOTE — DISCHARGE INSTRUCTIONS
Ultimately you have a lot of fibroids in the uterus which are probably causing her symptoms.  The ovary looks good without any actual cyst on ultrasound.    You will need to follow-up with OB/GYN to talk about further management of these fibroids in your uterus.    In the meantime, use ibuprofen 600 mg and Tylenol 650 mg every 6 hours for pain.  For severe pain you may use oxycodone.  You have been prescribed a narcotic pain medication that has risk for addiction with prolonged use, so please use sparingly. Additionally, narcoticsare medications that are sedating (will make you sleepy), so do not drive or operate machinery while taking this medication. Avoid alcohol or other sedating medicines such as benzodiazepines while taking narcotics due torisk for increased sedation and difficulty breathing.     Narcotics will cause constipation. If you need to take this medication please consider taking an over-the-counter stool softener and laxative, such as SennaPlus, to prevent constipation from developing. Nausea is a side effect of narcotic use. Possible additional side effects include vomiting, itching, and dizziness/lightheadedness.     You may use Zofran as needed for nausea/vomiting.    Return to the emergency department if you develop new or worsening pain, vomiting/not keeping fluids down, new fevers, or any other concerning symptoms.  Would be happy to see you.

## 2022-05-23 ENCOUNTER — PATIENT OUTREACH (OUTPATIENT)
Dept: FAMILY MEDICINE | Facility: CLINIC | Age: 32
End: 2022-05-23
Payer: COMMERCIAL

## 2022-05-23 NOTE — PROGRESS NOTES
Clinic Care Coordination Contact    Follow Up Progress Note      Assessment: The pt was recently in the ED, I called to check up on the pt and help the pt setup a ED follow up. The pt was at [facility] for abdomina pain. I called the pt, but got her vm, so I left a vm for the pt to give me a call back.     Care Gaps:    Health Maintenance Due   Topic Date Due     ADVANCE CARE PLANNING  Never done     COVID-19 Vaccine (1) Never done     PHQ-9  12/10/2019     PREVENTIVE CARE VISIT  06/03/2020     PAP  06/03/2022           Goals addressed this encounter:    Goals Addressed    None         Intervention/Education provided during outreach:               Plan:     Care Coordinator will follow up in month

## 2022-05-24 ENCOUNTER — PATIENT OUTREACH (OUTPATIENT)
Dept: FAMILY MEDICINE | Facility: CLINIC | Age: 32
End: 2022-05-24
Payer: COMMERCIAL

## 2022-05-30 ENCOUNTER — HOSPITAL ENCOUNTER (EMERGENCY)
Facility: HOSPITAL | Age: 32
Discharge: HOME | End: 2022-05-30
Attending: EMERGENCY MEDICINE | Admitting: EMERGENCY MEDICINE
Payer: COMMERCIAL

## 2022-05-30 ENCOUNTER — APPOINTMENT (OUTPATIENT)
Dept: CT IMAGING | Facility: HOSPITAL | Age: 32
End: 2022-05-30
Attending: EMERGENCY MEDICINE
Payer: COMMERCIAL

## 2022-05-30 VITALS
WEIGHT: 293 LBS | TEMPERATURE: 98.3 F | OXYGEN SATURATION: 93 % | DIASTOLIC BLOOD PRESSURE: 58 MMHG | SYSTOLIC BLOOD PRESSURE: 116 MMHG | RESPIRATION RATE: 16 BRPM | HEART RATE: 67 BPM | BODY MASS INDEX: 47.29 KG/M2

## 2022-05-30 DIAGNOSIS — R10.13 EPIGASTRIC PAIN: ICD-10-CM

## 2022-05-30 LAB
ALBUMIN SERPL-MCNC: 3.5 G/DL (ref 3.5–5)
ALP SERPL-CCNC: 79 U/L (ref 45–120)
ALT SERPL W P-5'-P-CCNC: 10 U/L (ref 0–45)
ANION GAP SERPL CALCULATED.3IONS-SCNC: 10 MMOL/L (ref 5–18)
AST SERPL W P-5'-P-CCNC: 12 U/L (ref 0–40)
BILIRUB DIRECT SERPL-MCNC: 0.2 MG/DL
BILIRUB SERPL-MCNC: 0.6 MG/DL (ref 0–1)
BUN SERPL-MCNC: 10 MG/DL (ref 8–22)
CALCIUM SERPL-MCNC: 9 MG/DL (ref 8.5–10.5)
CHLORIDE BLD-SCNC: 106 MMOL/L (ref 98–107)
CO2 SERPL-SCNC: 18 MMOL/L (ref 22–31)
CREAT SERPL-MCNC: 0.76 MG/DL (ref 0.6–1.1)
ERYTHROCYTE [DISTWIDTH] IN BLOOD BY AUTOMATED COUNT: 14 % (ref 10–15)
GFR SERPL CREATININE-BSD FRML MDRD: >90 ML/MIN/1.73M2
GLUCOSE BLD-MCNC: 98 MG/DL (ref 70–125)
HCG SERPL QL: NEGATIVE
HCT VFR BLD AUTO: 37.8 % (ref 35–47)
HGB BLD-MCNC: 12.1 G/DL (ref 11.7–15.7)
HOLD SPECIMEN: NORMAL
LIPASE SERPL-CCNC: 18 U/L (ref 0–52)
MCH RBC QN AUTO: 28.5 PG (ref 26.5–33)
MCHC RBC AUTO-ENTMCNC: 32 G/DL (ref 31.5–36.5)
MCV RBC AUTO: 89 FL (ref 78–100)
PLATELET # BLD AUTO: 382 10E3/UL (ref 150–450)
POTASSIUM BLD-SCNC: 3.7 MMOL/L (ref 3.5–5)
PROT SERPL-MCNC: 7.5 G/DL (ref 6–8)
RBC # BLD AUTO: 4.24 10E6/UL (ref 3.8–5.2)
SODIUM SERPL-SCNC: 134 MMOL/L (ref 136–145)
WBC # BLD AUTO: 9.6 10E3/UL (ref 4–11)

## 2022-05-30 PROCEDURE — 83690 ASSAY OF LIPASE: CPT | Performed by: EMERGENCY MEDICINE

## 2022-05-30 PROCEDURE — 84703 CHORIONIC GONADOTROPIN ASSAY: CPT | Performed by: EMERGENCY MEDICINE

## 2022-05-30 PROCEDURE — 85027 COMPLETE CBC AUTOMATED: CPT | Performed by: EMERGENCY MEDICINE

## 2022-05-30 PROCEDURE — 96361 HYDRATE IV INFUSION ADD-ON: CPT

## 2022-05-30 PROCEDURE — 36415 COLL VENOUS BLD VENIPUNCTURE: CPT | Performed by: STUDENT IN AN ORGANIZED HEALTH CARE EDUCATION/TRAINING PROGRAM

## 2022-05-30 PROCEDURE — 250N000011 HC RX IP 250 OP 636: Performed by: EMERGENCY MEDICINE

## 2022-05-30 PROCEDURE — 82248 BILIRUBIN DIRECT: CPT | Performed by: EMERGENCY MEDICINE

## 2022-05-30 PROCEDURE — 96375 TX/PRO/DX INJ NEW DRUG ADDON: CPT

## 2022-05-30 PROCEDURE — 99285 EMERGENCY DEPT VISIT HI MDM: CPT | Mod: 25

## 2022-05-30 PROCEDURE — 96374 THER/PROPH/DIAG INJ IV PUSH: CPT | Mod: 59

## 2022-05-30 PROCEDURE — 74177 CT ABD & PELVIS W/CONTRAST: CPT

## 2022-05-30 PROCEDURE — 258N000003 HC RX IP 258 OP 636: Performed by: EMERGENCY MEDICINE

## 2022-05-30 RX ORDER — MORPHINE SULFATE 4 MG/ML
4 INJECTION, SOLUTION INTRAMUSCULAR; INTRAVENOUS ONCE
Status: COMPLETED | OUTPATIENT
Start: 2022-05-30 | End: 2022-05-30

## 2022-05-30 RX ORDER — FAMOTIDINE 20 MG/1
20 TABLET, FILM COATED ORAL 2 TIMES DAILY
Qty: 20 TABLET | Refills: 0 | Status: SHIPPED | OUTPATIENT
Start: 2022-05-30 | End: 2023-08-21

## 2022-05-30 RX ORDER — KETOROLAC TROMETHAMINE 30 MG/ML
15 INJECTION, SOLUTION INTRAMUSCULAR; INTRAVENOUS ONCE
Status: COMPLETED | OUTPATIENT
Start: 2022-05-30 | End: 2022-05-30

## 2022-05-30 RX ORDER — ONDANSETRON 2 MG/ML
4 INJECTION INTRAMUSCULAR; INTRAVENOUS ONCE
Status: COMPLETED | OUTPATIENT
Start: 2022-05-30 | End: 2022-05-30

## 2022-05-30 RX ORDER — IOPAMIDOL 755 MG/ML
100 INJECTION, SOLUTION INTRAVASCULAR ONCE
Status: COMPLETED | OUTPATIENT
Start: 2022-05-30 | End: 2022-05-30

## 2022-05-30 RX ADMIN — SODIUM CHLORIDE 1000 ML: 9 INJECTION, SOLUTION INTRAVENOUS at 06:47

## 2022-05-30 RX ADMIN — FAMOTIDINE 20 MG: 10 INJECTION INTRAVENOUS at 09:10

## 2022-05-30 RX ADMIN — ONDANSETRON 4 MG: 2 INJECTION INTRAMUSCULAR; INTRAVENOUS at 06:47

## 2022-05-30 RX ADMIN — IOPAMIDOL 100 ML: 755 INJECTION, SOLUTION INTRAVENOUS at 07:58

## 2022-05-30 RX ADMIN — KETOROLAC TROMETHAMINE 15 MG: 30 INJECTION, SOLUTION INTRAMUSCULAR at 06:47

## 2022-05-30 RX ADMIN — MORPHINE SULFATE 4 MG: 4 INJECTION, SOLUTION INTRAMUSCULAR; INTRAVENOUS at 07:42

## 2022-05-30 NOTE — ED NOTES
Discharge paperwork discussed with and given to pt. IV access discontinued. Pt denies having questions about todays visit. Pt has ride coming to pick her up. Pt vitally stable and ambulates unassisted when leaving ED.

## 2022-05-30 NOTE — ED PROVIDER NOTES
EMERGENCY DEPARTMENT ENCOUnter      NAME: Lucinda Ji  AGE: 31 year old female  YOB: 1990  MRN: [id number]  EVALUATION DATE & TIME: 2022  6:21 AM    PCP: Seth Hand    ED PROVIDER: Greyson Craft DO      Chief Complaint   Patient presents with     Abdominal Pain         FINAL IMPRESSION:  1. Epigastric pain          ED COURSE & MEDICAL DECISION MAKIN:34 AM Introduced myself to the patient, obtained history of present illness, and performed initial physical exam at this time. PPE: Provider wore gloves, N95 mask      The patient presented to the emergency department today with complaints of epigastric pain.  She has been seen here in the past for similar symptoms.  She had tenderness on exam in the epigastrium but no lower abdominal tenderness.  Laboratory testing today was unremarkable.  Her abdominal CT does not show any new acute process.  Vital signs have been stable.  Symptoms are most likely due to gastritis given her normal work-up and recurrent symptoms today.  She will be discharged home with a prescription for Pepcid and instructions for outpatient follow-up.  She has been instructed to return right away for any worsening symptoms or other concerns.      At the conclusion of the encounter I discussed the results of all of the tests and the disposition. The questions were answered. The patient or family acknowledged understanding and was agreeable with the care plan.         NEW PRESCRIPTIONS STARTED AT TODAY'S ER VISIT  Discharge Medication List as of 2022  9:21 AM      START taking these medications    Details   famotidine (PEPCID) 20 MG tablet Take 1 tablet (20 mg) by mouth 2 times daily, Disp-20 tablet, R-0, E-Prescribe                =================================================================    HPI    Lucinda Ji is a 31 year old female with a pertinent history of TBI, asthma, hiatal hernia, and uterine leiomyoma, who presents to this ED for evaluation  of abdominal pain    Per chart review, the patient was seen on 5/20/22 for evaluation of abdominal pain. During this visit, the patient had a CT of her abdomen and pelvis with results as follows:    IMPRESSION:   1.  Large right adnexal cyst is unchanged from 04/27/2022, and considerably smaller compared to 11/06/2020.  2.  No other findings to account for the patient's abdominal pain     The patient presents with worsening epigastric abdominal pain that radiates to her right side. She notes that she has had this pain for a while, but it increased in severity yesterday. This pain has been constant since yesterday, causing her to be unable to participate in her normal daily activities. She endorses associated nausea and vomiting. She notes that walking and movement exacerbate her pain. She also endorses shortness of breath with exertion, but reports that this is not new. She mentions that she forgot to fill her prescription from her last ED visit.     REVIEW OF SYSTEMS     Constitutional:  Denies fever or chills  HENT:  Denies sore throat   Respiratory:  Denies cough  Cardiovascular:  Denies chest pain or palpitations  GI: Positive for abdominal pain, nausea, and vomiting.  Musculoskeletal:  Denies any new extremity pain   Skin:  Denies rash   Neurologic:  Denies headache, focal weakness or sensory changes    All other systems reviewed and are negative      PAST MEDICAL HISTORY:  Past Medical History:   Diagnosis Date     Asthma      Bacterial vaginosis in pregnancy 05/26/2020     Fetal demise, greater than 22 weeks, antepartum, single gestation 10/29/2020     Gastritis 2012     Gastritis      GBS bacteriuria 7/23/2020     GBS bacteriuria 8/10/2020     Nausea & vomiting 05/26/2020     Obesity      Pregnancy 05/26/2020     Round ligament pain 9/10/2020       PAST SURGICAL HISTORY:  Past Surgical History:   Procedure Laterality Date     NO PAST SURGERIES  09/17/2020           CURRENT MEDICATIONS:    famotidine (PEPCID)  20 MG tablet  acetaminophen (TYLENOL) 325 MG tablet  albuterol (PROAIR HFA/PROVENTIL HFA/VENTOLIN HFA) 108 (90 Base) MCG/ACT inhaler  cetirizine (ZYRTEC) 10 MG tablet  diphenhydrAMINE (BENADRYL) 25 MG tablet  doxylamine (UNISOM) 25 MG TABS tablet  fluticasone (FLONASE) 50 MCG/ACT nasal spray  metoclopramide (REGLAN) 5 MG tablet  metroNIDAZOLE (FLAGYL) 500 MG tablet  naproxen (NAPROSYN) 500 MG tablet  omeprazole (PRILOSEC) 40 MG DR capsule  ondansetron (ZOFRAN-ODT) 4 MG ODT tab  Prenatal Vit-Fe Fumarate-FA (PRENATAL MULTIVITAMIN W/IRON) 27-0.8 MG tablet  promethazine (PHENERGAN) 25 MG tablet  sulfamethoxazole-trimethoprim (BACTRIM DS) 800-160 MG tablet  sulfamethoxazole-trimethoprim (BACTRIM DS) 800-160 MG tablet  sulfamethoxazole-trimethoprim (BACTRIM DS) 800-160 MG tablet  vitamin B6 (PYRIDOXINE) 50 MG TABS        ALLERGIES:  No Known Allergies    FAMILY HISTORY:  Family History   Problem Relation Age of Onset     Brain Tumor Mother      Heart Disease Father      Thyroid Disease Sister      Asthma Brother      Cancer Mother      Hypertension Father      Asthma Brother        SOCIAL HISTORY:   Social History     Socioeconomic History     Marital status: Single     Number of children: 1   Occupational History     Occupation: unemployed   Tobacco Use     Smoking status: Former Smoker     Years: 12.00     Types: Cigarettes     Smokeless tobacco: Never Used   Substance and Sexual Activity     Alcohol use: Not Currently     Comment: occasional     Drug use: Not Currently     Types: Marijuana     Sexual activity: Yes     Partners: Male     Birth control/protection: Condom       VITALS:  Patient Vitals for the past 24 hrs:   BP Temp Temp src Pulse Resp SpO2 Weight   05/30/22 0845 116/58 -- -- 67 16 93 % --   05/30/22 0830 112/56 -- -- 63 16 93 % --   05/30/22 0745 -- -- -- 84 (!) 32 100 % --   05/30/22 0730 126/81 -- -- 86 (!) 34 100 % --   05/30/22 0645 (!) 152/81 -- -- 86 21 99 % --   05/30/22 0630 (!) 145/76 -- -- 78 17  97 % --   05/30/22 0627 130/74 98.3  F (36.8  C) Oral 81 24 98 % 132.9 kg (293 lb)       PHYSICAL EXAM    Constitutional:  Well developed, Well nourished,  HENT:  Normocephalic, Atraumatic, Bilateral external ears normal, Oropharynx moist, Nose normal.   Neck:  Normal range of motion, No meningismus, No stridor.   Eyes:  EOMI, Conjunctiva normal, No discharge.   Respiratory:  Normal breath sounds, No respiratory distress, No wheezing, No chest tenderness.   Cardiovascular:  Normal heart rate, Normal rhythm, No murmurs  GI:  Soft, No guarding, No CVA tenderness. Moderate epigastric tenderness. No low abdominal tenderness.  Musculoskeletal:   No tenderness to palpation or major deformities noted.   Integument:  Warm, Dry, No erythema, No rash.   Neurologic:  Alert & oriented x 3, Normal motor function, Normal sensory function, No focal deficits noted.   Psychiatric:  Affect normal, Judgment normal, Mood normal.      LAB:  All pertinent labs reviewed and interpreted.  Results for orders placed or performed during the hospital encounter of 05/30/22                                                                      CBC with platelets   Result Value Ref Range    WBC Count 9.6 4.0 - 11.0 10e3/uL    RBC Count 4.24 3.80 - 5.20 10e6/uL    Hemoglobin 12.1 11.7 - 15.7 g/dL    Hematocrit 37.8 35.0 - 47.0 %    MCV 89 78 - 100 fL    MCH 28.5 26.5 - 33.0 pg    MCHC 32.0 31.5 - 36.5 g/dL    RDW 14.0 10.0 - 15.0 %    Platelet Count 382 150 - 450 10e3/uL   Basic metabolic panel   Result Value Ref Range    Sodium 134 (L) 136 - 145 mmol/L    Potassium 3.7 3.5 - 5.0 mmol/L    Chloride 106 98 - 107 mmol/L    Carbon Dioxide (CO2) 18 (L) 22 - 31 mmol/L    Anion Gap 10 5 - 18 mmol/L    Urea Nitrogen 10 8 - 22 mg/dL    Creatinine 0.76 0.60 - 1.10 mg/dL    Calcium 9.0 8.5 - 10.5 mg/dL    Glucose 98 70 - 125 mg/dL    GFR Estimate >90 >60 mL/min/1.73m2   Hepatic function panel   Result Value Ref Range    Bilirubin Total 0.6 0.0 - 1.0 mg/dL     Bilirubin Direct 0.2 <=0.5 mg/dL    Protein Total 7.5 6.0 - 8.0 g/dL    Albumin 3.5 3.5 - 5.0 g/dL    Alkaline Phosphatase 79 45 - 120 U/L    AST 12 0 - 40 U/L    ALT 10 0 - 45 U/L   Result Value Ref Range    Lipase 18 0 - 52 U/L   hCG Qualitative Pregnancy   Result Value Ref Range    hCG Serum Qualitative Negative Negative       RADIOLOGY:  I have independently reviewed and interpreted the above imaging, pending the final radiology read.  CT Abdomen Pelvis w Contrast   Final Result   IMPRESSION:    1.  A specific etiology for the patient's epigastric pain is not identified. No acute pathology visualized.   2.  A small sliding-type hiatal hernia is unchanged. No associated obstruction or definite wall thickening.   3.  Mildly enlarged uterus due to fibroids, unchanged.              I, Lizbeth Hughes, am serving as a scribe to document services personally performed by Dr. Craft based on my observation and the provider's statements to me. I, Greyson Craft, DO attest that Lizbeth Hughes is acting in a scribe capacity, has observed my performance of the services and has documented them in accordance with my direction.    Greyson Craft DO  Emergency Medicine  [facility] EMERGENCY DEPARTMENT  [address] [address] [phone]  Dept: [phone]       Greyson Craft MD  05/30/22 6584

## 2022-05-30 NOTE — ED TRIAGE NOTES
Epigastric pain started last night, took naproxen and zofran without relief.       Triage Assessment     Row Name 05/30/22 0649       Triage Assessment (Adult)    Airway WDL WDL       Respiratory WDL    Respiratory WDL WDL       Skin Circulation/Temperature WDL    Skin Circulation/Temperature WDL WDL       Cardiac WDL    Cardiac WDL WDL       Peripheral/Neurovascular WDL    Peripheral Neurovascular WDL WDL       Cognitive/Neuro/Behavioral WDL    Cognitive/Neuro/Behavioral WDL WDL

## 2022-05-30 NOTE — DISCHARGE INSTRUCTIONS
No specific cause of your pain was identified today.  Fortunately, all of your testing appeared normal.  Take the prescribed medication as directed.  Follow-up closely with your primary care doctor and return to the ER for any worsening symptoms or other concerns.

## 2022-05-31 ENCOUNTER — PATIENT OUTREACH (OUTPATIENT)
Dept: FAMILY MEDICINE | Facility: CLINIC | Age: 32
End: 2022-05-31
Payer: COMMERCIAL

## 2022-05-31 NOTE — PROGRESS NOTES
Clinic Care Coordination Contact    Follow Up Progress Note      Assessment: The pt was recently in the ED, I called to check up on the pt and help the pt setup a ED follow up. The pt was at [facility] for abdominal pain. I called and talked to the pt, pt stated that she is doing fine. Pt stated that she is in the process of moving. She stated that when she is done moving, she will call to schedule a follow up.    Care Gaps:    Health Maintenance Due   Topic Date Due     ADVANCE CARE PLANNING  Never done     COVID-19 Vaccine (1) Never done     PHQ-9  12/10/2019     PREVENTIVE CARE VISIT  06/03/2020     PAP  06/03/2022           Goals addressed this encounter:    Goals Addressed    None         Intervention/Education provided during outreach:               Plan:     Care Coordinator will follow up in month

## 2022-06-02 ENCOUNTER — PATIENT OUTREACH (OUTPATIENT)
Dept: FAMILY MEDICINE | Facility: CLINIC | Age: 32
End: 2022-06-02
Payer: COMMERCIAL

## 2022-06-02 NOTE — PROGRESS NOTES
Clinic Care Coordination Contact    Follow Up Progress Note      Assessment: The pt was recently in the ED, I called to check up on the pt and help the pt setup a ED follow up. The pt was at [facility] for abdominal pain, and vomiting. I called and talked to the pt, pt stated that she is doing better. Pt did want to make a follow up. I was able to setup for the to come in on 06/06/2022 at 2:20pm with .    Care Gaps:    Health Maintenance Due   Topic Date Due     ADVANCE CARE PLANNING  Never done     COVID-19 Vaccine (1) Never done     PHQ-9  12/10/2019     PREVENTIVE CARE VISIT  06/03/2020     PAP  06/03/2022           Goals addressed this encounter:    Goals Addressed    None         Intervention/Education provided during outreach:               Plan:     Care Coordinator will follow up in month

## 2022-06-08 DIAGNOSIS — R10.31 ABDOMINAL PAIN, RIGHT LOWER QUADRANT: ICD-10-CM

## 2022-06-08 RX ORDER — OMEPRAZOLE 40 MG/1
40 CAPSULE, DELAYED RELEASE ORAL DAILY
Qty: 30 CAPSULE | Refills: 3 | Status: SHIPPED | OUTPATIENT
Start: 2022-06-08 | End: 2022-09-06

## 2022-06-09 ENCOUNTER — OFFICE VISIT (OUTPATIENT)
Dept: FAMILY MEDICINE | Facility: CLINIC | Age: 32
End: 2022-06-09
Payer: COMMERCIAL

## 2022-06-09 VITALS
WEIGHT: 279 LBS | SYSTOLIC BLOOD PRESSURE: 131 MMHG | HEART RATE: 104 BPM | TEMPERATURE: 97.9 F | OXYGEN SATURATION: 98 % | HEIGHT: 69 IN | RESPIRATION RATE: 18 BRPM | DIASTOLIC BLOOD PRESSURE: 85 MMHG | BODY MASS INDEX: 41.32 KG/M2

## 2022-06-09 DIAGNOSIS — R07.89 OTHER CHEST PAIN: ICD-10-CM

## 2022-06-09 DIAGNOSIS — D25.9 UTERINE LEIOMYOMA, UNSPECIFIED LOCATION: ICD-10-CM

## 2022-06-09 DIAGNOSIS — R10.84 ABDOMINAL PAIN, GENERALIZED: Primary | ICD-10-CM

## 2022-06-09 PROCEDURE — 99214 OFFICE O/P EST MOD 30 MIN: CPT | Mod: GC | Performed by: STUDENT IN AN ORGANIZED HEALTH CARE EDUCATION/TRAINING PROGRAM

## 2022-06-09 RX ORDER — POLYETHYLENE GLYCOL 3350 17 G/17G
POWDER, FOR SOLUTION ORAL
Qty: 238 G | Refills: 0 | Status: SHIPPED | OUTPATIENT
Start: 2022-06-09 | End: 2022-09-06

## 2022-06-09 RX ORDER — BISACODYL 5 MG
TABLET, DELAYED RELEASE (ENTERIC COATED) ORAL
Qty: 4 TABLET | Refills: 0 | Status: SHIPPED | OUTPATIENT
Start: 2022-06-09 | End: 2022-09-06

## 2022-06-09 NOTE — PROGRESS NOTES
Preceptor Attestation:   Patient seen, evaluated and discussed with the resident. I have verified the content of the note, which accurately reflects my assessment of the patient and the plan of care.    Supervising Physician:Zena Colon MD    [facility]

## 2022-06-09 NOTE — PROGRESS NOTES
Assessment and Plan     Abdominal Pain:  Multiple ER visits in the past month for stomach pain, and was recently discharged from [facility] after 3-day hospitalization for abdominal pain.  Only thing that really helps the pain is warm bath and patient slipped in bathtub last night.  States that she is having mostly epigastric pain, but sometimes right pelvic pain.  Decreased BMs lately.  No nausea/vomiting.  No blood or pain on BM.  No  symptoms.  Upon discharge from [facility], has been taking duloxetine and topiramate which has not really been helping symptoms.  Reviewed notes, and although I am unable to look at the images themselves they are otherwise unremarkable for any specific findings other than mild/moderate hiatal hernia, and stable right fibroid.  On exam patient has diffuse abdominal TTP.  Patient is obese so does make exam difficult.  Additionally, she is clearly in discomfort, crying on and off due to pain, tachycardic.  Afebrile.  She does note that she is passing gas and had a bowel movement this morning but very hard and small.  She has been having small hard bowel movements 2-3x/week with no other BMs.  Most likely constipation at this time, causing increased intra abdominal which may worsen symptoms of hiatal hernia and fibroid.  - Miralax/Gatorade   - if that doesn't work will prescribe golytly   -Additionally could think of GI referral for hiatal hernia    Uterine fibroid:  -OB GYN referral as patient would like it removed    Options for treatment and follow-up care were reviewed with the patient and/or guardian. Lucinda Ji and/or guardian engaged in the decision making process and verbalized understanding of the options discussed and agreed with the final plan.    Kalpesh Hand DO, MBA  [facility], PGY-3    Precepted patient with Dr. Gagnon       HPI:   Lucinda Ji is a 31 year old female who presents to clinic  today for   Chief Complaint   Patient presents with     Follow Up     Stomach pain. Has been in the hospital for the last 3 days upper left quadrant. Has been given laxatives, duloxetine, and toprimate     Chest Pain     Has been having chest pain      Stomach Pain:  - in and out of hospital   - hot water bath makes stomach pain   - took new meds today topiramate and duloxetine   - slept in tub until 8 am today   Problems sleeping due to pain.  - slowly started weeks ago          Denies Fever, Chest Pain, shortness of breath , changes in vision/hearing/GI//diet, abdominal pain, numbness/tingling, or any other concerns.         PMHX:   Active Problems List  Patient Active Problem List   Diagnosis     History of traumatic brain injury     Encounter for screening for cervical cancer     Anxiety and depression     Intractable migraine with aura with status migrainosus     Class 3 severe obesity due to excess calories without serious comorbidity with body mass index (BMI) of 40.0 to 44.9 in adult (H)     History of asthma     Hiatal hernia     Uterine leiomyoma       Current Medications  Current Outpatient Medications   Medication Sig Dispense Refill     bisacodyl (DULCOLAX) 5 MG EC tablet Take two (2) tablet at 4 pm the day before your procedure.  If your procedure is before 11 am, take two (2) additional tablets at 8 pm.  If your procedure is after 11 am, take two (2) additional tablets at 6 am. For additional instructions refer to your colonoscopy prep instructions. 4 tablet 0     polyethylene glycol (MIRALAX) 17 GM/Dose powder - Mix 8.3 oz of miralax powder with 64 oz of gatorade or other sport drink (no red or purple).  For additional instructions refer to your colonoscopy prep instructions. 238 g 0     acetaminophen (TYLENOL) 325 MG tablet Take 650 mg by mouth       albuterol (PROAIR HFA/PROVENTIL HFA/VENTOLIN HFA) 108 (90 Base) MCG/ACT inhaler Inhale 1-2 puffs into the lungs every 4 hours as needed for shortness  of breath / dyspnea or wheezing 18 g 11     cetirizine (ZYRTEC) 10 MG tablet Take 1 tablet (10 mg) by mouth daily 30 tablet 3     diphenhydrAMINE (BENADRYL) 25 MG tablet Take 1 tablet (25 mg) by mouth every 6 hours as needed for sleep or other (Nausea) 12 tablet 0     doxylamine (UNISOM) 25 MG TABS tablet Take 0.5 tablets (12.5 mg) by mouth 2 times daily (Patient not taking: Reported on 3/12/2021) 30 tablet 3     famotidine (PEPCID) 20 MG tablet Take 1 tablet (20 mg) by mouth 2 times daily 20 tablet 0     fluticasone (FLONASE) 50 MCG/ACT nasal spray Spray 1 spray into both nostrils daily 18.2 mL 3     metoclopramide (REGLAN) 5 MG tablet Take 1 tablet (5 mg) by mouth 2 times daily as needed (nausea vomiting) 30 tablet 2     metroNIDAZOLE (FLAGYL) 500 MG tablet  (Patient not taking: Reported on 9/22/2021)       naproxen (NAPROSYN) 500 MG tablet Take 1 tablet (500 mg) by mouth 2 times daily (with meals) 28 tablet 0     omeprazole (PRILOSEC) 40 MG DR capsule Take 1 capsule (40 mg) by mouth daily 30 capsule 3     ondansetron (ZOFRAN-ODT) 4 MG ODT tab Take 1 tablet (4 mg) by mouth every 8 hours as needed for nausea 10 tablet 3     Prenatal Vit-Fe Fumarate-FA (PRENATAL MULTIVITAMIN W/IRON) 27-0.8 MG tablet Take 1 tablet by mouth daily (Patient not taking: Reported on 3/12/2021) 90 tablet 3     promethazine (PHENERGAN) 25 MG tablet  (Patient not taking: Reported on 9/22/2021)       sulfamethoxazole-trimethoprim (BACTRIM DS) 800-160 MG tablet Take 1 tablet by mouth 2 times daily 6 tablet 0     sulfamethoxazole-trimethoprim (BACTRIM DS) 800-160 MG tablet Take 1 tablet by mouth 2 times daily (Patient not taking: Reported on 9/22/2021) 6 tablet 0     sulfamethoxazole-trimethoprim (BACTRIM DS) 800-160 MG tablet Take 1 tablet by mouth 2 times daily (Patient not taking: Reported on 9/22/2021) 6 tablet 0     vitamin B6 (PYRIDOXINE) 50 MG TABS Take 1 tablet (50 mg) by mouth 2 times daily (Patient not taking: Reported on 9/22/2021)  "60 tablet 3       Social History  Social History     Tobacco Use     Smoking status: Former Smoker     Years: 12.00     Types: Cigarettes     Smokeless tobacco: Never Used   Substance Use Topics     Alcohol use: Not Currently     Comment: occasional     Drug use: Not Currently     Types: Marijuana     History   Drug Use Unknown       Family History  Family History   Problem Relation Age of Onset     Brain Tumor Mother      Heart Disease Father      Thyroid Disease Sister      Asthma Brother      Cancer Mother      Hypertension Father      Asthma Brother        Allergies  No Known Allergies         Physical Exam:     Vitals:    06/09/22 1311   BP: 131/85   Pulse: 104   Resp: 18   Temp: 97.9  F (36.6  C)   TempSrc: Oral   SpO2: 98%   Weight: 126.6 kg (279 lb)   Height: 1.74 m (5' 8.5\")     Body mass index is 41.8 kg/m .    GENERAL APPEARANCE: alert, uncomfortable  HEENT: Eyes grossly normal to inspection, nares normal, and mouth and throat without erythema, ulcers, or lesions  RESP: lungs clear to auscultation - no rales, rhonchi, or wheezes  CV: regular rate and rhythm, no murmur, click, rub, or gallop  ABDOMEN: Large, diffuse TTP, TTP worse epigastric.  MSK: extremities normal, no gross deformities noted, no lower extremity edema  SKIN: no suspicious lesions or rashes   PSYCH: mood and affect normal/bright       "

## 2022-06-13 ENCOUNTER — PATIENT OUTREACH (OUTPATIENT)
Dept: FAMILY MEDICINE | Facility: CLINIC | Age: 32
End: 2022-06-13
Payer: COMMERCIAL

## 2022-06-13 NOTE — PROGRESS NOTES
Clinic Care Coordination Contact    Follow Up Progress Note      Assessment: The pt was recently in the ED, I called to check up on the pt and help the pt setup a ED follow up. The pt was at [facility] for abdominal pain. I called and talked to the pt, pt stated that she is doing ok. Pt stated that she did not feel that she needs a follow up.     Care Gaps:    Health Maintenance Due   Topic Date Due     ADVANCE CARE PLANNING  Never done     COVID-19 Vaccine (1) Never done     PREVENTIVE CARE VISIT  06/03/2020     PAP  06/03/2022           Goals addressed this encounter:    Goals Addressed    None         Intervention/Education provided during outreach:               Plan:     Care Coordinator will follow up in month

## 2022-06-23 ENCOUNTER — PATIENT OUTREACH (OUTPATIENT)
Dept: FAMILY MEDICINE | Facility: CLINIC | Age: 32
End: 2022-06-23

## 2022-06-23 NOTE — PROGRESS NOTES
Clinic Care Coordination Contact    Follow Up Progress Note      Assessment: The pt was recently in the ED, I called to check up on the pt, and help the pt setup a ED follow up. The pt was at [place] for abdominal pain. I called and talked to the pt, pt stated that she still in some pain. Pt stated that they gave her some medication to help with the pain. Pt stated that she needs to come in so that her doctor can prescribe her more medication. I was able to setup for the pt to come in on 06/27/2022 at 3:20pm with .     Care Gaps:    Health Maintenance Due   Topic Date Due     ADVANCE CARE PLANNING  Never done     COVID-19 Vaccine (1) Never done     PREVENTIVE CARE VISIT  06/03/2020     PAP  06/03/2022           Goals addressed this encounter:    Goals Addressed    None         Intervention/Education provided during outreach:               Plan:     Care Coordinator will follow up in month

## 2022-07-20 ENCOUNTER — TRANSFERRED RECORDS (OUTPATIENT)
Dept: HEALTH INFORMATION MANAGEMENT | Facility: CLINIC | Age: 32
End: 2022-07-20

## 2022-09-07 ENCOUNTER — ANESTHESIA EVENT (OUTPATIENT)
Dept: SURGERY | Facility: HOSPITAL | Age: 32
End: 2022-09-07
Payer: COMMERCIAL

## 2022-09-07 ENCOUNTER — HOSPITAL ENCOUNTER (OUTPATIENT)
Facility: HOSPITAL | Age: 32
Discharge: HOME | End: 2022-09-07
Attending: OBSTETRICS & GYNECOLOGY | Admitting: OBSTETRICS & GYNECOLOGY
Payer: COMMERCIAL

## 2022-09-07 ENCOUNTER — ANESTHESIA (OUTPATIENT)
Dept: SURGERY | Facility: HOSPITAL | Age: 32
End: 2022-09-07
Payer: COMMERCIAL

## 2022-09-07 VITALS
TEMPERATURE: 97.8 F | WEIGHT: 279 LBS | DIASTOLIC BLOOD PRESSURE: 68 MMHG | BODY MASS INDEX: 41.8 KG/M2 | HEART RATE: 67 BPM | SYSTOLIC BLOOD PRESSURE: 135 MMHG | RESPIRATION RATE: 14 BRPM | OXYGEN SATURATION: 99 %

## 2022-09-07 DIAGNOSIS — D25.1 INTRAMURAL LEIOMYOMA OF UTERUS: Primary | ICD-10-CM

## 2022-09-07 LAB
HCG UR QL: NEGATIVE
HGB BLD-MCNC: 11.4 G/DL (ref 11.7–15.7)
SARS-COV-2 RNA RESP QL NAA+PROBE: NEGATIVE

## 2022-09-07 PROCEDURE — 250N000011 HC RX IP 250 OP 636: Performed by: NURSE ANESTHETIST, CERTIFIED REGISTERED

## 2022-09-07 PROCEDURE — 88305 TISSUE EXAM BY PATHOLOGIST: CPT | Mod: 26 | Performed by: PATHOLOGY

## 2022-09-07 PROCEDURE — 88305 TISSUE EXAM BY PATHOLOGIST: CPT | Mod: TC | Performed by: OBSTETRICS & GYNECOLOGY

## 2022-09-07 PROCEDURE — 370N000017 HC ANESTHESIA TECHNICAL FEE, PER MIN: Performed by: OBSTETRICS & GYNECOLOGY

## 2022-09-07 PROCEDURE — 258N000003 HC RX IP 258 OP 636: Performed by: OBSTETRICS & GYNECOLOGY

## 2022-09-07 PROCEDURE — 710N000012 HC RECOVERY PHASE 2, PER MINUTE: Performed by: OBSTETRICS & GYNECOLOGY

## 2022-09-07 PROCEDURE — 258N000003 HC RX IP 258 OP 636: Performed by: NURSE ANESTHETIST, CERTIFIED REGISTERED

## 2022-09-07 PROCEDURE — C1765 ADHESION BARRIER: HCPCS | Performed by: OBSTETRICS & GYNECOLOGY

## 2022-09-07 PROCEDURE — 250N000009 HC RX 250: Performed by: NURSE ANESTHETIST, CERTIFIED REGISTERED

## 2022-09-07 PROCEDURE — 272N000001 HC OR GENERAL SUPPLY STERILE: Performed by: OBSTETRICS & GYNECOLOGY

## 2022-09-07 PROCEDURE — U0003 INFECTIOUS AGENT DETECTION BY NUCLEIC ACID (DNA OR RNA); SEVERE ACUTE RESPIRATORY SYNDROME CORONAVIRUS 2 (SARS-COV-2) (CORONAVIRUS DISEASE [COVID-19]), AMPLIFIED PROBE TECHNIQUE, MAKING USE OF HIGH THROUGHPUT TECHNOLOGIES AS DESCRIBED BY CMS-2020-01-R: HCPCS | Performed by: OBSTETRICS & GYNECOLOGY

## 2022-09-07 PROCEDURE — 360N000080 HC SURGERY LEVEL 7, PER MIN: Performed by: OBSTETRICS & GYNECOLOGY

## 2022-09-07 PROCEDURE — 81025 URINE PREGNANCY TEST: CPT | Performed by: PHYSICIAN ASSISTANT

## 2022-09-07 PROCEDURE — 250N000011 HC RX IP 250 OP 636: Performed by: ANESTHESIOLOGY

## 2022-09-07 PROCEDURE — 250N000013 HC RX MED GY IP 250 OP 250 PS 637: Performed by: PHYSICIAN ASSISTANT

## 2022-09-07 PROCEDURE — 85018 HEMOGLOBIN: CPT | Performed by: PHYSICIAN ASSISTANT

## 2022-09-07 PROCEDURE — 999N000141 HC STATISTIC PRE-PROCEDURE NURSING ASSESSMENT: Performed by: OBSTETRICS & GYNECOLOGY

## 2022-09-07 PROCEDURE — 710N000010 HC RECOVERY PHASE 1, LEVEL 2, PER MIN: Performed by: OBSTETRICS & GYNECOLOGY

## 2022-09-07 PROCEDURE — 250N000011 HC RX IP 250 OP 636: Performed by: OBSTETRICS & GYNECOLOGY

## 2022-09-07 PROCEDURE — 36415 COLL VENOUS BLD VENIPUNCTURE: CPT | Performed by: PHYSICIAN ASSISTANT

## 2022-09-07 PROCEDURE — 250N000013 HC RX MED GY IP 250 OP 250 PS 637: Performed by: ANESTHESIOLOGY

## 2022-09-07 PROCEDURE — 999N000127 HC STATISTIC PERIPHERAL IV START W US GUIDANCE

## 2022-09-07 PROCEDURE — 258N000003 HC RX IP 258 OP 636: Performed by: ANESTHESIOLOGY

## 2022-09-07 PROCEDURE — 250N000013 HC RX MED GY IP 250 OP 250 PS 637: Performed by: OBSTETRICS & GYNECOLOGY

## 2022-09-07 PROCEDURE — 250N000025 HC SEVOFLURANE, PER MIN: Performed by: OBSTETRICS & GYNECOLOGY

## 2022-09-07 RX ORDER — IBUPROFEN 200 MG
800 TABLET ORAL ONCE
Status: DISCONTINUED | OUTPATIENT
Start: 2022-09-07 | End: 2022-09-07

## 2022-09-07 RX ORDER — DEXAMETHASONE SODIUM PHOSPHATE 10 MG/ML
INJECTION, SOLUTION INTRAMUSCULAR; INTRAVENOUS PRN
Status: DISCONTINUED | OUTPATIENT
Start: 2022-09-07 | End: 2022-09-07

## 2022-09-07 RX ORDER — SODIUM CHLORIDE, SODIUM LACTATE, POTASSIUM CHLORIDE, AND CALCIUM CHLORIDE .6; .31; .03; .02 G/100ML; G/100ML; G/100ML; G/100ML
IRRIGANT IRRIGATION PRN
Status: DISCONTINUED | OUTPATIENT
Start: 2022-09-07 | End: 2022-09-07 | Stop reason: HOSPADM

## 2022-09-07 RX ORDER — SODIUM CHLORIDE, SODIUM LACTATE, POTASSIUM CHLORIDE, CALCIUM CHLORIDE 600; 310; 30; 20 MG/100ML; MG/100ML; MG/100ML; MG/100ML
INJECTION, SOLUTION INTRAVENOUS CONTINUOUS PRN
Status: DISCONTINUED | OUTPATIENT
Start: 2022-09-07 | End: 2022-09-07

## 2022-09-07 RX ORDER — OXYCODONE HYDROCHLORIDE 5 MG/1
5-10 TABLET ORAL EVERY 4 HOURS PRN
Status: DISCONTINUED | OUTPATIENT
Start: 2022-09-07 | End: 2022-09-07 | Stop reason: HOSPADM

## 2022-09-07 RX ORDER — NALOXONE HYDROCHLORIDE 1 MG/ML
0.2 INJECTION INTRAMUSCULAR; INTRAVENOUS; SUBCUTANEOUS
Status: DISCONTINUED | OUTPATIENT
Start: 2022-09-07 | End: 2022-09-07 | Stop reason: HOSPADM

## 2022-09-07 RX ORDER — NALOXONE HYDROCHLORIDE 1 MG/ML
0.4 INJECTION INTRAMUSCULAR; INTRAVENOUS; SUBCUTANEOUS
Status: DISCONTINUED | OUTPATIENT
Start: 2022-09-07 | End: 2022-09-07 | Stop reason: HOSPADM

## 2022-09-07 RX ORDER — ONDANSETRON 2 MG/ML
4 INJECTION INTRAMUSCULAR; INTRAVENOUS EVERY 30 MIN PRN
Status: DISCONTINUED | OUTPATIENT
Start: 2022-09-07 | End: 2022-09-07 | Stop reason: HOSPADM

## 2022-09-07 RX ORDER — LIDOCAINE HYDROCHLORIDE 10 MG/ML
INJECTION, SOLUTION INFILTRATION; PERINEURAL PRN
Status: DISCONTINUED | OUTPATIENT
Start: 2022-09-07 | End: 2022-09-07

## 2022-09-07 RX ORDER — BUPIVACAINE HYDROCHLORIDE 2.5 MG/ML
INJECTION, SOLUTION INFILTRATION; PERINEURAL PRN
Status: DISCONTINUED | OUTPATIENT
Start: 2022-09-07 | End: 2022-09-07 | Stop reason: HOSPADM

## 2022-09-07 RX ORDER — ACETAMINOPHEN 325 MG/1
975 TABLET ORAL ONCE
Status: COMPLETED | OUTPATIENT
Start: 2022-09-07 | End: 2022-09-07

## 2022-09-07 RX ORDER — CEFAZOLIN SODIUM/WATER 3 G/30 ML
3 SYRINGE (ML) INTRAVENOUS ONCE
Status: COMPLETED | OUTPATIENT
Start: 2022-09-07 | End: 2022-09-07

## 2022-09-07 RX ORDER — IBUPROFEN 200 MG
800 TABLET ORAL ONCE
Status: DISCONTINUED | OUTPATIENT
Start: 2022-09-07 | End: 2022-09-07 | Stop reason: HOSPADM

## 2022-09-07 RX ORDER — SODIUM CHLORIDE, SODIUM LACTATE, POTASSIUM CHLORIDE, CALCIUM CHLORIDE 600; 310; 30; 20 MG/100ML; MG/100ML; MG/100ML; MG/100ML
INJECTION, SOLUTION INTRAVENOUS CONTINUOUS
Status: DISCONTINUED | OUTPATIENT
Start: 2022-09-07 | End: 2022-09-07 | Stop reason: HOSPADM

## 2022-09-07 RX ORDER — ACETAMINOPHEN 325 MG/1
975 TABLET ORAL ONCE
Status: DISCONTINUED | OUTPATIENT
Start: 2022-09-07 | End: 2022-09-07

## 2022-09-07 RX ORDER — PROPOFOL 10 MG/ML
INJECTION, EMULSION INTRAVENOUS PRN
Status: DISCONTINUED | OUTPATIENT
Start: 2022-09-07 | End: 2022-09-07

## 2022-09-07 RX ORDER — FENTANYL CITRATE 50 UG/ML
50 INJECTION, SOLUTION INTRAMUSCULAR; INTRAVENOUS EVERY 5 MIN PRN
Status: DISCONTINUED | OUTPATIENT
Start: 2022-09-07 | End: 2022-09-07 | Stop reason: HOSPADM

## 2022-09-07 RX ORDER — ONDANSETRON 2 MG/ML
INJECTION INTRAMUSCULAR; INTRAVENOUS PRN
Status: DISCONTINUED | OUTPATIENT
Start: 2022-09-07 | End: 2022-09-07

## 2022-09-07 RX ORDER — VASOPRESSIN 20 U/ML
INJECTION PARENTERAL PRN
Status: DISCONTINUED | OUTPATIENT
Start: 2022-09-07 | End: 2022-09-07 | Stop reason: HOSPADM

## 2022-09-07 RX ORDER — ONDANSETRON 4 MG/1
4 TABLET, ORALLY DISINTEGRATING ORAL EVERY 30 MIN PRN
Status: DISCONTINUED | OUTPATIENT
Start: 2022-09-07 | End: 2022-09-07 | Stop reason: HOSPADM

## 2022-09-07 RX ORDER — FENTANYL CITRATE 50 UG/ML
INJECTION, SOLUTION INTRAMUSCULAR; INTRAVENOUS PRN
Status: DISCONTINUED | OUTPATIENT
Start: 2022-09-07 | End: 2022-09-07

## 2022-09-07 RX ORDER — MEPERIDINE HYDROCHLORIDE 25 MG/ML
12.5 INJECTION INTRAMUSCULAR; INTRAVENOUS; SUBCUTANEOUS
Status: DISCONTINUED | OUTPATIENT
Start: 2022-09-07 | End: 2022-09-07 | Stop reason: HOSPADM

## 2022-09-07 RX ORDER — OXYCODONE HYDROCHLORIDE 5 MG/1
5-10 TABLET ORAL EVERY 4 HOURS PRN
Qty: 12 TABLET | Refills: 0 | Status: SHIPPED | OUTPATIENT
Start: 2022-09-07 | End: 2023-08-21

## 2022-09-07 RX ORDER — LIDOCAINE 40 MG/G
CREAM TOPICAL
Status: DISCONTINUED | OUTPATIENT
Start: 2022-09-07 | End: 2022-09-07 | Stop reason: HOSPADM

## 2022-09-07 RX ORDER — FENTANYL CITRATE 50 UG/ML
50 INJECTION, SOLUTION INTRAMUSCULAR; INTRAVENOUS
Status: DISCONTINUED | OUTPATIENT
Start: 2022-09-07 | End: 2022-09-07 | Stop reason: HOSPADM

## 2022-09-07 RX ORDER — OXYCODONE HYDROCHLORIDE 5 MG/1
5 TABLET ORAL
Status: DISCONTINUED | OUTPATIENT
Start: 2022-09-07 | End: 2022-09-07 | Stop reason: HOSPADM

## 2022-09-07 RX ADMIN — MIDAZOLAM 1 MG: 1 INJECTION INTRAMUSCULAR; INTRAVENOUS at 10:41

## 2022-09-07 RX ADMIN — SUGAMMADEX 300 MG: 100 INJECTION, SOLUTION INTRAVENOUS at 12:31

## 2022-09-07 RX ADMIN — ONDANSETRON 4 MG: 2 INJECTION INTRAMUSCULAR; INTRAVENOUS at 13:58

## 2022-09-07 RX ADMIN — ONDANSETRON 4 MG: 2 INJECTION INTRAMUSCULAR; INTRAVENOUS at 12:18

## 2022-09-07 RX ADMIN — FENTANYL CITRATE 25 MCG: 50 INJECTION, SOLUTION INTRAMUSCULAR; INTRAVENOUS at 12:27

## 2022-09-07 RX ADMIN — DEXAMETHASONE SODIUM PHOSPHATE 10 MG: 10 INJECTION, SOLUTION INTRAMUSCULAR; INTRAVENOUS at 10:51

## 2022-09-07 RX ADMIN — Medication 3 G: at 10:56

## 2022-09-07 RX ADMIN — SODIUM CHLORIDE, POTASSIUM CHLORIDE, SODIUM LACTATE AND CALCIUM CHLORIDE: 600; 310; 30; 20 INJECTION, SOLUTION INTRAVENOUS at 10:42

## 2022-09-07 RX ADMIN — ROCURONIUM BROMIDE 50 MG: 50 INJECTION, SOLUTION INTRAVENOUS at 10:51

## 2022-09-07 RX ADMIN — FENTANYL CITRATE 100 MCG: 50 INJECTION, SOLUTION INTRAMUSCULAR; INTRAVENOUS at 10:51

## 2022-09-07 RX ADMIN — MIDAZOLAM 1 MG: 1 INJECTION INTRAMUSCULAR; INTRAVENOUS at 10:51

## 2022-09-07 RX ADMIN — HYDROMORPHONE HYDROCHLORIDE 0.4 MG: 1 INJECTION, SOLUTION INTRAMUSCULAR; INTRAVENOUS; SUBCUTANEOUS at 13:48

## 2022-09-07 RX ADMIN — FENTANYL CITRATE 25 MCG: 50 INJECTION, SOLUTION INTRAMUSCULAR; INTRAVENOUS at 12:45

## 2022-09-07 RX ADMIN — ACETAMINOPHEN 975 MG: 325 TABLET ORAL at 08:01

## 2022-09-07 RX ADMIN — ROCURONIUM BROMIDE 10 MG: 50 INJECTION, SOLUTION INTRAVENOUS at 11:09

## 2022-09-07 RX ADMIN — HYDROMORPHONE HYDROCHLORIDE 0.25 MG: 1 INJECTION, SOLUTION INTRAMUSCULAR; INTRAVENOUS; SUBCUTANEOUS at 12:19

## 2022-09-07 RX ADMIN — SODIUM CHLORIDE, POTASSIUM CHLORIDE, SODIUM LACTATE AND CALCIUM CHLORIDE: 600; 310; 30; 20 INJECTION, SOLUTION INTRAVENOUS at 14:12

## 2022-09-07 RX ADMIN — ACETAMINOPHEN 975 MG: 325 TABLET, FILM COATED ORAL at 15:44

## 2022-09-07 RX ADMIN — HYDROMORPHONE HYDROCHLORIDE 0.25 MG: 1 INJECTION, SOLUTION INTRAMUSCULAR; INTRAVENOUS; SUBCUTANEOUS at 11:22

## 2022-09-07 RX ADMIN — OXYCODONE HYDROCHLORIDE 5 MG: 5 TABLET ORAL at 16:38

## 2022-09-07 RX ADMIN — ROCURONIUM BROMIDE 20 MG: 50 INJECTION, SOLUTION INTRAVENOUS at 11:40

## 2022-09-07 RX ADMIN — LIDOCAINE HYDROCHLORIDE 50 MG: 10 INJECTION, SOLUTION INFILTRATION; PERINEURAL at 10:51

## 2022-09-07 RX ADMIN — PROPOFOL 200 MG: 10 INJECTION, EMULSION INTRAVENOUS at 10:51

## 2022-09-07 RX ADMIN — HYDROMORPHONE HYDROCHLORIDE 0.5 MG: 1 INJECTION, SOLUTION INTRAMUSCULAR; INTRAVENOUS; SUBCUTANEOUS at 12:26

## 2022-09-07 ASSESSMENT — ACTIVITIES OF DAILY LIVING (ADL)
ADLS_ACUITY_SCORE: 35

## 2022-09-07 NOTE — ANESTHESIA POSTPROCEDURE EVALUATION
Patient: Lucinda Ji    Procedure: Procedure(s):  ROBOTIC MYOMECTOMY  LYSIS, ADHESIONS, ROBOT-ASSISTED, LAPAROSCOPIC, USING DA BROOKE XI       Anesthesia Type:  General    Note:  Disposition: Outpatient   Postop Pain Control: Uneventful            Sign Out: Well controlled pain   PONV: No   Neuro/Psych: Uneventful            Sign Out: Acceptable/Baseline neuro status   Airway/Respiratory: Uneventful            Sign Out: Acceptable/Baseline resp. status   CV/Hemodynamics: Uneventful            Sign Out: Acceptable CV status; No obvious hypovolemia; No obvious fluid overload   Other NRE:    DID A NON-ROUTINE EVENT OCCUR?            Last vitals:  Vitals Value Taken Time   /81 09/07/22 1300   Temp 36.5  C (97.7  F) 09/07/22 1246   Pulse 74 09/07/22 1309   Resp 12 09/07/22 1309   SpO2 99 % 09/07/22 1309   Vitals shown include unvalidated device data.    Electronically Signed By: Josselyn Grier MD  September 7, 2022  1:10 PM

## 2022-09-07 NOTE — ANESTHESIA PROCEDURE NOTES
Airway       Patient location during procedure: OR       Procedure Start/Stop Times: 9/7/2022 10:53 AM  Staff -        CRNA: Konrad Anderson APRN CRNA       Performed By: CRNA  Consent for Airway        Urgency: elective  Indications and Patient Condition       Indications for airway management: winston-procedural       Induction type:intravenous       Mask difficulty assessment: 1 - vent by mask    Final Airway Details       Final airway type: endotracheal airway       Successful airway: ETT - single  Endotracheal Airway Details        ETT size (mm): 7.0       Cuffed: yes       Successful intubation technique: direct laryngoscopy       DL Blade Type: Pérez 2       Grade View of Cords: 1       Adjucts: stylet       Position: Right       Measured from: gums/teeth       Secured at (cm): 22       Bite block used: None    Post intubation assessment        Placement verified by: capnometry, equal breath sounds and chest rise        Number of attempts at approach: 1       Secured with: silk tape       Ease of procedure: easy       Dentition: Intact    Medication(s) Administered   Medication Administration Time: 9/7/2022 10:53 AM

## 2022-09-07 NOTE — OR NURSING
"Call placed to patient's sister at 1440 regarding picking up patient. States \"I am at work until 5:00, can you just get her home in an Uber or something?\" Explained that we cannot do that, as the patient had general anesthesia and will need someone to bring her home and a responsible adult to stay with her overnight. I then asked if this would be possible, the sister then raised her voice and stated \" SHE LIVES WITH MY FATHER. I AM AT WORK UNTIL 5:00 AND THEN I WILL PICK HER UP AND BRING HER THERE!\" She then hung up the phone.   "

## 2022-09-07 NOTE — ANESTHESIA PREPROCEDURE EVALUATION
Anesthesia Pre-Procedure Evaluation    Patient: Lucinda Ji   MRN: [id number] : 1990        Procedure : Procedure(s):  ROBOTIC MYOMECTOMY          Past Medical History:   Diagnosis Date     Asthma      Bacterial vaginosis in pregnancy 2020     Fetal demise, greater than 22 weeks, antepartum, single gestation 10/29/2020     Gastritis 2012     Gastritis      GBS bacteriuria 2020     GBS bacteriuria 8/10/2020     Nausea & vomiting 2020     Obesity      Pregnancy 2020     Round ligament pain 9/10/2020      Past Surgical History:   Procedure Laterality Date     NO PAST SURGERIES  2020      No Known Allergies   Social History     Tobacco Use     Smoking status: Former Smoker     Years: 12.00     Types: Cigarettes     Smokeless tobacco: Never Used   Substance Use Topics     Alcohol use: Not Currently     Comment: occasional      Wt Readings from Last 1 Encounters:   22 126.6 kg (279 lb)        Anesthesia Evaluation   Pt has had prior anesthetic.     No history of anesthetic complications       ROS/MED HX  ENT/Pulmonary:     (+) asthma     Neurologic:  - neg neurologic ROS     Cardiovascular:  - neg cardiovascular ROS     METS/Exercise Tolerance:     Hematologic:  - neg hematologic  ROS     Musculoskeletal:       GI/Hepatic:     (+) GERD, Asymptomatic on medication, hiatal hernia,     Renal/Genitourinary:       Endo:     (+) Obesity,  Type I DM: BMI 42.   Psychiatric/Substance Use:       Infectious Disease:       Malignancy:       Other:            Physical Exam    Airway        Mallampati: II   TM distance: > 3 FB   Neck ROM: full     Respiratory Devices and Support         Dental  no notable dental history         Cardiovascular          Rhythm and rate: regular and normal     Pulmonary           breath sounds clear to auscultation           OUTSIDE LABS:  CBC:   Lab Results   Component Value Date    WBC 9.6 2022    WBC 8.6 2022    HGB 12.1 2022    HGB 13.2  05/20/2022    HCT 37.8 05/30/2022    HCT 41.7 05/20/2022     05/30/2022     05/20/2022     BMP:   Lab Results   Component Value Date     (L) 05/30/2022     05/20/2022    POTASSIUM 3.7 05/30/2022    POTASSIUM 4.0 05/20/2022    CHLORIDE 106 05/30/2022    CHLORIDE 106 05/20/2022    CO2 18 (L) 05/30/2022    CO2 25 05/20/2022    BUN 10 05/30/2022    BUN 10 05/20/2022    CR 0.76 05/30/2022    CR 0.85 05/20/2022    GLC 98 05/30/2022    GLC 77 05/20/2022     COAGS:   Lab Results   Component Value Date    PTT 29 09/10/2020    INR 0.95 09/10/2020     POC:   Lab Results   Component Value Date    HCG Negative 09/07/2022    HCGS Negative 05/30/2022     HEPATIC:   Lab Results   Component Value Date    ALBUMIN 3.5 05/30/2022    PROTTOTAL 7.5 05/30/2022    ALT 10 05/30/2022    AST 12 05/30/2022    ALKPHOS 79 05/30/2022    BILITOTAL 0.6 05/30/2022     OTHER:   Lab Results   Component Value Date    A1C 5.5 05/18/2022    KRISTEN 9.0 05/30/2022    MAG 1.4 (L) 09/29/2020    LIPASE 18 05/30/2022    CRP 2.2 (H) 05/26/2020       Anesthesia Plan    ASA Status:  3      Anesthesia Type: General.     - Airway: ETT              Consents    Anesthesia Plan(s) and associated risks, benefits, and realistic alternatives discussed. Questions answered and patient/representative(s) expressed understanding.     - Discussed: Risks, Benefits and Alternatives for BOTH SEDATION and the PROCEDURE were discussed     - Discussed with:  Patient         Postoperative Care    Pain management: Multi-modal analgesia.   PONV prophylaxis: Ondansetron (or other 5HT-3), Dexamethasone or Solumedrol     Comments:                Josselyn Grier MD

## 2022-09-07 NOTE — H&P
History and Physical Update    I have examined the patient and reviewed the history and physical that is present on this chart. The changes in the patient's history and physical condition are as follows:    None    Indra Reis MD

## 2022-09-07 NOTE — ADDENDUM NOTE
Addendum  created 09/07/22 1350 by Konrad Anderson APRN CRNA    Intraprocedure Event edited, Intraprocedure Staff edited

## 2022-09-07 NOTE — BRIEF OP NOTE
[facility]    Brief Operative Note    Pre-operative diagnosis: Fibroids [D21.9]  Post-operative diagnosis Same as pre-operative diagnosis + pelvic adhesions    Procedure: Procedure(s):  ROBOTIC MYOMECTOMY  LYSIS, ADHESIONS, ROBOT-ASSISTED, LAPAROSCOPIC, USING DA BROOKE XI  Surgeon: Surgeon(s) and Role:     * Indra Reis MD - Primary  Anesthesia: General   Estimated Blood Loss: 10 mL from 9/7/2022 10:41 AM to 9/7/2022 12:43 PM      Drains: None  Specimens:   ID Type Source Tests Collected by Time Destination   1 : Uterine Fibroids Tissue Fibroid SURGICAL PATHOLOGY EXAM Indra Reis MD 9/7/2022 12:24 PM      Findings:   See dictated note.  Complications: None.  Implants: * No implants in log *

## 2022-09-07 NOTE — ANESTHESIA CARE TRANSFER NOTE
Patient: Lucinda Ji    Procedure: Procedure(s):  ROBOTIC MYOMECTOMY  LYSIS, ADHESIONS, ROBOT-ASSISTED, LAPAROSCOPIC, USING DA BROOKE XI       Diagnosis: Fibroids [D21.9]  Diagnosis Additional Information: No value filed.    Anesthesia Type:   General     Note:    Oropharynx: oropharynx clear of all foreign objects and spontaneously breathing  Level of Consciousness: drowsy  Oxygen Supplementation: face mask  Level of Supplemental Oxygen (L/min / FiO2): 6  Independent Airway: airway patency satisfactory and stable  Dentition: dentition unchanged  Vital Signs Stable: post-procedure vital signs reviewed and stable  Report to RN Given: handoff report given  Patient transferred to: PACU  Comments: Oropharynx suctioned. spont resp. Extubated sitting up 30 degree. Exchanging well. To \pacu.  Report to RN at bedside.   Handoff Report: Identifed the Patient, Identified the Reponsible Provider, Reviewed the pertinent medical history, Discussed the surgical course, Reviewed Intra-OP anesthesia mangement and issues during anesthesia, Set expectations for post-procedure period and Allowed opportunity for questions and acknowledgement of understanding      Vitals:  Vitals Value Taken Time   /85 09/07/22 1246   Temp     Pulse 79 09/07/22 1250   Resp 45 09/07/22 1250   SpO2 100 % 09/07/22 1250   Vitals shown include unvalidated device data.    Electronically Signed By: KERI Naylor CRNA  September 7, 2022  12:51 PM

## 2022-09-08 NOTE — OP NOTE
Procedure Date: 09/07/2022    PREOPERATIVE DIAGNOSIS: Symptomatic uterine fibroids.    POSTOPERATIVE DIAGNOSES:  Symptomatic uterine fibroids, plus pelvic adhesions.    PROCEDURE:  Robotic myomectomy, lysis of adhesions.    SURGEON:  Indra Reis MD    ASSISTANT:  Ayanna Wilde.    ANESTHESIA:  General.    ESTIMATED BLOOD LOSS:  10 mL, replaced with lactated Ringer's.    DRAINS:  None.    COMPLICATIONS:  None.    INDICATIONS FOR PROCEDURE:  This is a 31-year-old female with known uterine fibroids.  She has had extremely heavy bleeding and severe pelvic cramping, thought to be related to the fibroids.  We had a long discussion regarding options including medical treatment, hysterectomy, but in the end, she wished to pursue myomectomy.  The risks, benefits and alternatives were discussed at length.  She expressed understanding and wished to proceed.    OPERATIVE FINDINGS:  There were filmy adhesions between each adnexa and the pelvic sidewalls.  The left tube in particular was coated in adhesions, which were pinning the tube to the left pelvic sidewall.  The fimbrial ends of each tube appeared lush and patent.  There were multiple uterine fibroids.  We resected 6 and these were all that were seen, the largest measured approximately 8 cm and was located in the posterior wall.  All were near the fundus and were peppered over the fundus and anterior wall.  None appear to interfere with the cornua.    DESCRIPTION OF PROCEDURE:  The patient was brought to the operating room and after induction of general anesthesia, was prepped and draped in the dorsal lithotomy position.  A timeout was called and the patient and the procedure were verified.  A NUPUR catheter was attached to the cervix.  A Fang was placed and attention was directed to the abdomen where a small supraumbilical incision was made.  A Veress needle was inserted and the abdomen was insufflated with 2 liters of CO2.  An 8 mm trocar and trocar sheath was  inserted and a laparoscope was introduced.  Two additional incisions were then made to the right and one to the left and the appropriate robotic trocars were placed.  The patient was placed in steep Trendelenburg and the robot was docked.  I then took my place at the console.  Pelvis was inspected with findings as noted above.  Both ureters were identified transperitoneally.  Monopolar scissors was used to gently take down all adhesions.  Once this was done, a spinal needle was introduced suprapubically and each individual fibroid was injected with dilute Pitressin.  Once this was done, a monopolar scissors was used to make a small incision over the fibroid.  It was gently squeezed through this incision and a corkscrew was used to elevate until with gentle traction and using the monopolar scissors.  Each fibroid was excised from its bed in placed in the anterior cul-de-sac.  The fibroids were counted.  Once this was done, a V-Loc suture was used to close the dead space of the largest fibroid and the same suture was used to reapproximate the serosa, 0 Vicryls were then used to reapproximate and close the dead space and the other smaller fibroid beds.  Copious amounts of irrigation were then used.  The robot was undocked.  Morcellator was introduced and again, the fibroids were counted and each individually removed and submitted for pathologic exam.  Interceed was then placed into the cul-de-sac.  Good hemostasis was noted.  Skin was closed with nylon.  Sponge and instrument counts were correct.  The patient tolerated the procedure well and was taken to the recovery room in good condition.    Indra Reis MD        D: 2022   T: 2022   MT: lg    Name:     LEONCIO MOREAU  MRN:      [id number]        Account:        [id number]   :      1990           Procedure Date: 2022     Document: [id number]

## 2022-09-09 LAB
PATH REPORT.COMMENTS IMP SPEC: NORMAL
PATH REPORT.COMMENTS IMP SPEC: NORMAL
PATH REPORT.FINAL DX SPEC: NORMAL
PATH REPORT.GROSS SPEC: NORMAL
PATH REPORT.MICROSCOPIC SPEC OTHER STN: NORMAL
PATH REPORT.RELEVANT HX SPEC: NORMAL
PHOTO IMAGE: NORMAL

## 2022-09-24 NOTE — H&P
History and physical    NAME: Lucinda Ji   : 1990   MRN: [id number]      [facility]    ADMISSION DATE: 2022    PCP:  Emiliano Jackson     CHIEF COMPLAINT: pelvic pain     HPI: Lucinda Ji is admitted for robotic myomectomy. Patient is a 32 year old  female. History is obtained through the patient and chart review. She has symptomatic fibroids. Patient's last menstrual period was 2022 (approximate).       PAST MEDICAL HISTORY:  Past Medical History:   Diagnosis Date     Asthma      Bacterial vaginosis in pregnancy 2020     Fetal demise, greater than 22 weeks, antepartum, single gestation 10/29/2020     Gastritis 2012     Gastritis      GBS bacteriuria 2020     GBS bacteriuria 8/10/2020     Nausea & vomiting 2020     Obesity      Pregnancy 2020     Round ligament pain 9/10/2020       PAST SURGICAL HISTORY:  Past Surgical History:   Procedure Laterality Date     DAVINCI MYOMECTOMY N/A 2022    Procedure: ROBOTIC MYOMECTOMY;  Surgeon: Indra Reis MD;  Location: [facility] [place], [place] OR     LYSIS, ADHESIONS, ROBOT-ASSISTED, LAPAROSCOPIC, USING DA BROOKE XI N/A 2022    Procedure: LYSIS, ADHESIONS, ROBOT-ASSISTED, LAPAROSCOPIC, USING DA BROOKE XI;  Surgeon: Indra Reis MD;  Location: [facility] [place], [place] OR     NO PAST SURGERIES  2020       SOCIAL HISTORY:  Social History     Socioeconomic History     Marital status: Single     Spouse name: Not on file     Number of children: 1     Years of education: Not on file     Highest education level: Not on file   Occupational History     Occupation: unemployed   Tobacco Use     Smoking status: Former Smoker     Years: 12.00     Types: Cigarettes     Smokeless tobacco: Never Used   Substance and Sexual Activity     Alcohol use: Not Currently     Comment: occasional     Drug use: Not Currently     Types: Marijuana     Sexual activity: Yes     Partners: Male     Birth control/protection: Condom   Other  Topics Concern     Not on file   Social History Narrative     Not on file     Social Determinants of Health     Financial Resource Strain: Not on file   Food Insecurity: Not on file   Transportation Needs: Not on file   Physical Activity: Not on file   Stress: Not on file   Social Connections: Not on file   Intimate Partner Violence: Not on file   Housing Stability: Not on file       MEDICATIONS:  No current facility-administered medications for this encounter.     Current Outpatient Medications   Medication     acetaminophen (TYLENOL) 325 MG tablet     albuterol (PROAIR HFA/PROVENTIL HFA/VENTOLIN HFA) 108 (90 Base) MCG/ACT inhaler     famotidine (PEPCID) 20 MG tablet     fluticasone (FLONASE) 50 MCG/ACT nasal spray     metoclopramide (REGLAN) 5 MG tablet     ondansetron (ZOFRAN-ODT) 4 MG ODT tab     oxyCODONE (ROXICODONE) 5 MG tablet     promethazine (PHENERGAN) 25 MG tablet     vitamin B6 (PYRIDOXINE) 50 MG TABS       ALLERGIES:  No Known Allergies    REVIEW OF SYSTEMS    Negative except what is stated in the HPI    PHYSICAL EXAM:  /68   Pulse 67   Temp 97.8  F (36.6  C) (Temporal)   Resp 14   Wt 126.6 kg (279 lb)   LMP 08/12/2022 (Approximate)   SpO2 99%   BMI 41.80 kg/m     General Appearance: Alert, appropriate appearance for age. No acute distress,   HEENT : Grossly normal  Chest/Respiratory: Normal chest wall and respirations. Clear to auscultation.,   Cardiovascular: Regular rate and rhythm   Gastrointestinal: Soft NT  Pelvic Exam Female:  Uterus enlarged c/w fibroids,   Musculoskeletal Exam: Back IS non-tender, no cva tenderness, moving all four extremities  Skin: no rash or abnormalities,   Neurologic: Normal gait and speech, no tremor  Psychiatric: Alert and oriented, appropriate affect.    LABS  Lab Results   Component Value Date    HGB 11.4 (L) 09/07/2022     [unfilled]    Lab Results: personally reviewed.     IMAGING:  [unfilled]    Radiology Results: Personally reviewed  impression/s      IMPRESSION:  32 year old,  female with symptomatic uterine fibroids    RECOMMENDATIONS:  Robotic myomectomy      Indra Reis MD       CC: Emiliano Jackson, Indra Reis MD

## 2023-01-14 ENCOUNTER — HEALTH MAINTENANCE LETTER (OUTPATIENT)
Age: 33
End: 2023-01-14

## 2023-01-26 ENCOUNTER — TELEPHONE (OUTPATIENT)
Dept: FAMILY MEDICINE | Facility: CLINIC | Age: 33
End: 2023-01-26

## 2023-01-26 ENCOUNTER — OFFICE VISIT (OUTPATIENT)
Dept: FAMILY MEDICINE | Facility: CLINIC | Age: 33
End: 2023-01-26
Payer: COMMERCIAL

## 2023-01-26 VITALS
WEIGHT: 280 LBS | SYSTOLIC BLOOD PRESSURE: 134 MMHG | DIASTOLIC BLOOD PRESSURE: 81 MMHG | BODY MASS INDEX: 41.95 KG/M2 | OXYGEN SATURATION: 97 % | TEMPERATURE: 98.2 F | RESPIRATION RATE: 16 BRPM | HEART RATE: 82 BPM

## 2023-01-26 DIAGNOSIS — Z11.3 ROUTINE SCREENING FOR STI (SEXUALLY TRANSMITTED INFECTION): ICD-10-CM

## 2023-01-26 DIAGNOSIS — A59.9 TRICHOMONAL INFECTION: ICD-10-CM

## 2023-01-26 DIAGNOSIS — Z32.00 ENCOUNTER FOR PREGNANCY TEST, RESULT UNKNOWN: Primary | ICD-10-CM

## 2023-01-26 DIAGNOSIS — R10.31 BILATERAL LOWER ABDOMINAL CRAMPING: ICD-10-CM

## 2023-01-26 DIAGNOSIS — R10.32 BILATERAL LOWER ABDOMINAL CRAMPING: ICD-10-CM

## 2023-01-26 LAB
ALBUMIN UR-MCNC: 30 MG/DL
APPEARANCE UR: CLEAR
BACTERIA #/AREA URNS HPF: ABNORMAL /HPF
BILIRUB UR QL STRIP: NEGATIVE
CLUE CELLS: ABNORMAL
COLOR UR AUTO: YELLOW
GLUCOSE UR STRIP-MCNC: NEGATIVE MG/DL
HCG UR QL: NEGATIVE
HGB UR QL STRIP: ABNORMAL
HIV 1+2 AB+HIV1 P24 AG SERPL QL IA: NONREACTIVE
KETONES UR STRIP-MCNC: NEGATIVE MG/DL
LEUKOCYTE ESTERASE UR QL STRIP: ABNORMAL
NITRATE UR QL: NEGATIVE
PH UR STRIP: 5.5 [PH] (ref 5–8)
RBC #/AREA URNS AUTO: ABNORMAL /HPF
SP GR UR STRIP: >=1.03 (ref 1–1.03)
SQUAMOUS #/AREA URNS AUTO: ABNORMAL /LPF
T PALLIDUM AB SER QL: NONREACTIVE
TRICHOMONAS #/AREA URNS HPF: PRESENT /HPF
TRICHOMONAS, WET PREP: PRESENT
UROBILINOGEN UR STRIP-ACNC: 0.2 E.U./DL
WBC #/AREA URNS AUTO: ABNORMAL /HPF
WBC'S/HIGH POWER FIELD, WET PREP: ABNORMAL
YEAST, WET PREP: ABNORMAL

## 2023-01-26 PROCEDURE — 81025 URINE PREGNANCY TEST: CPT | Performed by: STUDENT IN AN ORGANIZED HEALTH CARE EDUCATION/TRAINING PROGRAM

## 2023-01-26 PROCEDURE — 87591 N.GONORRHOEAE DNA AMP PROB: CPT | Performed by: STUDENT IN AN ORGANIZED HEALTH CARE EDUCATION/TRAINING PROGRAM

## 2023-01-26 PROCEDURE — 87491 CHLMYD TRACH DNA AMP PROBE: CPT | Performed by: STUDENT IN AN ORGANIZED HEALTH CARE EDUCATION/TRAINING PROGRAM

## 2023-01-26 PROCEDURE — 99214 OFFICE O/P EST MOD 30 MIN: CPT | Mod: GC | Performed by: STUDENT IN AN ORGANIZED HEALTH CARE EDUCATION/TRAINING PROGRAM

## 2023-01-26 PROCEDURE — 81001 URINALYSIS AUTO W/SCOPE: CPT | Performed by: STUDENT IN AN ORGANIZED HEALTH CARE EDUCATION/TRAINING PROGRAM

## 2023-01-26 PROCEDURE — 36415 COLL VENOUS BLD VENIPUNCTURE: CPT | Performed by: STUDENT IN AN ORGANIZED HEALTH CARE EDUCATION/TRAINING PROGRAM

## 2023-01-26 PROCEDURE — 87210 SMEAR WET MOUNT SALINE/INK: CPT | Performed by: STUDENT IN AN ORGANIZED HEALTH CARE EDUCATION/TRAINING PROGRAM

## 2023-01-26 PROCEDURE — 86780 TREPONEMA PALLIDUM: CPT | Performed by: STUDENT IN AN ORGANIZED HEALTH CARE EDUCATION/TRAINING PROGRAM

## 2023-01-26 PROCEDURE — 87389 HIV-1 AG W/HIV-1&-2 AB AG IA: CPT | Performed by: STUDENT IN AN ORGANIZED HEALTH CARE EDUCATION/TRAINING PROGRAM

## 2023-01-26 PROCEDURE — 87086 URINE CULTURE/COLONY COUNT: CPT | Performed by: STUDENT IN AN ORGANIZED HEALTH CARE EDUCATION/TRAINING PROGRAM

## 2023-01-26 RX ORDER — IBUPROFEN 400 MG/1
400 TABLET, FILM COATED ORAL EVERY 6 HOURS PRN
Qty: 60 TABLET | Refills: 0 | Status: SHIPPED | OUTPATIENT
Start: 2023-01-26 | End: 2023-08-21

## 2023-01-26 RX ORDER — IBUPROFEN 200 MG
400 TABLET ORAL ONCE
Status: COMPLETED | OUTPATIENT
Start: 2023-01-26 | End: 2023-01-26

## 2023-01-26 RX ADMIN — Medication 400 MG: at 09:33

## 2023-01-26 NOTE — TELEPHONE ENCOUNTER
[facility] phone call message- patient requesting results:    Test: LAB    Date of test: 01/26/2023    Additional Comments: Please give pt a call for results.    OK to leave a message on voice mail? Yes    Primary language: English      needed? No    Call taken on January 26, 2023 at 11:31 AM by Grisel Flores-Cardona

## 2023-01-26 NOTE — NURSING NOTE
Clinic Administered Medication Documentation    Administrations This Visit     ibuprofen (ADVIL/MOTRIN) tablet 400 mg     Admin Date  01/26/2023 Action  Given Dose  400 mg Route  Oral Site  Other (see comments) Administered By  Giacomo Blum    Ordering Provider: Adolfo Bosch MD    NDC: 8747-3222-55    Lot#: [id number]    : Evermed[employer]    Patient Supplied?: No    Comments: oral                Oral Medication Documentation    Patient was given Ibuprofen . Prior to medication administration, verified patients identity using patient s name and date of birth. Please see MAR and medication order for additional information.     Was entire amount of medication used? Yes  Expiration Date: 6/1/2024

## 2023-01-26 NOTE — PROGRESS NOTES
Assessment & Plan   33 y/o female with history of migraine headaches, uterine leiomyoma s/p resection, anxiety and depression who presents to clinic with delayed menses, vaginal spotting, abdominal cramping, headaches and N/V. She was mildly tender to the pelvic examination.  Ddx includes possible pregnancy uterine vs ectopic, sexually transmitted disease/pelvic inflammatory disease, UTI.     Encounter for pregnancy test, result unknown  Routine screening for STI (sexually transmitted infection)  Bilateral lower abdominal cramping  - HIV Ag/Ab Screen [facility]; Future  - Syphilis Screen [facility] (RPR/VDRL); Future  - Chlamydia trachomatis PCR; Future  - Neisseria Gonorrhoeae PCR; Future  - Wet preparation  - HCG qualitative urine; Future  - ibuprofen (ADVIL/MOTRIN) 400 MG tablet; Take 1 tablet (400 mg) by mouth every 6 hours as needed for moderate pain (4-6)  - UA reflex to Microscopic and Culture; Future  - ibuprofen (ADVIL/MOTRIN) tablet 400 mg once in clinic       Return for Routine preventive. or sooner if symptoms worsen or persists.     HPI   Subjective   Ivonne is a 32 year old who presents to the clinic due to following concerns.   Late Period (Late period, spotting, been throwing up, cramping for last three weeks, have a lot of headache ), UPT, and STD  Patient states that her last LMP was 12/27/2022. Her menses are regular every month and lasts 3 days. On the 1/22/25 she had vaginal spotting that resolved same day. She has been having abdominal cramping for 3 weeks with intermittent nausea and vomiting and occasional headaches. She also reports some vaginal discharge with pruritis. She denies fevers, dysuria, thick white vaginal discharge. She was intimate with a partner beginning of January. She is unaware if he has STI and would like to be tested.       Review of Systems   As in HPI above       Objective    /81   Pulse 82   Temp 98.2  F (36.8  C) (Oral)   Resp 16   Wt 127 kg (280 lb)   LMP  12/26/2022 (Exact Date)   SpO2 97%   BMI 41.95 kg/m    Body mass index is 41.95 kg/m .  Physical Exam   GENERAL: healthy, alert and no distress  NECK: no adenopathy, no asymmetry, masses, or scars and thyroid normal to palpation  RESP: lungs clear to auscultation - no rales, rhonchi or wheezes  CV: regular rate and rhythm, normal S1 S2, no S3 or S4, no murmur, click or rub, no peripheral edema and peripheral pulses strong  ABDOMEN: soft, mild tenderness to palpation  MS: no gross musculoskeletal defects noted, no edema  GYN: visible blood at the vaginal canal, tenderness to pelvic exam     Maryann Salas MD  [facility]

## 2023-01-26 NOTE — PROGRESS NOTES
Preceptor Attestation:   Patient seen, evaluated and discussed with the resident. I have verified the content of the note, which accurately reflects my assessment of the patient and the plan of care.   Supervising Physician:  Adolfo Bosch MD

## 2023-01-27 LAB
BACTERIA UR CULT: NORMAL
C TRACH DNA SPEC QL NAA+PROBE: NEGATIVE
N GONORRHOEA DNA SPEC QL NAA+PROBE: NEGATIVE

## 2023-01-27 RX ORDER — METRONIDAZOLE 500 MG/1
500 TABLET ORAL 2 TIMES DAILY
Qty: 14 TABLET | Refills: 0 | Status: SHIPPED | OUTPATIENT
Start: 2023-01-27 | End: 2023-02-03

## 2023-01-27 NOTE — TELEPHONE ENCOUNTER
[facility] phone call message- general phone call:    Reason for call: Tri called and would like to discuss her lab results as she doesn't understand lab results in [employer]. Best contact is [phone].    Return call needed: Yes    OK to leave a message on voice mail? No    Primary language: English      needed? No    Call taken on January 27, 2023 at 11:49 AM by Kika Pedroza

## 2023-02-16 ENCOUNTER — OFFICE VISIT (OUTPATIENT)
Dept: FAMILY MEDICINE | Facility: CLINIC | Age: 33
End: 2023-02-16
Payer: COMMERCIAL

## 2023-02-16 VITALS
DIASTOLIC BLOOD PRESSURE: 68 MMHG | HEART RATE: 62 BPM | TEMPERATURE: 97.6 F | OXYGEN SATURATION: 98 % | RESPIRATION RATE: 18 BRPM | SYSTOLIC BLOOD PRESSURE: 112 MMHG | BODY MASS INDEX: 41.95 KG/M2 | WEIGHT: 280 LBS

## 2023-02-16 DIAGNOSIS — K29.70 GASTRITIS WITHOUT BLEEDING, UNSPECIFIED CHRONICITY, UNSPECIFIED GASTRITIS TYPE: ICD-10-CM

## 2023-02-16 DIAGNOSIS — L50.9 URTICARIA: ICD-10-CM

## 2023-02-16 DIAGNOSIS — L50.9 URTICARIA: Primary | ICD-10-CM

## 2023-02-16 PROCEDURE — 99213 OFFICE O/P EST LOW 20 MIN: CPT | Performed by: FAMILY MEDICINE

## 2023-02-16 RX ORDER — CETIRIZINE HYDROCHLORIDE 10 MG/1
10 TABLET ORAL DAILY
Qty: 30 TABLET | Refills: 1 | Status: SHIPPED | OUTPATIENT
Start: 2023-02-16 | End: 2023-02-20

## 2023-02-16 ASSESSMENT — ASTHMA QUESTIONNAIRES: ACT_TOTALSCORE: 24

## 2023-02-16 NOTE — PATIENT INSTRUCTIONS
For rash:  uritcaria. Take zyrtec once daily  For gastritis: stop any ibuprofen or alcohol.  Take omeprazole 20 mg every day.      F/up 1 week

## 2023-02-16 NOTE — PROGRESS NOTES
Patient Active Problem List    Diagnosis Date Noted     Hiatal hernia 11/06/2020     Priority: Medium     Uterine leiomyoma 11/06/2020     Priority: Medium     History of asthma 05/26/2020     Priority: Medium     Class 3 severe obesity due to excess calories without serious comorbidity with body mass index (BMI) of 40.0 to 44.9 in adult (H) 04/29/2020     Priority: Medium     Intractable migraine with aura with status migrainosus 06/10/2019     Priority: Medium     Anxiety and depression 06/03/2019     Priority: Medium     History of traumatic brain injury 05/20/2019     Priority: Medium     Encounter for screening for cervical cancer 05/20/2019     Priority: Medium     1. Result is:   Normal   2. Date next is due: 3 Years 6/3/2022  3. Follow up colposcopy is: Not indicated           There are no exam notes on file for this visit.  Chief Complaint   Patient presents with     Derm Problem     Rash that has been all over her body- no pain, no itchiness   She wakes up with the rash- today her lip is swellon      Blood pressure 112/68, pulse 62, temperature 97.6  F (36.4  C), temperature source Oral, resp. rate 18, weight 127 kg (280 lb), last menstrual period 01/25/2023, SpO2 98 %, not currently breastfeeding.  No results found for any visits on 02/16/23.   Couple weeks, blotchy rash in various places  Benadryl seems to help.  Has also been sick this week  Has h/o gastritris:  And hasn't been eating.  Normally has BM daily, but hasn't for 4 days.      No new medications.  Thought it might be reglan.  No major diet changes.    Has h/o eczema--an manage that.    Have been taking ibuprofen:  Only 1-2 daily.    Lucinda Ji is a 32-year-old with both a rash and epigastric pain.  Please see notes as above.  I reviewed her most recent ER note where she was essentially treated symptomatically.  Her epigastric pain is a burning discomfort that is worse when she is eating.  She does not currently take NSAIDs and is not drinking  alcohol.  She does use THC regularly.  Her nausea is a lot worse when she is not, and she takes a long hot shower to get some relief from it.  There has been no new foods in her diet.  She has been eating a few more nuts at times, but her rash extends beyond 48 hours after she eats nuts.     OBJECTIVE:    GENERAL:  The patient is alert, pleasant, in no acute distress.  SKIN:  Really pretty normal right now, but she shows me pictures of clear cut urticarial patches; 1 on her right upper lip, but several on her back and thighs.    ASSESSMENT/PLAN:    1.  Urticaria, likely chronic urticaria.  We will start Zyrtec 10 mg daily and reassess in a few weeks.  2.  Regarding her gastritis, stop the H2 blocker and start omeprazole 20 mg daily.  Strongly encouraged her to cut back THC if she can as symptoms may also be due to cyclic vomiting syndrome.  There are no red flags or indications for endoscopy at this time.  I asked her to follow up in 1 to 2 weeks for reassessment.

## 2023-02-20 RX ORDER — CETIRIZINE HYDROCHLORIDE 10 MG/1
TABLET ORAL
Qty: 90 TABLET | Refills: 1 | Status: SHIPPED | OUTPATIENT
Start: 2023-02-20 | End: 2023-08-21

## 2023-04-23 ENCOUNTER — HEALTH MAINTENANCE LETTER (OUTPATIENT)
Age: 33
End: 2023-04-23

## 2023-05-01 ENCOUNTER — OFFICE VISIT (OUTPATIENT)
Dept: FAMILY MEDICINE | Facility: CLINIC | Age: 33
End: 2023-05-01
Payer: COMMERCIAL

## 2023-05-01 VITALS
HEART RATE: 86 BPM | DIASTOLIC BLOOD PRESSURE: 69 MMHG | RESPIRATION RATE: 16 BRPM | SYSTOLIC BLOOD PRESSURE: 112 MMHG | TEMPERATURE: 98.5 F | OXYGEN SATURATION: 99 %

## 2023-05-01 DIAGNOSIS — R51.9 NONINTRACTABLE EPISODIC HEADACHE, UNSPECIFIED HEADACHE TYPE: ICD-10-CM

## 2023-05-01 DIAGNOSIS — Z87.09 HISTORY OF ASTHMA: ICD-10-CM

## 2023-05-01 DIAGNOSIS — E66.813 CLASS 3 SEVERE OBESITY DUE TO EXCESS CALORIES WITHOUT SERIOUS COMORBIDITY WITH BODY MASS INDEX (BMI) OF 40.0 TO 44.9 IN ADULT (H): ICD-10-CM

## 2023-05-01 DIAGNOSIS — R11.2 NAUSEA AND VOMITING, UNSPECIFIED VOMITING TYPE: Primary | ICD-10-CM

## 2023-05-01 DIAGNOSIS — R10.9 RIGHT FLANK PAIN: ICD-10-CM

## 2023-05-01 DIAGNOSIS — E66.01 CLASS 3 SEVERE OBESITY DUE TO EXCESS CALORIES WITHOUT SERIOUS COMORBIDITY WITH BODY MASS INDEX (BMI) OF 40.0 TO 44.9 IN ADULT (H): ICD-10-CM

## 2023-05-01 PROCEDURE — 99213 OFFICE O/P EST LOW 20 MIN: CPT | Mod: GC | Performed by: STUDENT IN AN ORGANIZED HEALTH CARE EDUCATION/TRAINING PROGRAM

## 2023-05-01 RX ORDER — ALBUTEROL SULFATE 90 UG/1
1-2 AEROSOL, METERED RESPIRATORY (INHALATION) EVERY 4 HOURS PRN
Qty: 18 G | Refills: 11 | Status: SHIPPED | OUTPATIENT
Start: 2023-05-01

## 2023-05-01 RX ORDER — ONDANSETRON 4 MG/1
4 TABLET, FILM COATED ORAL EVERY 8 HOURS PRN
Qty: 30 TABLET | Refills: 0 | Status: SHIPPED | OUTPATIENT
Start: 2023-05-01 | End: 2023-05-15

## 2023-05-01 NOTE — PROGRESS NOTES
"  Assessment & Plan     Nausea and vomiting  Patient with history of nausea/vomiting and THC use. She reports that she is cutting down in THC smoking continues to deny any alcohol or drug use.  She was started on omeprazole due to gastritis during her last visit.  Unfortunately patient has not filled prescription.  Symptoms likely secondary to THC use, gastritis, versus possible migraines.  - ondansetron (ZOFRAN) 4 MG tablet; Take 1 tablet (4 mg) by mouth every 8 hours as needed for nausea  - omeprazole (PRILOSEC) 20 MG DR capsule; Take 1 capsule (20 mg) by mouth daily    Right flank pain  Reporting right-sided flank pain associated with movement.  Patient did not have any urinary symptoms.  On examination tenderness to palpation on the right.  Negative CVA tenderness bilaterally.  Pain likely secondary to musculoskeletal.   -Discussed using IcyHot patch and Tylenol as needed    Nonintractable episodic headache, unspecified headache type  Patient reported on and off headaches since last week.  Patient states previously she did have migraine headaches.  Is currently not on any medication.  Headaches likely secondary to dehydration given nausea and vomiting, versus migraine headaches.  -Discussed increasing water intake and keeping her headache diary for next visit.  -Can use Tylenol as needed    History of asthma  -Refilled albuterol (PROAIR HFA/PROVENTIL HFA/VENTOLIN HFA) 108 (90 Base) MCG/ACT inhaler; Inhale 1-2 puffs into the lungs every 4 hours as needed for shortness of breath or wheezing       Return if symptoms worsen or fail to improve, for Routine preventive.  BMI:   Estimated body mass index is 41.95 kg/m  as calculated from the following:    Height as of 6/9/22: 1.74 m (5' 8.5\").    Weight as of 2/16/23: 127 kg (280 lb).   Weight management plan: Discussed healthy diet and exercise guidelines      Precepted with Dr. Rachael Salas MD  [facility]    Shar Coronado is a 32 " year old, presenting for the following health issues:  Nausea & Vomiting (Last three days been nausea, and major headache ), Refill Request (Nausea med ), and Abdominal Cramping (2 week ago )        2/16/2023     2:01 PM   Additional Questions   Roomed by ngf   Accompanied by self     HPI     Patient presents to the clinic secondary to nausea/vomiting, right-sided flank pain and headaches.  Patient states that she had increased nausea and vomiting on Friday and Saturday.  She was unable to tolerate fluids and solids.  This has since improved and patient is able to keep food down.  She denied any fevers, chills, abdominal pain, diarrhea.  She was prescribed omeprazole for gastritis during her previous visit.  Unfortunately patient states that she has not filled this prescription.  Patient also reporting right-sided flank pain that is worsened with movement.  Otherwise denies any urinary symptoms including dysuria, hematuria, increased urgency or frequency.  Patient also reporting on and off headaches.  She had them prior to Friday and does have photophobia.     Review of Systems   Constitutional, HEENT, cardiovascular, pulmonary, gi and gu systems are negative, except as otherwise noted.      Objective    /69 (BP Location: Left arm, Patient Position: Sitting, Cuff Size: Adult Large)   Pulse 86   Temp 98.5  F (36.9  C) (Oral)   Resp 16   LMP 04/20/2023 (Exact Date)   SpO2 99%   There is no height or weight on file to calculate BMI.  Physical Exam   GENERAL: healthy, alert and no distress  RESP: lungs clear to auscultation - no rales, rhonchi or wheezes  CV: regular rate and rhythm, normal S1 S2, no S3 or S4, no murmur  ABDOMEN: soft, nontender, no  bowel sounds normal, no CVA tenderness  MS: no gross musculoskeletal defects noted, no edema, Tenderness to deep palpation on the right flank.      ----- Service Performed and Documented by Resident or Fellow ------

## 2023-05-01 NOTE — PROGRESS NOTES
Preceptor Attestation:    I discussed the patient with the resident and evaluated the patient in person. I have verified the content of the note, which accurately reflects my assessment of the patient and the plan of care.   Supervising Physician:  Lubna Cano MD.

## 2023-05-01 NOTE — PATIENT INSTRUCTIONS
Headache  Please keep a headache diary to bring you your next appointment     Nausea/Vomitting   You can use zofran for nausea. Please also continue with Omeprazole.     Right flank pain   You can use over the counter icy/hot patch.

## 2023-05-30 LAB
RETINOPATHY: NORMAL
RETINOPATHY: NORMAL

## 2023-08-21 ENCOUNTER — OFFICE VISIT (OUTPATIENT)
Dept: FAMILY MEDICINE | Facility: CLINIC | Age: 33
End: 2023-08-21
Payer: COMMERCIAL

## 2023-08-21 VITALS
BODY MASS INDEX: 45.8 KG/M2 | HEART RATE: 97 BPM | OXYGEN SATURATION: 96 % | HEIGHT: 66 IN | SYSTOLIC BLOOD PRESSURE: 119 MMHG | DIASTOLIC BLOOD PRESSURE: 78 MMHG | RESPIRATION RATE: 20 BRPM | TEMPERATURE: 98.5 F | WEIGHT: 285 LBS

## 2023-08-21 DIAGNOSIS — R19.7 DIARRHEA, UNSPECIFIED TYPE: ICD-10-CM

## 2023-08-21 DIAGNOSIS — R29.898 WEAKNESS OF BOTH HANDS: Primary | ICD-10-CM

## 2023-08-21 DIAGNOSIS — R35.0 URINARY FREQUENCY: Primary | ICD-10-CM
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
## 2023-08-21 LAB
ALBUMIN UR-MCNC: NEGATIVE MG/DL
ANION GAP SERPL CALCULATED.3IONS-SCNC: 3 MMOL/L (ref 3–14)
APPEARANCE UR: CLEAR
BACTERIA #/AREA URNS HPF: ABNORMAL /HPF
BILIRUB UR QL STRIP: NEGATIVE
BUN SERPL-MCNC: 7 MG/DL (ref 7–30)
CALCIUM SERPL-MCNC: 9.8 MG/DL (ref 8.5–10.1)
CHLORIDE BLD-SCNC: 107 MMOL/L (ref 94–109)
CO2 SERPL-SCNC: 27 MMOL/L (ref 20–32)
COLOR UR AUTO: YELLOW
CREAT SERPL-MCNC: 1 MG/DL (ref 0.52–1.04)
ERYTHROCYTE [DISTWIDTH] IN BLOOD BY AUTOMATED COUNT: 12.9 % (ref 10–15)
GFR SERPL CREATININE-BSD FRML MDRD: 76 ML/MIN/1.73M2
GLUCOSE BLD-MCNC: 88 MG/DL (ref 70–99)
GLUCOSE UR STRIP-MCNC: NEGATIVE MG/DL
HBA1C MFR BLD: 5.5 % (ref 0–5.6)
HCT VFR BLD AUTO: 42.7 % (ref 35–47)
HGB BLD-MCNC: 13.2 G/DL (ref 11.7–15.7)
HGB UR QL STRIP: ABNORMAL
KETONES UR STRIP-MCNC: NEGATIVE MG/DL
LEUKOCYTE ESTERASE UR QL STRIP: ABNORMAL
MCH RBC QN AUTO: 28.4 PG (ref 26.5–33)
MCHC RBC AUTO-ENTMCNC: 30.9 G/DL (ref 31.5–36.5)
MCV RBC AUTO: 92 FL (ref 78–100)
MUCOUS THREADS #/AREA URNS LPF: PRESENT /LPF
NITRATE UR QL: NEGATIVE
PH UR STRIP: 6.5 [PH] (ref 5–7)
PLATELET # BLD AUTO: 318 10E3/UL (ref 150–450)
POTASSIUM BLD-SCNC: 4.8 MMOL/L (ref 3.4–5.3)
RBC # BLD AUTO: 4.65 10E6/UL (ref 3.8–5.2)
RBC #/AREA URNS AUTO: ABNORMAL /HPF
SODIUM SERPL-SCNC: 137 MMOL/L (ref 133–144)
SP GR UR STRIP: 1.02 (ref 1–1.03)
SQUAMOUS #/AREA URNS AUTO: ABNORMAL /LPF
UROBILINOGEN UR STRIP-ACNC: 0.2 E.U./DL
WBC # BLD AUTO: 7 10E3/UL (ref 4–11)
WBC #/AREA URNS AUTO: ABNORMAL /HPF

## 2023-08-21 PROCEDURE — 81001 URINALYSIS AUTO W/SCOPE: CPT

## 2023-08-21 PROCEDURE — 85027 COMPLETE CBC AUTOMATED: CPT

## 2023-08-21 PROCEDURE — 80048 BASIC METABOLIC PNL TOTAL CA: CPT

## 2023-08-21 PROCEDURE — 36415 COLL VENOUS BLD VENIPUNCTURE: CPT

## 2023-08-21 PROCEDURE — 99213 OFFICE O/P EST LOW 20 MIN: CPT | Mod: 25

## 2023-08-21 PROCEDURE — 83036 HEMOGLOBIN GLYCOSYLATED A1C: CPT

## 2023-08-21 NOTE — PROGRESS NOTES
Preceptor Attestation:  Patient's case reviewed and discussed with the resident, Josselyn Lal MD, and I personally evaluated the patient. I agree with written assessment and plan of care.    Supervising Physician:  Rocio Garcia MD   [facility]    [FreeTextEntry1] : \par PLAN AND RECOMMENDATIONS :\par \par We discussed differential diagnosis and clinical impression\par suspect spinal stenosis with neurogenic claudication \par agree with EMG \par rule out radiculopathy \par needs xrays and MR spine \par Recommend\par .symptomatic care and support\par  medications NSAIDS as needed -  OTC fine (-personal preference )-(once or twice a day), -warned of  possible GI side effects -advised to take with meals or add over the counter Nexium, if sensitive\par \par  imaging as above \par  \par  hydrotherapy /heat / cold for pain\par  continue  spinal precautions including care with bending, lifting, twisting and  carrying.\par \par  relative rest and avoidance of painful activity where possible \par  increasing activity as discussed \par  return for EMG \par Information given to patient about EMG and Nerve Conduction Study Examination including  planning, differential diagnosis to rule in /rule out ,duration of the test ,precautions (if patient on blood thinner.has bleeding disorder or  pace maker device etc -still possible to undergo with care), side effects(benign-limited to short term bruising and discomfort/pain)  \par The protocol of temp checks upon arrival ,disinfection procedure of waiting room and the lab explained- reassured. \par All questions answered. \par Patient instructed to book appointment upon conclusion of appointment\par \par Information sheet ' Answers to your Questions on EMG " forwarded to patient to read prior to testing, with further information about training,background and the procedure itself .\par I certify that > 50 % of time spent was spent on counselling and coordination of care and more than 50% face to face directly \par Time spent including review of documents /records/decision making /discussion  amounted  > 45 minutes\par The patient had the opportunity to ask questions and all were answered to their satisfaction. We will coordinate treatment with the other members of the treatment team.\par The patient verbalized understanding of the management/plan rationale and agreed with my recommendations.\par \par \par

## 2023-08-21 NOTE — LETTER
September 1, 2023      Ivonne Ji  [address]  [place] [address]        Dear ,    We are writing to inform you of your test results.    Your blood counts, electrolytes, kidney function, and diabetes level were all normal at your last clinic visit, which is good news.     Resulted Orders   UA Macroscopic with reflex to Microscopic and Culture   Result Value Ref Range    Color Urine Yellow Colorless, Straw, Light Yellow, Yellow    Appearance Urine Clear Clear    Glucose Urine Negative Negative mg/dL    Bilirubin Urine Negative Negative    Ketones Urine Negative Negative mg/dL    Specific Gravity Urine 1.020 1.003 - 1.035    Blood Urine Small (A) Negative    pH Urine 6.5 5.0 - 7.0    Protein Albumin Urine Negative Negative mg/dL    Urobilinogen Urine 0.2 0.2, 1.0 E.U./dL    Nitrite Urine Negative Negative    Leukocyte Esterase Urine Small (A) Negative   UA Microscopic with Reflex to Culture   Result Value Ref Range    Bacteria Urine Moderate (A) None Seen /HPF    RBC Urine 0-2 0-2 /HPF /HPF    WBC Urine 5-10 (A) 0-5 /HPF /HPF    Squamous Epithelials Urine Few (A) None Seen /LPF    Mucus Urine Present (A) None Seen /LPF    Narrative    Urine Culture not indicated   Hemoglobin A1c   Result Value Ref Range    Hemoglobin A1C 5.5 0.0 - 5.6 %      Comment:      Normal <5.7%   Prediabetes 5.7-6.4%    Diabetes 6.5% or higher     Note: Adopted from ADA consensus guidelines.   Basic metabolic panel   Result Value Ref Range    Sodium 137 133 - 144 mmol/L    Potassium 4.8 3.4 - 5.3 mmol/L    Chloride 107 94 - 109 mmol/L    Carbon Dioxide (CO2) 27 20 - 32 mmol/L    Anion Gap 3 3 - 14 mmol/L    Urea Nitrogen 7 7 - 30 mg/dL    Creatinine 1.00 0.52 - 1.04 mg/dL    Calcium 9.8 8.5 - 10.1 mg/dL    Glucose 88 70 - 99 mg/dL    GFR Estimate 76 >60 mL/min/1.73m2   CBC with platelets   Result Value Ref Range    WBC Count 7.0 4.0 - 11.0 10e3/uL    RBC Count 4.65 3.80 - 5.20 10e6/uL    Hemoglobin 13.2 11.7 - 15.7 g/dL     Hematocrit 42.7 35.0 - 47.0 %    MCV 92 78 - 100 fL    MCH 28.4 26.5 - 33.0 pg    MCHC 30.9 (L) 31.5 - 36.5 g/dL    RDW 12.9 10.0 - 15.0 %    Platelet Count 318 150 - 450 10e3/uL       If you have any questions or concerns, please call the clinic at the number listed above.       Sincerely,      Jessica Tavera, DO
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
## 2023-08-28 ENCOUNTER — DOCUMENTATION ONLY (OUTPATIENT)
Dept: FAMILY MEDICINE | Facility: CLINIC | Age: 33
End: 2023-08-28
Payer: COMMERCIAL

## 2023-08-28 NOTE — PROGRESS NOTES
To be completed in Nursing note:    Please reference list for forms that require a visit for completion.  Please remind patients that providers are given 3-5 business days to complete and return forms.      Form type:[employer] [facility] Plan: Referral Request form     Date form received: 23    Date form completed by Physician:23    How was form returned to patient (mailed, faxed, or at  for patient to ):  Faxed  to [phone]  Date form mailed/faxed/left at  for patient and sent to HIM for [email or web address]    Once form is left for patient, faxed, or mailed PCS will then close the documentation only encounter.
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
## 2023-09-11 NOTE — PROGRESS NOTES
Preceptor Attestation:  Patient's case reviewed and discussed with the resident, Josselyn Lal MD, and I personally evaluated the patient. I agree with written assessment and plan of care.    Supervising Physician:  Rocio Garcia MD   [facility]

## 2023-09-19 ENCOUNTER — TELEPHONE (OUTPATIENT)
Dept: FAMILY MEDICINE | Facility: CLINIC | Age: 33
End: 2023-09-19
Payer: COMMERCIAL

## 2023-09-19 NOTE — TELEPHONE ENCOUNTER
Message noted.  I am happy to send prescriptions as needed/appropriate and help out however I can.    Joie Ghosh MD

## 2023-09-19 NOTE — TELEPHONE ENCOUNTER
Pt has RESTRICTED [facility] MA: She is restricted to Dr Ghosh and Dr Salas because she has [facility] MA only faculty/attending provider Dr Ghosh or another attending (as long as restricted referral done) can sign the prescriptions.  She is restricted to [address] and to [facility].  Routed note to Dr Ghosh and Dr Salas./Effie Prescott RN BSN

## 2023-09-29 ENCOUNTER — OFFICE VISIT (OUTPATIENT)
Dept: FAMILY MEDICINE | Facility: CLINIC | Age: 33
End: 2023-09-29
Payer: COMMERCIAL

## 2023-09-29 VITALS
SYSTOLIC BLOOD PRESSURE: 106 MMHG | WEIGHT: 287 LBS | HEART RATE: 75 BPM | OXYGEN SATURATION: 100 % | TEMPERATURE: 98.3 F | DIASTOLIC BLOOD PRESSURE: 78 MMHG | BODY MASS INDEX: 46.32 KG/M2

## 2023-09-29 DIAGNOSIS — B96.89 BACTERIAL VAGINOSIS: ICD-10-CM

## 2023-09-29 DIAGNOSIS — R35.0 URINARY FREQUENCY: ICD-10-CM

## 2023-09-29 DIAGNOSIS — Z32.00 ENCOUNTER FOR PREGNANCY TEST, RESULT UNKNOWN: Primary | ICD-10-CM

## 2023-09-29 DIAGNOSIS — B37.31 CANDIDIASIS OF VAGINA: ICD-10-CM

## 2023-09-29 DIAGNOSIS — N76.0 BACTERIAL VAGINOSIS: ICD-10-CM

## 2023-09-29 DIAGNOSIS — Z11.3 SCREEN FOR STD (SEXUALLY TRANSMITTED DISEASE): ICD-10-CM

## 2023-09-29 LAB
ALBUMIN UR-MCNC: NEGATIVE MG/DL
APPEARANCE UR: CLEAR
BACTERIA #/AREA URNS HPF: ABNORMAL /HPF
BILIRUB UR QL STRIP: NEGATIVE
CLUE CELLS: PRESENT
COLOR UR AUTO: YELLOW
GLUCOSE UR STRIP-MCNC: NEGATIVE MG/DL
HCG UR QL: NEGATIVE
HGB UR QL STRIP: ABNORMAL
KETONES UR STRIP-MCNC: NEGATIVE MG/DL
LEUKOCYTE ESTERASE UR QL STRIP: ABNORMAL
NITRATE UR QL: NEGATIVE
PH UR STRIP: 6.5 [PH] (ref 5–8)
RBC #/AREA URNS AUTO: ABNORMAL /HPF
SP GR UR STRIP: 1.02 (ref 1–1.03)
SQUAMOUS #/AREA URNS AUTO: ABNORMAL /LPF
TRICHOMONAS, WET PREP: ABNORMAL
UROBILINOGEN UR STRIP-ACNC: 1 E.U./DL
WBC #/AREA URNS AUTO: ABNORMAL /HPF
WBC'S/HIGH POWER FIELD, WET PREP: ABNORMAL
YEAST, WET PREP: PRESENT

## 2023-09-29 PROCEDURE — 87491 CHLMYD TRACH DNA AMP PROBE: CPT

## 2023-09-29 PROCEDURE — 87591 N.GONORRHOEAE DNA AMP PROB: CPT

## 2023-09-29 PROCEDURE — 99214 OFFICE O/P EST MOD 30 MIN: CPT | Mod: GC

## 2023-09-29 PROCEDURE — 86803 HEPATITIS C AB TEST: CPT

## 2023-09-29 PROCEDURE — 87210 SMEAR WET MOUNT SALINE/INK: CPT

## 2023-09-29 PROCEDURE — 81025 URINE PREGNANCY TEST: CPT

## 2023-09-29 PROCEDURE — 36415 COLL VENOUS BLD VENIPUNCTURE: CPT

## 2023-09-29 PROCEDURE — 81001 URINALYSIS AUTO W/SCOPE: CPT

## 2023-09-29 PROCEDURE — 87389 HIV-1 AG W/HIV-1&-2 AB AG IA: CPT

## 2023-09-29 PROCEDURE — 86780 TREPONEMA PALLIDUM: CPT

## 2023-09-29 RX ORDER — FLUCONAZOLE 150 MG/1
150 TABLET ORAL ONCE
Qty: 1 TABLET | Refills: 0 | Status: SHIPPED | OUTPATIENT
Start: 2023-09-29 | End: 2023-09-29

## 2023-09-29 RX ORDER — METRONIDAZOLE 500 MG/1
500 TABLET ORAL 2 TIMES DAILY
Qty: 14 TABLET | Refills: 0 | Status: SHIPPED | OUTPATIENT
Start: 2023-09-29 | End: 2023-10-06

## 2023-09-29 NOTE — PROGRESS NOTES
Preceptor Attestation:    I discussed the patient with the resident and evaluated the patient in person. I have verified the content of the note, which accurately reflects my assessment of the patient and the plan of care.   Supervising Physician:  Brando Francois MD.

## 2023-09-29 NOTE — PATIENT INSTRUCTIONS
Nice to see you today!    Here's what we talked about:  - I will send you a [employer] message after all your lab results come back.

## 2023-09-29 NOTE — PROGRESS NOTES
Assessment & Plan      Lucinda was seen today for pregnancy test and std.    Diagnoses and all orders for this visit:    Encounter for pregnancy test, result unknown  UPT negative.  -     HCG qualitative urine    Screen for STD (sexually transmitted disease)  -     NEISSERIA GONORRHOEA PCR  -     CHLAMYDIA TRACHOMATIS PCR  -     Wet preparation  -     Hepatitis C Screen Reflex to HCV RNA Quant and Genotype  -     HIV Antigen Antibody Combo Cascade  -     Treponema Abs w Reflex to RPR and Titer    Urinary frequency  Obtained UA due to patient reporting urinary frequency and UPT being negative.  UA showed small amount of leukocyte esterase, negative nitrite.  However, given patient does not have dysuria, will not treat for UTI at this time and instead will treat for BV and yeast infection as below (tested positive on wet prep) as those may likely be causing her symptoms of urinary frequency.  -     UA Macroscopic with reflex to Microscopic and Culture  -     UA Microscopic with Reflex to Culture    Bacterial vaginosis  -     metroNIDAZOLE (FLAGYL) 500 MG tablet; Take 1 tablet (500 mg) by mouth 2 times daily for 7 days    Candidiasis of vagina  -     fluconazole (DIFLUCAN) 150 MG tablet; Take 1 tablet (150 mg) by mouth once for 1 dose      Patient precepted with Dr. Francois.    Rocio Bowman MD  [facility]    Subjective   Lucinda Ji is a 33 year old female presenting for a urine pregnancy test.    HPI  Lucinda is a 33 year old female who presents with question of pregnancy. She has been nauseous, having specific food cravings, and unable to sleep through the night due to urinary frequency and hunger. Pregnancy would be desired if positive. Last intercourse was 1 month ago. Menses was a few days prior to last intercourse.    Requests STD testing - no dysuria, vaginal discharge, odor, or itching. Currently sexually active with 1 male partner.    Review of Systems   Pertinent positives and negatives as noted in  HPI.        Objective    /78 (BP Location: Left arm, Patient Position: Sitting, Cuff Size: Adult Large)   Pulse 75   Temp 98.3  F (36.8  C) (Oral)   Wt 130.2 kg (287 lb)   LMP 08/23/2023 (Approximate)   SpO2 100%   BMI 46.32 kg/m    Body mass index is 46.32 kg/m .  Physical Exam   GENERAL: alert and no distress  MS: no gross musculoskeletal defects noted  SKIN: no suspicious lesions or rashes  NEURO: Normal strength and tone, mentation intact and speech normal  PSYCH: mentation appears normal, affect normal    Results for orders placed or performed in visit on 09/29/23 (from the past 24 hour(s))   HCG qualitative urine   Result Value Ref Range    hCG Urine Qualitative Negative Negative   UA Macroscopic with reflex to Microscopic and Culture    Specimen: Urine, Midstream   Result Value Ref Range    Color Urine Yellow Colorless, Straw, Light Yellow, Yellow    Appearance Urine Clear Clear    Glucose Urine Negative Negative mg/dL    Bilirubin Urine Negative Negative    Ketones Urine Negative Negative mg/dL    Specific Gravity Urine 1.025 1.005 - 1.030    Blood Urine Moderate (A) Negative    pH Urine 6.5 5.0 - 8.0    Protein Albumin Urine Negative Negative mg/dL    Urobilinogen Urine 1.0 0.2, 1.0 E.U./dL    Nitrite Urine Negative Negative    Leukocyte Esterase Urine Small (A) Negative   UA Microscopic with Reflex to Culture   Result Value Ref Range    Bacteria Urine Moderate (A) None Seen /HPF    RBC Urine 2-5 (A) 0-2 /HPF /HPF    WBC Urine 0-5 0-5 /HPF /HPF    Squamous Epithelials Urine Moderate (A) None Seen /LPF    Narrative    Urine Culture not indicated   Wet preparation    Specimen: Vagina; Swab   Result Value Ref Range    Trichomonas Absent Absent    Yeast Present (A) Absent    Clue Cells Present (A) Absent    WBCs/high power field 2+ (A) None    Narrative    <20% clue cell; moderate bacteria; negative odor        ----- Service Performed and Documented by Resident or Fellow ------

## 2023-10-02 LAB
HCV AB SERPL QL IA: NONREACTIVE
HIV 1+2 AB+HIV1 P24 AG SERPL QL IA: NONREACTIVE

## 2023-10-09 ENCOUNTER — OFFICE VISIT (OUTPATIENT)
Dept: FAMILY MEDICINE | Facility: CLINIC | Age: 33
End: 2023-10-09
Payer: COMMERCIAL

## 2023-10-09 ENCOUNTER — DOCUMENTATION ONLY (OUTPATIENT)
Dept: FAMILY MEDICINE | Facility: CLINIC | Age: 33
End: 2023-10-09

## 2023-10-09 VITALS
HEART RATE: 90 BPM | WEIGHT: 289.2 LBS | SYSTOLIC BLOOD PRESSURE: 104 MMHG | OXYGEN SATURATION: 97 % | DIASTOLIC BLOOD PRESSURE: 69 MMHG | BODY MASS INDEX: 46.68 KG/M2 | RESPIRATION RATE: 16 BRPM

## 2023-10-09 DIAGNOSIS — D25.1 INTRAMURAL LEIOMYOMA OF UTERUS: ICD-10-CM

## 2023-10-09 DIAGNOSIS — R10.32 ABDOMINAL CRAMPING, BILATERAL LOWER QUADRANT: Primary | ICD-10-CM

## 2023-10-09 DIAGNOSIS — R10.31 ABDOMINAL CRAMPING, BILATERAL LOWER QUADRANT: Primary | ICD-10-CM

## 2023-10-09 DIAGNOSIS — B37.31 CANDIDIASIS OF VAGINA: ICD-10-CM

## 2023-10-09 DIAGNOSIS — R35.0 URINARY FREQUENCY: ICD-10-CM

## 2023-10-09 DIAGNOSIS — Z87.09 HISTORY OF ASTHMA: ICD-10-CM

## 2023-10-09 DIAGNOSIS — B96.89 BACTERIAL VAGINOSIS: ICD-10-CM

## 2023-10-09 DIAGNOSIS — N76.0 BACTERIAL VAGINOSIS: ICD-10-CM

## 2023-10-09 PROCEDURE — 87086 URINE CULTURE/COLONY COUNT: CPT

## 2023-10-09 PROCEDURE — 99214 OFFICE O/P EST MOD 30 MIN: CPT | Mod: GC

## 2023-10-09 RX ORDER — METRONIDAZOLE 500 MG/1
500 TABLET ORAL 2 TIMES DAILY
Qty: 14 TABLET | Refills: 0 | Status: SHIPPED | OUTPATIENT
Start: 2023-10-09 | End: 2023-10-16

## 2023-10-09 RX ORDER — FLUCONAZOLE 150 MG/1
150 TABLET ORAL ONCE
Qty: 1 TABLET | Refills: 0 | Status: SHIPPED | OUTPATIENT
Start: 2023-10-09 | End: 2023-10-09

## 2023-10-09 NOTE — PATIENT INSTRUCTIONS
Ibuprofen 800 mg every 6 hours for 1 week to 2 weeks until your cramping gets better.    Try this alongside the fluconazole and metronidazole, hopefully this should make your cramping feel better.  If in 1 to 2 weeks your cramping is not any better, please make another appointment with us and we will get to the bottom of what is going on.    I will call you with the results of your urine if it grows anything.  Otherwise, I will send you a letter if the results are normal.

## 2023-10-09 NOTE — PROGRESS NOTES
"\"Pt has RESTRICTED [facility] MA: She is restricted to Dr Ghosh and Dr Salas because she has [facility] only faculty/attending provider Dr Ghosh or another attending (as long as restricted referral done) can sign the prescriptions.\"    Josselyn YUN, had called the patient's pharmacy ([facility] [address]) who said that the prescriptions are in the system - however patient is restricted to Specific PCP's as listed above from [facility] comment. And they cannot dispense the prescription sent over.     Please advise, thank you     Josselyn Bosch MA    "

## 2023-10-09 NOTE — PROGRESS NOTES
Assessment & Plan     Abdominal cramping, bilateral lower quadrant  Patient has been experiencing intermittent cramping of lower abdomen for approximately 1 month.  Came in after 2 weeks of symptoms to the clinic for evaluation 9/29.  Found to have BV and vulvovaginal candidiasis.  Was unable to get medicines (see below).  Ended up going to [facility] emergency department 10/5.  Incidentally found to new fibroids on ultrasound, patient was worried that these may be the cause of her pain as she had significant pain with previous fibroids which were removed surgically 9/2022.  Multiple possible causes of uterine cramping right now including untreated vulvovaginal candidiasis, untreated BV, and possible UTI.  See below for more details.  -Take 800 mg ibuprofen every 6 hours for 1 week to help with cramping  -Treat possible causative factors as below  -If no relief of symptoms in 1 to 2 weeks, follow-up with us for further work-up      Bacterial vaginosis  Candidiasis of vagina  Diagnosed with BV and vulvovaginal candidiasis 9/29 via wet prep.  Prescription for sent into the [facility] at [place], however patient said that for some reason she was unable to get them from the pharmacy.  Ended up going to the ED, ED also so they were not able to send prescription.  As result patient has had almost 2-1/2 weeks of symptoms without relief.  -Calling [facility] to confirm 1 that prescription was sent to the correct place and 2 is available for patient to   -Patient has limitation on transportation and will be there around 3 PM if she is able to get a ride from friends    Urinary frequency  Admitted the above-mentioned concerns, patient also has been noticing some urinary frequency.  While UA was taken at the emergency department, noteworthy for blood, leukocyte esterase, and mucus, but no nitrites.  No urine culture was taken.  Doing a urine culture at this time to rule out possible UTI on top of  everything else.  - Urine Culture    Hx of asthma  Needs to  a refill of albuterol. Has 11 refills listed from 5/2023 visit. Plan to call pharmacy and let them know she will be picking one up.     Diagnosis or treatment significantly limited by social determinants of health - limited transportation, pharmacy not calling patient for ready scripts  Ordering of each unique test  Prescription drug management  30 minutes spent by me on the date of the encounter doing chart review, history and exam, documentation and further activities per the note       MEDICATIONS:   No orders of the defined types were placed in this encounter.        - Continue other medications without change    Return in about 1 week (around 10/16/2023), or if symptoms worsen or fail to improve.    Izabel Dickson MD  PGY3 Family Medicine Resident  [facility] LEELEE Coronado is a 33 year old, presenting for the following health issues:  Abdominal Pain (Abdominal pain - ER Follow-up. Needs Primary care to sign for the medication - pt has a yeast infection - confirmed from the ER - [facility]. )        10/9/2023     8:45 AM   Additional Questions   Roomed by REMEDIOS senior MA   Accompanied by Self       HPI   ED follow up, seen at [facility] 10/5 for abdominal cramping. Tested positive for BV and vulvovaginal candidiasis. Previously tested 9/29 in clinic with prescriptions sent to [facility] at [facility] [place]. Incidentally noted to have fibroids on ultrasound, many of which are improved or resolved from previous ultrasound (secondary to surgery 9/2022). Recommended outpatient follow up if symptoms continue.     Pain is intermittent cramping pain with some shock components for 1 month off and on. Worse with certain movements. Always baseline pain 1/10, gets up to normal period cramps at most intense. Pain did not resolve with 2 tylenol.     Noticed increased urination recently.     Frustrated with the whole experience.   Reports that she should be able to get a ride from a friend at about 3 PM to be able to go to the pharmacy.  Also noted that she is nearly out of her albuterol inhaler and would like to have a refill of that at this time.    Review of Systems   Constitutional, HEENT, cardiovascular, pulmonary, gi and gu systems are negative, except as otherwise noted.      Objective    LMP 08/23/2023 (Approximate)   There is no height or weight on file to calculate BMI.  Physical Exam   GENERAL: healthy, alert and no distress  RESP: lungs clear to auscultation - no rales, rhonchi or wheezes  CV: regular rate and rhythm, normal S1 S2, no S3 or S4, no murmur, click or rub, no peripheral edema and peripheral pulses strong  ABDOMEN: soft, nontender, no hepatosplenomegaly, no masses and bowel sounds normal, cramping occurred only about 2 minutes after palpation  MS: no gross musculoskeletal defects noted, no edema  SKIN: no suspicious lesions or rashes  PSYCH: mentation appears normal, affect normal/bright    Office Visit on 09/29/2023   Component Date Value Ref Range Status    hCG Urine Qualitative 09/29/2023 Negative  Negative Final    This test is for screening purposes.  Results should be interpreted along with the clinical picture.  Confirmation testing is available if warranted by ordering [id number], HCG Quantitative Pregnancy.    Neisseria gonorrhoeae 09/29/2023 Negative  Negative Final    Negative for N. gonorrhoeae rRNA by transcription mediated amplification. A negative result by transcription mediated amplification does not preclude the presence of C. trachomatis infection because results are dependent on proper and adequate collection, absence of inhibitors and sufficient rRNA to be detected.    Chlamydia trachomatis 09/29/2023 Negative  Negative Final    A negative result by transcription mediated amplification does not preclude the presence of C. trachomatis infection because results are dependent on proper and adequate  collection, absence of inhibitors and sufficient rRNA to be detected.    Trichomonas 09/29/2023 Absent  Absent Final    Yeast 09/29/2023 Present (A)  Absent Final    Clue Cells 09/29/2023 Present (A)  Absent Final    WBCs/high power field 09/29/2023 2+ (A)  None Final    Color Urine 09/29/2023 Yellow  Colorless, Straw, Light Yellow, Yellow Final    Appearance Urine 09/29/2023 Clear  Clear Final    Glucose Urine 09/29/2023 Negative  Negative mg/dL Final    Bilirubin Urine 09/29/2023 Negative  Negative Final    Ketones Urine 09/29/2023 Negative  Negative mg/dL Final    Specific Gravity Urine 09/29/2023 1.025  1.005 - 1.030 Final    Blood Urine 09/29/2023 Moderate (A)  Negative Final    pH Urine 09/29/2023 6.5  5.0 - 8.0 Final    Protein Albumin Urine 09/29/2023 Negative  Negative mg/dL Final    Urobilinogen Urine 09/29/2023 1.0  0.2, 1.0 E.U./dL Final    Nitrite Urine 09/29/2023 Negative  Negative Final    Leukocyte Esterase Urine 09/29/2023 Small (A)  Negative Final    Bacteria Urine 09/29/2023 Moderate (A)  None Seen /HPF Final    RBC Urine 09/29/2023 2-5 (A)  0-2 /HPF /HPF Final    WBC Urine 09/29/2023 0-5  0-5 /HPF /HPF Final    Squamous Epithelials Urine 09/29/2023 Moderate (A)  None Seen /LPF Final    Hepatitis C Antibody 09/29/2023 Nonreactive  Nonreactive Final    HIV Antigen Antibody Combo 09/29/2023 Nonreactive  Nonreactive Final    HIV-1 p24 Ag & HIV-1/HIV-2 Ab Not Detected    Treponema Antibody Total 09/29/2023 Nonreactive  Nonreactive Final     No results found for this or any previous visit (from the past 24 hour(s)).  US Pelvic Complete W EV    Result Date: 10/5/2023  EXAM: US PELVIC COMPLETE W EV LOCATION: [facility] H[facility]PITAL DATE: 10/5/2023 INDICATION: Lower abdominal cramping, HX OF FIBROIDS COMPARISON: CT abdomen/pelvis 09/08/2022 and pelvic ultrasound 05/20/2022. TECHNIQUE: Transabdominal scans were performed. Endovaginal ultrasound was performed to better visualize the adnexa. Color flow with  spectral Doppler and waveform analysis performed. FINDINGS: UTERUS: 7.9 x 4.3 x 5.8 cm. Normal in size and position. There are intramural masses compatible with fibroids. Many of the previously seen fibroids are no longer visualized. A fibroid in the right uterine body measures up to 1.3 cm. A fibroid in the anterior uterine body measures up to 0.7 cm. ENDOMETRIUM: 8 mm. Normal smooth endometrium. RIGHT OVARY: 2.3 x 1.3 x 1.6 cm. Normal with arterial and venous duplex flow identified. LEFT OVARY: 3.3 x 2.0 x 2.6 cm. Normal with arterial and venous duplex flow identified. Small volume free fluid. IMPRESSION:   1.  Fibroid uterus. Many of the previously seen fibroids are no longer visualized. 2.  Normal sonographic appearance of the adnexa. 3.  Small volume free fluid, likely within physiologic limits.     ----- Service Performed and Documented by Resident or Fellow ------

## 2023-10-11 LAB — BACTERIA UR CULT: NO GROWTH

## 2024-02-01 ENCOUNTER — OFFICE VISIT (OUTPATIENT)
Dept: FAMILY MEDICINE | Facility: CLINIC | Age: 34
End: 2024-02-01
Payer: COMMERCIAL

## 2024-02-01 VITALS
DIASTOLIC BLOOD PRESSURE: 78 MMHG | RESPIRATION RATE: 20 BRPM | TEMPERATURE: 98.5 F | HEART RATE: 86 BPM | OXYGEN SATURATION: 96 % | WEIGHT: 293 LBS | BODY MASS INDEX: 47.09 KG/M2 | HEIGHT: 66 IN | SYSTOLIC BLOOD PRESSURE: 114 MMHG

## 2024-02-01 DIAGNOSIS — R35.0 URINARY FREQUENCY: ICD-10-CM

## 2024-02-01 DIAGNOSIS — Z12.4 CERVICAL CANCER SCREENING: ICD-10-CM

## 2024-02-01 DIAGNOSIS — Z11.3 SCREEN FOR STD (SEXUALLY TRANSMITTED DISEASE): Primary | ICD-10-CM

## 2024-02-01 LAB
ALBUMIN UR-MCNC: NEGATIVE MG/DL
APPEARANCE UR: CLEAR
BACTERIA #/AREA URNS HPF: ABNORMAL /HPF
BILIRUB UR QL STRIP: NEGATIVE
COLOR UR AUTO: YELLOW
GLUCOSE UR STRIP-MCNC: NEGATIVE MG/DL
HCG UR QL: NEGATIVE
HGB UR QL STRIP: ABNORMAL
KETONES UR STRIP-MCNC: NEGATIVE MG/DL
LEUKOCYTE ESTERASE UR QL STRIP: ABNORMAL
NITRATE UR QL: NEGATIVE
PH UR STRIP: 6 [PH] (ref 5–8)
RBC #/AREA URNS AUTO: ABNORMAL /HPF
SP GR UR STRIP: 1.02 (ref 1–1.03)
SQUAMOUS #/AREA URNS AUTO: ABNORMAL /LPF
UROBILINOGEN UR STRIP-ACNC: 0.2 E.U./DL
WBC #/AREA URNS AUTO: ABNORMAL /HPF

## 2024-02-01 PROCEDURE — 86780 TREPONEMA PALLIDUM: CPT

## 2024-02-01 PROCEDURE — G0145 SCR C/V CYTO,THINLAYER,RESCR: HCPCS

## 2024-02-01 PROCEDURE — 81025 URINE PREGNANCY TEST: CPT

## 2024-02-01 PROCEDURE — 99212 OFFICE O/P EST SF 10 MIN: CPT | Mod: GC

## 2024-02-01 PROCEDURE — 36415 COLL VENOUS BLD VENIPUNCTURE: CPT

## 2024-02-01 PROCEDURE — 87591 N.GONORRHOEAE DNA AMP PROB: CPT

## 2024-02-01 PROCEDURE — 87624 HPV HI-RISK TYP POOLED RSLT: CPT

## 2024-02-01 PROCEDURE — 87491 CHLMYD TRACH DNA AMP PROBE: CPT

## 2024-02-01 PROCEDURE — 81001 URINALYSIS AUTO W/SCOPE: CPT

## 2024-02-01 NOTE — PATIENT INSTRUCTIONS
Nice to see you today! Here is a list of what we discussed:  We did a pap smear so your next one is not due for another 5 years.  2.   Will call you if any results are abnormal and will write message on [employer].  3.   Recommend use of condoms  4.   Maintain hydration this will help the diarrhea    If you have any questions or need further assistance, please call the clinic at [phone] or send me a Mobile[employer] message.  Thank you for trusting me with your care.    Gold Adames MD   [facility]

## 2024-02-01 NOTE — PROGRESS NOTES
"  Assessment & Plan     Screen for STD (sexually transmitted disease)  Presents with itchiness and increased frequency for 2 weeks in the setting of recent sexual activity. Requesting to be checked for STI. Exam remarkable for some white-clear vaginal discharge. No uterine or bladder tenderness. No CVA tenderness. LMP 3 days ago. Counseled on use of condoms and good hygiene practices.   - Chlamydia trachomatis/Neisseria gonorrhoeae by PCR - Clinic Collect  - Syphilis Screen Cascade (RPR/VDRL)  - HCG qualitative urine    Cervical cancer screening  Last PAP in 2019. Done today in clinic. Cervix appeared unremarkable. Tolerated well.   - Pap Screen with HPV - recommended age 30 - 65 years    Urinary frequency  C/o increasing frequency but not urgency, burning, pain, or fevers. She has had cystitis in the past. Will check urine and call patient if abnormal.   - Urine Macroscopic with reflex to Microscopic      Return if symptoms worsen or fail to improve.    Shar Coronado is a 33 year old, presenting for the following health issues:  STD (CK)        10/9/2023     8:45 AM   Additional Questions   Roomed by REMEDIOS senior MA   Accompanied by Self     HPI     33 year old female here for Sti check. Says that she has had some vaginal irritation/itchiness for 2 weeks. Last sexual activity was 3 weeks ago with a male partner, unprotected. She is not sure if he had an STI or not. Also c/o urinary frequency and increased thirst. She has had these sxs before. No hx of diabetes, last A1c in 2023 was normal.     LMP 3 days ago. She gets 2 periods per month usually 5-6 days of light-moderate bleeding. No recent changes. No dyspareunia. Not on birth control.     Also says she has had 1-2 days of \"green diarrhea.\" Says she ate a lot of green vegetables such as green beans and denies any blood in stool, pain with defecation, tenesmus, nausea, night time stools, abdominal pain.     Denies dizziness, fevers, chills, CP, SOB, emesis.   Does " "not currently smoke cigarettes. Used to smoke 1-5 cigs/day for a few years. Drinks alcohol rarely, 1-2x/month.       Review of Systems  Constitutional, HEENT, cardiovascular, pulmonary, gi and gu systems are negative, except as otherwise noted.      Objective    /78 (BP Location: Left arm, Patient Position: Sitting, Cuff Size: Adult Large)   Pulse 86   Temp 98.5  F (36.9  C) (Oral)   Resp 20   Ht 1.676 m (5' 6\")   Wt 133.1 kg (293 lb 6.4 oz)   LMP 01/29/2024 (Approximate)   SpO2 96%   BMI 47.36 kg/m    Body mass index is 47.36 kg/m .  Physical Exam   GENERAL: alert and no distress  NECK: no adenopathy, no asymmetry, masses, or scars  RESP: lungs clear to auscultation - no rales, rhonchi or wheezes  CV: regular rate and rhythm, normal S1 S2, no S3 or S4, no murmur, click or rub, no peripheral edema  ABDOMEN: soft, nontender, no hepatosplenomegaly, no masses and bowel sounds normal  MS: no gross musculoskeletal defects noted, no edema  SKIN: no suspicious lesions or rashes  NEURO: Normal strength and tone, mentation intact and speech normal  PSYCH: mentation appears normal, affect normal/bright  : vulvar skin without lesions, labia unremarkable, vaginal vault with white discharge. No uterine tenderness.          Signed Electronically by: Gold Adames MD  "

## 2024-02-01 NOTE — PROGRESS NOTES
"Preceptor attestation:  Vital signs reviewed: /78 (BP Location: Left arm, Patient Position: Sitting, Cuff Size: Adult Large)   Pulse 86   Temp 98.5  F (36.9  C) (Oral)   Resp 20   Ht 1.676 m (5' 6\")   Wt 133.1 kg (293 lb 6.4 oz)   LMP 01/29/2024 (Approximate)   SpO2 96%   BMI 47.36 kg/m      Patient seen, evaluated, and discussed with the resident.  I verified the content of the note, which accurately reflects my assessment of the patient and the plan of care.    Supervising physician: Gilma Gibbons MD  [facility]  "

## 2024-02-02 LAB
C TRACH DNA SPEC QL PROBE+SIG AMP: NEGATIVE
N GONORRHOEA DNA SPEC QL NAA+PROBE: NEGATIVE
T PALLIDUM AB SER QL: NONREACTIVE

## 2024-02-05 LAB
BKR LAB AP GYN ADEQUACY: NORMAL
BKR LAB AP GYN INTERPRETATION: NORMAL
BKR LAB AP HPV REFLEX: NORMAL
BKR LAB AP LMP: NORMAL
BKR LAB AP PREVIOUS ABNORMAL: NORMAL
PATH REPORT.COMMENTS IMP SPEC: NORMAL
PATH REPORT.COMMENTS IMP SPEC: NORMAL
PATH REPORT.RELEVANT HX SPEC: NORMAL

## 2024-02-08 LAB
HUMAN PAPILLOMA VIRUS 16 DNA: NEGATIVE
HUMAN PAPILLOMA VIRUS 18 DNA: NEGATIVE
HUMAN PAPILLOMA VIRUS FINAL DIAGNOSIS: NORMAL
HUMAN PAPILLOMA VIRUS OTHER HR: NEGATIVE

## 2024-06-04 ENCOUNTER — OFFICE VISIT (OUTPATIENT)
Dept: FAMILY MEDICINE | Facility: CLINIC | Age: 34
End: 2024-06-04
Payer: COMMERCIAL

## 2024-06-04 VITALS
HEART RATE: 102 BPM | RESPIRATION RATE: 18 BRPM | HEIGHT: 66 IN | DIASTOLIC BLOOD PRESSURE: 85 MMHG | BODY MASS INDEX: 46.45 KG/M2 | TEMPERATURE: 98.6 F | SYSTOLIC BLOOD PRESSURE: 124 MMHG | OXYGEN SATURATION: 98 % | WEIGHT: 289 LBS

## 2024-06-04 DIAGNOSIS — R10.84 ABDOMINAL PAIN, GENERALIZED: Primary | ICD-10-CM

## 2024-06-04 DIAGNOSIS — F41.9 ANXIETY AND DEPRESSION: ICD-10-CM

## 2024-06-04 DIAGNOSIS — F32.A ANXIETY AND DEPRESSION: ICD-10-CM

## 2024-06-04 DIAGNOSIS — K06.8 PAIN IN GUMS: ICD-10-CM

## 2024-06-04 LAB — HCG UR QL: NEGATIVE

## 2024-06-04 PROCEDURE — 81025 URINE PREGNANCY TEST: CPT

## 2024-06-04 PROCEDURE — 99214 OFFICE O/P EST MOD 30 MIN: CPT | Mod: GC

## 2024-06-04 RX ORDER — NAPROXEN 500 MG/1
500 TABLET ORAL 2 TIMES DAILY WITH MEALS
Qty: 60 TABLET | Refills: 0 | Status: SHIPPED | OUTPATIENT
Start: 2024-06-04 | End: 2024-08-21

## 2024-06-04 ASSESSMENT — ANXIETY QUESTIONNAIRES
2. NOT BEING ABLE TO STOP OR CONTROL WORRYING: NEARLY EVERY DAY
GAD7 TOTAL SCORE: 13
5. BEING SO RESTLESS THAT IT IS HARD TO SIT STILL: NEARLY EVERY DAY
1. FEELING NERVOUS, ANXIOUS, OR ON EDGE: NOT AT ALL
6. BECOMING EASILY ANNOYED OR IRRITABLE: NEARLY EVERY DAY
7. FEELING AFRAID AS IF SOMETHING AWFUL MIGHT HAPPEN: NOT AT ALL
3. WORRYING TOO MUCH ABOUT DIFFERENT THINGS: SEVERAL DAYS
GAD7 TOTAL SCORE: 13

## 2024-06-04 ASSESSMENT — PATIENT HEALTH QUESTIONNAIRE - PHQ9
SUM OF ALL RESPONSES TO PHQ QUESTIONS 1-9: 14
5. POOR APPETITE OR OVEREATING: NEARLY EVERY DAY

## 2024-06-04 NOTE — PATIENT INSTRUCTIONS
Low/NoCost Dental Clinics    [facility]  [address] [place] [address]  [phone]    [facility]  [address] [place] [address]  [phone]    [facility]  [address] [place] [address]  [phone]    [facility]  [address] [place] [address]  [phone]    [facility]  [address] [place] [address]  [phone]    [facility]  [address] [place] [address], [address], [place], [place]   [phone]    [facility]  [address] [place] [address]  [phone]    [facility]   [address], [place] [address]  [phone]    [facility]  [address] [place] [address]  [phone]    [facility]  [address] [place], [place] [address] [phone]    [facility]  [address] [place] [address]  [phone]    [facility]  Call for locations.  [phone]    [facility]  [address], [place] [address] [phone]    [facility]  [address] [place] [address]  [phone]    [facility]  [address] [place] [address]  [phone]    [facility]  [address], [place] [id number] [phone] or [phone]    [facility]  [phone]    [facility]  [address] [place], [place] [address] [phone] (Adult)  [phone] (Children)    [facility]  [address] [place] [address]  [phone]       Your doctor has put in a mental health referral for psychotherapy and/or psychiatry treatment. Our behavioral health team will evaluate your referral to see if they have any more  recommendations or if they have openings for therapy in-house at [place]. Our referral team would then contact you within 1-2 weeks with this update. However, if you want to get started for psychotherapy or schedule with a psychiatrist sooner, we recommend you call one or two of the following resources.  Let your doctor know if you do connect with a therapist or psychiatrist.     [facility] Central Scheduling  [phone].     [employer]  This is a behavioral health search tool that gives people real time information about availability via updated search engine.    You can search for a provider in your area who takes your insurance and has current openings.    Community Mental Health Agencies:    [facility]   [address]  [place], [place] [address] [phone] (for appointments)    [facility]  [address]  [place] [address]  Phone:  [phone]    [address]  [place], [place] [id number] [phone]    [facility]  [address]  [place] [address]  [phone]    [facility]  [address]  [place], [place] [address]  [phone]    [facility]  [address]  [place] [address]  [phone]    Community Psychiatry Agencies:    [facility]  [address]  [address]  [place], [place] [address]  [phone]    [facility]  [address]   [address]  [place] [address]  [phone] (for appointments)    [facility]  [address]  [place] [address]  Phone:  [phone]    [address]  [address]  [place] [address] [phone]    [facility]  [address]  [place] [address]  [phone]    [facility]  [address]  [place] [address]  [phone]    [facility] - offers psychiatry and psychology services across the lifespan  [facility]  [address] [place] [address]  [phone]

## 2024-06-04 NOTE — PROGRESS NOTES
"  Assessment & Plan     Abdominal pain, generalized  Presents with a few days of menses-like cramping with LMP ending 5/11/24 and a few days of vaginal spotting, now stopped. Exam reassuring. UPT negative. Likely dysmenorrhea and has known hx of fibroids. Will trial conservative management with naproxen as needed. Return precautions reviewed. Could consider re-imaging or re-referral to OBGYN in the future given hx of prior myomectomy in 2022.   - HCG qualitative urine  - naproxen (NAPROSYN) 500 MG tablet  Dispense: 60 tablet; Refill: 0    Anxiety and depression  Reports anxiety attack about 1 month ago and has history of anxiety and depression, not previously connected with therapy and not wanting to become \"addicted\" to psych meds. Recently lost mother and still grieving loss of child multiple years ago. Elevated PHQ and SANDRA but no SI. Discussed low risk for addiction with meds however patient declines meds today. Patient open to  referral for talk therapy. Follow up with PCP when able.   - Adult Mental Health  Referral    Pain in gums  Has had a few days of pain in her left upper gums without fever, drainage, discharge or severe tooth pain. Does have some bleeding with brushing. Has not been to dentist in many years. Exam without obvious infection. Dental info provided.     Return if symptoms worsen or fail to improve.    Az Cary, MS3    Resident/Fellow Attestation   I, Lubna Strickland MD, was present with the medical/[employer] student who participated in the service and in the documentation of the note.  I have verified the history and personally performed the physical exam and medical decision making.  I agree with the assessment and plan of care as documented in the note.      Luban Strickland MD  PGY2    Subjective   Ivonne is a 33 year old, presenting for the following health issues:  Mouth/Lip Problem (Gum pain), Abdominal Pain (Stomach cramp), and Other (Anxiety attack?/)        6/4/2024    " 11:04 AM   Additional Questions   Roomed by cory   Accompanied by alone         6/4/2024    Information    services provided? No     Ivonne is a 33 year old female with a history of uterine leiomyoma (previous myomectomy), anxiety, and depression who presents with stomach cramps and gum pain.    Stomach cramps: patient reported that the cramps started 3-4 days ago and feel like regular period cramps. She reports that she was spotting for the first 1-2 days, but then the spotting disappeared. She used tylenol which helped a bit with the cramps. She also mentioned that sitting in the tub helps. She has felt nauseous for the last couple days. Her last menstrual period ended on 05/11 and she has had unprotected  intercourse with one male partner since her last period ended. Patient denies any vaginal discharge, itching, vomiting, diarrhea, constipation or appetite changes. She is most concerned that she could be pregnant.    Gum pain: patient reports that the left side of her mouth and gums have been aching for the last couple days. The pain is throbbing, constant and feels worse with any pressure and chewing. Her gums often bleed when she brushes her teeth, but scrubbing the molars on the left side of her mouth helped with the pain. Tylenol also helped a bit. She hasn't seen a dentist since she was a child. Patient denies any fevers or chills.    Patient also reports that she had an episode that seems like a panic attack at the end of April/beginning of May. Patient recalls having difficulty breathing, slurring her words, and having trouble remembering her name. She called 911 and a dispatcher helped her calm down. She did not go to the hospital, nor is she on any medication for anxiety. Patient discussed that she recently lost her mother and is still grieving the loss of her child. Patient denies suicidal ideation.        Objective    /85 (BP Location: Left arm, Patient Position: Sitting,  "Cuff Size: Adult Large)   Pulse 102   Temp 98.6  F (37  C) (Oral)   Resp 18   Ht 1.676 m (5' 6\")   Wt 131.1 kg (289 lb)   LMP 05/11/2024   SpO2 98%   BMI 46.65 kg/m    Body mass index is 46.65 kg/m .  Physical Exam   Constitutional: awake, alert, cooperative, no apparent distress  ENT: Normocephalic, without obvious abnormality, atraumatic, no lymphadenopathy, poor dentition throughout, no obvious bleeding, drainage or erythema in mouth, gums without significant erythema  Respiratory: No increased work of breathing, good air exchange, clear to auscultation bilaterally, no crackles or wheezing  Cardiovascular: Regular rate and rhythm  GI: Normal bowel sounds, soft, non-distended, mild tenderness palpation of bilateral lower quadrants, no rebound or guarding  Neurologic: Awake, alert, oriented to name, place and time.  Cranial nerves II-XII are grossly intact.        Signed Electronically by: Lubna Strickland MD    "

## 2024-06-04 NOTE — PROGRESS NOTES
Preceptor Attestation:    I discussed the patient with the resident and evaluated the patient in person. I have verified the content of the note, which accurately reflects my assessment of the patient and the plan of care.   Supervising Physician:  Anish Reveles MD.

## 2024-06-30 ENCOUNTER — HEALTH MAINTENANCE LETTER (OUTPATIENT)
Age: 34
End: 2024-06-30

## 2024-07-09 ENCOUNTER — OFFICE VISIT (OUTPATIENT)
Dept: FAMILY MEDICINE | Facility: CLINIC | Age: 34
End: 2024-07-09
Payer: COMMERCIAL

## 2024-07-09 ENCOUNTER — TELEPHONE (OUTPATIENT)
Dept: FAMILY MEDICINE | Facility: CLINIC | Age: 34
End: 2024-07-09

## 2024-07-09 VITALS
HEIGHT: 66 IN | WEIGHT: 293 LBS | RESPIRATION RATE: 24 BRPM | HEART RATE: 83 BPM | DIASTOLIC BLOOD PRESSURE: 76 MMHG | SYSTOLIC BLOOD PRESSURE: 127 MMHG | OXYGEN SATURATION: 95 % | TEMPERATURE: 97.8 F | BODY MASS INDEX: 47.09 KG/M2

## 2024-07-09 DIAGNOSIS — B30.9 VIRAL CONJUNCTIVITIS: ICD-10-CM

## 2024-07-09 DIAGNOSIS — Z11.3 SCREENING EXAMINATION FOR STI: Primary | ICD-10-CM

## 2024-07-09 LAB
CLUE CELLS: PRESENT
TRICHOMONAS, WET PREP: ABNORMAL
WBC'S/HIGH POWER FIELD, WET PREP: ABNORMAL
YEAST, WET PREP: ABNORMAL

## 2024-07-09 PROCEDURE — 87210 SMEAR WET MOUNT SALINE/INK: CPT

## 2024-07-09 PROCEDURE — 87591 N.GONORRHOEAE DNA AMP PROB: CPT

## 2024-07-09 PROCEDURE — 86780 TREPONEMA PALLIDUM: CPT

## 2024-07-09 PROCEDURE — 36415 COLL VENOUS BLD VENIPUNCTURE: CPT

## 2024-07-09 PROCEDURE — 87491 CHLMYD TRACH DNA AMP PROBE: CPT

## 2024-07-09 PROCEDURE — 99213 OFFICE O/P EST LOW 20 MIN: CPT | Mod: GC

## 2024-07-09 PROCEDURE — 87389 HIV-1 AG W/HIV-1&-2 AB AG IA: CPT

## 2024-07-09 RX ORDER — TOBRAMYCIN 3 MG/ML
1-2 SOLUTION/ DROPS OPHTHALMIC 4 TIMES DAILY
Qty: 5 ML | Refills: 0 | Status: SHIPPED | OUTPATIENT
Start: 2024-07-09 | End: 2024-07-14

## 2024-07-09 NOTE — PATIENT INSTRUCTIONS
Thanks for coming in today. We've sent a prescription for your eye drops to your pharmacy. Please let us know if you have any issues picking it up.     We're also testing for STIs. I will call you with the results when they come back.     Please call to schedule an appointment with Dr. Ghosh for your annual visit.

## 2024-07-09 NOTE — PROGRESS NOTES
"  Assessment & Plan     Viral conjunctivitis  3 days of itchy red eyes seen at optometrist today and prescribed Tobrex however unable to  due to restricted status. Presents for script. Mild conjunctival injection on exam L>R, likely viral. Improving. Will send script to pharmacy.    - tobramycin (TOBREX) 0.3 % ophthalmic solution; Place 1-2 drops into both eyes 4 times daily for 5 days    Screening examination for STI  New sexual male partner and having unprotected intercourse. Patient does have hx of distant gonorrhea infection, tolerated abx treatment then. Will obtain labs as below to screen for STIs.   - Chlamydia trachomatis/Neisseria gonorrhoeae by PCR - Clinic Collect  - Wet preparation  - HIV Antigen Antibody Combo Cascade; Future  - Treponema Abs w Reflex to RPR and Titer; Future    Patient should follow-up for annual visit for pap and discuss other concerns.     Patient seen and discussed with Dr. Anish Vickers MD, MPH   PGY-2  [facility] [address] [place] [address]  [phone]      Shar Coronado is a 33 year old, presenting for the following health issues:  Medication Problem (Pt's restricted - her eye clinic prescribed medication - however due to restrictions - needs to be from one of her restricted providers - Dr. Snider - or selvin. ), itchy eyes (Itchy eyes for 3 days - something in eye - yesterday could not open the left eye. ), Vaginal Problem (Pt requesting STD test - \"something feels off.\" ), and Ear Problem (Poss ear referral. )    HPI     Here for bilateral eye Itchiness. Endorses 3 days of it. Went to optometrist today for evaluation and was prescribed Tobrex. Unable to  script because of her provider restriction. States that eyes are improving. Some redness and drainage. Denies drainage elsewhere. No sore throat, fevers, chills.     STI screening:   Has a new male partner and having unprotected sex. " "Unclear of his sexual history. She has a hx of gonorrhea infection in the past. Also BV in the past. Patient does have a gluteal lesion but now improved. No vaginal discharge. Some itchiness. No urinary frequency, urgency, dysuria,or hematuria. No fevers or chills.           Objective    /76   Pulse 83   Temp 97.8  F (36.6  C) (Tympanic)   Resp 24   Ht 1.676 m (5' 6\")   Wt 134.5 kg (296 lb 9.6 oz)   LMP 06/29/2024 (Approximate)   SpO2 95%   BMI 47.87 kg/m    Body mass index is 47.87 kg/m .  Physical Exam   GENERAL: alert and no distress  HEENT: bilateral conjunctival injection L>R; no significant drainage or purulence   SKIN: R inner gluteal papule suggestive of ingrown hair but nonerythematous, nontender.       Signed Electronically by: Yovany Vickers MD    "

## 2024-07-09 NOTE — TELEPHONE ENCOUNTER
General Call      Reason for Call:  call back    What are your questions or concerns:  Patient is being seen today but she is restricted can you put a referral in please.    Date of last appointment with provider: ?    Could we send this information to you in [place] [employer] or would you prefer to receive a phone call?:   Patient would prefer a phone call   Okay to leave a detailed message?: Yes at Home number on file [phone] (home)    Does patient or caller know when to expect a call? Yes, Care Coordinators will return call within 24 business hours.       Génesis Johnson on 7/9/2024 at 3:09 PM

## 2024-07-09 NOTE — PROGRESS NOTES
Preceptor Attestation:    I discussed the patient with the resident and evaluated the patient in person. I have verified the content of the note, which accurately reflects my assessment of the patient and the plan of care.   Supervising Physician:  Anish Reveles MD.  Results for orders placed or performed in visit on 07/09/24   HIV Antigen Antibody Combo Cascade     Status: Normal   Result Value Ref Range    HIV Antigen Antibody Combo Nonreactive Nonreactive   Treponema Abs w Reflex to RPR and Titer     Status: Normal   Result Value Ref Range    Treponema Antibody Total Nonreactive Nonreactive   Chlamydia trachomatis/Neisseria gonorrhoeae by PCR - Clinic Collect     Status: Normal    Specimen: Urine, Voided   Result Value Ref Range    Chlamydia Trachomatis Negative Negative    Neisseria gonorrhoeae Negative Negative   Wet preparation     Status: Abnormal    Specimen: Vagina; Swab   Result Value Ref Range    Trichomonas Absent Absent    Yeast Absent Absent    Clue Cells Present (A) Absent    WBCs/high power field 1+ (A) None    Narrative    Clue cells <20%, bacteria few, odor none

## 2024-07-10 LAB
C TRACH DNA SPEC QL PROBE+SIG AMP: NEGATIVE
HIV 1+2 AB+HIV1 P24 AG SERPL QL IA: NONREACTIVE
N GONORRHOEA DNA SPEC QL NAA+PROBE: NEGATIVE
T PALLIDUM AB SER QL: NONREACTIVE

## 2024-07-11 DIAGNOSIS — B96.89 BACTERIAL VAGINOSIS: Primary | ICD-10-CM

## 2024-07-11 DIAGNOSIS — N76.0 BACTERIAL VAGINOSIS: Primary | ICD-10-CM

## 2024-07-11 RX ORDER — METRONIDAZOLE 500 MG/1
500 TABLET ORAL 2 TIMES DAILY
Qty: 14 TABLET | Refills: 0 | Status: SHIPPED | OUTPATIENT
Start: 2024-07-11 | End: 2024-07-18

## 2024-08-06 ENCOUNTER — OFFICE VISIT (OUTPATIENT)
Dept: FAMILY MEDICINE | Facility: CLINIC | Age: 34
End: 2024-08-06
Payer: COMMERCIAL

## 2024-08-06 ENCOUNTER — TELEPHONE (OUTPATIENT)
Dept: FAMILY MEDICINE | Facility: CLINIC | Age: 34
End: 2024-08-06

## 2024-08-06 VITALS
BODY MASS INDEX: 47.09 KG/M2 | HEIGHT: 66 IN | TEMPERATURE: 98.5 F | DIASTOLIC BLOOD PRESSURE: 84 MMHG | HEART RATE: 86 BPM | OXYGEN SATURATION: 97 % | WEIGHT: 293 LBS | RESPIRATION RATE: 18 BRPM | SYSTOLIC BLOOD PRESSURE: 129 MMHG

## 2024-08-06 DIAGNOSIS — T78.3XXA ANGIOEDEMA, INITIAL ENCOUNTER: Primary | ICD-10-CM

## 2024-08-06 DIAGNOSIS — K08.89 TOOTHACHE: ICD-10-CM

## 2024-08-06 PROCEDURE — 99213 OFFICE O/P EST LOW 20 MIN: CPT | Mod: GC

## 2024-08-06 RX ORDER — PREDNISONE 20 MG/1
TABLET ORAL
Qty: 11 TABLET | Refills: 0 | Status: SHIPPED | OUTPATIENT
Start: 2024-08-06 | End: 2024-08-16

## 2024-08-06 RX ORDER — CETIRIZINE HYDROCHLORIDE 10 MG/1
10 TABLET ORAL 2 TIMES DAILY
Qty: 60 TABLET | Refills: 0 | Status: SHIPPED | OUTPATIENT
Start: 2024-08-06 | End: 2024-09-23

## 2024-08-06 NOTE — TELEPHONE ENCOUNTER
"Spoke with pt regarding symptoms. Pt states for the last two days, she has been having \"some kind of allergic reaction\". Pt reports red, swollen lips and red, itchy blotchy areas on her arms and thighs. Pt denies difficulty breathing or swallowing. Pt unsure what may be causing the reaction, as she has not taken any new medication or eaten any new foods. Pt has tried ibuprofen, tylenol and benadryl with no relief. Pt desires to be evaluated in clinic. Pt is restricted to Dr Ghosh and Dr Salas, but there are no appts available with them. Pt saw Dr Vickers on 7/9/24, so scheduled with him today at 3:30. Instructed pt to call clinic if she cannot come to her appt. ED precautions reviewed for worsening symptoms.     Misbah RN, BSN    "

## 2024-08-06 NOTE — TELEPHONE ENCOUNTER
Reason for Call:  Appointment Request    Patient requesting this type of appt:  PATIENT'S LIP IS SWELLING    Requested provider:  PATIENT IS RESTRICTED TO [facility] - NO APPTS WITH EITHER PROVIDER UNTIL 08/12    Reason patient unable to be scheduled: Not within requested timeframe    When does patient want to be seen/preferred time: Same day    Comments: SHE HAS A DENTAL APPT AT 0900. PLEASE CALL EITHER BEFORE APPT OR AFTER 1000.    Could we send this information to you in [place] [employer] or would you prefer to receive a phone call?:   Patient would prefer a phone call   Okay to leave a detailed message?: Yes at Home number on file [phone] (home)    Call taken on 8/6/2024 at 8:07 AM by Sandie Diaz    Does patient or caller know when to expect a call? Yes, Nurses will return call within 2-3 business hours.       Sandie Diaz on 8/6/2024 at 8:07 AM

## 2024-08-06 NOTE — PATIENT INSTRUCTIONS
I've prescribed Zyrtec for your symptoms. Please do not take Benadryl while on Zyrtec.     If you experience worsening swelling of your lips, throat closure, please immediately go to the Emergency Room.     I've also prescribed you a course of steroids to see if this helps.     With regards to your toothache, I've prescribed you Augmentin for 7 days. Please come back if you do not have any improvement.

## 2024-08-06 NOTE — LETTER
2024    To:   [Insurance Company]  [Address]    RE: Lucinda Ji  [address] [place] [address]  : 1990  MRN: [id number]  Policy #: [id number]    To Whom It May Concern,    I am writing on behalf of my patient, Lucinda Ji to document the medical necessity of cetirizine for the treatment of concern of angioedema. This letter provides information about the patient's medical history and diagnosis and a statement summarizing my treatment rationale.     Summary of Patient History and Diagnosis  Lucinda Ji has been diagnosed with acute angioedema, a condition characterized by swelling in various parts of the body, including the extremities, face, and gastrointestinal tract. This diagnosis is supported by clinical evaluation of the presence of angioedema. The patient's history includes multiple episodes of severe swelling that have not responded adequately to first-line treatments or lifestyle modifications. The condition significantly impacts the patient s quality of life and poses a risk for potentially severe complications.    Treatment Rationale  Cetirizine, a second-generation antihistamine, is prescribed to manage the symptoms of angioedema. Its mechanism of action involves antagonizing histamine H1 receptors, which helps reduce the swelling and itching associated with angioedema. Given the nature of the patient's condition and the inadequate response to alternative therapies such as Benadryl, cetirizine has been chosen as a suitable treatment option.     Duration  (Length of time treatment/medication/equipment (item in question) is necessary - not to exceed 12 months)     Summary  In summary, Cetirizine is medically necessary for this patient s medical condition. Please call my office at [phone] if I can provide you with any additional information to approve my request. I look forward to receiving your timely response and approval of this request.     Sincerely,    Yovany Vickers MD, MPH    [facility] [address] [place] [address]  [phone]

## 2024-08-06 NOTE — PROGRESS NOTES
Assessment & Plan     Angioedema, initial encounter  3 days of lip swelling, urticaria with no clear triggers.  No report of new medication use.  Examination pertinent for upper and lower lip swelling and 1 isolated wheal.  Patient protecting airway and overall good aeration on lung exam.  Possible angioedema given lip swelling.  Will try Zyrtec 10 mg twice daily and prednisone taper as below.  Patient advised to monitor symptoms and go to emergency room if she experiences any more severe swelling or difficulty breathing.  - cetirizine (ZYRTEC) 10 MG tablet; Take 1 tablet (10 mg) by mouth 2 times daily  - predniSONE (DELTASONE) 20 MG tablet; Take 1 tablet (20 mg) by mouth 2 times daily for 3 days, THEN 1 tablet (20 mg) daily for 3 days, THEN 0.5 tablets (10 mg) daily for 4 days.    Toothache  Was recently seen by dentist this morning for tooth infection.  Deferred obtaining prescription there due to restricted status.  On examination, patient does have appreciable edema of lower left jaw concerning for possible abscess.  Will prescribe 7 days of Augmentin and advised patient to follow-up with dentist as needed.  - amoxicillin-clavulanate (AUGMENTIN) 875-125 MG tablet; Take 1 tablet by mouth 2 times daily for 7 days    Patient seen and discussed with Dr. Adolfo Vickers MD, MPH   PGY-2  [facility] [address] [place] [address]  [phone]    Shar Coronado is a 33 year old, presenting for the following health issues:  Otalgia (Need medication for ear infection) and Derm Problem (Allergic reaction)    HPI     3 days of lip swelling, itching, wheals.  Denies any new exposure to foods or toxins.  No new medications during this period.  Patient has been taking Benadryl with some relief of symptoms.  Last dose was 8 AM this morning.  She denies any shortness of breath difficulty breathing.    Also presenting with left tooth ache.  Was seen by dentist this  "morning and was told she has a tooth infection.  Dentist was going to prescribe amoxicillin however patient to wait until being seen here at [facility] due to her restricted status.    No fevers or chills.      Objective    /84 (BP Location: Left arm, Patient Position: Sitting, Cuff Size: Adult Large)   Pulse 86   Temp 98.5  F (36.9  C) (Oral)   Resp 18   Ht 1.676 m (5' 6\")   Wt 135.2 kg (298 lb)   LMP 06/29/2024 (Approximate)   SpO2 97%   BMI 48.10 kg/m    Body mass index is 48.1 kg/m .  Physical Exam   GENERAL: alert and no distress  HENT:  upper and lower lip swelling. left lower tooth swelling; no facial tenderness   NECK: no adenopathy, no asymmetry, masses, or scars  RESP: lungs clear to auscultation - no rales, rhonchi or wheezes  CV: regular rate and rhythm, normal S1 S2, no S3 or S4, no murmur, click or rub, no peripheral edema  ABDOMEN: soft, nontender, no hepatosplenomegaly, no masses and bowel sounds normal  SKIN: single wheal on lower left thigh; otherwise no erythema signs of urticaria      Signed Electronically by: Yovany Vickers MD    "

## 2024-08-07 ENCOUNTER — TELEPHONE (OUTPATIENT)
Dept: FAMILY MEDICINE | Facility: CLINIC | Age: 34
End: 2024-08-07
Payer: COMMERCIAL

## 2024-08-07 NOTE — LETTER
2024    To:   [Insurance Company]  [Address]    RE: Lucinda Ji  [address] [place] [address]  : 1990  MRN: [id number]  Policy #: [id number]    To Whom It May Concern,    I am writing on behalf of my patient, Lucinda Ji to document the medical necessity of cetirizine for the treatment of concern of angioedema. This letter provides information about the patient's medical history and diagnosis and a statement summarizing my treatment rationale.     Summary of Patient History and Diagnosis  Lucinda Ji has been diagnosed with acute angioedema, a condition characterized by swelling in various parts of the body, including the extremities, face, and gastrointestinal tract. This diagnosis is supported by clinical evaluation of the presence of angioedema. The patient's history includes multiple episodes of severe swelling that have not responded adequately to first-line treatments or lifestyle modifications. The condition significantly impacts the patient s quality of life and poses a risk for potentially severe complications.    Treatment Rationale  Cetirizine, a second-generation antihistamine, is prescribed to manage the symptoms of angioedema. Its mechanism of action involves antagonizing histamine H1 receptors, which helps reduce the swelling and itching associated with angioedema. Given the nature of the patient's condition and the inadequate response to alternative therapies such as Benadryl, cetirizine has been chosen as a suitable treatment option.     Duration  (Length of time treatment/medication/equipment (item in question) is necessary - not to exceed 12 months)     Summary  In summary, Cetirizine is medically necessary for this patient s medical condition. Please call my office at [phone] if I can provide you with any additional information to approve my request. I look forward to receiving your timely response and approval of this request.     Sincerely,    Yovany Vickers MD, MPH    [facility] [address] [place] [address]  [phone]

## 2024-08-08 NOTE — TELEPHONE ENCOUNTER
Retail Pharmacy Prior Authorization Team   Phone: [phone]    PA Initiation    Medication: CETIRIZINE HCL 10 MG PO TABS  Insurance Company: [email or web address] ([facility]) - Phone [phone] Fax [phone]  Pharmacy Filling the Rx: [id number] - [place], [place] - [address] AT [facility] [address]  Filling Pharmacy Phone: [phone]  Filling Pharmacy Fax:    Start Date: 8/8/2024

## 2024-08-08 NOTE — TELEPHONE ENCOUNTER
PRIOR AUTHORIZATION DENIED    Medication: CETIRIZINE HCL 10 MG PO TABS  Insurance Company: [employer] ([facility]) - Phone [phone] Fax [phone]  Denial Date: 8/8/2024  Denial Reason(s): EXCEEDS PLAN LIMITS OF ONE TABLET PER DAY - COVERAGE IS PROVIDED WHEN PRESCRIBED WITHIN DOSING GUIDELINES      Appeal Information: IF THE PROVIDER WOULD LIKE TO APPEAL THIS DECISION PLEASE PROVIDE THE PA TEAM WITH A LETTER OF MEDICAL NECESSITY      Patient Notified: NO

## 2024-08-09 NOTE — PROGRESS NOTES
Physician Attestation   I, Adolfo Mendoza MD, saw this patient and agree with the findings and plan of care as documented in the note.      Items personally reviewed/procedural attestation: vitals.    Adolfo Mendoza MD

## 2024-08-12 NOTE — TELEPHONE ENCOUNTER
Juliann Vickers and Dr. Ghosh,     There was a PA initiated for the medication Cetrizine that was denied. This is a RESTRICTED PT. Please review and see if you would like to send an Appeal letter or see denial alternatives if any within the denial letter.     Appeal Letter:   In Epic under 'letters' tab in 'chart review' click on 'new' to start a letter and then find the letter of medical necessity template under '[facility] medical necessity' or enter ID#: [id number].   2. After you have written the letter we will need to reach out to the Prior Auth team to have them submit the APPEAL LETTER to insurance and go from there depending on their decision.   3. Route encounter to P [facility] PA MED after you have completed the APPEAL LETTER.   4. Close Encounter when done.     Other Actions:   If you choose not to APPEAL the medications and would like to send alternative, have other action items regarding this medication or etc.  Route encounter to your assigned PCS to call pt and notify pt of PLAN going forward.   Close Encounter when done.        Hermelinda Lozano CMA on 8/12/2024 at 3:46 PM

## 2024-08-13 NOTE — TELEPHONE ENCOUNTER
"Appeal letter in \"Letters\" tab.     Routed to P [facility] PA MED and PCS, Yvette Pham.     Yovany Vickers MD, MPH   PGY-2  [facility] [address] [place] [address] [phone]    "

## 2024-08-13 NOTE — TELEPHONE ENCOUNTER
[address]  [place] [facility] [address]  Phone: [phone]  Fax: [phone]      2024     To:   [Insurance Company]  [Address]     RE: Tri S Kirk [address] [place] [address]  : 1990  MRN: [id number]  Policy #: [id number]     To Whom It May Concern,     I am writing on behalf of my patient, Lucinda Ji to document the medical necessity of cetirizine for the treatment of concern of angioedema. This letter provides information about the patient's medical history and diagnosis and a statement summarizing my treatment rationale.      Summary of Patient History and Diagnosis  Lucinda Ji has been diagnosed with acute angioedema, a condition characterized by swelling in various parts of the body, including the extremities, face, and gastrointestinal tract. This diagnosis is supported by clinical evaluation of the presence of angioedema. The patient's history includes multiple episodes of severe swelling that have not responded adequately to first-line treatments or lifestyle modifications. The condition significantly impacts the patient s quality of life and poses a risk for potentially severe complications.     Treatment Rationale  Cetirizine, a second-generation antihistamine, is prescribed to manage the symptoms of angioedema. Its mechanism of action involves antagonizing histamine H1 receptors, which helps reduce the swelling and itching associated with angioedema. Given the nature of the patient's condition and the inadequate response to alternative therapies such as Benadryl, cetirizine has been chosen as a suitable treatment option.      Duration  (Length of time treatment/medication/equipment (item in question) is necessary - not to exceed 12 months)      Summary  In summary, Cetirizine is medically necessary for this patient s medical condition. Please call my office at [phone] if I can provide you with any additional  information to approve my request. I look forward to receiving your timely response and approval of this request.      Sincerely,     Yovany Vickers MD, MPH   [facility] [address] [place] [address] [phone]

## 2024-08-14 NOTE — TELEPHONE ENCOUNTER
Medication Appeal Initiation    Medication: CETIRIZINE HCL 10 MG PO TABS  Appeal Start Date:  8/14/2024  Insurance Company: [employer]  Insurance Phone: [phone]  Insurance Fax: [phone]  Comments:       FAXED LETTER OF MEDICAL NECESSITY AND CHART NOTES TO INSURANCE -

## 2024-08-20 ENCOUNTER — TELEPHONE (OUTPATIENT)
Dept: FAMILY MEDICINE | Facility: CLINIC | Age: 34
End: 2024-08-20

## 2024-08-20 NOTE — TELEPHONE ENCOUNTER
This patient is restricted to Dr Ghosh and Dr Salas.  The next available appt for either of them was 08/29.  Pt feels she really needs to be seen sooner than that. I scheduled her an appt with Dr Strickland for tomorrow at 08:40.  Can you take care of this for the patient.

## 2024-08-21 ENCOUNTER — OFFICE VISIT (OUTPATIENT)
Dept: FAMILY MEDICINE | Facility: CLINIC | Age: 34
End: 2024-08-21
Payer: COMMERCIAL

## 2024-08-21 VITALS
OXYGEN SATURATION: 99 % | HEART RATE: 80 BPM | WEIGHT: 293 LBS | HEIGHT: 67 IN | BODY MASS INDEX: 45.99 KG/M2 | TEMPERATURE: 97.5 F | DIASTOLIC BLOOD PRESSURE: 78 MMHG | RESPIRATION RATE: 16 BRPM | SYSTOLIC BLOOD PRESSURE: 124 MMHG

## 2024-08-21 DIAGNOSIS — R35.0 URINARY FREQUENCY: ICD-10-CM

## 2024-08-21 DIAGNOSIS — R11.2 NAUSEA AND VOMITING, UNSPECIFIED VOMITING TYPE: ICD-10-CM

## 2024-08-21 DIAGNOSIS — K08.409 S/P TOOTH EXTRACTION: Primary | ICD-10-CM

## 2024-08-21 PROBLEM — A59.9 TRICHOMONAS INFECTION: Status: ACTIVE | Noted: 2024-08-21

## 2024-08-21 PROBLEM — Z11.3 SCREENING EXAMINATION FOR STI: Status: RESOLVED | Noted: 2024-07-09 | Resolved: 2024-08-21

## 2024-08-21 LAB
ALBUMIN UR-MCNC: 30 MG/DL
APPEARANCE UR: ABNORMAL
BACTERIA #/AREA URNS HPF: ABNORMAL /HPF
BILIRUB UR QL STRIP: NEGATIVE
COLOR UR AUTO: YELLOW
GLUCOSE UR STRIP-MCNC: NEGATIVE MG/DL
HCG UR QL: NEGATIVE
HGB UR QL STRIP: ABNORMAL
KETONES UR STRIP-MCNC: NEGATIVE MG/DL
LEUKOCYTE ESTERASE UR QL STRIP: ABNORMAL
NITRATE UR QL: NEGATIVE
PH UR STRIP: 7 [PH] (ref 5–8)
RBC #/AREA URNS AUTO: ABNORMAL /HPF
SP GR UR STRIP: >=1.03 (ref 1–1.03)
SQUAMOUS #/AREA URNS AUTO: ABNORMAL /LPF
TRICHOMONAS #/AREA URNS HPF: PRESENT /HPF
UROBILINOGEN UR STRIP-ACNC: 0.2 E.U./DL
WBC #/AREA URNS AUTO: ABNORMAL /HPF

## 2024-08-21 PROCEDURE — 99214 OFFICE O/P EST MOD 30 MIN: CPT | Mod: GC

## 2024-08-21 PROCEDURE — 81025 URINE PREGNANCY TEST: CPT

## 2024-08-21 PROCEDURE — 81001 URINALYSIS AUTO W/SCOPE: CPT

## 2024-08-21 RX ORDER — ACETAMINOPHEN 500 MG
1000 TABLET ORAL 3 TIMES DAILY PRN
Qty: 20 TABLET | Refills: 0 | Status: SHIPPED | OUTPATIENT
Start: 2024-08-21 | End: 2024-08-28

## 2024-08-21 RX ORDER — NAPROXEN 500 MG/1
500 TABLET ORAL 2 TIMES DAILY WITH MEALS
Qty: 60 TABLET | Refills: 0 | Status: SHIPPED | OUTPATIENT
Start: 2024-08-21 | End: 2024-09-17

## 2024-08-21 RX ORDER — METRONIDAZOLE 500 MG/1
500 TABLET ORAL 2 TIMES DAILY
Qty: 14 TABLET | Refills: 0 | Status: SHIPPED | OUTPATIENT
Start: 2024-08-21 | End: 2024-08-21

## 2024-08-21 RX ORDER — METRONIDAZOLE 500 MG/1
500 TABLET ORAL 2 TIMES DAILY
Qty: 14 TABLET | Refills: 0 | Status: SHIPPED | OUTPATIENT
Start: 2024-08-21 | End: 2024-09-23

## 2024-08-21 RX ORDER — ONDANSETRON 4 MG/1
4 TABLET, FILM COATED ORAL EVERY 8 HOURS PRN
Qty: 15 TABLET | Refills: 0 | Status: SHIPPED | OUTPATIENT
Start: 2024-08-21 | End: 2024-09-23

## 2024-08-21 NOTE — PATIENT INSTRUCTIONS
Thank you for coming into clinic today!    Our clinic will contact you with the results of your testing if there are abnormal results or changes in your care   your prescriptions from the pharmacy  Schedule appointment at  for with Dr. Salas for follow up  Call [phone] to schedule Mental Health Appt  Sign [facility] for [facility] to receive results, view appointments, and communicate with your healthcare team  Call [facility] at [phone] with questions or concerns    Take care,  Lubna Strickland MD

## 2024-08-21 NOTE — PROGRESS NOTES
Assessment & Plan     S/P tooth extraction  5 days s/p tooth extraction dental clinic in [place] with ongoing dental pain postoperatively but without evidence of complication on exam.  Patient has been unable to  pain medications due to restricted status.  Discussed scheduled use of naproxen and acetaminophen for pain.  Patient to follow-up with dental provider for further concerns  - naproxen (NAPROSYN) 500 MG tablet  Dispense: 60 tablet; Refill: 0  - acetaminophen (TYLENOL) 500 MG tablet  Dispense: 20 tablet; Refill: 0    Nausea and vomiting, unspecified vomiting type  Patient has had a few weeks of intermittent nausea with vomiting.  Patient appears well today without signs of dehydration.  Differential considered but not limited to infection, obstruction, pregnancy, substance use including cannabis, elevated intracranial pressure, among others.  Reassuring that the vomit is nonbilious and nonbloody.  Abdominal exam benign with patient reporting some constipation.  Will check urine pregnancy test today.  Prescribe short course of Zofran as needed.  Patient to follow-up with primary MD for further workup.  - ondansetron (ZOFRAN) 4 MG tablet  Dispense: 15 tablet; Refill: 0    Urinary frequency  Few weeks of increased urinary frequency to urinate up to 15-20 times a day with some mild dysuria and pressure in her bladder area.  Previously has had microscopic hematuria.  Will collect UA today and contact patient with results.  No signs of systemic infection.  - Urine Macroscopic with reflex to Microscopic  - Urine Microscopic Exam    Return in about 2 weeks (around 9/4/2024) for Follow up.    Shar Coronado is a 33 year old, presenting for the following health issues:  Refill, urinating frequently, vomiting, headache      8/21/2024     8:53 AM   Additional Questions   Roomed by REMEDIOS Bosch MA   Accompanied by Self         8/21/2024    Information    services provided? No        HPI  "    Patient shares that she had her left lower wisdom tooth pulled about 5 days ago and has had pain in that area that has been relieved with her short supply of ibuprofen at home.  Her dental provider has sent additional prescriptions to the pharmacy however because of her insurance restriction she has been unable to pick them up.  She has been doing recommended postprocedure cares.  No fever.  No drainage from that area.    She also endorses a few weeks of urinary frequency.  She has been urinating 15-20 times per day.  She endorses mild pain with urination.  No blood in urine.  Does endorses pressure in her bladder area.  She is sexually active with a male partner.  LMP was 7/28/2024 denies significant discharge.    Endorses a few weeks of intermittent nausea with vomiting.  Nonbloody nonbilious.  Does endorse some constipation as well.  Has not been taking her MiraLAX at home.  Has been eating pickles which she has been craving and thinks helps with the nausea.  Also has had intermittent tension-like headache with some photophobia.  Not currently present on today's exam.      Objective    /78   Pulse 80   Temp 97.5  F (36.4  C) (Oral)   Resp 16   Ht 1.689 m (5' 6.5\")   Wt 134.2 kg (295 lb 12.8 oz)   LMP 07/28/2024 (Approximate)   SpO2 99%   BMI 47.03 kg/m    Body mass index is 47.03 kg/m .  Physical Exam   Constitutional: awake, alert, cooperative, no apparent distress, patient eating dill pickles  ENT: Normocephalic, without obvious abnormality, atraumatic, left lower molar removed without any bleeding, drainage, significant swelling at the site  Respiratory: No increased work of breathing  Cardiovascular: Well-perfused  GI: Normal bowel sounds, soft, non-distended, non-tender  Neurologic: Awake, alert, oriented to name, place and time.  Cranial nerves II-XII are grossly intact.        Signed Electronically by: Lubna Strickland MD    "

## 2024-08-28 NOTE — TELEPHONE ENCOUNTER
PRIOR AUTHORIZATION FOLLOW-UP  No updates on this request at this time - I have faxed [facility] appeals for an update.

## 2024-09-04 NOTE — TELEPHONE ENCOUNTER
MEDICATION APPEAL APPROVED    Medication: CETIRIZINE HCL 10 MG PO TABS  Authorization Effective Date: 8/16/2024  Authorization Expiration Date: 8/16/2025  Appeal Insurance Company: [employer]  [facility]: [id number] - [place], [place] - [address]  AT [facility] [address]  Patient Notified: YES (faxed approval info to pharmacy and notified patient via [employer] message)  Comments:       Called [facility] spoke with Jeannette -  the appeal decision was overturned. Provided approval dates and will fax the approval letter to the PA team shortly.

## 2024-09-10 ENCOUNTER — HOSPITAL ENCOUNTER (EMERGENCY)
Facility: HOSPITAL | Age: 34
Discharge: HOME | End: 2024-09-10
Admitting: PHYSICIAN ASSISTANT
Payer: COMMERCIAL

## 2024-09-10 VITALS
HEIGHT: 66 IN | TEMPERATURE: 97.3 F | HEART RATE: 87 BPM | DIASTOLIC BLOOD PRESSURE: 86 MMHG | BODY MASS INDEX: 47.09 KG/M2 | SYSTOLIC BLOOD PRESSURE: 138 MMHG | OXYGEN SATURATION: 97 % | WEIGHT: 293 LBS | RESPIRATION RATE: 18 BRPM

## 2024-09-10 DIAGNOSIS — K08.89 ODONTALGIA: ICD-10-CM

## 2024-09-10 LAB — HCG UR QL: NEGATIVE

## 2024-09-10 PROCEDURE — 99284 EMERGENCY DEPT VISIT MOD MDM: CPT | Mod: 25 | Performed by: PHYSICIAN ASSISTANT

## 2024-09-10 PROCEDURE — 250N000011 HC RX IP 250 OP 636: Performed by: PHYSICIAN ASSISTANT

## 2024-09-10 PROCEDURE — 96372 THER/PROPH/DIAG INJ SC/IM: CPT | Performed by: PHYSICIAN ASSISTANT

## 2024-09-10 PROCEDURE — 81025 URINE PREGNANCY TEST: CPT | Performed by: STUDENT IN AN ORGANIZED HEALTH CARE EDUCATION/TRAINING PROGRAM

## 2024-09-10 RX ORDER — KETOROLAC TROMETHAMINE 30 MG/ML
30 INJECTION, SOLUTION INTRAMUSCULAR; INTRAVENOUS ONCE
Status: COMPLETED | OUTPATIENT
Start: 2024-09-10 | End: 2024-09-10

## 2024-09-10 RX ADMIN — KETOROLAC TROMETHAMINE 30 MG: 30 INJECTION, SOLUTION INTRAMUSCULAR at 20:48

## 2024-09-10 ASSESSMENT — ACTIVITIES OF DAILY LIVING (ADL)
ADLS_ACUITY_SCORE: 35
ADLS_ACUITY_SCORE: 35

## 2024-09-11 NOTE — ED NOTES
Pt was very restless and painful when roomed, pacing in the room and struggling to calm down due to pain. Provider was notified, IM toradol ordered and given, Pt now calm and relaxed. Pt stated she had left lower wisdom teeth removed 3 Fridays ago and has had increasing pain.

## 2024-09-11 NOTE — ED PROVIDER NOTES
Emergency Department Encounter   NAME: Lucinda Ji ; AGE: 33 year old female ; YOB: 1990 ; MRN: [id number] ; PCP: Maryann Salas   ED PROVIDER: Regi Mendoza PA-C    Evaluation Date & Time:   9/10/2024  8:12 PM    CHIEF COMPLAINT:  Dental Pain      Impression and Plan   MDM: Lucinda Ji is a 33 year old female who presents to the ED for evaluation of dental pain.  Patient is status post 3-1/2 weeks out from left lower wisdom tooth extraction.  Pain has been controlled at home with naproxen until this afternoon.  She was eating a taco when she had sudden onset severe shocklike pain that involve the left upper molar.  Due to the intensity of the pain, she called EMS.  Per our nursing staff, she was initially in acute distress and they requested something for pain.  Due to late menstrual period, I did check a pregnancy test which returned negative, and then she received a dose of IM Toradol.  I assessed the patient after pain control, and she is sleeping comfortably in the chair, arouses easily to voice.  At this time, she states she is pain-free and that the Toradol completely resolved her discomfort.  On her intraoral exam, there is no evidence of infection to her extraction site.  She is 3-1/2 weeks out, she is out of window for concern of dry socket.  She does have some tenderness over her left upper molar though there is no evidence of dental fracture, periapical abscess, or infection.  She has some mild nonspecific swelling to the left buccal area that was is not tender or fluctuant to suggest infection or abscess. Based on her description of sudden onset pain with eating and given the severity, it sounds neuropathic in nature. We did discuss putting her on antibiotics out of precaution, however shared medical decision making performed and plan to hold off on this time and monitor. She has been on multiple recent courses of antibiotics recently, and has a follow up appointment scheduled with her  PCP in ~2 days and can reassess at that time. We reviewed plan for soft diet, no excessive chewing, and uses of her home naproxen this week with plans to return to the ED with any return of severe pain or signs of worsening infection.  Patient comfortable with the plan and was discharged home in good condition.      Medical Decision Making  Obtained supplemental history:Supplemental history obtained?: EMS  Reviewed external records: External records reviewed?: Outpatient Record: clinic visit on 8/21/2024  Care impacted by chronic illness:N/A  Care significantly affected by social determinants of health:N/A  Did you consider but not order tests?: Work up considered but not performed and documented in chart, if applicable  Did you interpret images independently?: Independent interpretation of ECG and images noted in documentation, when applicable.  Consultation discussion with other provider:Did you involve another provider (consultant, MH, pharmacy, etc.)?: No  Discharge. No recommendations on prescription strength medication(s). See documentation for any additional details.    ED COURSE:  9:42 PM I met and introduced myself to the patient. I gathered initial history and performed my physical exam. We discussed plan for discharge, follow up, and reasons to return to the ED.     At the conclusion of the encounter I discussed the results of all the tests and the disposition. The questions were answered. The patient or family acknowledged understanding and was agreeable with the care plan.    FINAL IMPRESSION:    ICD-10-CM    1. Odontalgia  K08.89             MEDICATIONS GIVEN IN THE EMERGENCY DEPARTMENT:  Medications   ketorolac (TORADOL) injection 30 mg (30 mg Intramuscular $Given 9/10/24 2048)         NEW PRESCRIPTIONS STARTED AT TODAY'S ED VISIT:  New Prescriptions    No medications on file         HPI   Use of Intrepreter: N/A     Lucinda Ji is a 33 year old female with a pertinent history of angioedema, asthma,  TBI, s/p tooth extraction, and intractable migraine with aura, who presents to the ED for evaluation of dental pain.     The patient had her left lower wisdom tooth extracted 3/3.5 weeks ago. Since then, she has been healing without difficulty. Today, the patient was eating a soft taco when she experienced sudden onset of severe shock-like pain in her teeth at the top of the left side of her mouth. She attempted to control the pain at home, but after it had persisted for an hour she called EMS. No pain with food prior to this incident. No other concerns at this time.     REVIEW OF SYSTEMS:  Pertinent positive and negative symptoms per HPI.       Medical History     Past Medical History:   Diagnosis Date    Asthma     Bacterial vaginosis in pregnancy 05/26/2020    Fetal demise, greater than 22 weeks, antepartum, single gestation 10/29/2020    Gastritis 2012    Gastritis     GBS bacteriuria 07/23/2020    GBS bacteriuria 08/10/2020    Nausea & vomiting 05/26/2020    Obesity     Pregnancy 05/26/2020    Round ligament pain 09/10/2020    Screening examination for STI 07/09/2024       Past Surgical History:   Procedure Laterality Date    DAVINCI MYOMECTOMY N/A 9/7/2022    Procedure: ROBOTIC MYOMECTOMY;  Surgeon: Indra Reis MD;  Location: [facility] OR    LYSIS, ADHESIONS, ROBOT-ASSISTED, LAPAROSCOPIC, USING DA BROOKE XI N/A 9/7/2022    Procedure: LYSIS, ADHESIONS, ROBOT-ASSISTED, LAPAROSCOPIC, USING DA BROOKE XI;  Surgeon: Indra Reis MD;  Location: [facility] OR    NO PAST SURGERIES  09/17/2020       Family History   Problem Relation Age of Onset    Brain Tumor Mother     Heart Disease Father     Thyroid Disease Sister     Asthma Brother     Cancer Mother     Hypertension Father     Asthma Brother        Social History     Tobacco Use    Smoking status: Former     Types: Cigarettes    Smokeless tobacco: Never   Vaping Use    Vaping status: Never Used   Substance Use Topics    Alcohol use:  "Not Currently     Comment: occasional    Drug use: Not Currently     Types: Marijuana       acetaminophen (TYLENOL) 325 MG tablet  albuterol (PROAIR HFA/PROVENTIL HFA/VENTOLIN HFA) 108 (90 Base) MCG/ACT inhaler  cetirizine (ZYRTEC) 10 MG tablet  metroNIDAZOLE (FLAGYL) 500 MG tablet  naproxen (NAPROSYN) 500 MG tablet  ondansetron (ZOFRAN) 4 MG tablet          Physical Exam     First Vitals:  Patient Vitals for the past 24 hrs:   BP Temp Pulse Resp SpO2 Height Weight   09/10/24 2145 (!) 140/84 -- -- -- -- -- --   09/10/24 2143 -- -- 87 -- 97 % -- --   09/10/24 2115 124/64 -- 70 -- 98 % -- --   09/10/24 2101 115/82 -- -- -- -- -- --   09/10/24 2024 -- -- 111 -- 97 % -- --   09/10/24 2000 (!) 149/118 97.3  F (36.3  C) 107 18 96 % -- --   09/10/24 1959 -- -- -- -- -- 1.676 m (5' 6\") 132.9 kg (293 lb)         PHYSICAL EXAM:   Physical Exam  Vitals reviewed.   Constitutional:       General: She is not in acute distress.     Appearance: She is not ill-appearing or toxic-appearing.      Comments: Patient sleeping upon entering room.    HENT:      Mouth/Throat:      Mouth: Mucous membranes are moist.      Pharynx: Oropharynx is clear.      Comments: Left lower wisdom tooth extraction site appears to be well-healing.  Normal gingival tissue without tenderness, edema, or drainage.  She has some mild tenderness over her left upper most posterior molar though there is no evidence of dental fracture, surrounding gingival swelling or fullness.  She has some mild swelling to her left buccal cheek.  No fluctuance or evidence of abscess.  No facial swelling.  No sublingual swelling or tenderness.  No trismus.  Normal phonation.  Handling secretions.  No submandibular fullness or tenderness.  Neurological:      Mental Status: She is alert.             Results     LAB:  All pertinent labs reviewed and interpreted  Labs Ordered and Resulted from Time of ED Arrival to Time of ED Departure   HCG QUALITATIVE URINE - Normal       Result " Value    hCG Urine Qualitative Negative         RADIOLOGY:  No orders to display         I, Lizbeth Hughes, am serving as a scribe to document services personally performed by Regi Mendoza PA-C, based on my observation and the provider's statements to me. I, Regi Mendoza PA-C attest that Lizbeth Hughes is acting in a scribe capacity, has observed my performance of the services and has documented them in accordance with my direction.       Regi Mendoza PA-C   Emergency Medicine   [facility] EMERGENCY DEPARTMENT      Regi Mendoza PA-C  09/10/24 1911

## 2024-09-11 NOTE — DISCHARGE INSTRUCTIONS
As we discussed, please use your home naproxen or ibuprofen for pain relief.  I would attempt a soft diet for the next several days to avoid chewing on that side.  If at any point you notice increased swelling, pain, drainage, facial swelling or fever please return to the ER.  Otherwise, please follow-up in your clinic in 2 days as scheduled to make sure symptoms are improving.

## 2024-09-11 NOTE — ED TRIAGE NOTES
Bottom L sided swelling near the gums - 3 weeks ago had wisdom teeth removed.     Possible pregnancy ~est 6 weeks if she is     Dull throbbing ache.

## 2024-09-17 DIAGNOSIS — K08.409 S/P TOOTH EXTRACTION: ICD-10-CM

## 2024-09-17 RX ORDER — NAPROXEN 500 MG/1
500 TABLET ORAL 2 TIMES DAILY WITH MEALS
Qty: 60 TABLET | Refills: 0 | Status: SHIPPED | OUTPATIENT
Start: 2024-09-17 | End: 2024-09-23

## 2024-09-17 NOTE — TELEPHONE ENCOUNTER
Name from pharmacy: NAPROXEN 500MG TABLETS          Will file in chart as: naproxen (NAPROSYN) 500 MG tablet    Sig: TAKE 1 TABLET(500 MG) BY MOUTH TWICE DAILY WITH MEALS    Disp: 60 tablet    Refills: 0 (Pharmacy requested: Not specified)    Start: 9/17/2024    Class: E-Prescribe    Non-formulary For: S/P tooth extraction    Last ordered: 3 weeks ago (8/21/2024) by Joie Ghosh MD    Last refill: 8/21/2024    Rx #: [id number]    NSAID Medications [id number]/2024 03:51 AM   Protocol Details Normal CBC on file in past 12 months    Always Fail Criteria - Chart Review Required    Normal GFR on file in past 12 months        SATISH Crawley, BSN

## 2024-09-23 ENCOUNTER — OFFICE VISIT (OUTPATIENT)
Dept: FAMILY MEDICINE | Facility: CLINIC | Age: 34
End: 2024-09-23
Payer: COMMERCIAL

## 2024-09-23 VITALS
OXYGEN SATURATION: 98 % | SYSTOLIC BLOOD PRESSURE: 114 MMHG | WEIGHT: 293 LBS | TEMPERATURE: 98.3 F | HEIGHT: 66 IN | RESPIRATION RATE: 18 BRPM | HEART RATE: 99 BPM | DIASTOLIC BLOOD PRESSURE: 71 MMHG | BODY MASS INDEX: 47.09 KG/M2

## 2024-09-23 DIAGNOSIS — Z32.00 ENCOUNTER FOR PREGNANCY TEST, RESULT UNKNOWN: Primary | ICD-10-CM

## 2024-09-23 DIAGNOSIS — N89.8 VAGINAL DISCHARGE: ICD-10-CM

## 2024-09-23 DIAGNOSIS — R35.0 INCREASED URINARY FREQUENCY: ICD-10-CM

## 2024-09-23 LAB
ALBUMIN UR-MCNC: NEGATIVE MG/DL
APPEARANCE UR: CLEAR
BACTERIA #/AREA URNS HPF: ABNORMAL /HPF
BILIRUB UR QL STRIP: NEGATIVE
CLUE CELLS: ABNORMAL
COLOR UR AUTO: YELLOW
GLUCOSE UR STRIP-MCNC: NEGATIVE MG/DL
HCG UR QL: NEGATIVE
HGB UR QL STRIP: ABNORMAL
KETONES UR STRIP-MCNC: NEGATIVE MG/DL
LEUKOCYTE ESTERASE UR QL STRIP: NEGATIVE
NITRATE UR QL: NEGATIVE
PH UR STRIP: 7 [PH] (ref 5–8)
RBC #/AREA URNS AUTO: ABNORMAL /HPF
SP GR UR STRIP: 1.02 (ref 1–1.03)
SQUAMOUS #/AREA URNS AUTO: ABNORMAL /LPF
TRICHOMONAS, WET PREP: ABNORMAL
UROBILINOGEN UR STRIP-ACNC: 0.2 E.U./DL
WBC #/AREA URNS AUTO: ABNORMAL /HPF
WBC'S/HIGH POWER FIELD, WET PREP: ABNORMAL
YEAST, WET PREP: ABNORMAL

## 2024-09-23 PROCEDURE — 81025 URINE PREGNANCY TEST: CPT | Performed by: STUDENT IN AN ORGANIZED HEALTH CARE EDUCATION/TRAINING PROGRAM

## 2024-09-23 PROCEDURE — 87210 SMEAR WET MOUNT SALINE/INK: CPT | Performed by: STUDENT IN AN ORGANIZED HEALTH CARE EDUCATION/TRAINING PROGRAM

## 2024-09-23 PROCEDURE — 99213 OFFICE O/P EST LOW 20 MIN: CPT | Mod: GC | Performed by: STUDENT IN AN ORGANIZED HEALTH CARE EDUCATION/TRAINING PROGRAM

## 2024-09-23 PROCEDURE — 81001 URINALYSIS AUTO W/SCOPE: CPT | Performed by: STUDENT IN AN ORGANIZED HEALTH CARE EDUCATION/TRAINING PROGRAM

## 2024-09-23 NOTE — PROGRESS NOTES
"  Assessment & Plan     Encounter for pregnancy test, result unknown  (primary encounter diagnosis)  Vaginal discharge  Increased urinary frequency  And coming into clinic reporting abdominal cramping, nausea, increased urinary frequency, increased vaginal discharge.  She is denying fevers, flank pain, pelvic pain.  She had a sexual encounter in July.  Patient reports that her boyfriend wants a child. Patient is okay if she gets pregnant.  She was seen in clinic in July and August.  Wet prep, chlamydia gonorrhea syphilis HIV back in July were negative.  She was seen in clinic in August and was found to have trichomonas.  Patient was treated with metronidazole.  Today she is reporting she has not had any sexual activity since July.  Reports that she did tell her partner who went back to [place].  She is vitally stable on presentation, no CVA tenderness.  Plan:   HCG qualitative urine  UA with Microscopic reflex to Culture - Clinic Collect  Wet preparation  Discussed with patient if UA still shows blood with no other signs of infection will need further workup.    Return if symptoms worsen or fail to improve.    Shar Coronado is a 34 year old, presenting for the following health issues:  Abdominal Pain (Last couple weeks and period didn't came LMP )    HPI   Sexual encounger July 27th, no condoms   Menses usually last 5-7 days but in August 3 days. Last LMP was in August. No mensus in September   Noticed cramping and nausea.   Reports to increased vaginal discharge.   Increased urinary frequency  No fevers  No vaginal pruritus or lesions    Review of Systems  Constitutional, HEENT, cardiovascular, pulmonary, gi and gu systems are negative, except as otherwise noted.      Objective    /71   Pulse 99   Temp 98.3  F (36.8  C) (Oral)   Resp 18   Ht 1.676 m (5' 6\")   Wt 133.1 kg (293 lb 6.4 oz)   LMP 08/22/2024 (Approximate)   SpO2 98%   BMI 47.36 kg/m    Body mass index is 47.36 kg/m .  Physical Exam "   GENERAL: alert and no distress  RESP: lungs clear to auscultation - no rales, rhonchi or wheezes  CV: regular rate and rhythm, normal S1 S2, no S3 or S4, no murmur, click or rub, no peripheral edema  ABDOMEN: soft, nontender, no hepatosplenomegaly, no masses and bowel sounds normal, no CVA tenderness       Precepted with Dr. Abdirizak Salas MD PGY3  [facility]

## 2024-09-28 NOTE — PROGRESS NOTES
Preceptor Attestation:    I discussed the patient with the resident and evaluated the patient in person. I have verified the content of the note, which accurately reflects my assessment of the patient and the plan of care.   Supervising Physician:  Anihs Reveles MD.

## 2024-11-25 ENCOUNTER — OFFICE VISIT (OUTPATIENT)
Dept: FAMILY MEDICINE | Facility: CLINIC | Age: 34
End: 2024-11-25
Payer: COMMERCIAL

## 2024-11-25 VITALS
WEIGHT: 293 LBS | RESPIRATION RATE: 18 BRPM | HEART RATE: 122 BPM | SYSTOLIC BLOOD PRESSURE: 119 MMHG | BODY MASS INDEX: 47.09 KG/M2 | DIASTOLIC BLOOD PRESSURE: 80 MMHG | TEMPERATURE: 98.3 F | OXYGEN SATURATION: 98 % | HEIGHT: 66 IN

## 2024-11-25 DIAGNOSIS — R35.0 INCREASED URINARY FREQUENCY: ICD-10-CM

## 2024-11-25 DIAGNOSIS — N89.8 VAGINAL DISCHARGE: ICD-10-CM

## 2024-11-25 DIAGNOSIS — Z32.01 PREGNANCY TEST POSITIVE: Primary | ICD-10-CM

## 2024-11-25 DIAGNOSIS — Z11.3 SCREEN FOR STD (SEXUALLY TRANSMITTED DISEASE): ICD-10-CM

## 2024-11-25 LAB
BACTERIA #/AREA URNS HPF: ABNORMAL /HPF
CLUE CELLS: ABNORMAL
HCG UR QL: POSITIVE
HYALINE CASTS #/AREA URNS LPF: ABNORMAL /LPF
MUCOUS THREADS #/AREA URNS LPF: PRESENT /LPF
RBC #/AREA URNS AUTO: ABNORMAL /HPF
SQUAMOUS #/AREA URNS AUTO: ABNORMAL /LPF
T PALLIDUM AB SER QL: NONREACTIVE
TRICHOMONAS, WET PREP: ABNORMAL
WBC #/AREA URNS AUTO: ABNORMAL /HPF
WBC'S/HIGH POWER FIELD, WET PREP: ABNORMAL
YEAST, WET PREP: ABNORMAL

## 2024-11-25 RX ORDER — PYRIDOXINE HCL (VITAMIN B6) 25 MG
25 TABLET ORAL DAILY
Qty: 90 TABLET | Refills: 0 | Status: SHIPPED | OUTPATIENT
Start: 2024-11-25

## 2024-11-25 RX ORDER — PRENATAL VIT/IRON FUM/FOLIC AC 27MG-0.8MG
1 TABLET ORAL DAILY
Qty: 90 TABLET | Refills: 3 | Status: SHIPPED | OUTPATIENT
Start: 2024-11-25

## 2024-11-25 NOTE — PROGRESS NOTES
"  Assessment & Plan     Pregnancy test positive   Pregnancy unexpected but welcomed. She has nausea but no fatigue   Plan:   HCG qualitative urine, Prenatal Vit-Fe         Fumarate-FA (PRENATAL MULTIVITAMIN W/IRON)         27-0.8 MG tablet, doxylamine (UNISOM) 25 MG         TABS tablet, pyridOXINE (VITAMIN B6) 25 MG         Tablet  Scheduled for dating ultrasound             Vaginal discharge  Increased urinary frequency  Screen for STD (sexually transmitted disease)  Has hx of BV, yeast and Trichomonas in the past. Wants to be tested for STIs.  UA and wet prep was negative for infection.  Plan:   Chlamydia trachomatis/Neisseria gonorrhoeae by         PCR - Clinic Collect, HIV Ag/Ab Screen [facility],        Syphilis Screen [facility] (RPR/VDRL)              Shar Coronado is a 34 year old, presenting for the following health issues:  STD (Want to get tested ), upt , and Vaginal Problem (Itching couple days ago )      11/25/2024     3:44 PM   Additional Questions   Roomed by Ml   Accompanied by self         11/25/2024    Information    services provided? No        HPI   Delayed menses. LMP 10/20/24. Was supposed to have bleeding last Saturday.   Not using condoms   Has Suprapubic cramping  Increased urinary frequency  Nausea, fatigue  Vaginal itching       Review of Systems  Constitutional, HEENT, cardiovascular, pulmonary, gi and gu systems are negative, except as otherwise noted.      Objective    /80 (BP Location: Left arm, Patient Position: Sitting, Cuff Size: Adult Large)   Pulse (!) 122   Temp 98.3  F (36.8  C) (Oral)   Resp 18   Ht 1.676 m (5' 6\")   Wt 133.4 kg (294 lb)   LMP 10/20/2024 (Approximate)   SpO2 98%   BMI 47.45 kg/m    Body mass index is 47.45 kg/m .  Physical Exam   GENERAL: alert and no distress  NECK: no adenopathy, no asymmetry, masses, or scars  RESP: lungs clear to auscultation - no rales, rhonchi or wheezes  CV: regular rate and rhythm, normal S1 S2, no S3 " or S4, no murmur, click or rub, no peripheral edema  ABDOMEN: soft, nontender, no hepatosplenomegaly, no masses and bowel sounds normal  MS: no gross musculoskeletal defects noted, no edema    Precepted with Dr. Theresa Salas MD PGY3  [facility]

## 2024-11-25 NOTE — PROGRESS NOTES
Preceptor Attestation:    I discussed the patient with the resident and evaluated the patient in person. I have verified the content of the note, which accurately reflects my assessment of the patient and the plan of care.   Supervising Physician:  Vic Cardenas MD.

## 2024-11-26 LAB
C TRACH DNA SPEC QL PROBE+SIG AMP: NEGATIVE
HIV 1+2 AB+HIV1 P24 AG SERPL QL IA: NONREACTIVE
N GONORRHOEA DNA SPEC QL NAA+PROBE: NEGATIVE

## 2024-12-19 ENCOUNTER — OFFICE VISIT (OUTPATIENT)
Dept: FAMILY MEDICINE | Facility: CLINIC | Age: 34
End: 2024-12-19
Payer: COMMERCIAL

## 2024-12-19 ENCOUNTER — ANCILLARY PROCEDURE (OUTPATIENT)
Dept: ULTRASOUND IMAGING | Facility: HOSPITAL | Age: 34
End: 2024-12-19
Attending: STUDENT IN AN ORGANIZED HEALTH CARE EDUCATION/TRAINING PROGRAM
Payer: COMMERCIAL

## 2024-12-19 DIAGNOSIS — Z98.890 HISTORY OF MYOMECTOMY: ICD-10-CM

## 2024-12-19 DIAGNOSIS — Z32.01 PREGNANCY TEST POSITIVE: ICD-10-CM

## 2024-12-19 DIAGNOSIS — O21.9 NAUSEA AND VOMITING IN PREGNANCY: ICD-10-CM

## 2024-12-19 DIAGNOSIS — O09.291 HISTORY OF PREGNANCY LOSS IN PRIOR PREGNANCY, CURRENTLY PREGNANT IN FIRST TRIMESTER: ICD-10-CM

## 2024-12-19 DIAGNOSIS — Z32.01 PREGNANCY TEST POSITIVE: Primary | ICD-10-CM

## 2024-12-19 PROCEDURE — 76815 OB US LIMITED FETUS(S): CPT | Performed by: STUDENT IN AN ORGANIZED HEALTH CARE EDUCATION/TRAINING PROGRAM

## 2024-12-19 RX ORDER — PYRIDOXINE HCL (VITAMIN B6) 25 MG
25 TABLET ORAL 3 TIMES DAILY
Qty: 90 TABLET | Refills: 1 | Status: SHIPPED | OUTPATIENT
Start: 2024-12-19

## 2024-12-19 NOTE — NURSING NOTE
Average Risk Category  No significant risk factors: Yes    At Risk Category (up to 3)  Teen pregnancy: No  Poor social situation: No  Domestic abuse: No  Financial difficulties: No  Smoker: No  H/O  deliver: Yes  H/O drug abuse: No  Non-English speaking: No  Advanced maternal age: No  GDM risks: Yes  Previous C/S: No  H/O PIH: No  H/O STIs: Yes  H/O mental health concerns: Yes  Onset care > 20 weeks: No  Other: Hx stillbirth  Hx 22 wk miscarriage  Hx myomectomy and recommended to have  for all future pregnancies  High Risk Category (4 or more At Risk or)  Diabetes/GDM: No  Multiple gestation: No  Chronic hypertension: No  Significant hx of asthma: No  Fetal demise > 20 weeks: Yes x's 2  Positive tox screen: No  Current mental health treatment: No      Risk: High Risk   Date determined: 24    Discussed and gave out the following handouts: 1st trimester fetal development, first trimester pregnancy symptoms, when to see medical advice (ie vomiting frequently, vaginal bleeding or cramping), nutrition during pregnancy, nonmedicinal prevention/treatment of nausea & vomiting, heartburn, and constipation.  Gave proof of pregnancy and phone number for clinic and WIC./Effie Prescott RN BSN

## 2024-12-19 NOTE — PROGRESS NOTES
Pregnancy Ultrasound for Dating and Initial Medical Assessment    34 year old,, at 8w4d by certain LMP. - 10/20/2024. Reports irregular menstrual cycles.     Pregnancy was unplanned.  Pregnancy is welcomed  No vaginal bleeding  No significant pain, slight cramping  Minimal nausea/vomiting  Good PO tolerance  -History of myomectomy, was told would need C-sections going forward.     OB History    Para Term  AB Living   8 3 1 2 4 1   SAB IAB Ectopic Multiple Live Births   1 3 0 0 1      # Outcome Date GA Lbr Daquan/2nd Weight Sex Type Anes PTL Lv   8 Current            7  10/30/20 31w2d  1.32 kg (2 lb 14.6 oz) M Vag-Spont EPI  FD      Name: Uday   6 Term 14 39w0d  2.778 kg (6 lb 2 oz) F Vag-Spont   LENI      Complications: Hyperemesis gravidarum      Name: Irma   5 IAB      AB, COMPLETE         Birth Comments: elective    4 IAB      AB, COMPLETE         Birth Comments: elective    3 SAB  7w0d    SAB      2   22w0d    STILLBIRTH      1 IAB                Patient Active Problem List   Diagnosis    History of traumatic brain injury    Encounter for screening for cervical cancer    Anxiety and depression    Intractable migraine with aura with status migrainosus    Class 3 severe obesity due to excess calories without serious comorbidity with body mass index (BMI) of 40.0 to 44.9 in adult (H)    History of asthma    Hiatal hernia    Uterine leiomyoma    Viral conjunctivitis    Angioedema, initial encounter    Toothache    S/P tooth extraction    Nausea and vomiting, unspecified vomiting type    Urinary frequency    Trichomonas infection       Past Surgical History:   Procedure Laterality Date    DAVINCI MYOMECTOMY N/A 2022    Procedure: ROBOTIC MYOMECTOMY;  Surgeon: Indra Reis MD;  Location: [facility] OR    LYSIS, ADHESIONS, ROBOT-ASSISTED, LAPAROSCOPIC, USING DA BROOKE XI N/A 2022    Procedure: LYSIS, ADHESIONS,  "ROBOT-ASSISTED, LAPAROSCOPIC, USING DA BROOKE XI;  Surgeon: Indra Reis MD;  Location: [facility] OR       Allergies   Allergen Reactions    No Clinical Screening - See Comments Other (See Comments)     \"I was all jittery and they had to give me benadryl.\"  \"I was all jittery and they had to give me benadryl.\"       Current Outpatient Medications   Medication Sig Dispense Refill    doxylamine (UNISOM) 25 MG TABS tablet Take 0.5-1 tablets (12.5-25 mg) by mouth at bedtime. 30 tablet 1    pyridOXINE (VITAMIN B6) 25 MG tablet Take 1 tablet (25 mg) by mouth 3 times daily. 90 tablet 1    albuterol (PROAIR HFA/PROVENTIL HFA/VENTOLIN HFA) 108 (90 Base) MCG/ACT inhaler Inhale 1-2 puffs into the lungs every 4 hours as needed for shortness of breath or wheezing 18 g 11    Prenatal Vit-Fe Fumarate-FA (PRENATAL MULTIVITAMIN W/IRON) 27-0.8 MG tablet Take 1 tablet by mouth daily. 90 tablet 3     No current facility-administered medications for this visit.       Social history:  Living situation: lives with daughter  Work: Stay at home mom  Substances: No  Tobacco, betel nut: NO  Alcohol: NO  Nonprescribed substances, including marijuana: None    LMP 10/20/2024 (Approximate)     Labs: None yet    Ultrasound Findings: See Imaging section for images  Transvaginal images obtained - visualization difficult with transabdominal images due to shadowing    Cervix visually closed  + Fundal intrauterine gestational sac with decidual reaction  Three small anterior fibroids visualized, hypoechoic  + Yolk sac   + Fetal pole  + Cardiac motion  + Gross movement    CRL =2.21 cm = 8w6d  CRL =2.19 cm = 8w6d  CRL =2.24 cm = 9w0d    Average CRL = 2.21cm = 8w6d    Impression: Single living IUP at 8w6d by today's early dating ultrasound.    C/W LMP? Yes  Redate? Yes  Today's assigned GA: 8w6d  Final ASCENCION: 7/25/2025    Genetic Screen Plans: Declines    Aspirin prophylaxis starting at 12-16 wk: Yes  The patient does have a history of:   Any " ONE of the following high risk factors:  Pregestational DM1 or DM2  Chronic HTN  Autoimmune dz (Eg SLE, APLS)   H/o Preeclampsia in prior pregnancy  Multifetal gestation  6.   Renal Disease     TWO or more of the following moderate risk factors:  Nulliparity or > 10 years since last birth  Obesity (BMI > 30)  H/o preeclampsia in mother or sister  Age >= 35 years  Experienced anti-black racism or social/structural factors that could impact health  Personal history factors (prior SGA/low birthweight, prior fetal demise)  so WILL start low dose aspirin (81mg) at 12-28 weeks (ideally 12-16 weeks) to prevent preeclampsia.    Recommendations for Prenatal Care:  # History of 31w demise: Plan for comprehensive anatomy ultrasound and lab workup  - No identifiable lab workup for loss - will complete.  History of 2nd or 3rd trimester loss, currently pregnant  If Unexplained IUFD check:   [ ] Beta 2 glycoprotein antibodies  [ ] Lupus anticoagulant panel  [ ] Cardiolipin Ig G   [ ] Cardiolipin Ig M  [ ] TSH  [ ] HbA1c    # History of robotic vaginal myomectomy: Advised to avoid future vaginal delivery, so suspect transmural myomectomy.  - Refer OB/GYN for surgical consultation as pregnancy progresses          Diagnoses and all orders for this visit:    Pregnancy test positive  -     POC US OB TRANSABDOMINAL LIMITED; Future  -     doxylamine (UNISOM) 25 MG TABS tablet; Take 0.5-1 tablets (12.5-25 mg) by mouth at bedtime.  -     pyridOXINE (VITAMIN B6) 25 MG tablet; Take 1 tablet (25 mg) by mouth 3 times daily.  -     POC US OB TRANSVAGINAL LIMITED; Future    Nausea and vomiting in pregnancy    History of myomectomy  -     Cancel: Ob/Gyn  Referral; Future  -     Ob/Gyn  Referral; Future    History of pregnancy loss in prior pregnancy, currently pregnant in first trimester  -     [facility] Referral - Pregnancy; Future  -     Lupus Anticoagulant Panel; Future  -     Cardiolipin Anyi IgG and IgM; Future  -      Beta 2 Glycoprotein Antibodies IGG IGM; Future  -     TSH with free T4 reflex; Future          Follow Up:  Future Appointments   Date Time Provider Department Center   1/2/2025  9:20 AM Izabel Feldman MD [facility]   1/2/2025 10:00 AM Educator, [facility]         Izabel Feldman MD

## 2024-12-26 ENCOUNTER — DOCUMENTATION ONLY (OUTPATIENT)
Dept: FAMILY MEDICINE | Facility: CLINIC | Age: 34
End: 2024-12-26
Payer: COMMERCIAL

## 2024-12-26 NOTE — PROGRESS NOTES
To be completed in Nursing note:    Please reference list for forms that require a visit for completion.  Please remind patients that providers are given 3-5 business days to complete and return forms.      Form type:[facility] referral for [place] Restricted Recipient Enrol[employer]    Date form received: 12/24/24    Date form completed by Physician:    How was form returned to patient (mailed, faxed, or at  for patient to ): Faxed to [phone]    Date form mailed/faxed/left at  for patient and sent to HIM for scanning:      Once form is left for patient, faxed, or mailed PCS will then close the documentation only encounter.

## 2025-01-02 ENCOUNTER — OFFICE VISIT (OUTPATIENT)
Dept: FAMILY MEDICINE | Facility: CLINIC | Age: 35
End: 2025-01-02
Payer: MEDICAID

## 2025-01-02 ENCOUNTER — ALLIED HEALTH/NURSE VISIT (OUTPATIENT)
Dept: FAMILY MEDICINE | Facility: CLINIC | Age: 35
End: 2025-01-02
Payer: COMMERCIAL

## 2025-01-02 ENCOUNTER — ENROLLMENT (OUTPATIENT)
Dept: HOME HEALTH SERVICES | Facility: HOME HEALTH | Age: 35
End: 2025-01-02
Payer: COMMERCIAL

## 2025-01-02 VITALS
OXYGEN SATURATION: 100 % | BODY MASS INDEX: 47.09 KG/M2 | TEMPERATURE: 98.9 F | WEIGHT: 293 LBS | HEIGHT: 66 IN | HEART RATE: 90 BPM | RESPIRATION RATE: 18 BRPM

## 2025-01-02 DIAGNOSIS — O09.91 SUPERVISION OF HIGH-RISK PREGNANCY, FIRST TRIMESTER: Primary | ICD-10-CM

## 2025-01-02 DIAGNOSIS — O09.291 HISTORY OF STILLBIRTH IN PREGNANT PATIENT IN FIRST TRIMESTER, ANTEPARTUM: ICD-10-CM

## 2025-01-02 DIAGNOSIS — E66.813 CLASS 3 SEVERE OBESITY DUE TO EXCESS CALORIES WITHOUT SERIOUS COMORBIDITY WITH BODY MASS INDEX (BMI) OF 40.0 TO 44.9 IN ADULT (H): Primary | ICD-10-CM

## 2025-01-02 DIAGNOSIS — F32.A ANXIETY AND DEPRESSION: ICD-10-CM

## 2025-01-02 DIAGNOSIS — O99.211 OBESITY AFFECTING PREGNANCY IN FIRST TRIMESTER, UNSPECIFIED OBESITY TYPE: ICD-10-CM

## 2025-01-02 DIAGNOSIS — R03.0 ELEVATED BLOOD PRESSURE READING WITHOUT DIAGNOSIS OF HYPERTENSION: ICD-10-CM

## 2025-01-02 DIAGNOSIS — E66.01 CLASS 3 SEVERE OBESITY DUE TO EXCESS CALORIES WITHOUT SERIOUS COMORBIDITY WITH BODY MASS INDEX (BMI) OF 40.0 TO 44.9 IN ADULT (H): Primary | ICD-10-CM

## 2025-01-02 DIAGNOSIS — Z32.01 PREGNANCY TEST POSITIVE: ICD-10-CM

## 2025-01-02 DIAGNOSIS — F41.9 ANXIETY AND DEPRESSION: ICD-10-CM

## 2025-01-02 DIAGNOSIS — O21.9 NAUSEA/VOMITING IN PREGNANCY: ICD-10-CM

## 2025-01-02 DIAGNOSIS — J45.20 MILD INTERMITTENT ASTHMA IN ADULT WITHOUT COMPLICATION: ICD-10-CM

## 2025-01-02 DIAGNOSIS — D25.9 UTERINE LEIOMYOMA: ICD-10-CM

## 2025-01-02 DIAGNOSIS — O09.91 SUPERVISION OF HIGH-RISK PREGNANCY, FIRST TRIMESTER: ICD-10-CM

## 2025-01-02 PROCEDURE — H1001 ANTEPARTUM MANAGEMENT: HCPCS

## 2025-01-02 PROCEDURE — H1002 CARECOORDINATION PRENATAL: HCPCS

## 2025-01-02 RX ORDER — SWAB
1 SWAB, NON-MEDICATED MISCELLANEOUS DAILY
Qty: 100 TABLET | Refills: 3 | Status: SHIPPED | OUTPATIENT
Start: 2025-01-02

## 2025-01-02 RX ORDER — ASPIRIN 81 MG/1
81 TABLET ORAL DAILY
Qty: 90 TABLET | Refills: 2 | Status: SHIPPED | OUTPATIENT
Start: 2025-01-10

## 2025-01-02 RX ORDER — ONDANSETRON 4 MG/1
4 TABLET, FILM COATED ORAL EVERY 6 HOURS PRN
Qty: 30 TABLET | Refills: 3 | Status: SHIPPED | OUTPATIENT
Start: 2025-01-02

## 2025-01-02 RX ORDER — PYRIDOXINE HCL (VITAMIN B6) 25 MG
25 TABLET ORAL 3 TIMES DAILY
Qty: 90 TABLET | Refills: 1 | Status: SHIPPED | OUTPATIENT
Start: 2025-01-02

## 2025-01-02 RX ORDER — ONDANSETRON 4 MG/1
4 TABLET, ORALLY DISINTEGRATING ORAL ONCE
Status: ACTIVE | OUTPATIENT
Start: 2025-01-02
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
## 2025-01-06 ENCOUNTER — HOME INFUSION BILLING (OUTPATIENT)
Dept: HOME HEALTH SERVICES | Facility: HOME HEALTH | Age: 35
End: 2025-01-06
Payer: COMMERCIAL

## 2025-01-06 DIAGNOSIS — O09.291 HISTORY OF STILLBIRTH IN PREGNANT PATIENT IN FIRST TRIMESTER, ANTEPARTUM: ICD-10-CM

## 2025-01-06 DIAGNOSIS — O09.91 SUPERVISION OF HIGH-RISK PREGNANCY, FIRST TRIMESTER: Primary | ICD-10-CM

## 2025-01-06 DIAGNOSIS — E66.813 CLASS 3 SEVERE OBESITY DUE TO EXCESS CALORIES WITHOUT SERIOUS COMORBIDITY WITH BODY MASS INDEX (BMI) OF 40.0 TO 44.9 IN ADULT (H): ICD-10-CM

## 2025-01-06 DIAGNOSIS — E66.01 CLASS 3 SEVERE OBESITY DUE TO EXCESS CALORIES WITHOUT SERIOUS COMORBIDITY WITH BODY MASS INDEX (BMI) OF 40.0 TO 44.9 IN ADULT (H): ICD-10-CM

## 2025-01-06 PROCEDURE — E0776 IV POLE: HCPCS

## 2025-01-06 PROCEDURE — A4452 WATERPROOF TAPE: HCPCS

## 2025-01-06 PROCEDURE — A4245 ALCOHOL WIPES PER BOX: HCPCS

## 2025-01-07 ENCOUNTER — HOME CARE VISIT (OUTPATIENT)
Dept: HOME HEALTH SERVICES | Facility: HOME HEALTH | Age: 35
End: 2025-01-07
Payer: COMMERCIAL

## 2025-01-07 VITALS
HEART RATE: 133 BPM | DIASTOLIC BLOOD PRESSURE: 115 MMHG | OXYGEN SATURATION: 98 % | WEIGHT: 293 LBS | SYSTOLIC BLOOD PRESSURE: 153 MMHG | BODY MASS INDEX: 47.61 KG/M2 | RESPIRATION RATE: 18 BRPM | TEMPERATURE: 97.5 F

## 2025-01-07 PROBLEM — E66.01 CLASS 3 SEVERE OBESITY DUE TO EXCESS CALORIES WITHOUT SERIOUS COMORBIDITY WITH BODY MASS INDEX (BMI) OF 40.0 TO 44.9 IN ADULT (H): Status: ACTIVE | Noted: 2025-01-02

## 2025-01-07 PROBLEM — Z12.4 ENCOUNTER FOR SCREENING FOR CERVICAL CANCER: Status: RESOLVED | Noted: 2019-05-20 | Resolved: 2025-01-07

## 2025-01-07 PROBLEM — K08.89 TOOTHACHE: Status: RESOLVED | Noted: 2024-08-06 | Resolved: 2025-01-07

## 2025-01-07 PROBLEM — R35.0 URINARY FREQUENCY: Status: RESOLVED | Noted: 2024-08-21 | Resolved: 2025-01-07

## 2025-01-07 PROBLEM — O99.211 OBESITY AFFECTING PREGNANCY IN FIRST TRIMESTER: Status: RESOLVED | Noted: 2025-01-02 | Resolved: 2025-01-07

## 2025-01-07 PROBLEM — K08.409 S/P TOOTH EXTRACTION: Status: RESOLVED | Noted: 2024-08-21 | Resolved: 2025-01-07

## 2025-01-07 PROBLEM — J45.20 MILD INTERMITTENT ASTHMA IN ADULT WITHOUT COMPLICATION: Status: ACTIVE | Noted: 2025-01-02

## 2025-01-07 PROBLEM — D25.9 UTERINE LEIOMYOMA: Status: ACTIVE | Noted: 2020-11-06

## 2025-01-07 PROBLEM — F41.9 ANXIETY AND DEPRESSION: Status: ACTIVE | Noted: 2025-01-02

## 2025-01-07 PROBLEM — E66.813 CLASS 3 SEVERE OBESITY DUE TO EXCESS CALORIES WITHOUT SERIOUS COMORBIDITY WITH BODY MASS INDEX (BMI) OF 40.0 TO 44.9 IN ADULT (H): Status: ACTIVE | Noted: 2025-01-02

## 2025-01-07 PROBLEM — E66.01 CLASS 3 SEVERE OBESITY DUE TO EXCESS CALORIES WITHOUT SERIOUS COMORBIDITY WITH BODY MASS INDEX (BMI) OF 40.0 TO 44.9 IN ADULT (H): Status: ACTIVE | Noted: 2020-04-02

## 2025-01-07 PROBLEM — T78.3XXA ANGIOEDEMA, INITIAL ENCOUNTER: Status: RESOLVED | Noted: 2024-08-06 | Resolved: 2025-01-07

## 2025-01-07 PROBLEM — A59.9 TRICHOMONAS INFECTION: Status: RESOLVED | Noted: 2024-08-21 | Resolved: 2025-01-07

## 2025-01-07 PROBLEM — G43.111 INTRACTABLE MIGRAINE WITH AURA WITH STATUS MIGRAINOSUS: Status: RESOLVED | Noted: 2019-06-10 | Resolved: 2025-01-07

## 2025-01-07 PROBLEM — Z87.820 HISTORY OF TRAUMATIC BRAIN INJURY: Status: RESOLVED | Noted: 2019-05-20 | Resolved: 2025-01-07

## 2025-01-07 PROBLEM — R11.2 NAUSEA AND VOMITING, UNSPECIFIED VOMITING TYPE: Status: RESOLVED | Noted: 2024-08-21 | Resolved: 2025-01-07

## 2025-01-07 PROBLEM — F32.A ANXIETY AND DEPRESSION: Status: ACTIVE | Noted: 2025-01-02

## 2025-01-07 PROBLEM — B30.9 VIRAL CONJUNCTIVITIS: Status: RESOLVED | Noted: 2024-07-09 | Resolved: 2025-01-07

## 2025-01-07 PROBLEM — E66.813 CLASS 3 SEVERE OBESITY DUE TO EXCESS CALORIES WITHOUT SERIOUS COMORBIDITY WITH BODY MASS INDEX (BMI) OF 40.0 TO 44.9 IN ADULT (H): Status: ACTIVE | Noted: 2020-04-02

## 2025-01-07 PROCEDURE — S9374 HIT HYDRA 1 LITER DIEM: HCPCS

## 2025-01-07 NOTE — NURSING NOTE
Family Medicine OB Education    I provided the following OB education to Lucinda Ji.    Discussed that Dr Feldman should be the doctor that she sees for her prenatal visits and that provider will try hard to be the one to deliver her baby.  Discussed that if primary provider was unavailable that one of our other physicians would deliver the baby and that this could be a male or female provider.  Briefly discussed residency program and that multiple doctors would be present at time of delivery.  Sheet given and discussed fetal growth and development.  Sheet given and discussed warning signs with reasons to call clinic or L&D with questions or concerns (phone numbers given).  Sheet given and discussed Tdap to be given after 27 weeks gestation and the importance of family members get immunized before delivery.  Proof of pregnancy completed and given to pt.  Gave pt preregistration form for hospital to complete.  See questionnaire and pregnancy risk assessment for further information in provider encounter.       Name of provider who requested the OB education: Dr Feldman  Name of provider on site (faculty or community preceptor) at the time of performing the OB education: Dr Gaston Prescott, RN, BSN

## 2025-01-07 NOTE — PROGRESS NOTES
Nursing Visit Note:  Nurse visit today for Hyperemesis IV fluid teach for Lucinda Ji.     present during visit today: Not Applicable.     Education Provided:     Patient Education focused on Iv hydration and  .     Saline administered (in mLs): 10    Next visit plan 1/14/25 for piv change. patient alert and oriented in good spirits. she was hypertensive and tachycardia at baseline. her OB is aware. she is having emesis 4-5x/day. she has not been taking antiemetics because she hasn t picked them up from pharmacy but she said she will ask her nephew to do that. she is able to eat sour candy and eats small bites throughout the day. she had a regular soft stool today. no vaginal bleeding or pain. PIV started in left hand without difficulty. she will need a vein light if starting in arms. IV fluids started without difficulty. taught patient how to administer IV fluids using sterile technique and maintaining PIV and when to call [facility]. she returned demonstration and verbalized understanding. SOC complete. see sticky for instructions on what door to go to upon arrival. .       Vanessa Petty RN 01/07/25         Vanessa Petty RN 1/7/2025

## 2025-01-08 ENCOUNTER — HOSPITAL ENCOUNTER (EMERGENCY)
Facility: HOSPITAL | Age: 35
Discharge: HOME | End: 2025-01-08
Attending: EMERGENCY MEDICINE | Admitting: EMERGENCY MEDICINE
Payer: COMMERCIAL

## 2025-01-08 VITALS
WEIGHT: 293 LBS | BODY MASS INDEX: 47.09 KG/M2 | HEART RATE: 96 BPM | DIASTOLIC BLOOD PRESSURE: 78 MMHG | OXYGEN SATURATION: 100 % | TEMPERATURE: 98.9 F | SYSTOLIC BLOOD PRESSURE: 141 MMHG | RESPIRATION RATE: 16 BRPM | HEIGHT: 66 IN

## 2025-01-08 DIAGNOSIS — O21.0 HYPEREMESIS GRAVIDARUM: ICD-10-CM

## 2025-01-08 LAB
ANION GAP SERPL CALCULATED.3IONS-SCNC: 12 MMOL/L (ref 7–15)
BASOPHILS # BLD AUTO: 0.1 10E3/UL (ref 0–0.2)
BASOPHILS NFR BLD AUTO: 1 %
BUN SERPL-MCNC: 4.2 MG/DL (ref 6–20)
CALCIUM SERPL-MCNC: 9.5 MG/DL (ref 8.8–10.4)
CHLORIDE SERPL-SCNC: 101 MMOL/L (ref 98–107)
CREAT SERPL-MCNC: 0.64 MG/DL (ref 0.51–0.95)
EGFRCR SERPLBLD CKD-EPI 2021: >90 ML/MIN/1.73M2
EOSINOPHIL # BLD AUTO: 0.3 10E3/UL (ref 0–0.7)
EOSINOPHIL NFR BLD AUTO: 2 %
ERYTHROCYTE [DISTWIDTH] IN BLOOD BY AUTOMATED COUNT: 13.6 % (ref 10–15)
GLUCOSE SERPL-MCNC: 88 MG/DL (ref 70–99)
HCO3 SERPL-SCNC: 22 MMOL/L (ref 22–29)
HCT VFR BLD AUTO: 40.2 % (ref 35–47)
HGB BLD-MCNC: 13.3 G/DL (ref 11.7–15.7)
IMM GRANULOCYTES # BLD: 0.1 10E3/UL
IMM GRANULOCYTES NFR BLD: 1 %
LYMPHOCYTES # BLD AUTO: 2.3 10E3/UL (ref 0.8–5.3)
LYMPHOCYTES NFR BLD AUTO: 20 %
MCH RBC QN AUTO: 29.7 PG (ref 26.5–33)
MCHC RBC AUTO-ENTMCNC: 33.1 G/DL (ref 31.5–36.5)
MCV RBC AUTO: 90 FL (ref 78–100)
MONOCYTES # BLD AUTO: 0.6 10E3/UL (ref 0–1.3)
MONOCYTES NFR BLD AUTO: 5 %
NEUTROPHILS # BLD AUTO: 8.3 10E3/UL (ref 1.6–8.3)
NEUTROPHILS NFR BLD AUTO: 72 %
NRBC # BLD AUTO: 0 10E3/UL
NRBC BLD AUTO-RTO: 0 /100
PLATELET # BLD AUTO: 369 10E3/UL (ref 150–450)
POTASSIUM SERPL-SCNC: 4.1 MMOL/L (ref 3.4–5.3)
RBC # BLD AUTO: 4.48 10E6/UL (ref 3.8–5.2)
SODIUM SERPL-SCNC: 135 MMOL/L (ref 135–145)
WBC # BLD AUTO: 11.6 10E3/UL (ref 4–11)

## 2025-01-08 PROCEDURE — 99284 EMERGENCY DEPT VISIT MOD MDM: CPT | Mod: 25 | Performed by: EMERGENCY MEDICINE

## 2025-01-08 PROCEDURE — 85004 AUTOMATED DIFF WBC COUNT: CPT | Performed by: EMERGENCY MEDICINE

## 2025-01-08 PROCEDURE — 82565 ASSAY OF CREATININE: CPT | Performed by: EMERGENCY MEDICINE

## 2025-01-08 PROCEDURE — S9374 HIT HYDRA 1 LITER DIEM: HCPCS

## 2025-01-08 PROCEDURE — 258N000003 HC RX IP 258 OP 636: Performed by: EMERGENCY MEDICINE

## 2025-01-08 PROCEDURE — 250N000011 HC RX IP 250 OP 636: Performed by: EMERGENCY MEDICINE

## 2025-01-08 PROCEDURE — 96374 THER/PROPH/DIAG INJ IV PUSH: CPT | Performed by: EMERGENCY MEDICINE

## 2025-01-08 PROCEDURE — 36415 COLL VENOUS BLD VENIPUNCTURE: CPT | Performed by: EMERGENCY MEDICINE

## 2025-01-08 PROCEDURE — 80048 BASIC METABOLIC PNL TOTAL CA: CPT | Performed by: EMERGENCY MEDICINE

## 2025-01-08 PROCEDURE — 96375 TX/PRO/DX INJ NEW DRUG ADDON: CPT | Performed by: EMERGENCY MEDICINE

## 2025-01-08 RX ORDER — METOCLOPRAMIDE 10 MG/1
10 TABLET ORAL
Qty: 12 TABLET | Refills: 0 | Status: SHIPPED | OUTPATIENT
Start: 2025-01-08

## 2025-01-08 RX ORDER — PROMETHAZINE HYDROCHLORIDE 25 MG/1
25 SUPPOSITORY RECTAL EVERY 6 HOURS PRN
Qty: 12 SUPPOSITORY | Refills: 0 | Status: SHIPPED | OUTPATIENT
Start: 2025-01-08

## 2025-01-08 RX ORDER — METOCLOPRAMIDE HYDROCHLORIDE 5 MG/ML
10 INJECTION INTRAMUSCULAR; INTRAVENOUS ONCE
Status: COMPLETED | OUTPATIENT
Start: 2025-01-08 | End: 2025-01-08

## 2025-01-08 RX ORDER — DIPHENHYDRAMINE HYDROCHLORIDE 50 MG/ML
25 INJECTION INTRAMUSCULAR; INTRAVENOUS ONCE
Status: COMPLETED | OUTPATIENT
Start: 2025-01-08 | End: 2025-01-08

## 2025-01-08 RX ADMIN — METOCLOPRAMIDE 10 MG: 5 INJECTION, SOLUTION INTRAMUSCULAR; INTRAVENOUS at 19:05

## 2025-01-08 RX ADMIN — SODIUM CHLORIDE 1000 ML: 9 INJECTION, SOLUTION INTRAVENOUS at 19:43

## 2025-01-08 RX ADMIN — DIPHENHYDRAMINE HYDROCHLORIDE 25 MG: 50 INJECTION INTRAMUSCULAR; INTRAVENOUS at 19:04

## 2025-01-08 ASSESSMENT — COLUMBIA-SUICIDE SEVERITY RATING SCALE - C-SSRS
1. IN THE PAST MONTH, HAVE YOU WISHED YOU WERE DEAD OR WISHED YOU COULD GO TO SLEEP AND NOT WAKE UP?: NO
2. HAVE YOU ACTUALLY HAD ANY THOUGHTS OF KILLING YOURSELF IN THE PAST MONTH?: NO
6. HAVE YOU EVER DONE ANYTHING, STARTED TO DO ANYTHING, OR PREPARED TO DO ANYTHING TO END YOUR LIFE?: NO

## 2025-01-08 ASSESSMENT — ACTIVITIES OF DAILY LIVING (ADL)
ADLS_ACUITY_SCORE: 41
ADLS_ACUITY_SCORE: 41

## 2025-01-08 NOTE — ED PROVIDER NOTES
EMERGENCY DEPARTMENT ENCOUNTER      NAME: Lucinda Ji  AGE: 34 year old female  YOB: 1990  MRN: [id number]  EVALUATION DATE & TIME: No admission date for patient encounter.    PCP: Maryann Salas    ED PROVIDER: Anushka Bahena MD    Chief Complaint   Patient presents with    hyperemesis     12 weeks         FINAL IMPRESSION:  1. Hyperemesis gravidarum          ED COURSE & MEDICAL DECISION MAKING:    Pertinent Labs & Imaging studies reviewed. (See chart for details)  34 year old female with history of Daisy, depression, asthma  at approximately 12 weeks gestational age who presents to the Emergency Department for evaluation of hyperemesis gravidarum.  Patient is established with [facility] and they have already arranged for outpatient home infusions due to the severity of her hyperemesis.  Unfortunately patient really is not doing her part.  She has been seen in our emergency department and prescribed vitamin B6, Unisom, Zofran but has yet to get to the pharmacy to  these or her Keflex previously prescribed for asymptomatic bacteria in pregnancy.  Patient was urged to go  the medications as these will no doubt help.  Her abdomen is benign she has not had any bleeding or spotting.  My concern for dehydration, electrolyte abnormality is low as she is fairly vitally stable.      Patient initially seen eval by myself in triage area due to boarding crisis.  IV established.  CBC, BMP unremarkable.  Patient given 1 L bolus, Benadryl and Reglan with resolution of her symptoms and she was able to tolerate oral challenge.  Will discharge to home with prescriptions for Reglan, Phenergan suppositories.  Counseled regarding  her medications.      ED Course as of 25   1983 I met with the patient for the initial interview and physical examination. Discussed plan for treatment and workup in the ED.      Patient feels improved and able to tolerate  oral challenge       Medical Decision Making  Obtained supplemental history:Supplemental history obtained?:  EMS  Reviewed external records: External records reviewed?: Inpatient Record: 1/3/2025, [facility] Emergency Department and Other: 1/7/2025, [facility]  Care impacted by chronic illness:Chronic Lung Disease  Did you consider but not order tests?: Work up considered but not performed and documented in chart, if applicable  Did you interpret images independently?: Independent interpretation of ECG and images noted in documentation, when applicable.  Consultation discussion with other provider:Did you involve another provider (consultant, , pharmacy, etc.)?: No  Discharge. I prescribed additional prescription strength medication(s) as charted. See documentation for any additional details.    MIPS: Not Applicable      At the conclusion of the encounter I discussed the results of all of the tests and the disposition. The questions were answered. The patient or family acknowledged understanding and was agreeable with the care plan.      MEDICATIONS GIVEN IN THE EMERGENCY:  Medications   sodium chloride 0.9% BOLUS 1,000 mL (0 mLs Intravenous Stopped 1/8/25 1944)   metoclopramide (REGLAN) injection 10 mg (10 mg Intravenous $Given 1/8/25 1905)   diphenhydrAMINE (BENADRYL) injection 25 mg (25 mg Intravenous $Given 1/8/25 1904)       NEW PRESCRIPTIONS STARTED AT TODAY'S ER VISIT  New Prescriptions    METOCLOPRAMIDE (REGLAN) 10 MG TABLET    Take 1 tablet (10 mg) by mouth 4 times daily (before meals and nightly).    PROMETHAZINE (PHENERGAN) 25 MG SUPPOSITORY    Place 1 suppository (25 mg) rectally every 6 hours as needed.          =================================================================    HPI    Patient information was obtained from: Patient    Use of Intrepreter: N/A        Lucinda Ji is a 34 year old female with pertinent medical history of uterine leiomyoma, anxiety,  depression, obesity, elevated blood pressure without diagnosis of hypertension, hiatal hernia, stillbirth, mild intermittent asthma who presents to this emergency department by EMS for evaluation of nausea and nausea and vomiting in pregnancy.     Per chart review:     Patient was seen 1/3/2025 at [facility] Emergency Department for evaluation of nausea and vomiting. Patient was given Zofran and IV fluids. Lab work was okay although mild dehydration. Fetal heart tones showed heart rate of 158. Urine was nitrite positive with 10 RBC and 4 WBC and 6 squamous cells, plan to start on antibiotics. Patient was discharged with prescription for cephalexin.     Patient had a home care visit with [facility] yesterday (1/7/2025) at which time a peripheral IV was started in her left hand for hyperemesis without difficulty. She received education on how to self-administer IV fluids by RN.    Patient reports that she is currently in her 12th week of pregnancy and is experiencing ongoing hyperemesis. She has a history of 8 previous pregnancies; one living 10 year old, otherwise 7 miscarriages and/or terminations. Patient mentions that she experienced similar nausea and vomiting with previous pregnancies, previous being worse than current. She had a peripheral IV started by a nurse at home yesterday. Patient states that she was previously prescribed antibiotics due to positive UA, however, has not yet picked up this medication from the pharmacy. Patient does not have any anti-nausea medications at home.     Patient follows with [facility] for this pregnancy.     Patient denies vaginal bleeding, vaginal discharge, or any other symptoms at this time.       PAST MEDICAL HISTORY:  Past Medical History:   Diagnosis Date    Asthma     Fetal demise, greater than 22 weeks, antepartum, single gestation 10/29/2020    Gastritis     GBS bacteriuria 08/10/2020    Obesity        PAST SURGICAL  "HISTORY:  Past Surgical History:   Procedure Laterality Date    DAVINCI MYOMECTOMY N/A 09/07/2022    Procedure: ROBOTIC MYOMECTOMY;  Surgeon: Indra Reis MD;  [place] [facility] [place]    LYSIS, ADHESIONS, ROBOT-ASSISTED, LAPAROSCOPIC, USING DA BROOKE XI N/A 09/07/2022    Procedure: LYSIS, ADHESIONS, ROBOT-ASSISTED, LAPAROSCOPIC, USING DA BROOKE XI;  Surgeon: Indra Reis MD;  [place] [facility] [place]       CURRENT MEDICATIONS:    Prior to Admission Medications   Prescriptions Last Dose Informant Patient Reported? Taking?   Emergency Supply Kit, PIV,   No No   Sig: Patient use for emergency only. Contents: 3 sodium chloride 0.9% flushes, 1 IV start kit, 1 microclave ext set 14\", 1 each IV Cath 22 G/1\" and 24G/3/4\", 6 alcohol prep pads, 4 nitrile gloves (med). Call [phone] to reorder.   Prenatal Vit-Fe Fumarate-FA (PRENATAL MULTIVITAMIN  WITH IRON) 28-0.8 MG TABS   No No   Sig: Take 1 tablet by mouth daily.   albuterol (PROAIR HFA/PROVENTIL HFA/VENTOLIN HFA) 108 (90 Base) MCG/ACT inhaler   No No   Sig: Inhale 1-2 puffs into the lungs every 4 hours as needed for shortness of breath or wheezing   aspirin 81 MG EC tablet   No No   Sig: Take 1 tablet (81 mg) by mouth daily.   cephALEXin (KEFLEX) 500 MG capsule   No No   Sig: Take 1 capsule (500 mg) by mouth 2 times daily for 5 days.   doxylamine (UNISOM) 25 MG TABS tablet   No No   Sig: Take 0.5-1 tablets (12.5-25 mg) by mouth at bedtime.   lactated ringers in 1,000 mL via gravity infusion   No No   Sig: Infuse over 2-4 hours into the vein daily as needed (dehydration). Infuse 1-3 bag(s) (1962-0987 mL). Each bag to infuse over 2-4 hours.   ondansetron (ZOFRAN) 4 MG tablet   No No   Sig: Take 1 tablet (4 mg) by mouth every 6 hours as needed for nausea or vomiting.   pyridOXINE (VITAMIN B6) 25 MG tablet   No No   Sig: Take 1 tablet (25 mg) by mouth 3 times daily.   sodium chloride, PF, 0.9% PF flush   No No   Sig: Inject 10 mLs into the " "vein as needed for line flush. Flush IV before and after medication administration as directed and/or at least every 12 hours.      Facility-Administered Medications: None       ALLERGIES:  Allergies   Allergen Reactions    No Clinical Screening - See Comments Other (See Comments)     \"I was all jittery and they had to give me benadryl.\"  \"I was all jittery and they had to give me benadryl.\"       FAMILY HISTORY:  Family History   Problem Relation Age of Onset    Brain Tumor Mother     Heart Disease Father     Thyroid Disease Sister     Asthma Brother     Cancer Mother     Hypertension Father     Asthma Brother        SOCIAL HISTORY:  Social History     Tobacco Use    Smoking status: Former     Types: Cigarettes    Smokeless tobacco: Never   Vaping Use    Vaping status: Never Used   Substance Use Topics    Alcohol use: Not Currently     Comment: occasional    Drug use: Not Currently     Types: Marijuana        VITALS:  Patient Vitals for the past 24 hrs:   BP Temp Pulse Resp SpO2 Height Weight   01/08/25 1718 (!) 141/78 -- -- -- -- -- --   01/08/25 1709 -- 98.9  F (37.2  C) 96 16 100 % 1.676 m (5' 6\") 133.8 kg (295 lb)       PHYSICAL EXAM    General Appearance: Well-appearing, well-nourished, no acute distress, morbidly obese  Head:  Normocephalic  Cardio:  Regular rate and rhythm, hypertensive  Pulm:  No respiratory distress, clear to auscultation bilaterally  Abdomen:  Soft, non-tender, morbidly obese  Extremities: Normal gait  Skin:  Skin warm, dry, no rashes  Neuro:  Alert and oriented ×3     RADIOLOGY/LABS:  Reviewed all pertinent imaging. Please see official radiology report. All pertinent labs reviewed and interpreted.    Results for orders placed or performed during the hospital encounter of 01/08/25   Basic metabolic panel   Result Value Ref Range    Sodium 135 135 - 145 mmol/L    Potassium 4.1 3.4 - 5.3 mmol/L    Chloride 101 98 - 107 mmol/L    Carbon Dioxide (CO2) 22 22 - 29 mmol/L    Anion Gap 12 7 - 15 " mmol/L    Urea Nitrogen 4.2 (L) 6.0 - 20.0 mg/dL    Creatinine 0.64 0.51 - 0.95 mg/dL    GFR Estimate >90 >60 mL/min/1.73m2    Calcium 9.5 8.8 - 10.4 mg/dL    Glucose 88 70 - 99 mg/dL   CBC with platelets and differential   Result Value Ref Range    WBC Count 11.6 (H) 4.0 - 11.0 10e3/uL    RBC Count 4.48 3.80 - 5.20 10e6/uL    Hemoglobin 13.3 11.7 - 15.7 g/dL    Hematocrit 40.2 35.0 - 47.0 %    MCV 90 78 - 100 fL    MCH 29.7 26.5 - 33.0 pg    MCHC 33.1 31.5 - 36.5 g/dL    RDW 13.6 10.0 - 15.0 %    Platelet Count 369 150 - 450 10e3/uL    % Neutrophils 72 %    % Lymphocytes 20 %    % Monocytes 5 %    % Eosinophils 2 %    % Basophils 1 %    % Immature Granulocytes 1 %    NRBCs per 100 WBC 0 <1 /100    Absolute Neutrophils 8.3 1.6 - 8.3 10e3/uL    Absolute Lymphocytes 2.3 0.8 - 5.3 10e3/uL    Absolute Monocytes 0.6 0.0 - 1.3 10e3/uL    Absolute Eosinophils 0.3 0.0 - 0.7 10e3/uL    Absolute Basophils 0.1 0.0 - 0.2 10e3/uL    Absolute Immature Granulocytes 0.1 <=0.4 10e3/uL    Absolute NRBCs 0.0 10e3/uL         The creation of this record is based on the scribe s observations of the work being performed by Anushka Bahena MD and the provider s statements to them. It was created on her behalf by Yobani Rodriguez, a trained medical scribe. This document has been checked and approved by the attending provider.    Anushka Bahena MD  Emergency Medicine  [facility] EMERGENCY DEPARTMENT  [facility] [facility] [phone]  Dept: [phone]      Anushka Bahena MD  01/08/25 2010

## 2025-01-08 NOTE — ED TRIAGE NOTES
Pt arrives per medics she is 12 weeks gestation with hyperemesis. Just started home infusions yesterday. Has been unable to get to the pharmacy to  the anti nausea med. Was given zofran 4mg in route per medics with some relief     Triage Assessment (Adult)       Row Name 01/08/25 3578          Triage Assessment    Airway WDL WDL        Respiratory WDL    Respiratory WDL WDL        Skin Circulation/Temperature WDL    Skin Circulation/Temperature WDL WDL        Cardiac WDL    Cardiac WDL WDL        Peripheral/Neurovascular WDL    Peripheral Neurovascular WDL WDL        Cognitive/Neuro/Behavioral WDL    Cognitive/Neuro/Behavioral WDL WDL

## 2025-01-09 ENCOUNTER — TELEPHONE (OUTPATIENT)
Dept: FAMILY MEDICINE | Facility: CLINIC | Age: 35
End: 2025-01-09
Payer: COMMERCIAL

## 2025-01-09 PROCEDURE — S9374 HIT HYDRA 1 LITER DIEM: HCPCS

## 2025-01-09 NOTE — TELEPHONE ENCOUNTER
"Called pt and she states that she still has not been able to pick her her meds.  Offered helping her set up a taxi ride through insurance and pt accepted.      Medical Transportation Coordination    Appt Date: 1/10/25               Arrival Time: 11:00 AM    Appt Location & Address:   [facility] [address]    Total family members:   1    Tranportation Name & Phone Number:   [employer]  [phone]        Roundtrip?  Yes will call return ride  Patient Notified?  Yes    Called and scheduled pt for Friday, 1/17/25 at 10:15 AM for genetic counseling and nuchal translucency at [facility].  Told pt that she is scheduled to have Level 2 u/s and consult on 2/24/25 at 11:00 AM.     Medical Transportation Coordination    Appt Date: 1/17/25               Arrival Time: 10:15 AM    Appt Location & Address:   [facility]    Total family members:   1    Tranportation Name & Phone Number:   [employer]  [phone]    Estimated Pick-up Time:   9:15-9:45 AM    Roundtrip?  Yes  Patient Notified?  Yes    Assisted pt with scheduling an appt for Monday, 1/13/25 at 1:30 PM for lab only appt to draw NOB labs.  Pt states that the ER placed a new peripheral IV since the one HHN placed \"blew\".  She states that she was able to keep down food so has not ran any fluids yet but does plan to this afternoon.  Gave her the phone number for [employer] and told her that she can call to set up rides in the future.  Reminded her that she has an appt with Dr Feldman on 1/16/25 and Dr Feldman will go over lab results at that appt.  She denies any further questions or concerns./Effie Prescott RN BSN       Called [facility] and prescriptions that Dr Feldman did on 1/2/25 are being denied due to Dr Feldman not being listed as restricted provider.  Discussed with Josselyn (clinic manager) and she states that Dr Feldman, Dr Ghosh, and Dr Salas are listed as pt's restricted providers.  She is unsure why " pharmacy would get denial and suggests that Dr Ghosh place the prescriptions.  Routed note to Dr Ghosh./Effie Prescott RN BSN

## 2025-01-09 NOTE — ED NOTES
Denies nausea at this time. Feeling better at this time.     MD okay with sending pt home with IV placed in R arm for home infusions.

## 2025-01-09 NOTE — DISCHARGE INSTRUCTIONS
Use the previously prescribed vitamin B6, Unisom.   the previously prescribed Keflex from the pharmacy for bacteria in the urine.  You have now 3 different nausea medicines for home.  The Zofran dissolvable tablets, Reglan pills and Phenergan suppository.  The Reglan and the Phenergan are close enough biochemically that I do not want to using them together.  You could however take the Zofran followed by either of the Reglan and Phenergan for ongoing nausea and vomiting.

## 2025-01-10 PROCEDURE — S9374 HIT HYDRA 1 LITER DIEM: HCPCS

## 2025-01-11 PROCEDURE — S9374 HIT HYDRA 1 LITER DIEM: HCPCS

## 2025-01-12 PROCEDURE — S9374 HIT HYDRA 1 LITER DIEM: HCPCS

## 2025-01-13 PROCEDURE — S9374 HIT HYDRA 1 LITER DIEM: HCPCS

## 2025-01-14 PROCEDURE — S9374 HIT HYDRA 1 LITER DIEM: HCPCS

## 2025-01-15 PROCEDURE — S9374 HIT HYDRA 1 LITER DIEM: HCPCS

## 2025-01-16 ENCOUNTER — TELEPHONE (OUTPATIENT)
Dept: FAMILY MEDICINE | Facility: CLINIC | Age: 35
End: 2025-01-16

## 2025-01-16 ENCOUNTER — OFFICE VISIT (OUTPATIENT)
Dept: FAMILY MEDICINE | Facility: CLINIC | Age: 35
End: 2025-01-16
Payer: COMMERCIAL

## 2025-01-16 VITALS
TEMPERATURE: 98.1 F | RESPIRATION RATE: 20 BRPM | OXYGEN SATURATION: 99 % | DIASTOLIC BLOOD PRESSURE: 82 MMHG | HEART RATE: 99 BPM | BODY MASS INDEX: 47.09 KG/M2 | WEIGHT: 293 LBS | SYSTOLIC BLOOD PRESSURE: 131 MMHG | HEIGHT: 66 IN

## 2025-01-16 DIAGNOSIS — O09.91 SUPERVISION OF HIGH-RISK PREGNANCY, FIRST TRIMESTER: ICD-10-CM

## 2025-01-16 DIAGNOSIS — B37.31 YEAST VAGINITIS: Primary | ICD-10-CM

## 2025-01-16 LAB
ABO + RH BLD: NORMAL
ALBUMIN MFR UR ELPH: 20.7 MG/DL
ALT SERPL W P-5'-P-CCNC: 18 U/L (ref 0–50)
ANION GAP SERPL CALCULATED.3IONS-SCNC: 8 MMOL/L (ref 7–15)
AST SERPL W P-5'-P-CCNC: 17 U/L (ref 0–45)
BLD GP AB SCN SERPL QL: NEGATIVE
BUN SERPL-MCNC: 5.7 MG/DL (ref 6–20)
CALCIUM SERPL-MCNC: 9.1 MG/DL (ref 8.8–10.4)
CHLORIDE SERPL-SCNC: 106 MMOL/L (ref 98–107)
CREAT SERPL-MCNC: 0.64 MG/DL (ref 0.51–0.95)
CREAT UR-MCNC: 388 MG/DL
EGFRCR SERPLBLD CKD-EPI 2021: >90 ML/MIN/1.73M2
ERYTHROCYTE [DISTWIDTH] IN BLOOD BY AUTOMATED COUNT: 13.7 % (ref 10–15)
EST. AVERAGE GLUCOSE BLD GHB EST-MCNC: 114 MG/DL
GLUCOSE SERPL-MCNC: 115 MG/DL (ref 70–99)
HBA1C MFR BLD: 5.6 % (ref 0–5.6)
HBV SURFACE AG SERPL QL IA: NONREACTIVE
HCO3 SERPL-SCNC: 21 MMOL/L (ref 22–29)
HCT VFR BLD AUTO: 35.3 % (ref 35–47)
HGB BLD-MCNC: 11.4 G/DL (ref 11.7–15.7)
HIV 1+2 AB+HIV1 P24 AG SERPL QL IA: NONREACTIVE
MCH RBC QN AUTO: 29.2 PG (ref 26.5–33)
MCHC RBC AUTO-ENTMCNC: 32.3 G/DL (ref 31.5–36.5)
MCV RBC AUTO: 90 FL (ref 78–100)
PLATELET # BLD AUTO: 337 10E3/UL (ref 150–450)
POTASSIUM SERPL-SCNC: 4.1 MMOL/L (ref 3.4–5.3)
PROT/CREAT 24H UR: 0.05 MG/MG CR (ref 0–0.2)
RBC # BLD AUTO: 3.91 10E6/UL (ref 3.8–5.2)
RUBV IGG SERPL QL IA: 9.2 INDEX
RUBV IGG SERPL QL IA: POSITIVE
SODIUM SERPL-SCNC: 135 MMOL/L (ref 135–145)
SPECIMEN EXP DATE BLD: NORMAL
SPECIMEN EXP DATE BLD: NORMAL
T PALLIDUM AB SER QL: NONREACTIVE
TSH SERPL DL<=0.005 MIU/L-ACNC: 0.43 UIU/ML (ref 0.3–4.2)
WBC # BLD AUTO: 8.7 10E3/UL (ref 4–11)

## 2025-01-16 PROCEDURE — S9374 HIT HYDRA 1 LITER DIEM: HCPCS

## 2025-01-16 RX ORDER — MICONAZOLE NITRATE 200 MG-2 %
1 KIT VAGINAL AT BEDTIME
Qty: 1 KIT | Refills: 0 | Status: SHIPPED | OUTPATIENT
Start: 2025-01-16 | End: 2025-01-19

## 2025-01-16 RX ORDER — ASPIRIN 81 MG/1
81 TABLET ORAL DAILY
Qty: 90 TABLET | Refills: 2 | Status: SHIPPED | OUTPATIENT
Start: 2025-01-16

## 2025-01-16 NOTE — TELEPHONE ENCOUNTER
General Call    Contacts       Contact Date/Time Type Contact Phone/Fax    01/16/2025 03:18 PM CST Phone (Incoming) Ivonne Ji (Self) [phone] (M)          Reason for Call:  talk     What are your questions or concerns:  the pt lost the number to the ride share and needs a call back to get it     Date of last appointment with provider:     Could we send this information to you in [facility] or would you prefer to receive a phone call?:   Patient would prefer a phone call   Okay to leave a detailed message?: Yes at Cell number on file:    Telephone Information:   Mobile [phone]         Does this patient currently have active insurance coverage?  Yes, Pt has active insurance coverage.     Does patient or caller know when to expect a call? Yes, Nurses will return call within 2-3 business hours.    Franc Troy on 1/16/2025 at 3:18 PM

## 2025-01-16 NOTE — PROGRESS NOTES
Assessment & Plan  Lucinda Ji is a 34 year old , at 12w6d weeks of pregnancy with ASCENCION of 2025 by  8 week ultrasound.  Patient's history is high risk for the following reasons: History of myomectomy, history of third trimester fetal demise at 31 weeks, history of hyperemesis gravidarum, pregravid BMI >40, possible essential/pregestational hypertension. Nausea much improved by today's visit and able to eat much better.      # Other concerns (medical problems, high risk surgical/obstetric history, high risk social situation, etc):   - In addition to routine prenatal care, patient will need:     # History of 31w demise:   - Plan for comprehensive anatomy ultrasound and lab workup  - MFM referral for consultation for history of demise - Tomorrow  - No identifiable lab workup for loss - labs drawn today  History of 2nd or 3rd trimester loss, currently pregnant  If Unexplained IUFD check:   [ ] Beta 2 glycoprotein antibodies  [ ] Lupus anticoagulant panel  [ ] Cardiolipin Ig G   [ ] Cardiolipin Ig M  [ ] TSH  [ ] HbA1c  - Fetal monitoring - initiation around timing of prior demise  - ASA as below     # History of robotic vaginal myomectomy: Advised to avoid future vaginal delivery, so suspect transmural myomectomy.  - Refer OB/GYN for surgical consultation as pregnancy progresses     # Hyperemesis gravidarum:  - doxylamine, B6, ondansetron  - IV home infusion services ordered - no longer needed     # Pregravid BMI >40:  [ ] Goal weight gain 0-11 lbs, majority after 20 wks gestation - discussed today  [ ] Q 4 week growth sonos in third trimester to ensure habitus not confounding assessment of fetal growth  [ ]  testing starting at 36 weeks     # Possible pregestational hypertension: Very high Bps in the ED when seen for hyperemesis; improved today  - Obtain baseline preeclampsia labs  - Home BP cuff DME  - ASA 12+ weeks  - Continue to monitor.     Ivonne was seen today for prenatal care and  std.    Diagnoses and all orders for this visit:    Supervision of high-risk pregnancy, first trimester  -     Beta 2 Glycoprotein Antibodies IGG IGM  -     Cardiolipin Anyi IgG and IgM  -     Lupus Anticoagulant Panel  -     TSH with free T4 reflex  -     Protein  random urine  -     CBC with Plt  -     Basic Metabolic Panel  -     AST / SGOT  -     ALT (SGPT)  -     Hemoglobin A1c  -     Hemoglobinopathy/[place]ale  -     Neisseria gonorrhoeae PCR  URINE  -     Chlamydia trachomatis PCR  URINE  -     Syphilis Screen Cascade  -     Rubella Antibody IgG  -     Lead, Blood  -     HIV Ag/Ab Screen Cascade  -     Hepatitis B Surface Ag  -     Culture Urine  -     Cancel: CBC with Plt  -     Antibody Screen  -     ABO/Rh Type-HML        Weight gain adequate: 2.449 kg (5 lb 6.4 oz) to date, out of recommended total of 0-20 lbs (pregravid BMI >30)      Return to clinic in 4 weeks.    Izabel Feldman MD    Future Appointments   Date Time Provider Department Center   1/17/2025 10:15 AM RH GEN COUNSELOR 1 [facility] RID   1/17/2025 11:00 AM [id number] MFUS [place] RID   1/17/2025 11:30 AM RH ALEXA HUMPHREY [facility] RID   2/12/2025  1:30 PM Joie Ghosh MD [facility]   2/24/2025 11:00 AM [facility]   2/24/2025 11:45 AM [facility]   3/5/2025  2:50 PM Izabel Feldman MD [facility]           Subjective  Concerns: Nausea improved. Pharmacy did not give all medications recommended.  Has some vaginal discharge that is irritating.     ROS:  No - Headache  No - Changes in vision  No - Dysuria   YES - Vaginal Discharge    No - Vaginal bleeding         Patient Active Problem List   Diagnosis    Anxiety and depression    Class 3 severe obesity due to excess calories without serious comorbidity with body mass index (BMI) of 40.0 to 44.9 in adult (H)    Mild intermittent asthma in adult without complication    Hiatal hernia    Uterine leiomyoma    History of stillbirth in  "pregnant patient in first trimester, antepartum    Elevated blood pressure reading without diagnosis of hypertension    Supervision of high-risk pregnancy, first trimester       Lucinda Ji speaks English so an  was not used today.    Guidance:  genetic testing  weight gain and exercise    Going to [facility]? Did not inquire    Objective  /82   Pulse 99   Temp 98.1  F (36.7  C) (Oral)   Resp 20   Ht 1.676 m (5' 6\")   Wt 135.4 kg (298 lb 6.4 oz)   LMP 10/20/2024 (Approximate)   SpO2 99%   BMI 48.16 kg/m    No distress.  Gravid abdomen.  .     Results  Blood type: B POS  Results for orders placed or performed in visit on 01/16/25   CBC with Plt     Status: Abnormal   Result Value Ref Range    WBC Count 8.7 4.0 - 11.0 10e3/uL    RBC Count 3.91 3.80 - 5.20 10e6/uL    Hemoglobin 11.4 (L) 11.7 - 15.7 g/dL    Hematocrit 35.3 35.0 - 47.0 %    MCV 90 78 - 100 fL    MCH 29.2 26.5 - 33.0 pg    MCHC 32.3 31.5 - 36.5 g/dL    RDW 13.7 10.0 - 15.0 %    Platelet Count 337 150 - 450 10e3/uL   Hemoglobin A1c     Status: Normal   Result Value Ref Range    Estimated Average Glucose 114 <117 mg/dL    Hemoglobin A1C 5.6 0.0 - 5.6 %      "

## 2025-01-16 NOTE — TELEPHONE ENCOUNTER
Pt states that she called [employer] and they told her that they do not have a ride set up for her.     Called [employer] and they have been having glitches with their system erasing rides if they are set up to far in advance (was scheduled on 1/9/25).  Ride was reset for tomorrow with [employer] [phone] to pick her up between 9:00-9:30 AM.    Called pt and gave her info./Effie Prescott RN BSN

## 2025-01-17 ENCOUNTER — LAB (OUTPATIENT)
Dept: LAB | Facility: CLINIC | Age: 35
End: 2025-01-17
Attending: OBSTETRICS & GYNECOLOGY
Payer: COMMERCIAL

## 2025-01-17 ENCOUNTER — HOSPITAL ENCOUNTER (OUTPATIENT)
Dept: ULTRASOUND IMAGING | Facility: CLINIC | Age: 35
Discharge: HOME | End: 2025-01-17
Attending: OBSTETRICS & GYNECOLOGY
Payer: COMMERCIAL

## 2025-01-17 DIAGNOSIS — O09.291 HISTORY OF STILLBIRTH IN PREGNANT PATIENT IN FIRST TRIMESTER, ANTEPARTUM: ICD-10-CM

## 2025-01-17 DIAGNOSIS — E66.813 CLASS 3 SEVERE OBESITY DUE TO EXCESS CALORIES WITHOUT SERIOUS COMORBIDITY WITH BODY MASS INDEX (BMI) OF 40.0 TO 44.9 IN ADULT (H): ICD-10-CM

## 2025-01-17 DIAGNOSIS — O09.91 SUPERVISION OF HIGH-RISK PREGNANCY, FIRST TRIMESTER: ICD-10-CM

## 2025-01-17 DIAGNOSIS — Z36.9 ENCOUNTER FOR ANTENATAL SCREENING OF MOTHER: ICD-10-CM

## 2025-01-17 DIAGNOSIS — E66.01 CLASS 3 SEVERE OBESITY DUE TO EXCESS CALORIES WITHOUT SERIOUS COMORBIDITY WITH BODY MASS INDEX (BMI) OF 40.0 TO 44.9 IN ADULT (H): ICD-10-CM

## 2025-01-17 PROCEDURE — 36415 COLL VENOUS BLD VENIPUNCTURE: CPT

## 2025-01-17 PROCEDURE — S9374 HIT HYDRA 1 LITER DIEM: HCPCS

## 2025-01-17 PROCEDURE — 76813 OB US NUCHAL MEAS 1 GEST: CPT

## 2025-01-18 PROCEDURE — S9374 HIT HYDRA 1 LITER DIEM: HCPCS

## 2025-01-19 LAB — LEAD BLDV-MCNC: <2 UG/DL

## 2025-01-19 PROCEDURE — S9374 HIT HYDRA 1 LITER DIEM: HCPCS

## 2025-01-20 LAB
PERFORMING LABORATORY: NORMAL
SPECIMEN STATUS: NORMAL
TEST NAME: NORMAL

## 2025-01-20 PROCEDURE — S9374 HIT HYDRA 1 LITER DIEM: HCPCS

## 2025-01-21 ENCOUNTER — HOME INFUSION (OUTPATIENT)
Dept: HOME HEALTH SERVICES | Facility: HOME HEALTH | Age: 35
End: 2025-01-21
Payer: COMMERCIAL

## 2025-01-21 DIAGNOSIS — O21.0 HYPEREMESIS GRAVIDARUM: Primary | ICD-10-CM

## 2025-01-23 PROBLEM — O34.29 PREGNANCY W/ HX OF UTERINE MYOMECTOMY: Status: ACTIVE | Noted: 2025-01-23

## 2025-01-29 ENCOUNTER — TELEPHONE (OUTPATIENT)
Dept: MATERNAL FETAL MEDICINE | Facility: CLINIC | Age: 35
End: 2025-01-29
Payer: COMMERCIAL

## 2025-01-29 LAB — SCANNED LAB RESULT: NORMAL

## 2025-01-29 NOTE — TELEPHONE ENCOUNTER
January 29, 2025    Left a message for Ivonne regarding her Prequel (NIPT) results through [employer].     Results indicate NO ANEUPLOIDY DETECTED for chromosomes 21, 18, 13, or sex chromosomes (XX - female predicted fetal sex). Results also indicate NO MICRODELETION DETECTED for the 22q11.2 or other select microdeletions (15q11.2, 1p36, 4p, and 5p).     This puts her current pregnancy at low risk for Down syndrome, Terisomy 18, Terisomy 13 and sex chromosome abnormalities. This test is reported to have the following sensitivities: Down syndrome: 99.7%, Terisomy 18: 97.9%, Terisomy 13: 99.0%, monosomy X: 95.8%, XX: 97.6%, and XY: 99.1%. Although these results are reassuring, this does not replace a standard chromosome analysis from a chorionic villus sampling or amniocentesis. The results also reduce, but do not eliminate, the risk for 22q11.2 deletion syndrome and other select microdeletions.    Ivonne is scheduled for a comprehensive anatomy scan with [facility] on 2/24/25.    MSAFP is the appropriate second Terimester screening test for open neural tube defects; the maternal quad screen is not recommended.    Her results are available in her Epic chart for her primary OB to review.    Lester Suarez MS, [facility]  Board Certified and [place] Licensed Genetic Counselor  [facility]  Maternal Fetal Medicine  Office: [phone]  [facility]: [phone]   Fax: [phone]  [facility]

## 2025-02-03 ENCOUNTER — TELEPHONE (OUTPATIENT)
Dept: MATERNAL FETAL MEDICINE | Facility: CLINIC | Age: 35
End: 2025-02-03
Payer: COMMERCIAL

## 2025-02-03 LAB — SCANNED LAB RESULT: NORMAL

## 2025-02-03 NOTE — PROGRESS NOTES
2/5/2025    Virtual Visit Details  Type of service:  Video Visit   Video Start Time: 11:56 AM  Video End Time:12:27 PM  Originating Location (pt. Location): Home  Distant Location (provider location):  Off-site  Platform used for Video Visit: [employer]    Referring Provider: Christiane Bunn MD     Presenting Information:   I met with Lucinda Ji today for her video genetic counseling visit through the Cancer Risk Management Program to discuss her family history of young onset cy tumors. She is here today to review this history, cancer screening recommendations, and available genetic testing options.    Personal History:  Lucinda is a 34 year old female. She does not have any personal history of cancer. In her hormonal-based medical history, she had her first menstrual period at age 9, her first child at age 24, and is premenopausal. Lucinda has her ovaries, fallopian tubes and uterus in place, and reports no ovarian cancer screening to date. She reports no history of hormone replacement therapy or oral contraceptive use. She has not had mammogram or colonoscopy. Lucinda denies any history of dermatological exams. She does not regularly do any other cancer screening at this time. Tri reported a history of nicotine or tobacco use for a couple years and no current alcohol use.    Family History: (Please see scanned pedigree for detailed family history information)  Lucinda's mother was diagnosed with a brain tumor in her early 20's. She passed away at age 23.  Maternal aunt was diagnosed with a brain tumor and passed away at age 10.  Lucinda's father was recently diagnosed with cancer (unknown type) and is currently in his late 50's.   Lucinda has limited information regarding maternal and paternal extended relative's medical histories but has not been told additional relatives have been diagnosed with cancers or tumors.    Her maternal and paternal ethnicity is Black. There is no known Ashkenazi Sikhism ancestry on either side of her family.  There is no reported consanguinity.    Family Structure  Daughters: 1; Sons: 0  Sisters: 0; Brothers: 0; Maternal half sisters: 1; Maternal half brothers: 1; Paternal half sisters: 1; Paternal half brothers: 1  Maternal aunts: At least one; Maternal uncles: Unknown  Paternal aunts: Unknown; Paternal uncles: Unknown    Discussion:  Lucinda's family history of young onset brain tumors is suggestive of a hereditary cancer syndrome.  We briefly reviewed basic genetics principles. Many of the genes we are born with impact our risk of certain diseases, such as cancer.  When one of these genes is not working properly due to a mistake (known as a  mutation  or  variant ), this may lead to an increased risk of developing cancer.   We reviewed the features of sporadic, familial, and hereditary cancers.   Cancer is a common diagnosis which impacts many families. The vast majority of cancers are considered sporadic and not primarily due to an inherited factor. Individuals can develop cancer due to aging, chance events, environmental exposures or lifestyles.  Only ~5-10% of cancer diagnoses are thought to be caused by inheriting a mutation or variant within a single cancer susceptibility gene. Features typically seen in these high risk families include: cancers diagnosed under age 50, multiple relatives with similar cancers on the same side of the family, cancers in multiple generations, and relatives with certain rare cancers (i.e., male breast cancer).   We discussed the natural history and genetics of several hereditary cancer syndromes, including Freedman syndrome.   Freedman syndrome can be caused by a mutation in one of five genes:  MLH1, MSH2, MSH6, PMS2, and EPCAM. A single mutation in one of the Freedman Syndrome genes increases the risk for several cancers, such as colon cancer, endometrial/uterine cancer, gastric cancer, and ovarian cancer. Other cancers have also been reported in some families with Freedman Syndrome include  pancreatic, bladder, biliary tract, urothelial, small bowel, prostate, breast and brain cancers.  A detailed handout regarding Freedman syndrome and the information we discussed will be provided to Tri via [employer] and can be found in the after visit summary. Topics included: inheritance pattern, cancer risks, cancer screening recommendations, and also risks, benefits and limitations of testing.  In looking at Lucinda's personal and family history, it is possible that a cancer susceptibility gene is present due to her mother diagnosed with a brain tumor in her early 20's and her maternal aunt diagnosed with a brain tumor at age 10. Of note, both affected relatives passed away from their diagnosis.  Based on her personal and family history, Tri meets current National Comprehensive Cancer Network (NCCN) criteria for genetic testing of Freedman syndrome genes (i.e. EPCAM, MLH1, MSH2, MSH6, and PMS2).   We discussed that there are additional genes that could cause increased risk for brain tumors. As many of these genes present with overlapping features in a family and accurate cancer risk cannot always be established based upon the pedigree analysis alone, it would be reasonable for Lucinda to consider panel genetic testing to analyze multiple genes at once.  We reviewed genetic testing options for Tri based on her personal and family history: a panel of genes associated with an increased risk for hereditary brain tumors/cancers, or larger panel options to include genes associated with increased risk for multiple different cancer types. Lucinda expressed interest in the Hereditary Brain Cancers panel + the Multi-Cancer panel through [facility].  Tri would like to submit a blood sample for her genetic testing. She will go to her nearest [facility] at her earliest convenience to get her blood drawn for genetic testing.   Verbal consent was given over video and written on the consent form. Turnaround time is  approximately 3-4 weeks once the lab receives the sample.  Medical Management: For Tri, we reviewed that the information from genetic testing may determine:  additional cancer screening for which Tri may qualify (i.e. mammogram and breast MRI, more frequent colonoscopies, more frequent dermatologic exams, etc.),  options for risk reducing surgeries Tri could consider (i.e. bilateral mastectomy, surgery to remove her ovaries and/or uterus, etc.),    and targeted chemotherapies if she were to develop certain cancers in the future (i.e. immunotherapy for individuals with Freedman syndrome, PARP inhibitors, etc.).   These recommendations and possible targeted chemotherapies will be discussed in detail once genetic testing is completed.     Plan:  1) Today Tri elected to proceed with the Hereditary Brain Cancers panel + the Multi-Cancer panel through [facility]. Therefore, consent was reviewed and verbally obtained for this testing.   2) Lucinda plans to schedule her blood draw appointment at a clinic that is convenient to her.   3) The results should be available in 3-4 weeks after her blood is drawn.  4) Lucinda will either have a telephone visit or video visit to discuss the results.  Additional recommendations about screening will be made at that time.    Lucinda is 34 year old and is being evaluated via a billable video visit.    Time spent on date of the encounter doing chart review, video appointment, documentation, and further activities as noted above: 68 minutes    Susie Garcia MS, [facility]  Licensed, Certified Genetic Counselor  Phone: [phone]

## 2025-02-03 NOTE — TELEPHONE ENCOUNTER
February 3, 2025    I spoke with Tri regarding her [facility] single-gene NIPT results.     Results were LOW-RISK for the 25 genetic conditions screened for.    These results are not diagnostic, abut lower risk for these conditions. This screening was done due to advanced paternal age.    Her results are available in her Epic chart for her primary OB to review.    Dori Serna MS, [facility]  Licensed Genetic Counselor  [facility]  Pager: [phone]  Office: [phone]

## 2025-02-05 ENCOUNTER — VIRTUAL VISIT (OUTPATIENT)
Dept: ONCOLOGY | Facility: CLINIC | Age: 35
End: 2025-02-05
Attending: OBSTETRICS & GYNECOLOGY
Payer: COMMERCIAL

## 2025-02-05 DIAGNOSIS — Z84.89 FAMILY HISTORY OF BRAIN TUMOR: Primary | ICD-10-CM

## 2025-02-05 PROCEDURE — 96041 GENETIC COUNSELING SVC EA 30: CPT | Mod: GT,95

## 2025-02-05 NOTE — Clinical Note
2/5/2025      Lucinda Ji  [address] [place] [address]      Dear Colleague,    Thank you for referring your patient, Lucinda Ji, to the [facility]. Please see a copy of my visit note below.    2/5/2025    Virtual Visit Details  Type of service:  Video Visit   Video Start Time: 11:56 AM  Video End Time:12:27 PM  Originating Location (pt. Location): Home  Distant Location (provider location):  Off-site  Platform used for Video Visit: [facility]    Referring Provider: Christiane Bunn MD     Presenting Information:   I met with Lucinda Ji today for her video genetic counseling visit through the Cancer Risk Management Program to discuss her family history of young onset cy tumors. She is here today to review this history, cancer screening recommendations, and available genetic testing options.    Personal History:  Lucinda is a 34 year old female. She does not have any personal history of cancer. In her hormonal-based medical history, she had her first menstrual period at age 9, her first child at age 24, and is premenopausal. Lucinda has her ovaries, fallopian tubes and uterus in place, and reports no ovarian cancer screening to date. She reports no history of hormone replacement therapy or oral contraceptive use. She has not had mammogram or colonoscopy. Lucinda denies any history of dermatological exams. She does not regularly do any other cancer screening at this time. Tri reported a history of nicotine or tobacco use for a couple years and no current alcohol use.    Family History: (Please see scanned pedigree for detailed family history information)  Lucinda's mother was diagnosed with a brain tumor in her early 20's. She passed away at age 23.  Maternal aunt was diagnosed with a brain tumor and passed away at age 10.  Lucinda's father was recently diagnosed with cancer (unknown type) and is currently in his late 50's.   Lucinda has limited information regarding maternal and paternal extended relative's  medical histories but has not been told additional relatives have been diagnosed with cancers or tumors.    Her maternal and paternal ethnicity is Black. There is no known Ashkenazi Synagogue ancestry on either side of her family. There is no reported consanguinity.    Family Structure  Daughters: 1; Sons: 0  Sisters: 0; Brothers: 0; Maternal half sisters: 1; Maternal half brothers: 1; Paternal half sisters: 1; Paternal half brothers: 1  Maternal aunts: At least one; Maternal uncles: Unknown  Paternal aunts: Unknown; Paternal uncles: Unknown    Discussion:  Lucinda's family history of young onset brain tumors is suggestive of a hereditary cancer syndrome.  We briefly reviewed basic genetics principles. Many of the genes we are born with impact our risk of certain diseases, such as cancer.  When one of these genes is not working properly due to a mistake (known as a  mutation  or  variant ), this may lead to an increased risk of developing cancer.   We reviewed the features of sporadic, familial, and hereditary cancers.   Cancer is a common diagnosis which impacts many families. The vast majority of cancers are considered sporadic and not primarily due to an inherited factor. Individuals can develop cancer due to aging, chance events, environmental exposures or lifestyles.  Only ~5-10% of cancer diagnoses are thought to be caused by inheriting a mutation or variant within a single cancer susceptibility gene. Features typically seen in these high risk families include: cancers diagnosed under age 50, multiple relatives with similar cancers on the same side of the family, cancers in multiple generations, and relatives with certain rare cancers (i.e., male breast cancer).   We discussed the natural history and genetics of several hereditary cancer syndromes, including Freedman syndrome.   Freedman syndrome can be caused by a mutation in one of five genes:  MLH1, MSH2, MSH6, PMS2, and EPCAM. A single mutation in one of the Freedman  Syndrome genes increases the risk for several cancers, such as colon cancer, endometrial/uterine cancer, gastric cancer, and ovarian cancer. Other cancers have also been reported in some families with Freedman Syndrome include pancreatic, bladder, biliary tract, urothelial, small bowel, prostate, breast and brain cancers.  A detailed handout regarding Freedman syndrome and the information we discussed will be provided to Tri via [employer] and can be found in the after visit summary. Topics included: inheritance pattern, cancer risks, cancer screening recommendations, and also risks, benefits and limitations of testing.  In looking at Lucinda's personal and family history, it is possible that a cancer susceptibility gene is present due to her mother diagnosed with a brain tumor in her early 20's and her maternal aunt diagnosed with a brain tumor at age 10. Of note, both affected relatives passed away from their diagnosis.  Based on her personal and family history, Tri meets current National Comprehensive Cancer Network (NCCN) criteria for genetic testing of Freedman syndrome genes (i.e. EPCAM, MLH1, MSH2, MSH6, and PMS2).   We discussed that there are additional genes that could cause increased risk for brain tumors. As many of these genes present with overlapping features in a family and accurate cancer risk cannot always be established based upon the pedigree analysis alone, it would be reasonable for Lucinda to consider panel genetic testing to analyze multiple genes at once.  We reviewed genetic testing options for Tri based on her personal and family history: a panel of genes associated with an increased risk for hereditary brain tumors/cancers, or larger panel options to include genes associated with increased risk for multiple different cancer types. Lucinda expressed interest in the Hereditary Brain Cancers panel + the Multi-Cancer panel through [facility].  Tri would like to submit a blood sample for her genetic testing.  She will go to her nearest [facility] at her earliest convenience to get her blood drawn for genetic testing.   Verbal consent was given over video and written on the consent form. Turnaround time is approximately 3-4 weeks once the lab receives the sample.  Medical Management: For Tri, we reviewed that the information from genetic testing may determine:  additional cancer screening for which Tri may qualify (i.e. mammogram and breast MRI, more frequent colonoscopies, more frequent dermatologic exams, etc.),  options for risk reducing surgeries Tri could consider (i.e. bilateral mastectomy, surgery to remove her ovaries and/or uterus, etc.),    and targeted chemotherapies if she were to develop certain cancers in the future (i.e. immunotherapy for individuals with Freedman syndrome, PARP inhibitors, etc.).   These recommendations and possible targeted chemotherapies will be discussed in detail once genetic testing is completed.     Plan:  1) Today Tri elected to proceed with the Hereditary Brain Cancers panel + the Multi-Cancer panel through [facility]. Therefore, consent was reviewed and verbally obtained for this testing.   2) Lucinda plans to schedule her blood draw appointment at a clinic that is convenient to her.   3) The results should be available in 3-4 weeks after her blood is drawn.  4) Lucinda will either have a telephone visit or video visit to discuss the results.  Additional recommendations about screening will be made at that time.    Lucinda is 34 year old and is being evaluated via a billable video visit.    Time spent on date of the encounter doing chart review, video appointment, documentation, and further activities as noted above: ***minutes    Susie Garcia MS, [facility] [place]  Licensed, Certified Genetic Counselor  Phone: [phone]      Again, thank you for allowing me to participate in the care of your patient.        Sincerely,        Susie Garcia GC    Electronically signed

## 2025-02-05 NOTE — NURSING NOTE
Current patient location: [address] [place] [address]    Is the patient currently in the state of [place]? YES    Visit mode: VIDEO    If the visit is dropped, the patient can be reconnected by:VIDEO VISIT: Text to cell phone:   Telephone Information:   Mobile [phone]       Will anyone else be joining the visit? NO  (If patient encounters technical issues they should call [phone]    Are changes needed to the allergy or medication list? N/A    Are refills needed on medications prescribed by this physician? NO    Rooming Documentation:  Not applicable    Reason for visit: Consult    LAURA HOWELL

## 2025-02-05 NOTE — LETTER
Cancer Risk Management  Program Locations    [facility]  [facility]  [facility]  [facility]  [facility]  Mailing Address  Cancer Risk Management Program  [facility]  [address]  [place] [address]    New patient appointments  [phone]    February 5, 2025    Lucinda Ji  [address]  [place] [address]    Dear Lucinda,    It was a pleasure talking with you via your virtual genetic counseling visit on 2/5/2025.  Below is a copy of the progress note from that recent visit through the Cancer Risk Management Program.  If you have any additional questions, please feel free to contact me.    Referring Provider: Christiane Bunn MD     Presenting Information:   I met with Lucinda Ji today for her video genetic counseling visit through the Cancer Risk Management Program to discuss her family history of young onset cy tumors. She is here today to review this history, cancer screening recommendations, and available genetic testing options.    Personal History:  Lucinda is a 34 year old female. She does not have any personal history of cancer. In her hormonal-based medical history, she had her first menstrual period at age 9, her first child at age 24, and is premenopausal. Lucinda has her ovaries, fallopian tubes and uterus in place, and reports no ovarian cancer screening to date. She reports no history of hormone replacement therapy or oral contraceptive use. She has not had mammogram or colonoscopy. Lucinda denies any history of dermatological exams. She does not regularly do any other cancer screening at this time. Tri reported a history of nicotine or tobacco use for a couple years and no current alcohol use.    Family History: (Please see scanned pedigree for detailed family history information)  Lucinda's mother was diagnosed with a brain tumor in her early 20's. She passed  away at age 23.  Maternal aunt was diagnosed with a brain tumor and passed away at age 10.  Lucinda's father was recently diagnosed with cancer (unknown type) and is currently in his late 50's.   Lucinda has limited information regarding maternal and paternal extended relative's medical histories but has not been told additional relatives have been diagnosed with cancers or tumors.    Her maternal and paternal ethnicity is Black. There is no known [employer] ancestry on either side of her family. There is no reported consanguinity.    Family Structure  Daughters: 1; Sons: 0  Sisters: 0; Brothers: 0; Maternal half sisters: 1; Maternal half brothers: 1; Paternal half sisters: 1; Paternal half brothers: 1  Maternal aunts: At least one; Maternal uncles: Unknown  Paternal aunts: Unknown; Paternal uncles: Unknown    Discussion:  Lucinda's family history of young onset brain tumors is suggestive of a hereditary cancer syndrome.  We briefly reviewed basic genetics principles. Many of the genes we are born with impact our risk of certain diseases, such as cancer.  When one of these genes is not working properly due to a mistake (known as a  mutation  or  variant ), this may lead to an increased risk of developing cancer.   We reviewed the features of sporadic, familial, and hereditary cancers.   Cancer is a common diagnosis which impacts many families. The vast majority of cancers are considered sporadic and not primarily due to an inherited factor. Individuals can develop cancer due to aging, chance events, environmental exposures or lifestyles.  Only ~5-10% of cancer diagnoses are thought to be caused by inheriting a mutation or variant within a single cancer susceptibility gene. Features typically seen in these high risk families include: cancers diagnosed under age 50, multiple relatives with similar cancers on the same side of the family, cancers in multiple generations, and relatives with certain rare cancers (i.e., male  breast cancer).   We discussed the natural history and genetics of several hereditary cancer syndromes, including Freedman syndrome.   Freedman syndrome can be caused by a mutation in one of five genes:  MLH1, MSH2, MSH6, PMS2, and EPCAM. A single mutation in one of the Freedman Syndrome genes increases the risk for several cancers, such as colon cancer, endometrial/uterine cancer, gastric cancer, and ovarian cancer. Other cancers have also been reported in some families with Freedman Syndrome include pancreatic, bladder, biliary tract, urothelial, small bowel, prostate, breast and brain cancers.  A detailed handout regarding Freedman syndrome and the information we discussed will be provided to Tri via [employer] and can be found in the after visit summary. Topics included: inheritance pattern, cancer risks, cancer screening recommendations, and also risks, benefits and limitations of testing.  In looking at Lucinda's personal and family history, it is possible that a cancer susceptibility gene is present due to her mother diagnosed with a brain tumor in her early 20's and her maternal aunt diagnosed with a brain tumor at age 10. Of note, both affected relatives passed away from their diagnosis.  Based on her personal and family history, Tri meets current National Comprehensive Cancer Network (NCCN) criteria for genetic testing of Freedman syndrome genes (i.e. EPCAM, MLH1, MSH2, MSH6, and PMS2).   We discussed that there are additional genes that could cause increased risk for brain tumors. As many of these genes present with overlapping features in a family and accurate cancer risk cannot always be established based upon the pedigree analysis alone, it would be reasonable for Lucinda to consider panel genetic testing to analyze multiple genes at once.  We reviewed genetic testing options for Tri based on her personal and family history: a panel of genes associated with an increased risk for hereditary brain tumors/cancers, or larger panel  options to include genes associated with increased risk for multiple different cancer types. Lucinda expressed interest in the Hereditary Brain Cancers panel + the Multi-Cancer panel through [facility].  Tri would like to submit a blood sample for her genetic testing. She will go to her nearest [facility] at her earliest convenience to get her blood drawn for genetic testing.   Verbal consent was given over video and written on the consent form. Turnaround time is approximately 3-4 weeks once the lab receives the sample.  Medical Management: For Tri, we reviewed that the information from genetic testing may determine:  additional cancer screening for which Tri may qualify (i.e. mammogram and breast MRI, more frequent colonoscopies, more frequent dermatologic exams, etc.),  options for risk reducing surgeries Tri could consider (i.e. bilateral mastectomy, surgery to remove her ovaries and/or uterus, etc.),    and targeted chemotherapies if she were to develop certain cancers in the future (i.e. immunotherapy for individuals with Freedman syndrome, PARP inhibitors, etc.).   These recommendations and possible targeted chemotherapies will be discussed in detail once genetic testing is completed.     Plan:  1) Today Tri elected to proceed with the Hereditary Brain Cancers panel + the Multi-Cancer panel through [facility]. Therefore, consent was reviewed and verbally obtained for this testing.   2) Lucinda plans to schedule her blood draw appointment at a clinic that is convenient to her.   3) The results should be available in 3-4 weeks after her blood is drawn.  4) Lucinda will either have a telephone visit or video visit to discuss the results.  Additional recommendations about screening will be made at that time.    Lucinda is 34 year old and is being evaluated via a billable video visit.      Susie Garcia MS, [facility]  Licensed, Certified Genetic Counselor  Phone: [phone]

## 2025-02-05 NOTE — PATIENT INSTRUCTIONS
Assessing Cancer Risk  Only about 5-10% of cancers are thought to be due to an inherited cancer susceptibility gene.    These families often have:  Several people with the same or related types of cancer  Cancers diagnosed at a young age (before age 50)  Individuals with more than one primary cancer  Multiple generations of the family affected with cancer    Genetic Testing  Genetic testing involves a blood or saliva test and will look at the genetic information in select genes for any harmful mutations that are associated with increased cancer risk.  If possible, it is recommended that the person(s) who has had cancer be tested before other family members.  That person will give us the most useful information about whether or not a specific gene is associated with the cancer in the family.     Results  There are three possible results of genetic testing:  Positive--a harmful mutation was identified  Negative--no mutation was identified  Variant of unknown significance--a variation in one of the genes was identified, but it is unclear how this impacts cancer risk in the family    Advantages and Disadvantages  There are advantages and disadvantages to genetic testing.    Advantages  May clarify your cancer risk  Can help you make medical decisions  May explain the cancers in your family  May give useful information to your family members (if you share your results)    Disadvantages  Possible negative emotional impact of learning about inherited cancer risk  Uncertainty in interpreting a negative test result in some situations  Possible genetic discrimination concerns (see below)    Inheritance   Mutations in most cancer risk genes are inherited in an autosomal dominant pattern.  This means that if a parent has a mutation, each of their children will have a 50% chance of inheriting that same mutation.  Therefore, each child would have a 50% chance of being at increased risk for developing cancer.                                               Image obtained from Genetics Home Reference, 2013     Genetic Information Nondiscrimination Act (CHANCE)  CHANCE is a federal law that protects individuals from health insurance or employment discrimination based on a genetic test result alone.  Although rare, there are currently no legal protections in terms of life insurance, long term care, or disability insurances.  Visit the National Human Genome Research Calmar at Genome.gov/18820985 to learn more.    Reducing Cancer Risk  If a harmful mutation is found in a cancer risk gene, there may be certain screening tests or preventative surgeries that can be offered. This information will be discussed after genetic testing is completed. If no mutations are found on genetic testing, screening is then recommended based on personal and/or family history of cancer.     Questions to Think About Regarding Genetic Testing  What effect will the test result have on me and my relationship with my family members if I have an inherited gene mutation?  If I don t have a gene mutation?  Should I share my test results, and how will my family react to this news, which may also affect them?  Are my children ready to learn new information that may one day affect their own health?    Resources  American Cancer Society (ACS) cancer.org   National Cancer Calmar (NCI) cancer.gov     Please call us if you have any questions or concerns.   Cancer Risk Management Program [phone] ([phone])  Waylon Christensen, MS [facility] [phone]  Susie Garcia, MS, [facility] [phone]  Geraldine Bennett, MS, [facility]  [phone]  Silvia Luo, MS, [facility]  [phone]  Samina Gonzalez, MS, [facility]  [phone]  Julianna Julian, MS, [facility] [phone]  Salina Griffith, MS, [facility] [phone]

## 2025-02-12 ENCOUNTER — OFFICE VISIT (OUTPATIENT)
Dept: FAMILY MEDICINE | Facility: CLINIC | Age: 35
End: 2025-02-12
Payer: COMMERCIAL

## 2025-02-12 VITALS
SYSTOLIC BLOOD PRESSURE: 95 MMHG | HEIGHT: 66 IN | HEART RATE: 105 BPM | DIASTOLIC BLOOD PRESSURE: 62 MMHG | WEIGHT: 293 LBS | BODY MASS INDEX: 47.09 KG/M2 | OXYGEN SATURATION: 98 % | TEMPERATURE: 98.6 F | RESPIRATION RATE: 20 BRPM

## 2025-02-12 DIAGNOSIS — Z32.01 PREGNANCY TEST POSITIVE: ICD-10-CM

## 2025-02-12 DIAGNOSIS — O09.91 SUPERVISION OF HIGH-RISK PREGNANCY, FIRST TRIMESTER: ICD-10-CM

## 2025-02-12 DIAGNOSIS — O21.9 NAUSEA/VOMITING IN PREGNANCY: ICD-10-CM

## 2025-02-12 DIAGNOSIS — Z84.89 FAMILY HISTORY OF BRAIN TUMOR: ICD-10-CM

## 2025-02-12 RX ORDER — ASPIRIN 81 MG/1
81 TABLET ORAL DAILY
Qty: 90 TABLET | Refills: 2 | Status: SHIPPED | OUTPATIENT
Start: 2025-02-12

## 2025-02-12 RX ORDER — ONDANSETRON 4 MG/1
4 TABLET, FILM COATED ORAL EVERY 6 HOURS PRN
Qty: 30 TABLET | Refills: 3 | Status: SHIPPED | OUTPATIENT
Start: 2025-02-12

## 2025-02-12 NOTE — PATIENT INSTRUCTIONS
Keep up the good work!    Stop in lab for blood work for the genetic team    For nausea-  Keep using the sour things that help  Take the vitamin B6 and the Unisom everyday  Use the Zofran for bad days  Make sure you are getting lots of fluids.     Resent the prescription for the aspirin.  Take everyday!    Weight gain looks good.  Make sure getting enough fluids.      If dizziness is getting worse, let us know.

## 2025-02-12 NOTE — PROGRESS NOTES
Assessment & Plan  Lucinda Ji is a 34 year old , at 16w5d weeks of pregnancy with ASCENCION of 2025 by  8 week ultrasound.   Patient's history is high risk for the following reasons: History of myomectomy, history of third trimester fetal demise at 31 weeks, history of hyperemesis gravidarum, pregravid BMI >40, possible essential/pregestational hypertension.   .      # Other concerns (medical problems, high risk surgical/obstetric history, high risk social situation, etc):   - In addition to routine prenatal care, patient will need:     # History of 31w demise:   - Plan for comprehensive anatomy ultrasound and lab workup  - MFM referral for consultation for history of demise - Tomorrow  - No identifiable lab workup for loss - labs drawn today  History of 2nd or 3rd trimester loss, currently pregnant  If Unexplained IUFD check:   [ ] Beta 2 glycoprotein antibodies  [ ] Lupus anticoagulant panel  [ ] Cardiolipin Ig G   [ ] Cardiolipin Ig M  [ ] TSH  [ ] HbA1c  - Fetal monitoring - initiation around timing of prior demise  - ASA as below  - Scheduled for US with M at the end of Feb  -Met with genetic counselor due to family hx of brain cancer at young age and will get blood draw for genetic testing today.       # History of robotic vaginal myomectomy: Advised to avoid future vaginal delivery, so suspect transmural myomectomy.  - Refer OB/GYN for surgical consultation as pregnancy progresses     # Hyperemesis gravidarum:  Improved, fewer trips to the ER.  Sour foods and candy have been helpful.   - doxylamine, B6, ondansetron  - Still going to the ER intermittently.  Sour foods and gum tend to help.  No longer vomiting daily.       # Pregravid BMI >40:  1.5lb weight loss since last visit.  Watching diet more.  Drinking lots of slushies to get fluids - tries to limit to once daily.    [ ] Goal weight gain 0-11 lbs, majority after 20 wks gestation - discussed today  [ ] Q 4 week growth sonos in third  trimester to ensure habitus not confounding assessment of fetal growth  [ ]  testing starting at 36 weeks     # Possible pregestational hypertension: Hypotensive today  -Encouraged regular fluid intake, getting up slowly.    - ASA 12+ weeks  - Continue to monitor.      Ivonne was seen today for prenatal care and std.    Weight gain adequate: 1.633 kg (3 lb 9.6 oz) to date, out of recommended total of 0-11lbs.      The longitudinal plan of care for the diagnosis(es)/condition(s) as documented were addressed during this visit. Due to the added complexity in care, I will continue to support Ivonne in the subsequent management and with ongoing continuity of care.    Patient Instructions   Keep up the good work!    Stop in lab for blood work for the genetic team    For nausea-  Keep using the sour things that help  Take the vitamin B6 and the Unisom everyday  Use the Zofran for bad days  Make sure you are getting lots of fluids.     Resent the prescription for the aspirin.  Take everyday!    Weight gain looks good.  Make sure getting enough fluids.      If dizziness is getting worse, let us know.      Return to clinic in 4 weeks. ALready scheduled for March visit with Dr. Feldman.     Joie Ghosh MD    Subjective  Concerns: Nausea and the vomiting is better.  Nothing the last 2 days.  Is taking the medications, and they are mildly helpful, but what has been more helpful are sour foods.  Likes sour apple slushy's, Calero's cough drops, and sour foods.   Has been doing better drinking fluids.  Go to the ER when she needs it, but has not been going as frequently.  Continues to have constipation.      Saw the genetic counselor.  Wants blood drawn for their lab test today.    Is wondering if she can have her fibroid removed in conjunction with her .  Is starting to feel baby move.    Blood pressure is low today.  She does feel dizzy and lightheaded at times.  Has been getting up slowly.    Continues to have  "regular headaches.  They are less severe however.  Does have vision changes with some spots.    Bowels are slow but she does have bowel movements.  Feels a sensation of a pulse down in her vaginal area.    Has not been able to get the aspirin from the pharmacy and has not started this.  Is trying to take her prenatal vitamin.  Would like refills of the B6 and Unisom.  Does not like with the OBT Zofran.  Tablets were okay.    ROS:  YES - Headache  YES - Changes in vision  No - Chest Pain  No - Shortness of Breath  YES - Nausea   YES - Vomiting  No - Abdominal pain   No - Contractions  No - Dysuria   No - Vaginal Discharge    No - Vaginal bleeding   No - Loss of Fluid   No - Extremity swelling   Present - Fetal movement       Patient Active Problem List   Diagnosis    Anxiety and depression    Class 3 severe obesity due to excess calories without serious comorbidity with body mass index (BMI) of 40.0 to 44.9 in adult (H)    Mild intermittent asthma in adult without complication    Hiatal hernia    Uterine leiomyoma    History of stillbirth in pregnant patient in first trimester, antepartum    Elevated blood pressure reading without diagnosis of hypertension    Supervision of high-risk pregnancy, first trimester    Pregnancy w/ hx of uterine myomectomy       Lucinda Ji speaks English so an  was not used today.    Guidance:  signs of miscarriage  OTC medications  genetic testing  weight gain and exercise  quickening  fetal survey ultrasound    Going to [facility]? DId not discuss    Objective  BP 95/62 (BP Location: Left arm, Patient Position: Sitting, Cuff Size: Adult Large)   Pulse 105   Temp 98.6  F (37  C) (Oral)   Resp 20   Ht 1.676 m (5' 6\")   Wt 134.5 kg (296 lb 9.6 oz)   LMP 10/20/2024 (Approximate)   SpO2 98%   BMI 47.87 kg/m    No distress.  Gravid abdomen.  , 140.  Fundal height below umbilicus, uterus feels off to the patient's right.  cm.  no edema.    Results  Blood type: B POS  Results " for orders placed or performed in visit on 02/12/25   Other Laboratory; [facility]; Hereditary Brain Cancers panel + the Multi-Cancer panel (Laboratory Miscellaneous Order)     Status: None   Result Value Ref Range    Specimen Status       Specimen received. Reordered and sent to performing laboratory. Report to follow upon completion.    Performing Laboratory [facility]     Test Name       Hereditary Brain Cancers panel + the Multi-Cancer panel

## 2025-02-13 LAB
PERFORMING LABORATORY: NORMAL
SPECIMEN STATUS: NORMAL
TEST NAME: NORMAL

## 2025-02-24 NOTE — PROGRESS NOTES
"Virtual Visit Details  Type of service:  Video Visit   Video Start Time:  1:17 PM  Video End Time: 1:36 PM  Originating Location (pt. Location): Home  Distant Location (provider location):  Off-site  Platform used for Video Visit: Mohit    2/26/2025    Referring Provider: Christiane Bunn MD     Presenting Information:   I spoke to Lucinda today to discuss her genetic testing results. Her blood was drawn on 2/12/2025. The Hereditary Brain Cancers panel + the Multi-Cancer panel was ordered from [facility]. This testing was done because of Lucinda's family history of young onset brain tumors.    Genetic Testing Results: POSITIVE  Tri is POSITIVE for a BRCA2 mutation. Specifically her mutation is called c.[id number] (p.[id number]*26). We discussed that this mutation is associated with a diagnosis of Hereditary Breast and Ovarian Cancer Syndrome (HBOC) and increased risk for breast, ovarian, pancreatic, prostate cancer and melanoma. We discussed the impact of this testing on Tri in detail.     Of note, Lucinda tested negative for mutations in the following genes by sequencing and deletion/duplication analysis: AIP, ALK, APC, STANISLAV, AXIN2, BAP1, BARD1, BLM, BMPR1A, BRCA1, BRIP1, CDC73, CDH1, CDK4, CDKN1B, CDKN2A, CHEK2, CTNNA1, DICER1, EGFR, EPCAM, FH, FLCN, GREM1, HOXB13, KIT, LZTR1, MAX, MBD4, MEN1, MET, MITF, MLH1, MSH2, MSH3, MSH6, MUTYH, NF1, NF2, NTHL1, PALB2, PDGFRA, PMS2, POLD1, POLE, POT1, ZRWBO6Z, PTCH1, PTEN, RAD51C, RAD51D, RB1, RET, SDHA, SDHAF2, SDHB, SDHC, SDHD, SMAD4, SMARCA4, SMARCB1, SMARCE1, STK11, SUFU, RYOM905, TP53, TSC1, TSC2, VHL, CDKN1C, GPC3, HRAS, NBN, PHOX2B.  We reviewed the autosomal dominant inheritance of these genes. Tri cannot pass on a mutation in any of these genes to her children based on this test result. Mutations in these genes do not skip generations.      A copy of the test report can be found in the Laboratory tab, dated 2/12/2025, and named \"LABORATORY MISCELLANEOUS ORDER\". The " report is scanned in as a linked document.     BRCA2 Cancer Risks:  Breast cancer risk (first primary):  Women who carry a BRCA2 pathogenic/likely pathogenic variant have a 55-69% lifetime risk of developing breast cancer. This is much greater than the general population breast cancer risk of 12%  Male breast cancer risks are also increased: approximately 1.8-7.1% by age 70.    Ovarian cancer risk: 13-29% (compared to the general population risk of 1-2%)  Pancreatic cancer risk: 5-10%   Prostate cancer risk: up to 19-61%, including a higher likelihood for advanced or metastatic disease.   Increased risk for other cancers, including melanoma, may be present. No exact lifetime risks are available at this time.     Cancer Screening and Prevention:  The following recommendations are based on current National Comprehensive Cancer Network guidelines for BRCA2.  Earlier and more frequent breast screening is recommended:  Breast awareness starting at age 18  Clinical breast exams starting at age 25; repeated every 6-12 months  Annual breast MRI from age 25-29  Annual mammograms with consideration of tomosynthesis and breast MRI alternating every 6 months starting at age 30.  Screening after age 75 is considered on an individual basis.    Consider risk reducing mastectomy, which reduces breast cancer risk by 90%, and other risk reducing agents.    Chemoprevention with Tamoxifen can reduce breast cancer risk in some women.  Surgical risk reduction options and Tamoxifen use should be discussed with Tri's physician.    Screening for male breast cancer includes self breast exams and annual clinical breast exams beginning at age 35  Annual mammogram screening may be considered starting at age 50 or 10 years prior to the earliest male breast cancer in the family  Bilateral salpingo-oophorectomy (removal of both ovaries and fallopian tubes) reduces the risk of ovarian cancer and is typically recommended between ages 35-40, or after  child bearing  Per current NCCN guidelines, individuals at risk for ovarian cancer due to BRCA2 may choose to delay this surgery to ages 40-45. For some patients with a BRCA2 mutation, though, surgery can be considered at an earlier age, based on family history. As with any surgery, risks are involved, and this option should be discussed in greater detail with a gyn-oncologist.    Consider transvaginal ultrasound and a  blood test starting at age 30-35, though the benefits of this screening are unclear.  We discussed that using oral contraceptives for five years or more has been shown to reduce ovarian cancer risk by around 50%.  The data are still inconclusive as to whether or not using oral contraceptives will increase breast cancer risk in BRCA2 mutation carriers.   Pancreatic cancer screening is recommended:  This screening is recommended to begin at age 50, or 10 years younger than the earliest pancreatic cancer diagnosis in the family, whichever is earlier.    Screening typically involves endoscopic ultrasonography (EUS) and/or MRI/magnetic resonance cholangiopancreatography.  Individuals at risk for prostate cancer may begin prostate screening at age 40, as recommended in the NCCN Guidelines for Prostate Early Detection  General melanoma risk management, including annual full body skin exams and safe sun practices are appropriate.      Some chemotherapies for certain cancers may be more effective in individuals with BRCA2 mutations. Lucinda should discuss this with her physicians, if chemotherapy is indicated for her in the future.     Other screening based on Lucinda's personal and family history:  Other population cancer screening options, such as those recommended by the American Cancer Society and the National Comprehensive Cancer Network (NCCN), are also appropriate for Lucinda and her family. These screening recommendations may change if there are changes to Lucinda's personal and/or family history of cancer.    Final screening recommendations should be made by each individual's primary care provider.    We discussed that Lucinda could participate in our Cancer Risk Management Program in which our nursing specialist provides an individual screening plan and assists with medical management. A referral was placed to see JULIA Napier for this service.    Of note, the above information is based on our current understanding of Lucinda's genetic findings. Lucinda is encouraged to reach out to me regularly regarding any pertinent updates to her personal and/or family history of cancer, as our understanding of the genetic findings in her family may change over time.     Implications for Family Members:  We reviewed that mutations in the BRCA2 gene are inherited in an autosomal dominant pattern. This means that each of Lucinda's children have a 50% chance of inheriting the same mutation. Each of her half siblings has a 25% risk of having the same mutation. Extended relatives may also carry this mutation, and she is encouraged to share this information with her family members on both sides of the family. I am happy to help her relatives connect with a genetic counselor in their area if they would like to discuss testing.    In rare situations in which both parents have a mutation in the BRCA2 gene, their children each have a 25% risk for Fanconi anemia. Fanconi anemia is a rare condition with onset in childhood that often results in physical abnormalities, growth retardation, bone marrow failure, and increased risk for cancer. For individuals of childbearing age with BRCA2 mutations, genetic counseling and genetic testing may be advised for their partners.    Additional Testing Considerations:  Even though Lucinda's genetic testing result was positive, other relatives may carry a different gene mutation associated with brain tumors. Genetic counseling can be considered for Lucinda's maternal half siblings to discuss genetic testing options.   "If any of these relatives do pursue genetic testing, Lucinda is encouraged to contact me so that we may review the impact of their test results on her.    Support Resources:  I provided Tri with information regarding national and local support resources.     Plan:  1. A copy of this summary and her test results will be available in [facility] [employer].  2.  A \"Dear Relative\" letter for Lucinda to share with her family members can be found in the \"After Visit Summary\" in [employer].   3.  Lucinda plans to follow up with her other providers.   4.   A referral was placed for Lucinda to meet with JULIA Napier to discuss screening associated with a BRCA2 mutation.    If Lucinda has additional questions, I encouraged her to contact me directly at [phone].     Time spent on date of the encounter doing chart review, video, documentation, and further activities as noted above: 26 minutes    Susie Garcia MS, [facility]  Licensed, Certified Genetic Counselor  Phone: [phone]      Resources for Hereditary Breast and Ovarian Cancer  [employer]    www.Amigos [employer] Amigos.org  [employer] is the only national non-profit organization focused on prevention and early detection of breast and ovarian cancer in young women.  [employer] aims to reach the 52 million young women in the [place] between the ages of 18 and 45 with innovative, life-saving breast and ovarian health programs, thereby empowering this and future generations of women to live healthier, happier, and longer lives.  [employer]: Facing Our Risk of Cancer Empowered  www.facingour[address] [facility].org  FORCE s mission is to improve the lives of individuals and families affected by hereditary breast, ovarian, and related cancers by creating awareness, supplying information and support to the community, advocating for and supporting research, and working with the research and medical communities.   Helpline: 1-863.528.5723  Message boards  Peer Navigation and local support groups  Talking About BRCA in Your " Family Tree http://www.facingourrisk.org/understanding-brca-and-hboc/publications/documents/hccfarv-qxeaviv-aakdc-brca-family.pdf  This free booklet helps parents discuss BRCA-related issues with their children and loved ones  [employer]: [place] [facility] http://mnovarian.org/  MOCA was started in 1999 by a small group of ovarian cancer survivors who came together to fund ovarian cancer research, raise awareness of the disease, and provide support to women with ovarian cancer and their families.  Firefly Sisterhood   www.fireflysisterhood.org  Based in the [facility], the Firefly Sisterhood connects women diagnosed with breast cancer with trained breast cancer survivors to offer support, guidance and hope.  [employer] [place] www.[facility].org  [facility] [employer] [place] is a 501(c)3 nonprofit and the local affiliate of the Cancer Support Community, a network of more than 54 supportive, free and welcoming  clubhouses  where everyone living with cancer can come for social, emotional and psychological support. Clubs are healing environments where individuals learn from each other with guidance from licensed professionals.  BRCA Information Support Group  People with BRCA1 or BRAC2 mutations face complex emotional challenges and complicated medical decisions due to high cancer risks and their impact on individuals and families. This group brings people with a BRAC1 or BRAC2 genetic diagnosis together with others facing similar challenges. The group meets nine times per year. For information, please contact [email or web address] or call [phone].  National Society of Genetic Counselors https://www.nsgc.org  The National Society of Genetic Counselors advances the various roles of genetic counselors in health care by fostering education, research, and public policy to ensure the availability of quality genetic services.  A number of resources are available through this website for  providers and patients seeking additional information about genetic counseling services.   Books:  The Breast Reconstruction Guidebook: Issues and Answers from Research to Recovery (2012), by Rachel Harding  Confronting Hereditary Breast and Ovarian Cancer: Identify Your Risk, Understand Your Options, Change Your Heaven (2012), by Susie Barrientos  Positive Results: Making the Best Decisions When You re at High Risk for Breast or Ovarian Cancer (2010), by Vane Ceballos and Dr. Cassidy Hopson.   Previvors: Facing the Breast Cancer Gene and Making Life-Changing Decisions (2010), by Damaris Palumbo

## 2025-02-25 LAB
SCANNED LAB RESULT: ABNORMAL
TEST NAME: ABNORMAL

## 2025-02-26 ENCOUNTER — VIRTUAL VISIT (OUTPATIENT)
Dept: ONCOLOGY | Facility: CLINIC | Age: 35
End: 2025-02-26
Payer: COMMERCIAL

## 2025-02-26 DIAGNOSIS — Z84.89 FAMILY HISTORY OF BRAIN TUMOR: ICD-10-CM

## 2025-02-26 DIAGNOSIS — Z15.01 MONOALLELIC MUTATION OF BRCA2 GENE: Primary | ICD-10-CM

## 2025-02-26 DIAGNOSIS — Z15.09 MONOALLELIC MUTATION OF BRCA2 GENE: Primary | ICD-10-CM

## 2025-02-26 PROCEDURE — 96041 GENETIC COUNSELING SVC EA 30: CPT | Mod: GT,95

## 2025-02-26 NOTE — Clinical Note
2/26/2025      Lucinda Ji  [address]  [place] [address]      Dear Colleague,    Thank you for referring your patient, Lucinda Ji, to the [facility]. Please see a copy of my visit note below.    Virtual Visit Details  Type of service:  Video Visit   Video Start Time:  1:17 PM  Video End Time: 1:36 PM  Originating Location (pt. Location): Home  Distant Location (provider location):  Off-site  Platform used for Video Visit: Mohit    2/26/2025    Referring Provider: Christiane Bunn MD     Presenting Information:   I spoke to Lucinda today to discuss her genetic testing results. Her blood was drawn on 2/12/2025. The Hereditary Brain Cancers panel + the Multi-Cancer panel was ordered from [facility]. This testing was done because of Lucinda's family history of young onset brain tumors.    Genetic Testing Results: POSITIVE  Tri is POSITIVE for a BRCA2 mutation. Specifically her mutation is called c.9253dup ([id number]). We discussed that this mutation is associated with a diagnosis of Hereditary Breast and Ovarian Cancer Syndrome (HBOC) and increased risk for breast, ovarian, pancreatic, prostate cancer and melanoma. We discussed the impact of this testing on Tri in detail.     Of note, Lucinda tested negative for mutations in the following genes by sequencing and deletion/duplication analysis: AIP, ALK, APC, STANISLAV, AXIN2, BAP1, BARD1, BLM, BMPR1A, BRCA1, BRIP1, CDC73, CDH1, CDK4, CDKN1B, CDKN2A, CHEK2, CTNNA1, DICER1, EGFR, EPCAM, FH, FLCN, GREM1, HOXB13, KIT, LZTR1, MAX, MBD4, MEN1, MET, MITF, MLH1, MSH2, MSH3, MSH6, MUTYH, NF1, NF2, NTHL1, PALB2, PDGFRA, PMS2, POLD1, POLE, POT1, JRVFF3K, PTCH1, PTEN, RAD51C, RAD51D, RB1, RET, SDHA, SDHAF2, SDHB, SDHC, SDHD, SMAD4, SMARCA4, SMARCB1, SMARCE1, STK11, SUFU, QJAO143, TP53, TSC1, TSC2, VHL, CDKN1C, GPC3, HRAS, NBN, PHOX2B.  We reviewed the autosomal dominant inheritance of these genes. Tri cannot pass on a mutation in any of these genes  "to her children based on this test result. Mutations in these genes do not skip generations.      A copy of the test report can be found in the Laboratory tab, dated 2/12/2025, and named \"LABORATORY MISCELLANEOUS ORDER\". The report is scanned in as a linked document.     BRCA2 Cancer Risks:  Breast cancer risk (first primary):  Women who carry a BRCA2 pathogenic/likely pathogenic variant have a 55-69% lifetime risk of developing breast cancer. This is much greater than the general population breast cancer risk of 12%  Male breast cancer risks are also increased: approximately 1.8-7.1% by age 70.    Ovarian cancer risk: 13-29% (compared to the general population risk of 1-2%)  Pancreatic cancer risk: 5-10%   Prostate cancer risk: up to 19-61%, including a higher likelihood for advanced or metastatic disease.   Increased risk for other cancers, including melanoma, may be present. No exact lifetime risks are available at this time.     Cancer Screening and Prevention:  The following recommendations are based on current National Comprehensive Cancer Network guidelines for BRCA2.  Earlier and more frequent breast screening is recommended:  Breast awareness starting at age 18  Clinical breast exams starting at age 25; repeated every 6-12 months  Annual breast MRI from age 25-29  Annual mammograms with consideration of tomosynthesis and breast MRI alternating every 6 months starting at age 30.  Screening after age 75 is considered on an individual basis.    Consider risk reducing mastectomy, which reduces breast cancer risk by 90%, and other risk reducing agents.    Chemoprevention with Tamoxifen can reduce breast cancer risk in some women.  Surgical risk reduction options and Tamoxifen use should be discussed with Tri's physician.    Screening for male breast cancer includes self breast exams and annual clinical breast exams beginning at age 35  Annual mammogram screening may be considered starting at age 50 or 10 years " prior to the earliest male breast cancer in the family  Bilateral salpingo-oophorectomy (removal of both ovaries and fallopian tubes) reduces the risk of ovarian cancer and is typically recommended between ages 35-40, or after child bearing  Per current NCCN guidelines, individuals at risk for ovarian cancer due to BRCA2 may choose to delay this surgery to ages 40-45. For some patients with a BRCA2 mutation, though, surgery can be considered at an earlier age, based on family history. As with any surgery, risks are involved, and this option should be discussed in greater detail with a gyn-oncologist.    Consider transvaginal ultrasound and a  blood test starting at age 30-35, though the benefits of this screening are unclear.  We discussed that using oral contraceptives for five years or more has been shown to reduce ovarian cancer risk by around 50%.  The data are still inconclusive as to whether or not using oral contraceptives will increase breast cancer risk in BRCA2 mutation carriers.   Pancreatic cancer screening is recommended:  This screening is recommended to begin at age 50, or 10 years younger than the earliest pancreatic cancer diagnosis in the family, whichever is earlier.    Screening typically involves endoscopic ultrasonography (EUS) and/or MRI/magnetic resonance cholangiopancreatography.  Individuals at risk for prostate cancer may begin prostate screening at age 40, as recommended in the NCCN Guidelines for Prostate Early Detection  General melanoma risk management, including annual full body skin exams and safe sun practices are appropriate.      Some chemotherapies for certain cancers may be more effective in individuals with BRCA2 mutations. Lucinda should discuss this with her physicians, if chemotherapy is indicated for her in the future.     Other screening based on Tri's personal and family history:  Other population cancer screening options, such as those recommended by the American Cancer  Society and the National Comprehensive Cancer Network (NCCN), are also appropriate for Lucinda and her family. These screening recommendations may change if there are changes to Lucinda's personal and/or family history of cancer.   Final screening recommendations should be made by each individual's primary care provider.    We discussed that Lucinda could participate in our Cancer Risk Management Program in which our nursing specialist provides an individual screening plan and assists with medical management. A referral was placed to see JULIA Napier for this service.    Of note, the above information is based on our current understanding of Lucinda's genetic findings. Lucinda is encouraged to reach out to me regularly regarding any pertinent updates to her personal and/or family history of cancer, as our understanding of the genetic findings in her family may change over time.     Implications for Family Members:  We reviewed that mutations in the BRCA2 gene are inherited in an autosomal dominant pattern. This means that each of Lucinda's children have a 50% chance of inheriting the same mutation. Likewise, each of her siblings has a 50% risk of having the same mutation. Extended relatives may also carry this mutation, and she is encouraged to share this information with her family members on both sides of the family. I am happy to help her relatives connect with a genetic counselor in their area if they would like to discuss testing.    In rare situations in which both parents have a mutation in the BRCA2 gene, their children each have a 25% risk for Fanconi anemia. Fanconi anemia is a rare condition with onset in childhood that often results in physical abnormalities, growth retardation, bone marrow failure, and increased risk for cancer. For individuals of childbearing age with BRCA2 mutations, genetic counseling and genetic testing may be advised for their partners.    Additional Testing Considerations:  Even though  "Lucinda's genetic testing result was positive, other relatives may carry a different gene mutation associated with brain tumors. Genetic counseling can be considered for Lucinda's maternal half siblings to discuss genetic testing options.  If any of these relatives do pursue genetic testing, Lucinda is encouraged to contact me so that we may review the impact of their test results on her.    Support Resources:  I provided Tri with information regarding national and local support resources.     Plan:  1. A copy of this summary and her test results will be available in [facility].  2.  A \"Dear Relative\" letter for Lucinda to share with her family members can be found in the \"After Visit Summary\" in [employer].   3.  Lucinda plans to follow up with her other providers.   4.   A referral was placed for Lucinda to meet with JULIA Napier to discuss screening associated with a BRCA2 mutation.    If Lucinda has additional questions, I encouraged her to contact me directly at [phone].     Time spent on date of the encounter doing chart review, video, documentation, and further activities as noted above: *** minutes    Susie Garcia MS, [facility]  Licensed, Certified Genetic Counselor  Phone: [phone]      Resources for Hereditary Breast and Ovarian Cancer  [employer]    www.Mobilitrix.org  [employer] is the only national non-profit organization focused on prevention and early detection of breast and ovarian cancer in young women.  [employer] aims to reach the 52 million young women in the [place] between the ages of 18 and 45 with innovative, life-saving breast and ovarian health programs, thereby empowering this and future generations of women to live healthier, happier, and longer lives.  FORCE: Facing Our Risk of Cancer Empowered  www.facingourrisk.org  FORCE s mission is to improve the lives of individuals and families affected by hereditary breast, ovarian, and related cancers by creating awareness, supplying information and " support to the community, advocating for and supporting research, and working with the research and medical communities.   Helpline: 1-888.936.2834  Message boards  Peer Navigation and local support groups  Talking About BRCA in Your Family Tree http://www.facingourrisk.org/understanding-brca-and-hboc/publications/documents/zmspdhw-ueskebd-ymiqk-brca-family.pdf  This free booklet helps parents discuss BRCA-related issues with their children and loved ones  MOCA: [place] [facility] http://mnovarian.org/  MOCA was started in 1999 by a small group of ovarian cancer survivors who came together to fund ovarian cancer research, raise awareness of the disease, and provide support to women with ovarian cancer and their families.  [employer]   www.1stdibsterbookletmobile.org  Based in the [facility], the [employer] connects women diagnosed with breast cancer with trained breast cancer survivors to offer support, guidance and hope.  [employer] www.Ript[employer] IOlubtwincities.org  [employer] is a 501(c)3 nonprofit and the local affiliate of the Cancer Support Community, a network of more than 54 supportive, free and welcoming  clubhouses  where everyone living with cancer can come for social, emotional and psychological support. Clubs are healing environments where individuals learn from each other with guidance from licensed professionals.  BRCA Information Support Group  People with BRCA1 or BRAC2 mutations face complex emotional challenges and complicated medical decisions due to high cancer risks and their impact on individuals and families. This group brings people with a BRAC1 or BRAC2 genetic diagnosis together with others facing similar challenges. The group meets nine times per year. For information, please contact [email or web address] or call [phone].  National Society of Genetic Counselors https://www.nsgc.org  The National Society of Genetic Counselors  advances the various roles of genetic counselors in health care by fostering education, research, and public policy to ensure the availability of quality genetic services.  A number of resources are available through this website for providers and patients seeking additional information about genetic counseling services.   Books:  The Breast Reconstruction Guidebook: Issues and Answers from Research to Recovery (2012), by Rachel Harding  Confronting Hereditary Breast and Ovarian Cancer: Identify Your Risk, Understand Your Options, Change Your Heaven (2012), by Susie Barrientos  Positive Results: Making the Best Decisions When You re at High Risk for Breast or Ovarian Cancer (2010), by Vane Ceballos and Dr. Cassidy Hopson.   Previvors: Facing the Breast Cancer Gene and Making Life-Changing Decisions (2010), by Damaris Palumbo      Again, thank you for allowing me to participate in the care of your patient.        Sincerely,        Susie Garcia GC    Electronically signed

## 2025-02-26 NOTE — PATIENT INSTRUCTIONS
2/26/2025    Dear Relative:  The purpose of this letter is to inform you that your relative recently underwent genetic counseling and genetic testing due to the family history of tumors.  The testing done through [facility] identified a pathogenic variant in the BRCA2 gene.  Specifically, the variant is called c.9253dup ([id number]*26).  The accession number linked to your relative's test report is [id number].  Many of the genes we are born with impact our risk of certain diseases, such as cancer.  When one of these genes is not working properly due to a mistake (known as a  mutation  or  variant ), this may lead to an increased risk of developing cancer.  Individuals who have this mutation in the BRCA2 gene have a diagnosis of hereditary breast and ovarian cancer syndrome.    Mutations in the BRCA2 gene are associated with a lifetime risk of breast cancer greater than 60%. They also increase the lifetime risk for ovarian cancer up to 13-29%.  Additional cancer risks include prostate, pancreatic, male breast, and melanoma.  If individuals are found to have a mutation in the BRCA2 gene, we would recommend increased cancer screening at younger ages.  Risk reduction surgeries are also available options that can prevent the development of certain cancers.  In rare situations in which both parents have a mutation in the BRCA2 gene, their children each have a 25% risk for Fanconi anemia. Fanconi anemia is a rare condition with onset in childhood. Fanconi anemia often results in physical abnormalities, growth delays, bone marrow failure, and increased risk for cancer.  In individuals of childbearing age with BRCA2 mutations, genetic counseling and genetic testing may be advised for their partners.  I understand that this may be surprising, unexpected, and even scary news. You may be at risk for having the same BRCA2 variant found in your family.  Individuals who carry a harmful variant in the BRCA2 gene have a  50% chance of passing this variant on to each of their children.   Scheduling a genetic counseling appointment does not mean you have to undergo genetic testing. The decision to pursue such testing is a very personal one that is discussed in more detail during the appointment. The role of genetic counseling is to provide valuable information to individuals who are impacted by genetic information such as this.    If you are interested in scheduling a genetic counseling appointment at [facility], please call [phone] to schedule an appointment. You can also find a genetic counselor close to you or at another health system at [facility]  Sincerely,   Susie Garcia MS, [facility]  Licensed, Certified Genetic Counselor

## 2025-02-26 NOTE — NURSING NOTE
Current patient location: [address] [place] [address]    Is the patient currently in the state of [place]? YES    Visit mode: VIDEO    If the visit is dropped, the patient can be reconnected by:VIDEO VISIT: Send to e-mail at: [email or web address]    Will anyone else be joining the visit? NO  (If patient encounters technical issues they should call [phone]    Are changes needed to the allergy or medication list? N/A    Are refills needed on medications prescribed by this physician? NO    Rooming Documentation:  Questionnaire(s) not done per department protocol    Reason for visit: RECHECK    Allen MANNINGF

## 2025-02-26 NOTE — LETTER
Cancer Risk Management  Program Locations    [facility]  [facility]  [facility]  Mailing Address  Cancer Risk Management Program  [address]  [place] [address]    New patient appointments  [phone]    February 26, 2025    Lucinda MORALES  [place] [address]    Dear Lucinda,    It was a pleasure talking with you via your virtual genetic counseling visit on 2/26/2025.  Below is a copy of the progress note from that recent visit through the Cancer Risk Management Program.  If you have any additional questions, please feel free to contact me.    Referring Provider: Christiane Bunn MD     Presenting Information:   I spoke to Lucinda today to discuss her genetic testing results. Her blood was drawn on 2/12/2025. The Hereditary Brain Cancers panel + the Multi-Cancer panel was ordered from [facility] [facility]. This testing was done because of Lucinda's family history of young onset brain tumors.    Genetic Testing Results: POSITIVE  Tri is POSITIVE for a BRCA2 mutation. Specifically her mutation is called [id number] ([id number]). We discussed that this mutation is associated with a diagnosis of Hereditary Breast and Ovarian Cancer Syndrome (HBOC) and increased risk for breast, ovarian, pancreatic, prostate cancer and melanoma. We discussed the impact of this testing on Tri in detail.     Of note, Lucinda tested negative for mutations in the following genes by sequencing and deletion/duplication analysis: AIP, ALK, APC, STANISLAV, AXIN2, BAP1, BARD1, BLM, BMPR1A, BRCA1, BRIP1, CDC73, CDH1, CDK4, CDKN1B, CDKN2A, CHEK2, CTNNA1, DICER1, EGFR, EPCAM, FH, FLCN, GREM1, HOXB13, KIT, LZTR1, MAX, MBD4, MEN1, MET, MITF, MLH1, MSH2, MSH3, MSH6, MUTYH, NF1, NF2, NTHL1, PALB2, PDGFRA, PMS2, POLD1, POLE, POT1, FRZXY0M, PTCH1, PTEN, RAD51C,  "RAD51D, RB1, RET, SDHA, SDHAF2, SDHB, SDHC, SDHD, SMAD4, SMARCA4, SMARCB1, SMARCE1, STK11, SUFU, OYTC764, TP53, TSC1, TSC2, VHL, CDKN1C, GPC3, HRAS, NBN, PHOX2B.  We reviewed the autosomal dominant inheritance of these genes. Tri cannot pass on a mutation in any of these genes to her children based on this test result. Mutations in these genes do not skip generations.      A copy of the test report can be found in the Laboratory tab, dated 2/12/2025, and named \"LABORATORY MISCELLANEOUS ORDER\". The report is scanned in as a linked document.     BRCA2 Cancer Risks:  Breast cancer risk (first primary):  Women who carry a BRCA2 pathogenic/likely pathogenic variant have a 55-69% lifetime risk of developing breast cancer. This is much greater than the general population breast cancer risk of 12%  Male breast cancer risks are also increased: approximately 1.8-7.1% by age 70.    Ovarian cancer risk: 13-29% (compared to the general population risk of 1-2%)  Pancreatic cancer risk: 5-10%   Prostate cancer risk: up to 19-61%, including a higher likelihood for advanced or metastatic disease.   Increased risk for other cancers, including melanoma, may be present. No exact lifetime risks are available at this time.     Cancer Screening and Prevention:  The following recommendations are based on current National Comprehensive Cancer Network guidelines for BRCA2.  Earlier and more frequent breast screening is recommended:  Breast awareness starting at age 18  Clinical breast exams starting at age 25; repeated every 6-12 months  Annual breast MRI from age 25-29  Annual mammograms with consideration of tomosynthesis and breast MRI alternating every 6 months starting at age 30.  Screening after age 75 is considered on an individual basis.    Consider risk reducing mastectomy, which reduces breast cancer risk by 90%, and other risk reducing agents.    Chemoprevention with Tamoxifen can reduce breast cancer risk in some women.  Surgical " risk reduction options and Tamoxifen use should be discussed with Tri's physician.    Screening for male breast cancer includes self breast exams and annual clinical breast exams beginning at age 35  Annual mammogram screening may be considered starting at age 50 or 10 years prior to the earliest male breast cancer in the family  Bilateral salpingo-oophorectomy (removal of both ovaries and fallopian tubes) reduces the risk of ovarian cancer and is typically recommended between ages 35-40, or after child bearing  Per current NCCN guidelines, individuals at risk for ovarian cancer due to BRCA2 may choose to delay this surgery to ages 40-45. For some patients with a BRCA2 mutation, though, surgery can be considered at an earlier age, based on family history. As with any surgery, risks are involved, and this option should be discussed in greater detail with a gyn-oncologist.    Consider transvaginal ultrasound and a  blood test starting at age 30-35, though the benefits of this screening are unclear.  We discussed that using oral contraceptives for five years or more has been shown to reduce ovarian cancer risk by around 50%.  The data are still inconclusive as to whether or not using oral contraceptives will increase breast cancer risk in BRCA2 mutation carriers.   Pancreatic cancer screening is recommended:  This screening is recommended to begin at age 50, or 10 years younger than the earliest pancreatic cancer diagnosis in the family, whichever is earlier.    Screening typically involves endoscopic ultrasonography (EUS) and/or MRI/magnetic resonance cholangiopancreatography.  Individuals at risk for prostate cancer may begin prostate screening at age 40, as recommended in the NCCN Guidelines for Prostate Early Detection  General melanoma risk management, including annual full body skin exams and safe sun practices are appropriate.      Some chemotherapies for certain cancers may be more effective in individuals  with BRCA2 mutations. Lucinda should discuss this with her physicians, if chemotherapy is indicated for her in the future.     Other screening based on Lucinda's personal and family history:  Other population cancer screening options, such as those recommended by the American Cancer Society and the National Comprehensive Cancer Network (NCCN), are also appropriate for Lucinda and her family. These screening recommendations may change if there are changes to Lucinda's personal and/or family history of cancer.   Final screening recommendations should be made by each individual's primary care provider.    We discussed that Lucinda could participate in our Cancer Risk Management Program in which our nursing specialist provides an individual screening plan and assists with medical management. A referral was placed to see JULIA Napier for this service.    Of note, the above information is based on our current understanding of Lucinda's genetic findings. Lucinda is encouraged to reach out to me regularly regarding any pertinent updates to her personal and/or family history of cancer, as our understanding of the genetic findings in her family may change over time.     Implications for Family Members:  We reviewed that mutations in the BRCA2 gene are inherited in an autosomal dominant pattern. This means that each of Lucinda's children have a 50% chance of inheriting the same mutation. Each of her half siblings has a 25% risk of having the same mutation. Extended relatives may also carry this mutation, and she is encouraged to share this information with her family members on both sides of the family. I am happy to help her relatives connect with a genetic counselor in their area if they would like to discuss testing.    In rare situations in which both parents have a mutation in the BRCA2 gene, their children each have a 25% risk for Fanconi anemia. Fanconi anemia is a rare condition with onset in childhood that often results in physical  "abnormalities, growth retardation, bone marrow failure, and increased risk for cancer. For individuals of childbearing age with BRCA2 mutations, genetic counseling and genetic testing may be advised for their partners.    Additional Testing Considerations:  Even though Lucinda's genetic testing result was positive, other relatives may carry a different gene mutation associated with brain tumors. Genetic counseling can be considered for Lucinda's maternal half siblings to discuss genetic testing options.  If any of these relatives do pursue genetic testing, Lucinda is encouraged to contact me so that we may review the impact of their test results on her.    Support Resources:  I provided Tri with information regarding national and local support resources.     Plan:  1. A copy of this summary and her test results will be available in [facility].  2.  A \"Dear Relative\" letter for Lucinda to share with her family members can be found in the \"After Visit Summary\" in [employer].   3.  Lucinda plans to follow up with her other providers.   4.   A referral was placed for Lucinda to meet with JULIA Napier to discuss screening associated with a BRCA2 mutation.    If Lucinda has additional questions, I encouraged her to contact me directly at [phone].     Susie Garcia MS, [facility]  Licensed, Certified Genetic Counselor  Phone: [phone]      Resources for Hereditary Breast and Ovarian Cancer  Bright Pink    www.Rise Art.org  [employer] is the only national non-profit organization focused on prevention and early detection of breast and ovarian cancer in young women.  Bright Pink aims to reach the 52 million young women in the [place] between the ages of 18 and 45 with innovative, life-saving breast and ovarian health programs, thereby empowering this and future generations of women to live healthier, happier, and longer lives.  FORCE: Facing Our Risk of Cancer Empowered  www.facingourrisk.org  FORCE s mission is to improve the lives " of individuals and families affected by hereditary breast, ovarian, and related cancers by creating awareness, supplying information and support to the community, advocating for and supporting research, and working with the research and medical communities.   Helpline: 1-404.569.1223  Message boards  Peer Navigation and local support groups  Talking About BRCA in Your Family Tree http://www.facingourrisk.org/understanding-brca-and-hboc/publications/documents/onhartq-baamwqy-ofqfo-brca-family.pdf  This free booklet helps parents discuss BRCA-related issues with their children and loved ones  MOCA: [place] Ovarian Cancer [employer] http://mnovarian.org/  MOCA was started in 1999 by a small group of ovarian cancer survivors who came together to fund ovarian cancer research, raise awareness of the disease, and provide support to women with ovarian cancer and their families.  [employer]   www.[employer] Brandssisterhood.org  Based in the [facility], the [employer] connects women diagnosed with breast cancer with trained breast cancer survivors to offer support, guidance and hope.  [employer] [place] www.webtidelubtwincities.org  [employer] [place] is a 501(c)3 nonprofit and the local affiliate of the Cancer Support Community, a network of more than 54 supportive, free and welcoming  clubhouses  where everyone living with cancer can come for social, emotional and psychological support. Clubs are healing environments where individuals learn from each other with guidance from licensed professionals.  BRCA Information Support Group  People with BRCA1 or BRAC2 mutations face complex emotional challenges and complicated medical decisions due to high cancer risks and their impact on individuals and families. This group brings people with a BRAC1 or BRAC2 genetic diagnosis together with others facing similar challenges. The group meets nine times per year. For information, please contact  [email or web address]@[email or web address] or call [phone].  National Society of Genetic Counselors https://www.nsgc.org  The National Society of Genetic Counselors advances the various roles of genetic counselors in health care by fostering education, research, and public policy to ensure the availability of quality genetic services.  A number of resources are available through this website for providers and patients seeking additional information about genetic counseling services.   Books:  The Breast Reconstruction Guidebook: Issues and Answers from Research to Recovery (2012), by Rachel Harding  Confronting Hereditary Breast and Ovarian Cancer: Identify Your Risk, Understand Your Options, Change Your Heaven (2012), by Susie Barrientos  Positive Results: Making the Best Decisions When You re at High Risk for Breast or Ovarian Cancer (2010), by Vane Ceballos and Dr. Cassidy Hopson.   Previvors: Facing the Breast Cancer Gene and Making Life-Changing Decisions (2010), by Damaris Palumbo

## 2025-02-27 ENCOUNTER — PATIENT OUTREACH (OUTPATIENT)
Dept: ONCOLOGY | Facility: CLINIC | Age: 35
End: 2025-02-27
Payer: COMMERCIAL

## 2025-02-27 NOTE — PROGRESS NOTES
Writer received referral to Cancer Risk Management/Genetic Counseling.    Referred for:   Please schedule with JULIA Napier to discuss screening associated with a BRCA2 mutation.     Referred By    Provider Department Location Phone   Susie Garcia [facility] Cancer Risk Mgmt [facility] [phone]       Referral reviewed for appropriate plan, and sent to New Patient Scheduling ([phone]) for completion.    Faye Baxter, RN, BSN  Oncology New Patient Nurse Navigator   [facility]

## 2025-03-05 PROBLEM — Z15.09 MONOALLELIC MUTATION OF BRCA2 GENE: Chronic | Status: ACTIVE | Noted: 2025-02-26

## 2025-03-05 PROBLEM — Z15.01 MONOALLELIC MUTATION OF BRCA2 GENE: Chronic | Status: ACTIVE | Noted: 2025-02-26

## 2025-03-26 ENCOUNTER — OFFICE VISIT (OUTPATIENT)
Dept: FAMILY MEDICINE | Facility: CLINIC | Age: 35
End: 2025-03-26
Payer: COMMERCIAL

## 2025-03-26 VITALS
TEMPERATURE: 97.8 F | BODY MASS INDEX: 47.09 KG/M2 | DIASTOLIC BLOOD PRESSURE: 68 MMHG | WEIGHT: 293 LBS | RESPIRATION RATE: 16 BRPM | HEART RATE: 121 BPM | SYSTOLIC BLOOD PRESSURE: 98 MMHG | HEIGHT: 66 IN | OXYGEN SATURATION: 98 %

## 2025-03-26 DIAGNOSIS — O21.9 NAUSEA/VOMITING IN PREGNANCY: ICD-10-CM

## 2025-03-26 DIAGNOSIS — O09.291 HISTORY OF STILLBIRTH IN PREGNANT PATIENT IN FIRST TRIMESTER, ANTEPARTUM: ICD-10-CM

## 2025-03-26 DIAGNOSIS — E66.813 CLASS 3 SEVERE OBESITY DUE TO EXCESS CALORIES WITHOUT SERIOUS COMORBIDITY WITH BODY MASS INDEX (BMI) OF 40.0 TO 44.9 IN ADULT (H): ICD-10-CM

## 2025-03-26 DIAGNOSIS — D25.9 UTERINE LEIOMYOMA, UNSPECIFIED LOCATION: ICD-10-CM

## 2025-03-26 DIAGNOSIS — O09.91 SUPERVISION OF HIGH-RISK PREGNANCY, FIRST TRIMESTER: Primary | ICD-10-CM

## 2025-03-26 DIAGNOSIS — E66.01 CLASS 3 SEVERE OBESITY DUE TO EXCESS CALORIES WITHOUT SERIOUS COMORBIDITY WITH BODY MASS INDEX (BMI) OF 40.0 TO 44.9 IN ADULT (H): ICD-10-CM

## 2025-03-26 PROCEDURE — 3074F SYST BP LT 130 MM HG: CPT | Performed by: STUDENT IN AN ORGANIZED HEALTH CARE EDUCATION/TRAINING PROGRAM

## 2025-03-26 PROCEDURE — 3078F DIAST BP <80 MM HG: CPT | Performed by: STUDENT IN AN ORGANIZED HEALTH CARE EDUCATION/TRAINING PROGRAM

## 2025-03-26 PROCEDURE — 99207 PR PRENATAL VISIT: CPT | Performed by: STUDENT IN AN ORGANIZED HEALTH CARE EDUCATION/TRAINING PROGRAM

## 2025-03-26 ASSESSMENT — ASTHMA QUESTIONNAIRES
QUESTION_5 LAST FOUR WEEKS HOW WOULD YOU RATE YOUR ASTHMA CONTROL: SOMEWHAT CONTROLLED
QUESTION_3 LAST FOUR WEEKS HOW OFTEN DID YOUR ASTHMA SYMPTOMS (WHEEZING, COUGHING, SHORTNESS OF BREATH, CHEST TIGHTNESS OR PAIN) WAKE YOU UP AT NIGHT OR EARLIER THAN USUAL IN THE MORNING: ONCE OR TWICE
QUESTION_4 LAST FOUR WEEKS HOW OFTEN HAVE YOU USED YOUR RESCUE INHALER OR NEBULIZER MEDICATION (SUCH AS ALBUTEROL): ONE OR TWO TIMES PER DAY
QUESTION_2 LAST FOUR WEEKS HOW OFTEN HAVE YOU HAD SHORTNESS OF BREATH: ONCE OR TWICE A WEEK
ACT_TOTALSCORE: 17
EMERGENCY_ROOM_LAST_YEAR_TOTAL: THREE OR MORE
QUESTION_1 LAST FOUR WEEKS HOW MUCH OF THE TIME DID YOUR ASTHMA KEEP YOU FROM GETTING AS MUCH DONE AT WORK, SCHOOL OR AT HOME: A LITTLE OF THE TIME

## 2025-03-26 NOTE — PROGRESS NOTES
Assessment & Plan  Lucinda Ji is a 34 year old , at 22w5d weeks of pregnancy with ASCENCION of 2025 by  9 week ultrasound.  Patient's history is high risk for the following reasons: .     # High risk concerns (medical problems, high risk surgical/obstetric history, high risk social situation, etc):   - Problem list reviewed and updated.    Hx of stillbirth at 31+2 in ,  at 22wks in  following with MFM.  Not taking aspirin - does not want to take.      Hyperemesis - improved, but still present.  Has B6 and Unisom.      Class 3 severe obesity - reviewed appropriate weight gain.  TWG in pregnancy 2 lbs.      Pelvic pain - likely round ligament - gave pregnancy support belt.  Unsure if the size we have available in clinic will fit, but patient wants to try.      Hx anxiety and depression with panic attacks.  Stable.      Mild intermittent asthma.  No current symptoms    Hx of myomectomy - plan for  with [facility].  Will need referral in 3rd trimester    Elevated BPs in past when NOT pregnant- not on medications, BPs have been appropriate to low in this pregnancy.      Following with MFM, with follow up US on 2025.      Ivonne was seen today for prenatal care.    Diagnoses and all orders for this visit:    Supervision of high-risk pregnancy, first trimester    Nausea/vomiting in pregnancy    Class 3 severe obesity due to excess calories without serious comorbidity with body mass index (BMI) of 40.0 to 44.9 in adult (H)    History of stillbirth in pregnant patient in first trimester, antepartum    Uterine leiomyoma, unspecified location      The longitudinal plan of care for the diagnosis(es)/condition(s) as documented were addressed during this visit. Due to the added complexity in care, I will continue to support Ivonne in the subsequent management and with ongoing continuity of care.    Weight gain adequate: 0.816 kg (1 lb 12.8 oz) to date, out of recommended total of 11-20 lbs  (pregravid BMI >30)    Patient Instructions   Keep up the good work!    Make sure you are drinking enough water.  This is important to keep your blood pressure up.     Give the pregnancy support belt a try - this will help relieve some of the pain.     For sleep, use the full body pillow and a bath before bed.  We do have medications that can help too!.      For pelvic pain during the day, use the pregnancy support band.     Already has appointment for Ultrasound in April.      Schedule follow up in 1 month with Dr. Feldman or Dr. Ghosh    Return to clinic in 4 weeks.    Joie Ghosh MD    Subjective  Concerns: Patient describes pressure in her lower abdomen.  Worse with standing or walking around.  Feels pressure when she is standing like baby is moving down into her pelvis.  Also noticing increased frequency of urination.  No burning, no pain.  Has to get up multiple times at night because she is uncomfortable and needs to go to the bathroom.  Warm baths have been helpful.  Squatting has been helpful.    Difficulty emptying or pain with urination.  Bowel movements are good and not hard.  Does feel like she has to urinate all the time.    Still having nausea and vomiting.  It comes and goes.  Different foods seem to irritate her stomach more.  Does get chest pain occasionally with vomiting and will feel like she is choking.    Notes increased shortness of breath with activity.  Feels like she gets short of breath walking up and down the stairs.  Sometimes feels dizzy.    Describes doing okay eating and drinking fluids.  No fevers and chills, no headache, no vision changes.    Has had some intermittent diarrhea.    This morning and has felt somewhat dizzy since.    Aware of upcoming ultrasound appointment with [facility].    ROS:  No - Headache  No - Changes in vision  YES - Chest Pain  YES - Shortness of Breath  YES - Nausea   YES - Vomiting  YES - Abdominal pain  -lower pelvic pain as described above  No -  "Contractions  No - Dysuria   No - Vaginal Discharge    No - Vaginal bleeding   No - Loss of Fluid   No - Extremity swelling   Present - Fetal movement       Patient Active Problem List   Diagnosis    Anxiety and depression    Class 3 severe obesity due to excess calories without serious comorbidity with body mass index (BMI) of 40.0 to 44.9 in adult (H)    Mild intermittent asthma in adult without complication    Hiatal hernia    Uterine leiomyoma    History of stillbirth in pregnant patient in first trimester, antepartum    Elevated blood pressure reading without diagnosis of hypertension    Supervision of high-risk pregnancy, first trimester    Pregnancy w/ hx of uterine myomectomy    Monoallelic mutation of BRCA2 gene       Lucinda Ji speaks English so an  was not used today.    Guidance:  signs of miscarriage  OTC medications  genetic testing  weight gain and exercise  quickening  fetal survey ultrasound    Going to [facility]? Yes    Objective  BP 98/68 (BP Location: Left arm, Patient Position: Sitting, Cuff Size: Adult Large)   Pulse (!) 121   Temp 97.8  F (36.6  C) (Oral)   Resp 16   Ht 1.676 m (5' 6\")   Wt 133.7 kg (294 lb 12.8 oz)   LMP 10/20/2024 (Approximate)   SpO2 98%   BMI 47.58 kg/m    No distress.  Gravid abdomen.   - 155.  Fundal height 23 cm.  no edema.    Results  Blood type: B POS  No results found for any visits on 03/26/25.   "

## 2025-03-26 NOTE — PATIENT INSTRUCTIONS
Keep up the good work!    Make sure you are drinking enough water.  This is important to keep your blood pressure up.     Give the pregnancy support belt a try - this will help relieve some of the pain.     For sleep, use the full body pillow and a bath before bed.  We do have medications that can help too!.      For pelvic pain during the day, use the pregnancy support band.     Already has appointment for Ultrasound in April.      Schedule follow up in 1 month with Dr. Feldman or Dr. Ghosh

## 2025-04-07 ENCOUNTER — TELEPHONE (OUTPATIENT)
Dept: FAMILY MEDICINE | Facility: CLINIC | Age: 35
End: 2025-04-07
Payer: COMMERCIAL

## 2025-04-07 DIAGNOSIS — O09.91 SUPERVISION OF HIGH-RISK PREGNANCY, FIRST TRIMESTER: Primary | ICD-10-CM

## 2025-04-07 DIAGNOSIS — O34.29 PREGNANCY W/ HX OF UTERINE MYOMECTOMY: ICD-10-CM

## 2025-04-07 DIAGNOSIS — D25.1 INTRAMURAL LEIOMYOMA OF UTERUS: ICD-10-CM

## 2025-04-07 DIAGNOSIS — O09.291 HISTORY OF STILLBIRTH IN PREGNANT PATIENT IN FIRST TRIMESTER, ANTEPARTUM: ICD-10-CM

## 2025-04-07 NOTE — TELEPHONE ENCOUNTER
General Call      Reason for Call:  CALL BACK IF QUESTIONS     What are your questions or concerns:  She has no showed about 5 appointments last on was 4/4 so they are going to go ahead and removed the order so if she still wants to be seen with them they will need a new referral     Date of last appointment with provider: ?    Could we send this information to you in [facility] or would you prefer to receive a phone call?:   Patient would prefer a phone call   Okay to leave a detailed message?: Yes at [phone]       Does this patient currently have active insurance coverage?  Yes, Pt has active insurance coverage.     Does patient or caller know when to expect a call? Yes, Nurses will return call within 2-3 business hours.    Génesis Johnson on 4/7/2025 at 3:11 PM

## 2025-04-10 NOTE — TELEPHONE ENCOUNTER
Called pt and assisted her with scheduling fetal survey u/s for Monday, 4/14/25 at 2:20 PM at [place]./Effie Prescott RN BSN

## 2025-04-10 NOTE — TELEPHONE ENCOUNTER
Called patient to inquire about missed ultrasounds. She has been having difficulty with transportation, including rides not coming when scheduled. She prefers to have an anatomy scan at [facility]. Discussed the differences between comprehensive US at [facility] vs at [facility] and she wishes to proceed with anatomy scan at [facility].  Ideally this would occur as early as possible - she is available this coming Monday, prefers afternoon US time if available.    Chart forwarded to Effie Prescott RN for assistance with scheduling.     Izabel Feldman MD

## 2025-04-14 ENCOUNTER — ANCILLARY PROCEDURE (OUTPATIENT)
Dept: ULTRASOUND IMAGING | Facility: CLINIC | Age: 35
End: 2025-04-14
Attending: STUDENT IN AN ORGANIZED HEALTH CARE EDUCATION/TRAINING PROGRAM
Payer: COMMERCIAL

## 2025-04-14 DIAGNOSIS — O09.291 HISTORY OF STILLBIRTH IN PREGNANT PATIENT IN FIRST TRIMESTER, ANTEPARTUM: ICD-10-CM

## 2025-04-14 DIAGNOSIS — D25.1 INTRAMURAL LEIOMYOMA OF UTERUS: ICD-10-CM

## 2025-04-14 DIAGNOSIS — O09.91 SUPERVISION OF HIGH-RISK PREGNANCY, FIRST TRIMESTER: ICD-10-CM

## 2025-04-14 DIAGNOSIS — O34.29 PREGNANCY W/ HX OF UTERINE MYOMECTOMY: ICD-10-CM

## 2025-04-14 PROCEDURE — 76805 OB US >/= 14 WKS SNGL FETUS: CPT | Mod: TC | Performed by: RADIOLOGY

## 2025-04-23 ENCOUNTER — OFFICE VISIT (OUTPATIENT)
Dept: FAMILY MEDICINE | Facility: CLINIC | Age: 35
End: 2025-04-23
Payer: COMMERCIAL

## 2025-04-23 VITALS
SYSTOLIC BLOOD PRESSURE: 135 MMHG | HEART RATE: 118 BPM | WEIGHT: 293 LBS | BODY MASS INDEX: 47.74 KG/M2 | TEMPERATURE: 97.2 F | OXYGEN SATURATION: 97 % | RESPIRATION RATE: 20 BRPM | DIASTOLIC BLOOD PRESSURE: 77 MMHG

## 2025-04-23 DIAGNOSIS — R10.84 ABDOMINAL PAIN, GENERALIZED: ICD-10-CM

## 2025-04-23 DIAGNOSIS — O09.91 SUPERVISION OF HIGH-RISK PREGNANCY, FIRST TRIMESTER: Primary | ICD-10-CM

## 2025-04-23 DIAGNOSIS — R03.0 ELEVATED BLOOD PRESSURE READING WITHOUT DIAGNOSIS OF HYPERTENSION: Primary | ICD-10-CM

## 2025-04-23 DIAGNOSIS — O09.91 SUPERVISION OF HIGH-RISK PREGNANCY, FIRST TRIMESTER: ICD-10-CM

## 2025-04-23 LAB
ALBUMIN MFR UR ELPH: 23.7 MG/DL
ALBUMIN UR-MCNC: 30 MG/DL
APPEARANCE UR: CLEAR
BACTERIA #/AREA URNS HPF: ABNORMAL /HPF
BILIRUB UR QL STRIP: ABNORMAL
COLOR UR AUTO: YELLOW
CREAT UR-MCNC: 312 MG/DL
ERYTHROCYTE [DISTWIDTH] IN BLOOD BY AUTOMATED COUNT: 13.1 % (ref 10–15)
GLUCOSE 1H P 50 G GLC PO SERPL-MCNC: 146 MG/DL (ref 70–129)
GLUCOSE UR STRIP-MCNC: NEGATIVE MG/DL
HCT VFR BLD AUTO: 36.1 % (ref 35–47)
HGB BLD-MCNC: 11.8 G/DL (ref 11.7–15.7)
HGB UR QL STRIP: ABNORMAL
KETONES UR STRIP-MCNC: ABNORMAL MG/DL
LEUKOCYTE ESTERASE UR QL STRIP: NEGATIVE
MCH RBC QN AUTO: 29 PG (ref 26.5–33)
MCHC RBC AUTO-ENTMCNC: 32.7 G/DL (ref 31.5–36.5)
MCV RBC AUTO: 89 FL (ref 78–100)
MUCOUS THREADS #/AREA URNS LPF: PRESENT /LPF
NITRATE UR QL: NEGATIVE
PH UR STRIP: 7.5 [PH] (ref 5–8)
PLATELET # BLD AUTO: 421 10E3/UL (ref 150–450)
PROT/CREAT 24H UR: 0.08 MG/MG CR (ref 0–0.2)
RBC # BLD AUTO: 4.07 10E6/UL (ref 3.8–5.2)
RBC #/AREA URNS AUTO: ABNORMAL /HPF
SP GR UR STRIP: 1.02 (ref 1–1.03)
SQUAMOUS #/AREA URNS AUTO: ABNORMAL /LPF
UROBILINOGEN UR STRIP-ACNC: 0.2 E.U./DL
WBC # BLD AUTO: 10.9 10E3/UL (ref 4–11)
WBC #/AREA URNS AUTO: ABNORMAL /HPF
YEAST #/AREA URNS HPF: ABNORMAL /HPF

## 2025-04-23 RX ORDER — CEPHALEXIN 500 MG/1
500 CAPSULE ORAL 2 TIMES DAILY
Qty: 14 CAPSULE | Refills: 0 | Status: SHIPPED | OUTPATIENT
Start: 2025-04-23

## 2025-04-23 RX ORDER — FLUCONAZOLE 150 MG/1
150 TABLET ORAL ONCE
Qty: 1 TABLET | Refills: 0 | Status: SHIPPED | OUTPATIENT
Start: 2025-04-23 | End: 2025-04-23

## 2025-04-23 NOTE — LETTER
April 24, 2025      Ivonne Ji  [address] [place] [address]        Dear ,    We are writing to inform you of your test results.    Your kidneys and liver tests are stable.  You are losing a little bit of protein in your urine - we will keep watching this.      Please  a blood pressure cuff and check your blood pressures at home.  Write them down and bring them in to your next visit.  If you blood pressure is higher than 140/90, please let us know.      Please call the clinic at [phone] if you have any questions.       Resulted Orders   Protein  random urine   Result Value Ref Range    Total Protein Urine mg/dL 23.7   mg/dL      Comment:      The reference ranges have not been established in urine protein. The results should be integrated into the clinical context for interpretation.    Total Protein Urine mg/mg Creat 0.08 0.00 - 0.20 mg/mg Cr    Creatinine Urine mg/dL 312.0 mg/dL      Comment:      The reference ranges have not been established in urine creatinine. The results should be integrated into the clinical context for interpretation.   UA Macroscopic with reflex to Microscopic and Culture   Result Value Ref Range    Color Urine Yellow Colorless, Straw, Light Yellow, Yellow    Appearance Urine Clear Clear    Glucose Urine Negative Negative mg/dL    Bilirubin Urine Small (A) Negative    Ketones Urine Trace (A) Negative mg/dL    Specific Gravity Urine 1.020 1.005 - 1.030    Blood Urine Moderate (A) Negative    pH Urine 7.5 5.0 - 8.0    Protein Albumin Urine 30 (A) Negative mg/dL    Urobilinogen Urine 0.2 0.2, 1.0 E.U./dL    Nitrite Urine Negative Negative    Leukocyte Esterase Urine Negative Negative   UA Microscopic with Reflex to Culture   Result Value Ref Range    Bacteria Urine Moderate (A) None Seen /HPF    RBC Urine 0-2 0-2 /HPF /HPF    WBC Urine 0-5 0-5 /HPF /HPF    Squamous Epithelials Urine Many (A) None Seen /LPF    Budding Yeast Urine Few (A) None Seen /HPF    Mucus  Urine Present (A) None Seen /LPF    Narrative    Urine Culture not indicated   CBC with platelets   Result Value Ref Range    WBC Count 10.9 4.0 - 11.0 10e3/uL    RBC Count 4.07 3.80 - 5.20 10e6/uL    Hemoglobin 11.8 11.7 - 15.7 g/dL    Hematocrit 36.1 35.0 - 47.0 %    MCV 89 78 - 100 fL    MCH 29.0 26.5 - 33.0 pg    MCHC 32.7 31.5 - 36.5 g/dL    RDW 13.1 10.0 - 15.0 %    Platelet Count 421 150 - 450 10e3/uL   Glucose tolerance gest screen 1 hour   Result Value Ref Range    Glu Gest Screen 1hr 50g 146 (H) 70 - 129 mg/dL    Narrative    This is a screening test for Gestational Diabetes Mellitus.   If results are 130 mg/dL or greater, a Standard 100 gram Gestational  3 hour Glucose Tolerance should be performed.   Basic metabolic panel   Result Value Ref Range    Sodium 134 (L) 135 - 145 mmol/L    Potassium 3.7 3.4 - 5.3 mmol/L    Chloride 104 98 - 107 mmol/L    Carbon Dioxide (CO2) 19 (L) 22 - 29 mmol/L    Anion Gap 11 7 - 15 mmol/L    Urea Nitrogen 4.3 (L) 6.0 - 20.0 mg/dL    Creatinine 0.49 (L) 0.51 - 0.95 mg/dL    GFR Estimate >90 >60 mL/min/1.73m2      Comment:      eGFR calculated using 2021 CKD-EPI equation.    Calcium 8.9 8.8 - 10.4 mg/dL    Glucose 154 (H) 70 - 99 mg/dL   Hepatic function panel   Result Value Ref Range    Protein Total 7.0 6.4 - 8.3 g/dL    Albumin 3.4 (L) 3.5 - 5.2 g/dL    Bilirubin Total 0.3 <=1.2 mg/dL    Alkaline Phosphatase 120 40 - 150 U/L    AST 24 0 - 45 U/L    ALT 14 0 - 50 U/L    Bilirubin Direct 0.12 0.00 - 0.30 mg/dL       If you have any questions or concerns, please call the clinic at the number listed above.       Sincerely,      Joie Ghosh MD    Electronically signed

## 2025-04-23 NOTE — PROGRESS NOTES
Assessment & Plan  Lucinda Ji is a 34 year old , at 26w5d weeks of pregnancy with ASCENCION of 2025 by LMP consistent with 9 week ultrasound.  Patient's history is high risk for the following reasons: See below.  .     # High risk concerns (medical problems, high risk surgical/obstetric history, high risk social situation, etc):   - Problem list reviewed and updated.    Routine Pregnancy Monitoring:  Supervision of high-risk pregnancy, second trimester  -     Glucose tolerance gest screen 1 hour; Future  -   Tolerance test was elevated.  Will do a 3-hour test.  -    Due for Tdap at next visit.     Polyhydramnios:  Follow up ultrasound ordered for .    -     US OB >14 Weeks Follow Up; Future    High Risk Pregnancy/  Increased risk for   delivery.  Hx of stillbirth at 31+2 in ,  at 22wks in  following with MFM.  Not taking aspirin - does not want to take.  Notes strong [place] Am[place] contractions and pelvic pressure, but cervix is not dilated.     Hyperemesis - improved, but still present.  Only using ondansetron currently.  Helps.       Class 3 severe obesity - reviewed appropriate weight gain.  TWG in pregnancy 3 lbs.  Still struggling with oral intake.      Pelvic pain - Worsening, feeling heavy pressure in abdomen all the time.  Pregnancy support belt did not help.  Some improvement with shower/bath.    Urine testing today shows yeast and bacteria on the micro.  Will treat with Diflucan and Keflex.  Hopefully this will decrease some of the pelvic pressure and pain.  Would recommend doing vaginal swabs next visit to confirm clearance.     Hx anxiety and depression with panic attacks.        Mild intermittent asthma.  No current symptoms     Hx of myomectomy - plan for  with [facility].  Will need referral in 3rd trimester     Elevated BP:    Elevated blood pressure reading without diagnosis of hypertension  No symptoms of Pre-E today.  Check Pre-E labs, given home BP  cuff.    -     Basic metabolic panel; Future  -     Hepatic function panel; Future  -     CBC with platelets; Future  -     UA Macroscopic with reflex to Microscopic and Culture; Future  -     Protein  random urine; Future  -     Home Blood Pressure Monitor Order for DME - ONLY FOR DME     Transportation Barriers:  Difficulty getting to M visits - will do US and monitoring here going forward.      Weight gain inadequate: 1.27 kg (2 lb 12.8 oz) to date, out of recommended total of 11-20 lbs (pregravid BMI >30)    Patient Instructions   Hang in there!  I know this is hard but you are doing a good job.      1)  Continue to use the shower and bath to help with pain and comfort.  This can help both the uterus and the rectal area  2)  Your cervix is still closed.  This is good news.  We want baby to continue to grow larger.   3)  Your blood pressure is high today.  We will check labs around this to make sure everything looks okay.  4)  We will prescribe a blood pressure cuff to have at home.  Please check your blood pressure daily and write down the readings.  Please call the clinic if your blood pressure is higher than 140/90.    5)  Schedule follow up ultrasound  6)  Schedule follow up clinic visit.      Please call if you have any questions!    Return to clinic in 2 weeks.    The longitudinal plan of care for the diagnosis(es)/condition(s) as documented were addressed during this visit. Due to the added complexity in care, I will continue to support Ivonne in the subsequent management and with ongoing continuity of care.    Joie Ghosh MD      Subjective  Concerns: Feeling lots of pelvic pressure, heaviness.  Present all the time.  Spends a lot of time on hands and knees, as this is the most comfortable position.  Hard time sleeping - waking up often to go to the bathroom.  Having frequent loose stools.      Feeling [place] Am[place] contractions.  These can be very strong.  No change in vaginal discharge.  No  dysuria, no vaginal bleeding,    She describes new diarrhea.  Has been having loose stools throughout pregnancy, but noticed increased frequency last night.  Describes up to 20 stools.  Watery and uncomfortable.  Had some bleeding from her rectum.  Has been soaking in the bathtub to help with this.  It still feels sore.    Did have some dizziness over the weekend.  Not bothering her now and feels mild.  She denies chest pain, no shortness of breath, no headache, no vision changes.    Has been feeling stressful in general.  Recently had her mom's birthday.  Her mom passed when she was 3 months of age, and this has been affecting her sleep and worries.  Feels like it is hard to get enough rest.    Still having nausea and vomiting.  Threw up this morning again as well as last night.  Even small meals can be challenging.  She is taking ondansetron and this helps.  Overall the nausea remains better and she is able to eat some.  She denies heartburn.    ROS:  No - Headache  No - Changes in vision  No - Chest Pain  No - Shortness of Breath  YES - Nausea   YES - Vomiting  YES - Abdominal pain  - lower abdominal pressure  YES - Contractions  No - Dysuria   No - Vaginal Discharge    No - Vaginal bleeding   No - Loss of Fluid   No - Extremity swelling   Present - Fetal movement     Risk Assessment   Average Risk Category  No significant risk factors: No    At Risk Category (up to 3)  Teen pregnancy: No  Poor social situation: Yes  Domestic abuse: No  Financial difficulties: No  Smoker: No  H/O  deliver: Yes  H/O drug abuse: No  Non-English speaking: No  Advanced maternal age: No  GDM risks: Yes  Previous C/S: Yes  H/O PIH: No  H/O STIs: No  H/O mental health concerns: Yes  Onset care > 20 weeks: No  Other: Challenges with pelvic pain and discomfort with pregnancy.      High Risk Category (4 or more At Risk or)  Diabetes/GDM: No  Multiple gestation: No  Chronic hypertension: Yes  Significant hx of asthma: Yes  Fetal  demise > 20 weeks: Yes  Positive tox screen: No  Current mental health treatment: No    Risk: High Risk   Date determined: 2025    Patient Active Problem List   Diagnosis    Anxiety and depression    Class 3 severe obesity due to excess calories without serious comorbidity with body mass index (BMI) of 40.0 to 44.9 in adult    Mild intermittent asthma in adult without complication    Hiatal hernia    Uterine leiomyoma    History of stillbirth in pregnant patient in first trimester, antepartum    Elevated blood pressure reading without diagnosis of hypertension    Supervision of high-risk pregnancy, first trimester    Pregnancy w/ hx of uterine myomectomy    Monoallelic mutation of BRCA2 gene       Lucinda Ji speaks English so an  was not used today.    Guidance:  hypertension  GDM  signs of  labor    Objective  /77   Pulse 118   Temp 97.2  F (36.2  C) (Tympanic)   Resp 20   Wt 134.2 kg (295 lb 12.8 oz)   LMP 10/20/2024 (Approximate)   SpO2 97%   BMI 47.74 kg/m    No distress.  Gravid abdomen.  .  Fundal height 28 cm.  trace edema.  Cervical check completed due to complaints of pelvic pressure and frequent contractions.  Cervix was long thick and closed.    Results  Blood type: B POS  No results found for any visits on 25.

## 2025-04-23 NOTE — PATIENT INSTRUCTIONS
Hang in there!  I know this is hard but you are doing a good job.      1)  Continue to use the shower and bath to help with pain and comfort.  This can help both the uterus and the rectal area  2)  Your cervix is still closed.  This is good news.  We want baby to continue to grow larger.   3)  Your blood pressure is high today.  We will check labs around this to make sure everything looks okay.  4)  We will prescribe a blood pressure cuff to have at home.  Please check your blood pressure daily and write down the readings.  Please call the clinic if your blood pressure is higher than 140/90.    5)  Schedule follow up ultrasound  6)  Schedule follow up clinic visit.      Please call if you have any questions!

## 2025-04-24 LAB
ALBUMIN SERPL BCG-MCNC: 3.4 G/DL (ref 3.5–5.2)
ALP SERPL-CCNC: 120 U/L (ref 40–150)
ALT SERPL W P-5'-P-CCNC: 14 U/L (ref 0–50)
ANION GAP SERPL CALCULATED.3IONS-SCNC: 11 MMOL/L (ref 7–15)
AST SERPL W P-5'-P-CCNC: 24 U/L (ref 0–45)
BILIRUB DIRECT SERPL-MCNC: 0.12 MG/DL (ref 0–0.3)
BILIRUB SERPL-MCNC: 0.3 MG/DL
BUN SERPL-MCNC: 4.3 MG/DL (ref 6–20)
CALCIUM SERPL-MCNC: 8.9 MG/DL (ref 8.8–10.4)
CHLORIDE SERPL-SCNC: 104 MMOL/L (ref 98–107)
CREAT SERPL-MCNC: 0.49 MG/DL (ref 0.51–0.95)
EGFRCR SERPLBLD CKD-EPI 2021: >90 ML/MIN/1.73M2
GLUCOSE SERPL-MCNC: 154 MG/DL (ref 70–99)
HCO3 SERPL-SCNC: 19 MMOL/L (ref 22–29)
POTASSIUM SERPL-SCNC: 3.7 MMOL/L (ref 3.4–5.3)
PROT SERPL-MCNC: 7 G/DL (ref 6.4–8.3)
SODIUM SERPL-SCNC: 134 MMOL/L (ref 135–145)

## 2025-04-24 NOTE — RESULT ENCOUNTER NOTE
Ivonne Ji-    So nice to see you in clinic today!      Your urine came back showing yeast and bacteria.  I wonder if this is why you've been feeling so much pressure and discomfort down below.  I would like you to take medication for yeast called diflucan.  It is one pill once.  We will also have you take the antibiotic, Keflex, 1 pill 2x daily for 1 week.      Additionally, your first glucose test came back elevated.  Please come back in to clinic for a 3 hour test.  Your blood counts and platelets look good.  We are still waiting on the rest of your results.  Please call the clinic at [phone] if you have any questions.      Joie Ghosh MD

## 2025-04-24 NOTE — RESULT ENCOUNTER NOTE
Ivonne Ji-    Your kidneys and liver tests are stable.  You are losing a little bit of protein in your urine - we will keep watching this.      Please  a blood pressure cuff and check your blood pressures at home.  Write them down and bring them in to your next visit.  If you blood pressure is higher than 140/90, please let us know.      Please call the clinic at [phone] if you have any questions.      Dr. Ghosh

## 2025-05-01 ENCOUNTER — HOSPITAL ENCOUNTER (OUTPATIENT)
Facility: CLINIC | Age: 35
End: 2025-05-01
Admitting: OBSTETRICS & GYNECOLOGY
Payer: COMMERCIAL

## 2025-05-01 ENCOUNTER — HOSPITAL ENCOUNTER (OUTPATIENT)
Facility: CLINIC | Age: 35
Discharge: HOME | End: 2025-05-01
Attending: OBSTETRICS & GYNECOLOGY | Admitting: OBSTETRICS & GYNECOLOGY
Payer: COMMERCIAL

## 2025-05-01 VITALS — SYSTOLIC BLOOD PRESSURE: 130 MMHG | RESPIRATION RATE: 22 BRPM | TEMPERATURE: 98.2 F | DIASTOLIC BLOOD PRESSURE: 68 MMHG

## 2025-05-01 DIAGNOSIS — O21.9 NAUSEA AND VOMITING IN PREGNANCY: ICD-10-CM

## 2025-05-01 DIAGNOSIS — O09.91 SUPERVISION OF HIGH-RISK PREGNANCY, FIRST TRIMESTER: Primary | ICD-10-CM

## 2025-05-01 LAB
ALBUMIN SERPL BCG-MCNC: 3.2 G/DL (ref 3.5–5.2)
ALBUMIN UR-MCNC: 10 MG/DL
ALP SERPL-CCNC: 118 U/L (ref 40–150)
ALT SERPL W P-5'-P-CCNC: 17 U/L (ref 0–50)
ANION GAP SERPL CALCULATED.3IONS-SCNC: 12 MMOL/L (ref 7–15)
APPEARANCE UR: CLEAR
AST SERPL W P-5'-P-CCNC: 15 U/L (ref 0–45)
BILIRUB SERPL-MCNC: 0.3 MG/DL
BILIRUB UR QL STRIP: NEGATIVE
BUN SERPL-MCNC: 3.5 MG/DL (ref 6–20)
C TRACH DNA SPEC QL PROBE+SIG AMP: NEGATIVE
CALCIUM SERPL-MCNC: 8.8 MG/DL (ref 8.8–10.4)
CHLORIDE SERPL-SCNC: 103 MMOL/L (ref 98–107)
CLUE CELLS: ABNORMAL
COLOR UR AUTO: ABNORMAL
CREAT SERPL-MCNC: 0.54 MG/DL (ref 0.51–0.95)
EGFRCR SERPLBLD CKD-EPI 2021: >90 ML/MIN/1.73M2
ERYTHROCYTE [DISTWIDTH] IN BLOOD BY AUTOMATED COUNT: 13.2 % (ref 10–15)
GLUCOSE SERPL-MCNC: 99 MG/DL (ref 70–99)
GLUCOSE UR STRIP-MCNC: NEGATIVE MG/DL
HCO3 SERPL-SCNC: 20 MMOL/L (ref 22–29)
HCT VFR BLD AUTO: 34.8 % (ref 35–47)
HGB BLD-MCNC: 11.4 G/DL (ref 11.7–15.7)
HGB UR QL STRIP: ABNORMAL
KETONES UR STRIP-MCNC: NEGATIVE MG/DL
LEUKOCYTE ESTERASE UR QL STRIP: ABNORMAL
MCH RBC QN AUTO: 28.9 PG (ref 26.5–33)
MCHC RBC AUTO-ENTMCNC: 32.8 G/DL (ref 31.5–36.5)
MCV RBC AUTO: 88 FL (ref 78–100)
N GONORRHOEA DNA SPEC QL NAA+PROBE: NEGATIVE
NITRATE UR QL: NEGATIVE
PH UR STRIP: 7 [PH] (ref 5–7)
PLATELET # BLD AUTO: 405 10E3/UL (ref 150–450)
POTASSIUM SERPL-SCNC: 3.8 MMOL/L (ref 3.4–5.3)
PROT SERPL-MCNC: 6.9 G/DL (ref 6.4–8.3)
RBC # BLD AUTO: 3.95 10E6/UL (ref 3.8–5.2)
SODIUM SERPL-SCNC: 135 MMOL/L (ref 135–145)
SP GR UR STRIP: 1.02 (ref 1–1.03)
SPECIMEN TYPE: NORMAL
TRICHOMONAS, WET PREP: ABNORMAL
UROBILINOGEN UR STRIP-MCNC: NORMAL MG/DL
WBC # BLD AUTO: 9.9 10E3/UL (ref 4–11)
WBC'S/HIGH POWER FIELD, WET PREP: ABNORMAL
YEAST, WET PREP: ABNORMAL

## 2025-05-01 PROCEDURE — G0463 HOSPITAL OUTPT CLINIC VISIT: HCPCS | Mod: 6MC

## 2025-05-01 PROCEDURE — 81003 URINALYSIS AUTO W/O SCOPE: CPT | Performed by: OBSTETRICS & GYNECOLOGY

## 2025-05-01 PROCEDURE — 87491 CHLMYD TRACH DNA AMP PROBE: CPT

## 2025-05-01 PROCEDURE — 258N000003 HC RX IP 258 OP 636: Performed by: OBSTETRICS & GYNECOLOGY

## 2025-05-01 PROCEDURE — 82310 ASSAY OF CALCIUM: CPT | Performed by: OBSTETRICS & GYNECOLOGY

## 2025-05-01 PROCEDURE — 87210 SMEAR WET MOUNT SALINE/INK: CPT

## 2025-05-01 PROCEDURE — 250N000013 HC RX MED GY IP 250 OP 250 PS 637

## 2025-05-01 PROCEDURE — 250N000013 HC RX MED GY IP 250 OP 250 PS 637: Performed by: OBSTETRICS & GYNECOLOGY

## 2025-05-01 PROCEDURE — 85014 HEMATOCRIT: CPT | Performed by: OBSTETRICS & GYNECOLOGY

## 2025-05-01 PROCEDURE — 36415 COLL VENOUS BLD VENIPUNCTURE: CPT | Performed by: OBSTETRICS & GYNECOLOGY

## 2025-05-01 PROCEDURE — 82435 ASSAY OF BLOOD CHLORIDE: CPT | Performed by: OBSTETRICS & GYNECOLOGY

## 2025-05-01 PROCEDURE — 84460 ALANINE AMINO (ALT) (SGPT): CPT | Performed by: OBSTETRICS & GYNECOLOGY

## 2025-05-01 PROCEDURE — 85048 AUTOMATED LEUKOCYTE COUNT: CPT | Performed by: OBSTETRICS & GYNECOLOGY

## 2025-05-01 RX ORDER — METOCLOPRAMIDE 5 MG/1
5 TABLET ORAL 3 TIMES DAILY PRN
Qty: 30 TABLET | Refills: 0 | Status: SHIPPED | OUTPATIENT
Start: 2025-05-01

## 2025-05-01 RX ORDER — FAMOTIDINE 20 MG/1
20 TABLET, FILM COATED ORAL 2 TIMES DAILY
Qty: 90 TABLET | Refills: 3 | Status: SHIPPED | OUTPATIENT
Start: 2025-05-01 | End: 2025-05-01

## 2025-05-01 RX ORDER — METOCLOPRAMIDE HYDROCHLORIDE 5 MG/ML
10 INJECTION INTRAMUSCULAR; INTRAVENOUS EVERY 6 HOURS PRN
Status: DISCONTINUED | OUTPATIENT
Start: 2025-05-01 | End: 2025-05-01 | Stop reason: HOSPADM

## 2025-05-01 RX ORDER — FAMOTIDINE 20 MG/1
20 TABLET, FILM COATED ORAL 2 TIMES DAILY
Qty: 90 TABLET | Refills: 2 | Status: SHIPPED | OUTPATIENT
Start: 2025-05-01

## 2025-05-01 RX ORDER — CALCIUM CARBONATE 500 MG/1
1000 TABLET, CHEWABLE ORAL
Status: DISCONTINUED | OUTPATIENT
Start: 2025-05-01 | End: 2025-05-01 | Stop reason: HOSPADM

## 2025-05-01 RX ORDER — PROCHLORPERAZINE MALEATE 10 MG
10 TABLET ORAL EVERY 6 HOURS PRN
Status: DISCONTINUED | OUTPATIENT
Start: 2025-05-01 | End: 2025-05-01 | Stop reason: HOSPADM

## 2025-05-01 RX ORDER — METOCLOPRAMIDE 10 MG/1
10 TABLET ORAL EVERY 6 HOURS PRN
Status: DISCONTINUED | OUTPATIENT
Start: 2025-05-01 | End: 2025-05-01 | Stop reason: HOSPADM

## 2025-05-01 RX ORDER — FLUCONAZOLE 150 MG/1
150 TABLET ORAL ONCE
Status: COMPLETED | OUTPATIENT
Start: 2025-05-01 | End: 2025-05-01

## 2025-05-01 RX ORDER — ONDANSETRON 4 MG/1
4 TABLET, ORALLY DISINTEGRATING ORAL EVERY 6 HOURS PRN
Status: DISCONTINUED | OUTPATIENT
Start: 2025-05-01 | End: 2025-05-01 | Stop reason: HOSPADM

## 2025-05-01 RX ORDER — SODIUM CHLORIDE, SODIUM LACTATE, POTASSIUM CHLORIDE, CALCIUM CHLORIDE 600; 310; 30; 20 MG/100ML; MG/100ML; MG/100ML; MG/100ML
INJECTION, SOLUTION INTRAVENOUS CONTINUOUS
Status: DISCONTINUED | OUTPATIENT
Start: 2025-05-01 | End: 2025-05-01 | Stop reason: HOSPADM

## 2025-05-01 RX ORDER — ACETAMINOPHEN 325 MG/1
650 TABLET ORAL EVERY 4 HOURS PRN
Status: DISCONTINUED | OUTPATIENT
Start: 2025-05-01 | End: 2025-05-01 | Stop reason: HOSPADM

## 2025-05-01 RX ORDER — LIDOCAINE 40 MG/G
CREAM TOPICAL
Status: DISCONTINUED | OUTPATIENT
Start: 2025-05-01 | End: 2025-05-01 | Stop reason: HOSPADM

## 2025-05-01 RX ORDER — MAGNESIUM HYDROXIDE/ALUMINUM HYDROXICE/SIMETHICONE 120; 1200; 1200 MG/30ML; MG/30ML; MG/30ML
30 SUSPENSION ORAL ONCE
Status: COMPLETED | OUTPATIENT
Start: 2025-05-01 | End: 2025-05-01

## 2025-05-01 RX ORDER — HYDROXYZINE HYDROCHLORIDE 50 MG/1
50 TABLET, FILM COATED ORAL EVERY 6 HOURS PRN
Status: DISCONTINUED | OUTPATIENT
Start: 2025-05-01 | End: 2025-05-01 | Stop reason: HOSPADM

## 2025-05-01 RX ORDER — MAGNESIUM HYDROXIDE/ALUMINUM HYDROXICE/SIMETHICONE 120; 1200; 1200 MG/30ML; MG/30ML; MG/30ML
30 SUSPENSION ORAL
Status: DISCONTINUED | OUTPATIENT
Start: 2025-05-01 | End: 2025-05-01 | Stop reason: HOSPADM

## 2025-05-01 RX ORDER — ONDANSETRON 2 MG/ML
4 INJECTION INTRAMUSCULAR; INTRAVENOUS EVERY 6 HOURS PRN
Status: DISCONTINUED | OUTPATIENT
Start: 2025-05-01 | End: 2025-05-01 | Stop reason: HOSPADM

## 2025-05-01 RX ADMIN — SODIUM CHLORIDE, SODIUM LACTATE, POTASSIUM CHLORIDE, AND CALCIUM CHLORIDE 500 ML: .6; .31; .03; .02 INJECTION, SOLUTION INTRAVENOUS at 11:24

## 2025-05-01 RX ADMIN — ACETAMINOPHEN 650 MG: 325 TABLET ORAL at 10:00

## 2025-05-01 RX ADMIN — FLUCONAZOLE 150 MG: 150 TABLET ORAL at 11:40

## 2025-05-01 RX ADMIN — HYDROXYZINE HYDROCHLORIDE 50 MG: 50 TABLET, FILM COATED ORAL at 10:00

## 2025-05-01 RX ADMIN — METOCLOPRAMIDE 10 MG: 10 TABLET ORAL at 10:00

## 2025-05-01 ASSESSMENT — ACTIVITIES OF DAILY LIVING (ADL)
ADLS_ACUITY_SCORE: 18
ADLS_ACUITY_SCORE: 41

## 2025-05-01 NOTE — PROGRESS NOTES
Patient seen for N/V for three days. VSS, afebrile,complains of mild cramping. See flow sheet for fetal and uterine monitoring. Patient medicated for N/V.  Specimen collected, patient is discharged home and will follow up with provider.

## 2025-05-01 NOTE — PROGRESS NOTES
L&D Triage Progress Note     HPI: Lucinda Ji is a 34 year old  at 27w6d by LMP, here for nausea and vomiting.     She reports the nausea and vomiting come in waves every couple hours. This has been ongoing throughout pregnancy, she has a history of hyperemesis in pregnancy and is usually resolved with Zofran. She presents today due to symptoms that do not improve with Zofran, feeling different with more sweating and panic. She last kept food and liquid down last night, and has not been able to keep anything down today.     She has been feeling good fetal movements. Reports occasional, cramping/contractions and pelvic discomfort. Endorses increased amount of vaginal discharge, but no change in quality. Denies LOF, VB.    She endorses a current headache, which is mild. She has not yet tried medications. Reports she has not been diagnosed with a blood pressure disorder, but her providers have been monitoring her blood pressure. She plans to  a cuff for home. She denies fever, chills, scotoma, CP, SOB, nausea, vomiting,  RUQ pain, constipation, diarrhea, dysuria, and acute swelling. Denies sick contacts. Reports loose stools have been ongoing throughout pregnancy.    Pregnancy notable for:  - Failed GCT, no GTT  - Nausea/Vomitting of pregnancy  - H/o PTD @ 22w   - Polyhydramnios  - MDD/SANDRA/Panic disorder  - H/o RA-myomectomy, plans PCS  - 1 elevated BP in clinic, HELLP nl    OBHX:  OB History    Para Term  AB Living   8 3 1 2 4 1   SAB IAB Ectopic Multiple Live Births   1 3 0 0 1      # Outcome Date GA Lbr Daquan/2nd Weight Sex Type Anes PTL Lv   8 Current            7  10/30/20 31w2d  1.32 kg (2 lb 14.6 oz) M Vag-Spont EPI N FD      Name: Uday   6 Term 14 39w0d  2.778 kg (6 lb 2 oz) F Vag-Spont None  LENI      Birth Comments: none      Complications: Hyperemesis gravidarum      Name: Irma Ji   5 IAB 2012     IAB      4 IAB 2011     AB, COMPLETE         Birth Comments: elective     3 IAB 2009     AB, COMPLETE         Birth Comments: elective    2 SAB 2006 7w0d    SAB         Birth Comments: D&C   1   22w0d    Vag-Spont         Birth Comments: fell into a glass doorknob and went into labor few hrs later, had D&C     MedicalHX:  Past Medical History:   Diagnosis Date    Asthma     Fetal demise, greater than 22 weeks, antepartum, single gestation 10/29/2020    Gastritis     GBS bacteriuria 08/10/2020    Obesity      SurgicalHX:  Past Surgical History:   Procedure Laterality Date    DAVINCI MYOMECTOMY N/A 2022    Procedure: ROBOTIC MYOMECTOMY;  Surgeon: Inrda Reis MD;  Location: [facility] OR    LYSIS, ADHESIONS, ROBOT-ASSISTED, LAPAROSCOPIC, USING DA BROOKE XI N/A 2022    Procedure: LYSIS, ADHESIONS, ROBOT-ASSISTED, LAPAROSCOPIC, USING DA BROOKE XI;  Surgeon: Indra Reis MD;  Location: [facility] OR     Medications:  Current Facility-Administered Medications   Medication Dose Route Frequency Provider Last Rate Last Admin    acetaminophen (TYLENOL) tablet 650 mg  650 mg Oral Q4H PRN Mikaela Davis MD   650 mg at 25 1000    alum & mag hydroxide-simethicone (MAALOX) suspension 30 mL  30 mL Oral Once PRN Mikaela Davis MD        calcium carbonate (TUMS) chewable tablet 1,000 mg  1,000 mg Oral Once PRN Mikaela Davis MD        hydrOXYzine HCl (ATARAX) tablet 50 mg  50 mg Oral Q6H PRN Mikaela Davis MD   50 mg at 25 1000    lactated ringers BOLUS 500 mL  500 mL Intravenous Once Mikaela Davis MD        Followed by    lactated ringers infusion   Intravenous Continuous Mikaela Davis MD        lidocaine (LMX4) cream   Topical Q1H PRN Mikaela Davis MD        lidocaine 1 % 0.1-1 mL  0.1-1 mL Other Q1H PRN Mikaela Davis MD        metoclopramide (REGLAN) tablet 10 mg  10 mg Oral Q6H PRN Mikaela Davis MD    "10 mg at 05/01/25 1000    Or    metoclopramide (REGLAN) injection 10 mg  10 mg Intravenous Q6H PRN Mikaela Davis MD        ondansetron (ZOFRAN ODT) ODT tab 4 mg  4 mg Oral Q6H PRN Mikaela Davis MD        Or    ondansetron (ZOFRAN) injection 4 mg  4 mg Intravenous Q6H PRN Mikaela Davis MD        prochlorperazine (COMPAZINE) tablet 10 mg  10 mg Oral Q6H PRN Mikaela Davis MD        Or    prochlorperazine (COMPAZINE) injection 10 mg  10 mg Intravenous Q6H PRN Mikaela Davis MD        sodium chloride (PF) 0.9% PF flush 3 mL  3 mL Intracatheter Q8H ROX Mikaela Davis MD        sodium chloride (PF) 0.9% PF flush 3 mL  3 mL Intracatheter q1 min prn Mikaela Davis MD         Allergies:  Allergies   Allergen Reactions    No Clinical Screening - See Comments Other (See Comments)     \"I was all jittery and they had to give me benadryl.\"  \"I was all jittery and they had to give me benadryl.\"     FamilyHX:  Family History   Problem Relation Age of Onset    Brain Tumor Mother     Heart Disease Father     Thyroid Disease Sister     Asthma Brother     Cancer Mother     Hypertension Father     Asthma Brother      SocialHX:  Social History     Socioeconomic History    Marital status: Single     Spouse name: None    Number of children: 1    Years of education: None    Highest education level: None   Occupational History    Occupation: unemployed   Tobacco Use    Smoking status: Former     Types: Cigarettes    Smokeless tobacco: Never   Vaping Use    Vaping status: Never Used   Substance and Sexual Activity    Alcohol use: Not Currently     Comment: occasional    Drug use: Not Currently     Types: Marijuana    Sexual activity: Yes     Partners: Male     Birth control/protection: Condom     Social Drivers of Health     Financial Resource Strain: Low Risk  (10/9/2023)    Financial Resource Strain     Within the past 12 months, have you or your " family members you live with been unable to get utilities (heat, electricity) when it was really needed?: No   Food Insecurity: Low Risk  (10/9/2023)    Food Insecurity     Within the past 12 months, did you worry that your food would run out before you got money to buy more?: No     Within the past 12 months, did the food you bought just not last and you didn t have money to get more?: No   Transportation Needs: Low Risk  (10/9/2023)    Transportation Needs     Within the past 12 months, has lack of transportation kept you from medical appointments, getting your medicines, non-medical meetings or appointments, work, or from getting things that you need?: No   Interpersonal Safety: Low Risk  (2025)    Interpersonal Safety     Do you feel physically and emotionally safe where you currently live?: Yes     Within the past 12 months, have you been hit, slapped, kicked or otherwise physically hurt by someone?: No     Within the past 12 months, have you been humiliated or emotionally abused in other ways by your partner or ex-partner?: No   Housing Stability: Low Risk  (10/9/2023)    Housing Stability     Do you have housing? : Yes     Are you worried about losing your housing?: No     ROS: 10-point ROS negative except as in HPI.    Physical Exam:  Vitals:    25 0823   BP: 130/68   Resp: 22   Temp: 98.2  F (36.8  C)   TempSrc: Oral     GEN: resting comfortably in bed, NAD   CV: Regular rate, well perfused  PULM: On room air, no increased work of breathing  ABD: soft, gravid, non-tender, non-distended  EXT: trace edema  CVX: C/L/H    NST:  FHT: baseline 140, moderate variability,  accels present,  no decels  TOCO: no ctx in ten minutes    A/P: 34 year old  at 27w6d by LMP, here for nausea and vomiting.    #Nausea and Vomiting  #GERD  - Ongoing nausea and vomiting throughout pregnancy, which is usually improved with Zofran. Current symptoms were not improved with Zofran and were associated with anxiety.   -  CBC, CMP wnl  - 1L IVF bolus   - Patient feeling better after PO tylenol, zofran, reglan and pepcid. Able to tolerate a full breakfast without vomiting. Encouraged scheduled Pepcid, and she is amenable.  - Discharge with Pepcid and reglan    #Abdominal Pain  - Reports cramping is at baseline but appeared very uncomfortable in triage so offered rule out labor evaluation in triage  - UA from 1 week ago did have yeast and patient was never able to retrieve fluconazole from pharmacy so administered today  - Wet prep and GC swabs collected  - cervix closed so no concern for labor    #Headache  - She has had infrequent mild headaches throughout pregnancy. Current headache is mild, and resolved with tylenol and IV fluid hydration.    Dispo: Home. Follow up with usual ob provider.    Patient discussed with Dr. Ryan Cheek, MS3    Resident/Fellow Attestation   I, Sky Yepez MD, was present with the medical/[employer] student who participated in the service and in the documentation of the note.  I have verified the history and personally performed the physical exam and medical decision making.  I agree with the assessment and plan of care as documented in the note and have edited the body of the note as necessary.    Sky Yepez MD  Obstetrics & Gynecology, PGY-2

## 2025-05-01 NOTE — PROVIDER NOTIFICATION
"   25 0835   Provider Notification   Provider Name/Title Dr escalante   Method of Notification Electronic Page   Request Evaluate in Person   Notification Reason Patient Arrived     Patient arrived via ambulance for nausea/vomiting at home since 0200. Patient unable to keep anything down \"this morning.\" Patient reports occasional contractions. . 27w6d. Patient is unassigned, normally receives prenatal care at [facility]. Patient received 4g zofran at 0751 per ambulance crew. Patient reports feeling nausea relief at this time.  "

## 2025-05-03 ENCOUNTER — APPOINTMENT (OUTPATIENT)
Dept: ULTRASOUND IMAGING | Facility: CLINIC | Age: 35
End: 2025-05-03
Payer: COMMERCIAL

## 2025-05-03 ENCOUNTER — HOSPITAL ENCOUNTER (OUTPATIENT)
Facility: CLINIC | Age: 35
Discharge: HOME | End: 2025-05-03
Attending: OBSTETRICS & GYNECOLOGY | Admitting: OBSTETRICS & GYNECOLOGY
Payer: COMMERCIAL

## 2025-05-03 VITALS — TEMPERATURE: 99.3 F | RESPIRATION RATE: 18 BRPM | SYSTOLIC BLOOD PRESSURE: 126 MMHG | DIASTOLIC BLOOD PRESSURE: 65 MMHG

## 2025-05-03 DIAGNOSIS — Z36.89 ENCOUNTER FOR TRIAGE IN PREGNANT PATIENT: ICD-10-CM

## 2025-05-03 DIAGNOSIS — O09.91 SUPERVISION OF HIGH-RISK PREGNANCY, FIRST TRIMESTER: Primary | ICD-10-CM

## 2025-05-03 LAB
ALBUMIN UR-MCNC: NEGATIVE MG/DL
APPEARANCE UR: CLEAR
APTT PPP: 27 SECONDS (ref 22–38)
BILIRUB UR QL STRIP: NEGATIVE
COLOR UR AUTO: ABNORMAL
ERYTHROCYTE [DISTWIDTH] IN BLOOD BY AUTOMATED COUNT: 13.2 % (ref 10–15)
FIBRINOGEN PPP-MCNC: 645 MG/DL (ref 170–510)
GLUCOSE UR STRIP-MCNC: NEGATIVE MG/DL
HCT VFR BLD AUTO: 36.5 % (ref 35–47)
HGB BLD-MCNC: 12 G/DL (ref 11.7–15.7)
HGB UR QL STRIP: ABNORMAL
HOLD SPECIMEN: NORMAL
HOLD SPECIMEN: NORMAL
INR PPP: 0.98 (ref 0.85–1.15)
KETONES UR STRIP-MCNC: 20 MG/DL
LEUKOCYTE ESTERASE UR QL STRIP: NEGATIVE
MCH RBC QN AUTO: 29.1 PG (ref 26.5–33)
MCHC RBC AUTO-ENTMCNC: 32.9 G/DL (ref 31.5–36.5)
MCV RBC AUTO: 89 FL (ref 78–100)
MUCOUS THREADS #/AREA URNS LPF: PRESENT /LPF
NITRATE UR QL: NEGATIVE
PH UR STRIP: 7 [PH] (ref 5–7)
PLATELET # BLD AUTO: 386 10E3/UL (ref 150–450)
PROTHROMBIN TIME: 13.5 SECONDS (ref 11.8–14.8)
RBC # BLD AUTO: 4.12 10E6/UL (ref 3.8–5.2)
RBC URINE: 16 /HPF
SP GR UR STRIP: 1.02 (ref 1–1.03)
SQUAMOUS EPITHELIAL: 3 /HPF
TRANSITIONAL EPI: <1 /HPF
UROBILINOGEN UR STRIP-MCNC: NORMAL MG/DL
WBC # BLD AUTO: 12.2 10E3/UL (ref 4–11)
WBC URINE: 2 /HPF

## 2025-05-03 PROCEDURE — 76770 US EXAM ABDO BACK WALL COMP: CPT

## 2025-05-03 PROCEDURE — 36415 COLL VENOUS BLD VENIPUNCTURE: CPT

## 2025-05-03 PROCEDURE — 85027 COMPLETE CBC AUTOMATED: CPT

## 2025-05-03 PROCEDURE — 85384 FIBRINOGEN ACTIVITY: CPT

## 2025-05-03 PROCEDURE — 87086 URINE CULTURE/COLONY COUNT: CPT

## 2025-05-03 PROCEDURE — 85730 THROMBOPLASTIN TIME PARTIAL: CPT

## 2025-05-03 PROCEDURE — 76770 US EXAM ABDO BACK WALL COMP: CPT | Mod: 26 | Performed by: STUDENT IN AN ORGANIZED HEALTH CARE EDUCATION/TRAINING PROGRAM

## 2025-05-03 PROCEDURE — 250N000013 HC RX MED GY IP 250 OP 250 PS 637

## 2025-05-03 PROCEDURE — 81001 URINALYSIS AUTO W/SCOPE: CPT

## 2025-05-03 PROCEDURE — 85610 PROTHROMBIN TIME: CPT

## 2025-05-03 RX ORDER — LIDOCAINE 40 MG/G
CREAM TOPICAL
Status: DISCONTINUED | OUTPATIENT
Start: 2025-05-03 | End: 2025-05-04 | Stop reason: HOSPADM

## 2025-05-03 RX ORDER — CYCLOBENZAPRINE HCL 10 MG
10 TABLET ORAL 3 TIMES DAILY PRN
Qty: 30 TABLET | Refills: 0 | Status: SHIPPED | OUTPATIENT
Start: 2025-05-03

## 2025-05-03 RX ORDER — METOCLOPRAMIDE 10 MG/1
10 TABLET ORAL ONCE
Status: COMPLETED | OUTPATIENT
Start: 2025-05-03 | End: 2025-05-03

## 2025-05-03 RX ORDER — CYCLOBENZAPRINE HCL 5 MG
10 TABLET ORAL ONCE
Status: COMPLETED | OUTPATIENT
Start: 2025-05-03 | End: 2025-05-03

## 2025-05-03 RX ORDER — FAMOTIDINE 10 MG
20 TABLET ORAL ONCE
Status: COMPLETED | OUTPATIENT
Start: 2025-05-03 | End: 2025-05-03

## 2025-05-03 RX ADMIN — FAMOTIDINE 20 MG: 10 TABLET ORAL at 21:09

## 2025-05-03 RX ADMIN — CYCLOBENZAPRINE HYDROCHLORIDE 10 MG: 5 TABLET, FILM COATED ORAL at 20:23

## 2025-05-03 RX ADMIN — METOCLOPRAMIDE 10 MG: 10 TABLET ORAL at 21:09

## 2025-05-03 ASSESSMENT — ACTIVITIES OF DAILY LIVING (ADL)
ADLS_ACUITY_SCORE: 18

## 2025-05-04 NOTE — PROGRESS NOTES
"Labor & Delivery  OB Triage Note    HPI: Lucinda Ji is a 34 year old  at 28w1d by 8w6d US here for abdominal pain.     Patient reports that she had abdominal pain earlier today that \"came out of nowhere \".  Patient states that she ate an apple, banana, jelly ranchers, a sucker, and French fries.  She then started feeling nauseous and went to lay down for a nap.  Before being able to nap, she threw up some of her food despite taking a nausea pill.  She then laid back down and fell asleep.  States that she woke up out of sleep \"screaming \".  States the pain at this time was at the bottom of her belly.  States that the pain has been constant.  States that the pain feels different from her Tilly Amaya contractions as she \"could not massage it out \".  States it feels like a constant pressure.  Rates pain at 7/10. Did not try taking any medications to relieve the pain.  When asked, states she does have a history of acid reflux and gastritis, though has not taken any medications.  States that she took so many times in her last pregnancy that she got sick of them.  She recently was seen in triage 2 days ago with similar concerns and was prescribed Pepcid and Reglan.  States that she has not been taking these as she generally does not like to take medications.  When asked if she is currently nauseous, she says she is unsure.    States she has had ongoing pelvic pressure throughout the pregnancy.  Per chart review, her primary OB had provided her with a belly band, though she rarely is using this. Not currently experiencing any dysuria or frequency. UA in triage 2 days ago was negative for infection.    When she was recently seen in triage, she was given a dose of Flexeril which she found helpful.  Is interested in this again today.  During that triage visit, she had presented for nausea and vomiting with difficulty keeping food and drink down.  She also abdominal pain at that time as well.  It was advised that she " try something other than Zofran for her nausea since she had been using that out patient unsuccessfully.  She was given Zofran, Reglan, and Pepcid, and she was able to tolerate a regular meal.  She was amenable to scheduled Pepcid at time of discharge, though has not been taking this medication.  During that triage visit, she reported some cramping and was uncomfortable with abdominal pain.  She was treated for yeast from her recent vaginitis panel for which she had not yet picked up her fluconazole.  She had a wet prep and gonorrhea/chlamydia swabs that were negative.  Her cervix was checked and closed, so there was no suspicion for  labor.    No dysuria, diarrhea, constipation, changes in vaginal discharge, vaginal bleeding, loss of fluid.  Baby has been moving well.    Has recently had a runny nose, though no other symptoms of cough or congestion.  Has intermittently had a mild headache.  No recent loose stools.    Pregnancy has been complicated by:   - Failed GCT, no GTT  - Nausea/vomiting of pregnancy  - GERD  - H/o PTD @ 22w  - H/o stillbirth @ 31w2d  - Isolated ventricular fetal echogenic focus  - MDD/SANDRA/Panic disorder  - H/o RA-myomectomy, plans PCS w/ [facility]  - Chronic hypertension, no meds  - Mild intermittent asthma  - Obesity  - Uterine fibroids  - Suspected fetal macrosomia    OB History   OB History    Para Term  AB Living   8 3 1 2 4 1   SAB IAB Ectopic Multiple Live Births   1 3 0 0 1      # Outcome Date GA Lbr Daquan/2nd Weight Sex Type Anes PTL Lv   8 Current            7  10/30/20 31w2d  1.32 kg (2 lb 14.6 oz) M Vag-Spont EPI N FD      Name: Uday   6 Term 14 39w0d  2.778 kg (6 lb 2 oz) F Vag-Spont None  LENI      Birth Comments: none      Complications: Hyperemesis gravidarum      Name: Irma Ji   5 IAB      IAB      4 IAB      AB, COMPLETE         Birth Comments: elective    3 IAB      AB, COMPLETE         Birth Comments: elective     2 SAB  7w0d    SAB         Birth Comments: D&C   1   22w0d    Vag-Spont         Birth Comments: fell into a glass doorknob and went into labor few hrs later, had D&C     Past Medical History  Patient Active Problem List   Diagnosis    Anxiety and depression    Class 3 severe obesity due to excess calories without serious comorbidity with body mass index (BMI) of 40.0 to 44.9 in adult (H)    Mild intermittent asthma in adult without complication    Hiatal hernia    Uterine leiomyoma    History of stillbirth in pregnant patient in first trimester, antepartum    Elevated blood pressure reading without diagnosis of hypertension    Supervision of high-risk pregnancy, first trimester    Pregnancy w/ hx of uterine myomectomy    Monoallelic mutation of BRCA2 gene    Nausea and vomiting in pregnancy    Encounter for triage in pregnant patient     Past Medical History:   Diagnosis Date    Asthma     Fetal demise, greater than 22 weeks, antepartum, single gestation 10/29/2020    Gastritis     GBS bacteriuria 08/10/2020    Obesity      Past Surgical History  Past Surgical History:   Procedure Laterality Date    DAVINCI MYOMECTOMY N/A 2022    Procedure: ROBOTIC MYOMECTOMY;  Surgeon: Indra Reis MD;  Location: [facility] [place] OR    LYSIS, ADHESIONS, ROBOT-ASSISTED, LAPAROSCOPIC, USING DA BROOKE XI N/A 2022    Procedure: LYSIS, ADHESIONS, ROBOT-ASSISTED, LAPAROSCOPIC, USING DA BROOKE XI;  Surgeon: Indra Reis MD;  Location: [facility] [place] OR     Medications   Medications Prior to Admission   Medication Sig Dispense Refill Last Dose/Taking    albuterol (PROAIR HFA/PROVENTIL HFA/VENTOLIN HFA) 108 (90 Base) MCG/ACT inhaler Inhale 1-2 puffs into the lungs every 4 hours as needed for shortness of breath or wheezing 18 g 11     aspirin 81 MG EC tablet Take 1 tablet (81 mg) by mouth daily. 90 tablet 2     cephALEXin (KEFLEX) 500 MG capsule Take 1 capsule (500 mg) by mouth 2  "times daily. 14 capsule 0     doxylamine (UNISOM) 25 MG TABS tablet Take 0.5-1 tablets (12.5-25 mg) by mouth at bedtime. 30 tablet 1     famotidine (PEPCID) 20 MG tablet Take 1 tablet (20 mg) by mouth 2 times daily. 90 tablet 2     metoclopramide (REGLAN) 5 MG tablet Take 1 tablet (5 mg) by mouth 3 times daily as needed for vomiting. 30 tablet 0     ondansetron (ZOFRAN) 4 MG tablet Take 1 tablet (4 mg) by mouth every 6 hours as needed for nausea or vomiting. 30 tablet 3     Prenatal Vit-Fe Fumarate-FA (PRENATAL MULTIVITAMIN  WITH IRON) 28-0.8 MG TABS Take 1 tablet by mouth daily. 100 tablet 3     pyridOXINE (VITAMIN B6) 25 MG tablet Take 1 tablet (25 mg) by mouth 3 times daily. 90 tablet 1      Allergies  Allergies   Allergen Reactions    No Clinical Screening - See Comments Other (See Comments)     \"I was all jittery and they had to give me benadryl.\"  \"I was all jittery and they had to give me benadryl.\"     Family History  Family History   Problem Relation Age of Onset    Brain Tumor Mother     Heart Disease Father     Thyroid Disease Sister     Asthma Brother     Cancer Mother     Hypertension Father     Asthma Brother      Social History   Social History     Tobacco Use    Smoking status: Former     Types: Cigarettes    Smokeless tobacco: Never   Vaping Use    Vaping status: Never Used   Substance Use Topics    Alcohol use: Not Currently     Comment: occasional    Drug use: Not Currently     Types: Marijuana     Physical Exam  Vitals:    05/03/25 1800 05/03/25 1844   BP:  126/65   BP Location:  Left arm   Patient Position:  Semi-Jalloh's   Cuff Size:  Adult Large   Resp: 18    Temp: 99.3  F (37.4  C)    TempSrc: Oral      General: alert, oriented female, resting in bed in NAD  CV: regular rate and rhythm  Lungs: normal rate and work of breathing on room air  Abdomen: soft, gravid, non-tender to palpation    Cervix: 0 / 0% / -3  Membranes: intact    FHT: baseline 135 bpm, moderate variability, accelerations " present, one deceleration present> no further decels on extended fetal monitoring  [place]: 0 contractions per 10 minutes    Prenatal Labs & Imaging:  Rh positive, antibody negative  Rubella immune  Hepatitis B sAg NR, Hepatitis C NR, HIV NR, RPR NR  Hgb A1c 5.6%, , HELLP normal, UPC 0.08  GC/Chlam negative  Genetic screening: low risk NIPT  GBS unknown    24h Labs:  Recent Results (from the past 24 hours)   UA with Microscopic reflex to Culture    Collection Time: 05/03/25  7:52 PM    Specimen: Urine, Clean Catch   Result Value Ref Range    Color Urine Light Yellow Colorless, Straw, Light Yellow, Yellow    Appearance Urine Clear Clear    Glucose Urine Negative Negative mg/dL    Bilirubin Urine Negative Negative    Ketones Urine 20 (A) Negative mg/dL    Specific Gravity Urine 1.018 1.003 - 1.035    Blood Urine Small (A) Negative    pH Urine 7.0 5.0 - 7.0    Protein Albumin Urine Negative Negative mg/dL    Urobilinogen Urine Normal Normal mg/dL    Nitrite Urine Negative Negative    Leukocyte Esterase Urine Negative Negative    Mucus Urine Present (A) None Seen /LPF    RBC Urine 16 (H) <=2 /HPF    WBC Urine 2 <=5 /HPF    Squamous Epithelials Urine 3 (H) <=1 /HPF    Transitional Epithelials Urine <1 <=1 /HPF   CBC with platelets    Collection Time: 05/03/25  8:51 PM   Result Value Ref Range    WBC Count 12.2 (H) 4.0 - 11.0 10e3/uL    RBC Count 4.12 3.80 - 5.20 10e6/uL    Hemoglobin 12.0 11.7 - 15.7 g/dL    Hematocrit 36.5 35.0 - 47.0 %    MCV 89 78 - 100 fL    MCH 29.1 26.5 - 33.0 pg    MCHC 32.9 31.5 - 36.5 g/dL    RDW 13.2 10.0 - 15.0 %    Platelet Count 386 150 - 450 10e3/uL   INR    Collection Time: 05/03/25  8:51 PM   Result Value Ref Range    INR 0.98 0.85 - 1.15    PT 13.5 11.8 - 14.8 Seconds   Partial thromboplastin time    Collection Time: 05/03/25  8:51 PM   Result Value Ref Range    aPTT 27 22 - 38 Seconds   Fibrinogen activity    Collection Time: 05/03/25  8:51 PM   Result Value Ref Range    Fibrinogen  Activity 645 (H) 170 - 510 mg/dL   Extra Serum Separator Tube (SST)    Collection Time: 25  8:51 PM   Result Value Ref Range    Hold Specimen JIC    Extra Green Top (Lithium Heparin) Tube    Collection Time: 25  8:51 PM   Result Value Ref Range    Hold Specimen JIC      A/P: 34 year old  at 28w1d who presents for abdominal pain.  She notably has been seen for abdominal pain, nausea, and vomiting in the setting of inadequately managed GERD in triage in the last few days.  She has been prescribed and advised to take medications for this, but she has not.  She reports her pain is excruciating, though she is comfortably resting and talking in triage; suspect a large functional component.  Her belly remains soft.  She reports feeling much better after Flexeril.  Her cervix was checked today and remains closed without concern for  labor.     She does have a new mild leukocytosis today of unclear etiology, though is not significantly abnormal in the setting of pregnancy. UA demonstrated blood with RBCs, but no signs of infection. Renal US negative for signs of renal stone, obstruction, or hydronephrosis. Hgb stable from previous. Coags normal.     Patient feeling better after Flexeril, Pepcid, Reglan. Strongly encouraged her to fill and take her prescriptions    Dispo: to home with OB follow-up on Monday. Highly encourage regular visits with primary OB given her regular presentation to L&D triage.    Patient discussed with Dr. Han.    Sadie Billingsley MD  Obstetrics & Gynecology, PGY-3  2025 10:32 PM    Patient staffed over the phone, agree with above note.  Nasreen Han MD

## 2025-05-04 NOTE — PROGRESS NOTES
Patient brought by ambulance for lower abdominal pain. VSS, afebrile, grading pain 10/10 on the scale 0-10. Provider notified of patient arrival. Report to April COVARRUBIAS for care continue.

## 2025-05-04 NOTE — PROGRESS NOTES
Patient experiencing 10/10 lower abdominal pain. Denies vaginal bleeding, LOF and visual changes/RUQ pain. States that she has had a mild HA since vomiting earlier today. Reports normal fetal movement, see flowsheet for EFM and TOCO. Plan to obtain labs and monitor fetus.

## 2025-05-05 ENCOUNTER — OFFICE VISIT (OUTPATIENT)
Dept: FAMILY MEDICINE | Facility: CLINIC | Age: 35
End: 2025-05-05
Payer: COMMERCIAL

## 2025-05-05 VITALS
OXYGEN SATURATION: 94 % | RESPIRATION RATE: 16 BRPM | SYSTOLIC BLOOD PRESSURE: 141 MMHG | TEMPERATURE: 97.9 F | DIASTOLIC BLOOD PRESSURE: 83 MMHG | HEART RATE: 92 BPM

## 2025-05-05 DIAGNOSIS — O09.93 SUPERVISION OF HIGH RISK PREGNANCY IN THIRD TRIMESTER: Primary | ICD-10-CM

## 2025-05-05 DIAGNOSIS — R03.0 ELEVATED BLOOD PRESSURE READING WITHOUT DIAGNOSIS OF HYPERTENSION: ICD-10-CM

## 2025-05-05 DIAGNOSIS — R10.84 ABDOMINAL PAIN, GENERALIZED: ICD-10-CM

## 2025-05-05 DIAGNOSIS — O34.29 PREGNANCY W/ HX OF UTERINE MYOMECTOMY: ICD-10-CM

## 2025-05-05 DIAGNOSIS — O21.9 NAUSEA/VOMITING IN PREGNANCY: ICD-10-CM

## 2025-05-05 DIAGNOSIS — O09.291 HISTORY OF STILLBIRTH IN PREGNANT PATIENT IN FIRST TRIMESTER, ANTEPARTUM: ICD-10-CM

## 2025-05-05 LAB
ALBUMIN MFR UR ELPH: 26.8 MG/DL
BACTERIA UR CULT: NORMAL
CREAT UR-MCNC: 313 MG/DL
PROT/CREAT 24H UR: 0.09 MG/MG CR (ref 0–0.2)

## 2025-05-05 NOTE — PROGRESS NOTES
Assessment & Plan     Supervision of high risk pregnancy in third trimester  Elevated blood pressure reading without diagnosis of hypertension  Abdominal pain, generalized  Pregnancy w/ hx of uterine myomectomy  History of stillbirth in pregnant patient in first trimester, antepartum  Nausea/vomiting in pregnancy  Lucinda Ji is a 34 year old , at 28w3d who presents today for ongoing abdominal/pelvic pain in pregnancy. Of note, she has a history of elevated BP's in pregnancy and outside of pregnancy (not currently on BP lowering medication),  delivery x2 (, ), hyperemesis gravidarum and pelvic pain in this pregnancy with history of myomectomy. She was evaluated at [facility] L&D twice over the weekend ( and 5/3) with unrevealing work up. Normal CMP, CBC, wet prep, UA, G/C, INR, PTT and elevated fibrinogen. They also did renal ultrasound to rule out kidney stones due to trace microscopic hematuria on UA. She was dilated to 1 cm at that time. They treated her with diflucan x1 for presumed yeast infection.   Returned to clinic today for 3 hour GTT but feeling too unwell to perform testing today. SVE today reveals unchanged cervix. NST inconclusively as it picked up mom's HR not FHR and patient had left clinic prior to this realization. FHR therefore not evaluated today, discussed with patient after the visit and agreed to have her follow-up in AM to assess overall condition and document FHR.   Due to elevated BP today (not elevated at L&D over weekend) will recollect urine protein:Cr but all other labs including CBC and CMP normal over the weekend so will not recollect at this time. Counseled patient to continue using Tylenol, belly band, room temperature to luke warm baths, zofran, etc. For comfort.  Suspect discomfort is MSK pain of pregnancy as we have no findings to suggest otherwise at this time and because pain is relieved only by specific movements which include taking the weight of baby off  mom (someone lifting her belly for her). Lab work and exam reassuring against UTI, kidney stones, liver/GB pathology, abruption, etc.       Supervision of high-risk pregnancy, third trimester  -     3 hour test initially scheduled for today, patient to reschedule for another day.      Polyhydramnios:  Follow up ultrasound ordered for . Patient reminded of this upcoming visit today.   -     US OB >14 Weeks Follow Up; Future     High Risk Pregnancy/  Increased risk for   delivery.  Hx of stillbirth at 31+2 in ,  at 22wks in  following with MFM.  Not taking aspirin - does not want to take.  Notes strong Duval Amaya contractions and pelvic pressure, but cervix has not changed.       Pelvic pain - Worsening, feeling heavy pressure in abdomen all the time.  Pregnancy support belt did not help.  Some improvement with shower/bath.    Urine testing today shows yeast and bacteria on the micro.  Will treat with Diflucan and Keflex.  Hopefully this will decrease some of the pelvic pressure and pain.  Would recommend doing vaginal swabs next visit to confirm clearance.     Hx of myomectomy - plan for  with [facility].  Will need referral in 3rd trimester.     Elevated BP:    Elevated blood pressure reading without diagnosis of hypertension  No symptoms of Pre-E today.  Check Pre-E lab given elevated BP today. Reviewed normal CBC and CMP from this weekend, will not recollect.   -     UA Macroscopic with reflex to Microscopic and Culture; Future      Follow-up  Return in about 3 weeks (around 2025) for routine prenatal care.    Shar Coronado is a 34 year old, presenting for the following health issues:  Other (Contraction and vomit since 2 am lower abdomin pain not feeling well)        2025     2:50 PM   Additional Questions   Roomed by E. her MA   Accompanied by self     HPI      Lucinda Ji is a 34 year old , at 28w3d of pregnancy with ASCENCION of 2025 by LMP  consistent with 9 week ultrasound.  Patient's history is high risk for the following reasons: polyhydramnios, hx of stillbirth x2 (31w2d in 2020 and 22w in 2004), hyperemesis and pelvic pain who presents for concern for contractions.     Of note, she was seen at [facility] L&D over the weekend for these complaints with unrevealing work up. Per patient report, her cervix was dilated to 1cm at that time. She had lab work including CMP, CBC, wet prep, UA, G/C, INR, PTT and elevated fibrinogen. They also did renal ultrasound to rule out kidney stones due to trace microscopic hematuria on UA. They treated her with diflucan x1 for presumed yeast infection.     She reports vomiting at 2 am this morning (hx of hyperemesis) without blood or bile and feeling pelvic pain/pressure continuously since then with pain that waxes and wanes. It does not come and go or resolve between episodes. She states the pain does not feel like uterine contractions and feels more like her typical pelvic pain she has had throughout pregnancy today.     Denies abdominal trauma, abnormal life stressors, vaginal bleeding or LOF today. She is feeling baby girl move. Pain is only relieved when someone physically lifts her belly for her.          Objective    BP (!) 141/83   Pulse 92   Temp 97.9  F (36.6  C) (Oral)   Resp 16   LMP 10/20/2024 (Approximate)   SpO2 94%   There is no height or weight on file to calculate BMI.  Physical Exam  Vitals reviewed.   Constitutional:       General: She is not in acute distress.     Appearance: She is obese.   HENT:      Head: Normocephalic.   Pulmonary:      Effort: Pulmonary effort is normal. No respiratory distress.   Abdominal:      Tenderness: There is abdominal tenderness. There is no right CVA tenderness, left CVA tenderness, guarding or rebound.      Comments: Gravid   Genitourinary:     Comments: Cervix closed, posterior and thick. Patient with extreme discomfort during exam.   Musculoskeletal:      Right  lower leg: No edema.      Left lower leg: No edema.   Skin:     General: Skin is warm and dry.   Neurological:      Mental Status: She is alert.   Psychiatric:         Mood and Affect: Mood normal.         Behavior: Behavior normal.         Thought Content: Thought content normal.         Judgment: Judgment normal.           Signed Electronically by: Irma Weathers DO

## 2025-05-05 NOTE — PROGRESS NOTES
Preceptor Attestation:    I discussed the patient with the resident and evaluated the patient in person. I have verified the content of the note, which accurately reflects my assessment of the patient and the plan of care.   Supervising Physician:  Franc Bianchi MD.

## 2025-05-07 ENCOUNTER — TELEPHONE (OUTPATIENT)
Dept: FAMILY MEDICINE | Facility: CLINIC | Age: 35
End: 2025-05-07
Payer: COMMERCIAL

## 2025-05-07 NOTE — TELEPHONE ENCOUNTER
Called pt because she cancelled her appt yesterday and rescheduled for Monday, 5/12/25 to see if she could come in sooner and she states that she can come tomorrow, 5/8/25 at 11:20 AM with Dr Yari Marcano.  She states that she is no longer having severe pelvic pain but has continued to have increased vomiting at night when she goes to bed.  She states that she will be sleeping and then clears her throat and will vomit.  She denies having heartburn and states that she doesn't lie down right after eating.  She has been taking Zofran which is helping.  She did not  the Famotidine or Reglan yet from the pharmacy.  Routed note to Dr Yari Marcano./Effie Prescott RN BSN

## 2025-05-08 ENCOUNTER — HOSPITAL ENCOUNTER (OUTPATIENT)
Facility: HOSPITAL | Age: 35
Discharge: HOME | End: 2025-05-08
Attending: FAMILY MEDICINE | Admitting: FAMILY MEDICINE
Payer: COMMERCIAL

## 2025-05-08 VITALS
SYSTOLIC BLOOD PRESSURE: 136 MMHG | OXYGEN SATURATION: 97 % | DIASTOLIC BLOOD PRESSURE: 73 MMHG | WEIGHT: 293 LBS | BODY MASS INDEX: 47.09 KG/M2 | RESPIRATION RATE: 16 BRPM | TEMPERATURE: 98.2 F | HEIGHT: 66 IN

## 2025-05-08 DIAGNOSIS — O23.43 UTI (URINARY TRACT INFECTION) DURING PREGNANCY, THIRD TRIMESTER: Primary | ICD-10-CM

## 2025-05-08 LAB
ALBUMIN SERPL BCG-MCNC: 3.4 G/DL (ref 3.5–5.2)
ALBUMIN UR-MCNC: NEGATIVE MG/DL
ALP SERPL-CCNC: 130 U/L (ref 40–150)
ALT SERPL W P-5'-P-CCNC: 47 U/L (ref 0–50)
ANION GAP SERPL CALCULATED.3IONS-SCNC: 8 MMOL/L (ref 7–15)
APPEARANCE UR: CLEAR
AST SERPL W P-5'-P-CCNC: 36 U/L (ref 0–45)
BACTERIA #/AREA URNS HPF: ABNORMAL /HPF
BILIRUB SERPL-MCNC: 0.4 MG/DL
BILIRUB UR QL STRIP: NEGATIVE
BUN SERPL-MCNC: 5.1 MG/DL (ref 6–20)
CALCIUM SERPL-MCNC: 8.8 MG/DL (ref 8.8–10.4)
CHLORIDE SERPL-SCNC: 103 MMOL/L (ref 98–107)
CLUE CELLS: ABNORMAL
COLOR UR AUTO: YELLOW
CREAT SERPL-MCNC: 0.65 MG/DL (ref 0.51–0.95)
EGFRCR SERPLBLD CKD-EPI 2021: >90 ML/MIN/1.73M2
GLUCOSE SERPL-MCNC: 92 MG/DL (ref 70–99)
GLUCOSE UR STRIP-MCNC: NEGATIVE MG/DL
HCO3 SERPL-SCNC: 25 MMOL/L (ref 22–29)
HGB UR QL STRIP: ABNORMAL
HOLD SPECIMEN: NORMAL
KETONES UR STRIP-MCNC: ABNORMAL MG/DL
LEUKOCYTE ESTERASE UR QL STRIP: ABNORMAL
MAGNESIUM SERPL-MCNC: 1.7 MG/DL (ref 1.7–2.3)
MUCOUS THREADS #/AREA URNS LPF: PRESENT /LPF
NITRATE UR QL: NEGATIVE
PH UR STRIP: 7.5 [PH] (ref 5–7)
PHOSPHATE SERPL-MCNC: 3 MG/DL (ref 2.5–4.5)
POTASSIUM SERPL-SCNC: 4.2 MMOL/L (ref 3.4–5.3)
PROT SERPL-MCNC: 7.2 G/DL (ref 6.4–8.3)
RBC URINE: 5 /HPF
RUPTURE OF FETAL MEMBRANES BY ROM PLUS: NEGATIVE
SODIUM SERPL-SCNC: 136 MMOL/L (ref 135–145)
SP GR UR STRIP: 1.02 (ref 1–1.03)
SQUAMOUS EPITHELIAL: 2 /HPF
TRICHOMONAS, WET PREP: ABNORMAL
UROBILINOGEN UR STRIP-MCNC: NORMAL MG/DL
WBC URINE: 6 /HPF
WBC'S/HIGH POWER FIELD, WET PREP: ABNORMAL
YEAST, WET PREP: ABNORMAL

## 2025-05-08 PROCEDURE — G0463 HOSPITAL OUTPT CLINIC VISIT: HCPCS

## 2025-05-08 PROCEDURE — 250N000011 HC RX IP 250 OP 636

## 2025-05-08 PROCEDURE — 81001 URINALYSIS AUTO W/SCOPE: CPT

## 2025-05-08 PROCEDURE — 99207 PR NO CHARGE LOS: CPT | Performed by: FAMILY MEDICINE

## 2025-05-08 PROCEDURE — 80053 COMPREHEN METABOLIC PANEL: CPT

## 2025-05-08 PROCEDURE — 87210 SMEAR WET MOUNT SALINE/INK: CPT

## 2025-05-08 PROCEDURE — 36415 COLL VENOUS BLD VENIPUNCTURE: CPT

## 2025-05-08 PROCEDURE — 999N000248 HC STATISTIC IV INSERT WITH US BY RN

## 2025-05-08 PROCEDURE — 84112 EVAL AMNIOTIC FLUID PROTEIN: CPT

## 2025-05-08 PROCEDURE — 84100 ASSAY OF PHOSPHORUS: CPT

## 2025-05-08 PROCEDURE — 87086 URINE CULTURE/COLONY COUNT: CPT

## 2025-05-08 PROCEDURE — 87591 N.GONORRHOEAE DNA AMP PROB: CPT

## 2025-05-08 PROCEDURE — 250N000013 HC RX MED GY IP 250 OP 250 PS 637

## 2025-05-08 PROCEDURE — 83735 ASSAY OF MAGNESIUM: CPT

## 2025-05-08 PROCEDURE — 258N000003 HC RX IP 258 OP 636

## 2025-05-08 RX ORDER — LIDOCAINE 40 MG/G
CREAM TOPICAL
Status: DISCONTINUED | OUTPATIENT
Start: 2025-05-08 | End: 2025-05-08 | Stop reason: HOSPADM

## 2025-05-08 RX ORDER — ONDANSETRON 2 MG/ML
4 INJECTION INTRAMUSCULAR; INTRAVENOUS EVERY 6 HOURS PRN
Status: DISCONTINUED | OUTPATIENT
Start: 2025-05-08 | End: 2025-05-08 | Stop reason: HOSPADM

## 2025-05-08 RX ORDER — ACETAMINOPHEN 325 MG/1
975 TABLET ORAL EVERY 8 HOURS PRN
Status: DISCONTINUED | OUTPATIENT
Start: 2025-05-08 | End: 2025-05-08 | Stop reason: HOSPADM

## 2025-05-08 RX ORDER — PROCHLORPERAZINE MALEATE 5 MG/1
5 TABLET ORAL EVERY 6 HOURS PRN
Status: DISCONTINUED | OUTPATIENT
Start: 2025-05-08 | End: 2025-05-08 | Stop reason: HOSPADM

## 2025-05-08 RX ORDER — CEPHALEXIN 500 MG/1
500 CAPSULE ORAL 2 TIMES DAILY
Qty: 14 CAPSULE | Refills: 0 | Status: SHIPPED | OUTPATIENT
Start: 2025-05-08 | End: 2025-05-15

## 2025-05-08 RX ORDER — PROCHLORPERAZINE 25 MG
25 SUPPOSITORY, RECTAL RECTAL EVERY 12 HOURS PRN
Status: DISCONTINUED | OUTPATIENT
Start: 2025-05-08 | End: 2025-05-08 | Stop reason: HOSPADM

## 2025-05-08 RX ADMIN — ACETAMINOPHEN 975 MG: 325 TABLET ORAL at 13:09

## 2025-05-08 RX ADMIN — SODIUM CHLORIDE, SODIUM LACTATE, POTASSIUM CHLORIDE, AND CALCIUM CHLORIDE 1000 ML: .6; .31; .03; .02 INJECTION, SOLUTION INTRAVENOUS at 10:14

## 2025-05-08 RX ADMIN — ONDANSETRON 4 MG: 2 INJECTION, SOLUTION INTRAMUSCULAR; INTRAVENOUS at 10:14

## 2025-05-08 ASSESSMENT — ACTIVITIES OF DAILY LIVING (ADL)
ADLS_ACUITY_SCORE: 21
ADLS_ACUITY_SCORE: 41
ADLS_ACUITY_SCORE: 21
ADLS_ACUITY_SCORE: 21

## 2025-05-08 NOTE — PROGRESS NOTES
OBSTETRICS TRIAGE ASSESSMENT NOTE    Lucinda Ji is a 34 year old  at 28w6d gestation based on 1st trimester US who has presented to maternity care for further evaluation of N/V and abdominal cramping. No bleeding, leakage of fluids. Baby is moving normally.    Patient reports waking up at 2AM this morning with nausea vomiting. The room was dark, so she did not know if there was blood in her emesis or not.  Reports at least 5 episodes of emesis. No diarrhea. She has lower abdominal cramping that feels like contractions, which prompted her to call EMS to bring her in.  She reports also noting white, malodorous discharge more recently-not sure when this started.    No recent changes in diet. Denies substance use. Denies chest pain, has dyspnea with cramps.     In regards to her past pregnancies, patient states that she does not remember much because she was so young. Her last child was born about 5 years ago. Denies having a cervical cerclage.     Per chart rvw:  Cervical Length: 3.8cm(?) by US 25  CMP  w/o significant findings -- Na, K, Cr, LFTs wnl.   UA 5/3 - ketones.     Seen by Dr. Weathers  -- abdominal pain, ongoing. Thought to be MSK related.     Cervical check 25 - long, closed    Fellows recently? No      PRENATAL CARE  Seen by Dr. Ghosh(?) at [facility].    Planning for  w/[facility] w/hx of fibroids, myomectomy.     Prenatal course complicated by:  - Elevated BMI  - Borderline polyhydramnios -- last growth US 25  -MDD/SANDRA--not on occasions for this  -Multiple fibroids status post myomectomy  -Chronic hypertension --BPs WNL this pregnancy, currently not on medication  -Did not pass 1 hour glucose test, has not done 3-hour GTT  -Hx of stillbirth @ 31w2d,  22wks ()  -Hx of IAB x3 (, , ), SAB x1 ()       PAST MEDICAL HISTORY  Past Medical History:   Diagnosis Date    Asthma     Fetal demise, greater than 22 weeks, antepartum, single  gestation 10/29/2020    Gastritis     GBS bacteriuria 08/10/2020    Hypertension     Obesity        PAST SURGICAL HISTORY   Past Surgical History:   Procedure Laterality Date    DAVINCI MYOMECTOMY N/A 09/07/2022    Procedure: ROBOTIC MYOMECTOMY;  Surgeon: Indra Reis MD;  Location: [facility] OR    LYSIS, ADHESIONS, ROBOT-ASSISTED, LAPAROSCOPIC, USING DA BROOKE XI N/A 09/07/2022    Procedure: LYSIS, ADHESIONS, ROBOT-ASSISTED, LAPAROSCOPIC, USING DA BROOKE XI;  Surgeon: Indra Reis MD;  Location: [facility]       MEDICATIONS    Current Facility-Administered Medications:     lactated ringers BOLUS 1,000 mL, 1,000 mL, Intravenous, Once, Marques Barba MD    SOCIAL HISTORY:   Social History     Socioeconomic History    Marital status: Single     Spouse name: Not on file    Number of children: 1    Years of education: Not on file    Highest education level: Not on file   Occupational History    Occupation: unemployed   Tobacco Use    Smoking status: Former     Types: Cigarettes    Smokeless tobacco: Never   Vaping Use    Vaping status: Never Used   Substance and Sexual Activity    Alcohol use: Not Currently     Comment: occasional    Drug use: Not Currently     Types: Marijuana    Sexual activity: Yes     Partners: Male     Birth control/protection: Condom   Other Topics Concern    Not on file   Social History Narrative    Not on file     Social Drivers of Health     Financial Resource Strain: Low Risk  (5/3/2025)    Financial Resource Strain     Within the past 12 months, have you or your family members you live with been unable to get utilities (heat, electricity) when it was really needed?: No   Food Insecurity: Low Risk  (5/3/2025)    Food Insecurity     Within the past 12 months, did you worry that your food would run out before you got money to buy more?: No     Within the past 12 months, did the food you bought just not last and you didn t have money to get more?: No  "  Transportation Needs: Low Risk  (5/3/2025)    Transportation Needs     Within the past 12 months, has lack of transportation kept you from medical appointments, getting your medicines, non-medical meetings or appointments, work, or from getting things that you need?: No   Physical Activity: Not on file   Stress: Not on file   Social Connections: Not on file   Interpersonal Safety: Low Risk  (5/5/2025)    Interpersonal Safety     Do you feel physically and emotionally safe where you currently live?: Yes     Within the past 12 months, have you been hit, slapped, kicked or otherwise physically hurt by someone?: No     Within the past 12 months, have you been humiliated or emotionally abused in other ways by your partner or ex-partner?: No   Housing Stability: High Risk (5/3/2025)    Housing Stability     Do you have housing? : No     Are you worried about losing your housing?: No       PHYSICAL EXAMINATION   Temp 98.5  F (36.9  C) (Oral)   Resp 18   Ht 1.676 m (5' 6\")   Wt 133.8 kg (295 lb)   LMP 10/20/2024 (Approximate)   SpO2 97%   BMI 47.61 kg/m    Gen: appears comfortable, no acute distress  CV: regular rate and rhythm, no murmur appreciated  Lungs: clear to ausculation, good air movement throughout  Abdomen: Gravid, mild tenderness to palpation of lower abdomen  Extremities: no lower extremity edema, DPP palpable bilaterally   Cervix: Deferred  FHT: Category 1 tracing; baseline 135 with moderate variability and accelerations, no decelerations  Contractions: none    LAB RESULTS  Personally reviewed.  No results found for this or any previous visit (from the past 24 hours).    Wt Readings from Last 12 Encounters:   05/08/25 133.8 kg (295 lb)   04/23/25 134.2 kg (295 lb 12.8 oz)   03/26/25 133.7 kg (294 lb 12.8 oz)   02/12/25 134.5 kg (296 lb 9.6 oz)   01/16/25 135.4 kg (298 lb 6.4 oz)   01/08/25 133.8 kg (295 lb)   01/07/25 133.8 kg (295 lb)   01/02/25 133.8 kg (295 lb)   11/25/24 133.4 kg (294 lb) "   24 133.1 kg (293 lb 6.4 oz)   09/10/24 132.9 kg (293 lb)   24 134.2 kg (295 lb 12.8 oz)         ASSESSMENT:  Lucinda Ji is a 34 year old year old at 28w6d, presenting for evaluation of n/v and lower abdominal cramping. Per chart rvw, both complaints appears to be an ongoing issue throughout this pregnancy - no formal diagnosis of hyperemesis gravidum, though UA /3 was notable for ketones, no weight loss. VSS.     FHT cat I - occasional contractions. Low suspicions for labor, though will r/o given high risk for  delivery. Will also plan to assess for infection and treat as appropriate.     PLAN:  UA/UCX  Wet prep  Chlamydia/gonorrhea   ROM plus to r/o labor.   Repeat CMP to assess for electrolyte derangement, hepatobiliary pathology   Consult OB/GYN, appreciate for further guidance on additional workup in setting of  delivery and stillbirth.   LR 1000mL bolus for rehydration   Zofran/compazine PRN or n/v      Honoree MD Jameson PGY2  [facility]       Precepted patient with Dr. Hilaria Elliott who agrees with the plan above.    11:07 AM  Addendum  Patient reports sx improved compared to when she first came in. Denies feeling nauseous, abdominal pain improved though exacerbated w/positional changes.     Remains FHT cat I  Patient requested cervical check, which is reasonable. Likely closed, long, -3 station per RN.     CMP, mag, phos wnl.   Wet prep neg for BV, yeast, trichomonas.     Will continue to monitor. Okay to trial PO.     12:36 PM  Addendum   ROM plus negative.   UA showing microscopic hematuria, leuk est pos. However, squamous epithelial cells present and WBC 6, which suggests likely not UTI. She denies dysuria, doesn't feel like she's gone more frequently than usual. Will await UCX to treat.     Reviewed labs w/patient and clarified questions/concerns. She reports persistence dizziness and HA since arrival to triage that she did not report  to me earlier. BP wnl here. Receiving tylenol 975mg when I was in the room. Did tolerate PO, nausea resolved.     On further discussion, she has several SDOH concerns. She has an 12yo daughter and dog at home. Her nephew is sometimes able to come by and help w/errands and transportation, but he works night shift and isn't always available. This has prevented her from not being able to pickup her medications. She reports she has not been taking anything except what's left of her zofran at home.     Per chart rvw, there are some notes that patient simply doesn't want to take the aspirin.     FHT remains cat I. Occasional contractions.     Also updated [facility] who will be by to see her.   Plan reviewed w/attending Dr. Elliott.     Will continue to monitor.     2:07 PM  Addendum  Touched base w/Dr. Hutchison after he reviewed her chart and met with patient. Explained that he thinks much of her abdominal pain is from her fibroids. Recommends weekly BPPs w/fibroids and myomectomy. Also recommended to empirically treat UTI w/keflex 500mg BID for 7d.   He would like to follow-up w/patient next week at [facility] if able. Will include phone # in AVS to help with scheduling and also message patient's FM OB Dr. Ghosh to help coordinate check-in on this.

## 2025-05-08 NOTE — PROGRESS NOTES
ROM plus done- negative. BMP and wet prep WDL. Possible UTI. No vomiting while at hospital this morning. Patient states nausea is better, eating crackers and tolerating well. Occasional low abdominal cramping per patient.

## 2025-05-08 NOTE — PROGRESS NOTES
Patient states she feels well, HA gone. Discharge instructions given and explained, states understanding. Patient will plan to  Rx (Keflex) at [facility] on [address]. Cab called for patient as she was unable to get a ride home. Escorted to cab.

## 2025-05-08 NOTE — CONSULTS
[facility] LABOR & DELIVERY   [facility] CONSULTATION    NAME:Lucinda Ji  : 1990   MRN: [id number]   GESTATIONAL AGE: 28w6d    ADMISSION DATE: 2025     PCP:  Joie Ghosh     CHIEF COMPLAINT: Nausea and vomiting     HPI: I have been requested by Dr. Elliott to evaluate Lucinda Ji for nausea and vomiting. Patient is a 34 year old,  female.  She has been followed at [facility] during the pregnancy.  She has had nausea and vomiting throughout the pregnancy although not as bad this pregnancy that other ones.  She came to the hospital because she could not get it under control today.  She denies any vaginal bleeding or leakage of fluid.  She has occasional contractions that early less than 1 an hour.  She endorses normal fetal movement     DIAGNOSIS COMPLICATING PREGNANCY  She failed her 1 hour GTT and has not had the 3-hour GTT yet  She has a history of an IUFD at 8 months of unknown cause.  She had a 20-week ultrasound at maternal-fetal medicine that was normal.    OBSTETRICAL HISTORY:     OB History    Para Term  AB Living   8 3 1 2 4 1   SAB IAB Ectopic Multiple Live Births   1 3 0 0 1      # Outcome Date GA Lbr Daquan/2nd Weight Sex Type Anes PTL Lv   8 Current            7  10/30/20 31w2d  1.32 kg (2 lb 14.6 oz) M Vag-Spont EPI N FD      Name: Uday   6 Term 14 39w0d  2.778 kg (6 lb 2 oz) F Vag-Spont None  LENI      Birth Comments: none      Complications: Hyperemesis gravidarum      Name: Irma Ji   5 IAB 2012     IAB      4 IAB 2011     AB, COMPLETE         Birth Comments: elective    3 IAB 2009     AB, COMPLETE         Birth Comments: elective    2 SAB 2006 7w0d    SAB         Birth Comments: D&C   1   22w0d    Vag-Spont         Birth Comments: fell into a glass doorknob and went into labor few hrs later, had D&C         PAST MEDICAL HISTORY:  Past Medical History:   Diagnosis Date    Asthma      Fetal demise, greater than 22 weeks, antepartum, single gestation 10/29/2020    Gastritis     GBS bacteriuria 08/10/2020    Hypertension     Obesity         PAST SURGICAL HISTORY:  Past Surgical History:   Procedure Laterality Date    DAVINCI MYOMECTOMY N/A 09/07/2022    Procedure: ROBOTIC MYOMECTOMY;  Surgeon: Indra Reis MD;  Location: [facility] [place]    LYSIS, ADHESIONS, ROBOT-ASSISTED, LAPAROSCOPIC, USING DA BROOKE XI N/A 09/07/2022    Procedure: LYSIS, ADHESIONS, ROBOT-ASSISTED, LAPAROSCOPIC, USING DA BROOKE XI;  Surgeon: Indra Reis MD;  Location: [facility] OR        SOCIAL HISTORY:   reports that she has quit smoking. Her smoking use included cigarettes. She has never used smokeless tobacco. She reports that she does not currently use alcohol. She reports that she does not currently use drugs after having used the following drugs: Marijuana.     MEDICATIONS:  Current Facility-Administered Medications   Medication Dose Route Frequency Provider Last Rate Last Admin    acetaminophen (TYLENOL) tablet 975 mg  975 mg Oral Q8H PRN Marques Barba MD   975 mg at 05/08/25 1309    lidocaine (LMX4) cream   Topical Q1H PRN Hilaria Elliott MD        lidocaine 1 % 0.1-1 mL  0.1-1 mL Other Q1H PRN Hilaria Elliott MD        ondansetron (ZOFRAN) injection 4 mg  4 mg Intravenous Q6H PRN Marques Barba MD   4 mg at 05/08/25 1014    prochlorperazine (COMPAZINE) injection 5 mg  5 mg Intravenous Q6H PRMarques Albarado MD        Or    prochlorperazine (COMPAZINE) tablet 5 mg  5 mg Oral Q6H PRN Marques Barba MD        Or    prochlorperazine (COMPAZINE) suppository 25 mg  25 mg Rectal Q12H PRMarques Albarado MD        sodium chloride (PF) 0.9% PF flush 3 mL  3 mL Intracatheter Q8H ROX Hilaria Elliott MD        sodium chloride (PF) 0.9% PF flush 3 mL  3 mL Intracatheter q1 min prn Hilaria Elliott MD            ALLERGIES:  Allergies   Allergen Reactions    No Clinical Screening - See  "Comments Other (See Comments)     \"I was all jittery and they had to give me benadryl.\"  \"I was all jittery and they had to give me benadryl.\"        REVIEW OF SYSTEMS   Negative except what is stated in the HPI    PHYSICAL EXAM:  /73   Temp 98.2  F (36.8  C) (Oral)   Resp 16   Ht 1.676 m (5' 6\")   Wt 133.8 kg (295 lb)   LMP 10/20/2024 (Approximate)   SpO2 97%   BMI 47.61 kg/m    GEN: NAD; Alert and oriented, appropriate affect.  Heart     S1, S2 normal, no murmur, click, rub or gallop, regular rate and rhythm, regular rate and rhythm, no hepatosplenomegaly  Lungs    Clear to auscultation, no wheezes or crackles, normal breath sounds  Abdomen   Abdomen soft, non-tender. BS normal. No masses, organomegaly  Vaginal exam: 1 cm 20% and -3 per an earlier exam today  Membranes: Intact    Fetal heart Rate Tracing: Category 1 tracing with a baseline in the 150s  Contractions: No contractions seen on the monitor    LABS:  CMP normal  Urinalysis possibly dirty but there was a positive leukocyte esterase and trace blood               IMPRESSION:  34 year old,  female  Nausea and vomiting that is resolved with IV hydration and Zofran  Possible UTI  History of IUFD with reassuring tracing  Failed 1 hour GTT without any further testing  History of myomectomy    RECOMMENDATIONS:  Since she is feeling better she can be discharged home.  I will treat her with Keflex for potential UTI and urine culture is being sent  I recommend she follow-up in our office as she will need to have weekly biophysical profiles for her history of fetal demise at 8 months and need a  due to history of myomectomy couple of years ago with the largest fibroid being 8 cm  The patient is comfortable with this plan and have spoken with the resident who will take care of the orders.      Thank you for allowing us to participate in the care of this patient.  Please contact us with any questions/concerns.      Luther Hutchison, " MD on 5/8/2025 at 1:55 PM

## 2025-05-08 NOTE — PROGRESS NOTES
BMP, wet prep, U/A and gonorrhea/chlamydia done. Patient states she feels a little better. Sipping on water and Sprite. IV started by PICC. IV fluid bolus started and Zofran given.

## 2025-05-08 NOTE — PROGRESS NOTES
One liter of LR infused. Still no vomiting. Patient ordered lunch, ate approx 50%. States she has a headache, rating 10/10. Tylenol given. Dr. Barba at bedside. Still having occasional low abdominal cramping. EFM off so patient can sleep.

## 2025-05-08 NOTE — PROGRESS NOTES
Data: [facility]: 2025  7:39 AM. Arrived by ambulance. Reason for maternal/fetal assessment is nausea and vomiting and lower abdominal cramping. Patient reports nausea and vomiting for about the past week. Denies fever or diarrhea. States she has been having whitish colored vaginal discharge for the past few days. Patient denies vaginal bleeding, pelvic pressure, headache, visual disturbances, epigastric or RUQ pain, significant edema. Patient reports fetal movement is normal. Patient is a 28w6d .  Prenatal record reviewed. Pregnancy has been complicated by obesity (pre-pregnancy BMI >=35) and hyperemesis. Also borderline polyhydramnios, uterine fibroids, hx of myomectomy, chronic hypertension and hx of stillbirth at 31 2/7 weeks.    Vital signs wnl. Support person is not present.     Action: Verbal consent for EFM. Triage assessment completed.     Response: Patient verbalized agreement with plan. Will contact Dr. Barba with update and further orders.

## 2025-05-09 ENCOUNTER — HOSPITAL ENCOUNTER (OUTPATIENT)
Facility: HOSPITAL | Age: 35
Discharge: HOME | End: 2025-05-10
Attending: FAMILY MEDICINE | Admitting: FAMILY MEDICINE
Payer: COMMERCIAL

## 2025-05-09 ENCOUNTER — APPOINTMENT (OUTPATIENT)
Dept: ULTRASOUND IMAGING | Facility: HOSPITAL | Age: 35
End: 2025-05-09
Payer: COMMERCIAL

## 2025-05-09 DIAGNOSIS — N30.00 ACUTE CYSTITIS WITHOUT HEMATURIA: ICD-10-CM

## 2025-05-09 LAB
ALBUMIN MFR UR ELPH: 20.9 MG/DL
ALBUMIN SERPL BCG-MCNC: 3.3 G/DL (ref 3.5–5.2)
ALBUMIN UR-MCNC: 20 MG/DL
ALP SERPL-CCNC: 132 U/L (ref 40–150)
ALT SERPL W P-5'-P-CCNC: 62 U/L (ref 0–50)
ANION GAP SERPL CALCULATED.3IONS-SCNC: 12 MMOL/L (ref 7–15)
APPEARANCE UR: CLEAR
AST SERPL W P-5'-P-CCNC: 57 U/L (ref 0–45)
BACTERIA #/AREA URNS HPF: ABNORMAL /HPF
BILIRUB SERPL-MCNC: 0.4 MG/DL
BILIRUB UR QL STRIP: NEGATIVE
BUN SERPL-MCNC: 3.6 MG/DL (ref 6–20)
C TRACH DNA SPEC QL PROBE+SIG AMP: NEGATIVE
CALCIUM SERPL-MCNC: 9.3 MG/DL (ref 8.8–10.4)
CHLORIDE SERPL-SCNC: 105 MMOL/L (ref 98–107)
CLUE CELLS: ABNORMAL
COLOR UR AUTO: ABNORMAL
CREAT SERPL-MCNC: 0.53 MG/DL (ref 0.51–0.95)
CREAT UR-MCNC: 295.1 MG/DL
CRYSTALS AMN MICRO: ABNORMAL
CRYSTALS AMN MICRO: NORMAL
EGFRCR SERPLBLD CKD-EPI 2021: >90 ML/MIN/1.73M2
ERYTHROCYTE [DISTWIDTH] IN BLOOD BY AUTOMATED COUNT: 13.5 % (ref 10–15)
GLUCOSE SERPL-MCNC: 83 MG/DL (ref 70–99)
GLUCOSE UR STRIP-MCNC: NEGATIVE MG/DL
HCO3 SERPL-SCNC: 19 MMOL/L (ref 22–29)
HCT VFR BLD AUTO: 37.4 % (ref 35–47)
HGB BLD-MCNC: 12.2 G/DL (ref 11.7–15.7)
HGB UR QL STRIP: ABNORMAL
KETONES UR STRIP-MCNC: NEGATIVE MG/DL
LEUKOCYTE ESTERASE UR QL STRIP: ABNORMAL
MCH RBC QN AUTO: 28.4 PG (ref 26.5–33)
MCHC RBC AUTO-ENTMCNC: 32.6 G/DL (ref 31.5–36.5)
MCV RBC AUTO: 87 FL (ref 78–100)
MUCOUS THREADS #/AREA URNS LPF: PRESENT /LPF
N GONORRHOEA DNA SPEC QL NAA+PROBE: NEGATIVE
NITRATE UR QL: NEGATIVE
PH UR STRIP: 6.5 [PH] (ref 5–7)
PLATELET # BLD AUTO: 268 10E3/UL (ref 150–450)
POTASSIUM SERPL-SCNC: 3.7 MMOL/L (ref 3.4–5.3)
PROT SERPL-MCNC: 7 G/DL (ref 6.4–8.3)
PROT/CREAT 24H UR: 0.07 MG/MG CR (ref 0–0.2)
RBC # BLD AUTO: 4.29 10E6/UL (ref 3.8–5.2)
RBC URINE: 5 /HPF
RUPTURE OF FETAL MEMBRANES BY ROM PLUS: NEGATIVE
RUPTURE OF FETAL MEMBRANES BY ROM PLUS: NEGATIVE
SODIUM SERPL-SCNC: 136 MMOL/L (ref 135–145)
SP GR UR STRIP: 1.02 (ref 1–1.03)
SPECIMEN TYPE: NORMAL
SQUAMOUS EPITHELIAL: 3 /HPF
TRICHOMONAS, WET PREP: ABNORMAL
UROBILINOGEN UR STRIP-MCNC: NORMAL MG/DL
WBC # BLD AUTO: 10 10E3/UL (ref 4–11)
WBC URINE: 3 /HPF
WBC'S/HIGH POWER FIELD, WET PREP: ABNORMAL
YEAST, WET PREP: ABNORMAL

## 2025-05-09 PROCEDURE — 76819 FETAL BIOPHYS PROFIL W/O NST: CPT

## 2025-05-09 PROCEDURE — 84156 ASSAY OF PROTEIN URINE: CPT

## 2025-05-09 PROCEDURE — 250N000011 HC RX IP 250 OP 636: Mod: JZ

## 2025-05-09 PROCEDURE — 82040 ASSAY OF SERUM ALBUMIN: CPT

## 2025-05-09 PROCEDURE — 84112 EVAL AMNIOTIC FLUID PROTEIN: CPT

## 2025-05-09 PROCEDURE — 81001 URINALYSIS AUTO W/SCOPE: CPT

## 2025-05-09 PROCEDURE — 87591 N.GONORRHOEAE DNA AMP PROB: CPT

## 2025-05-09 PROCEDURE — 96372 THER/PROPH/DIAG INJ SC/IM: CPT

## 2025-05-09 PROCEDURE — 99207 PR NO CHARGE LOS: CPT | Performed by: FAMILY MEDICINE

## 2025-05-09 PROCEDURE — 85041 AUTOMATED RBC COUNT: CPT

## 2025-05-09 PROCEDURE — 250N000013 HC RX MED GY IP 250 OP 250 PS 637

## 2025-05-09 PROCEDURE — 87210 SMEAR WET MOUNT SALINE/INK: CPT

## 2025-05-09 PROCEDURE — 87086 URINE CULTURE/COLONY COUNT: CPT

## 2025-05-09 PROCEDURE — 36415 COLL VENOUS BLD VENIPUNCTURE: CPT

## 2025-05-09 PROCEDURE — 84112 EVAL AMNIOTIC FLUID PROTEIN: CPT | Performed by: OBSTETRICS & GYNECOLOGY

## 2025-05-09 RX ORDER — PROCHLORPERAZINE 25 MG
25 SUPPOSITORY, RECTAL RECTAL EVERY 12 HOURS PRN
Status: DISCONTINUED | OUTPATIENT
Start: 2025-05-09 | End: 2025-05-10 | Stop reason: HOSPADM

## 2025-05-09 RX ORDER — MORPHINE SULFATE 10 MG/ML
10 INJECTION, SOLUTION INTRAMUSCULAR; INTRAVENOUS
Status: COMPLETED | OUTPATIENT
Start: 2025-05-09 | End: 2025-05-09

## 2025-05-09 RX ORDER — LIDOCAINE 40 MG/G
CREAM TOPICAL
Status: DISCONTINUED | OUTPATIENT
Start: 2025-05-09 | End: 2025-05-10 | Stop reason: HOSPADM

## 2025-05-09 RX ORDER — ACETAMINOPHEN 325 MG/1
975 TABLET ORAL EVERY 8 HOURS PRN
Status: DISCONTINUED | OUTPATIENT
Start: 2025-05-09 | End: 2025-05-10 | Stop reason: HOSPADM

## 2025-05-09 RX ORDER — MORPHINE SULFATE 10 MG/ML
10 INJECTION, SOLUTION INTRAMUSCULAR; INTRAVENOUS
Status: DISCONTINUED | OUTPATIENT
Start: 2025-05-09 | End: 2025-05-09

## 2025-05-09 RX ORDER — HYDROXYZINE HYDROCHLORIDE 50 MG/1
100 TABLET, FILM COATED ORAL
Status: DISCONTINUED | OUTPATIENT
Start: 2025-05-09 | End: 2025-05-10 | Stop reason: HOSPADM

## 2025-05-09 RX ORDER — SODIUM CHLORIDE, SODIUM LACTATE, POTASSIUM CHLORIDE, CALCIUM CHLORIDE 600; 310; 30; 20 MG/100ML; MG/100ML; MG/100ML; MG/100ML
10-125 INJECTION, SOLUTION INTRAVENOUS CONTINUOUS
Status: DISCONTINUED | OUTPATIENT
Start: 2025-05-09 | End: 2025-05-10 | Stop reason: HOSPADM

## 2025-05-09 RX ORDER — ONDANSETRON 2 MG/ML
4 INJECTION INTRAMUSCULAR; INTRAVENOUS EVERY 6 HOURS PRN
Status: DISCONTINUED | OUTPATIENT
Start: 2025-05-09 | End: 2025-05-10 | Stop reason: HOSPADM

## 2025-05-09 RX ORDER — MORPHINE SULFATE 2 MG/ML
2 INJECTION, SOLUTION INTRAMUSCULAR; INTRAVENOUS
Status: DISCONTINUED | OUTPATIENT
Start: 2025-05-09 | End: 2025-05-09

## 2025-05-09 RX ORDER — PROCHLORPERAZINE MALEATE 5 MG/1
5 TABLET ORAL EVERY 6 HOURS PRN
Status: DISCONTINUED | OUTPATIENT
Start: 2025-05-09 | End: 2025-05-10 | Stop reason: HOSPADM

## 2025-05-09 RX ADMIN — ACETAMINOPHEN 975 MG: 325 TABLET ORAL at 19:15

## 2025-05-09 RX ADMIN — MORPHINE SULFATE 10 MG: 10 INJECTION, SOLUTION INTRAMUSCULAR; INTRAVENOUS at 22:13

## 2025-05-09 RX ADMIN — HYDROXYZINE HYDROCHLORIDE 100 MG: 50 TABLET, FILM COATED ORAL at 22:13

## 2025-05-09 ASSESSMENT — ACTIVITIES OF DAILY LIVING (ADL)
ADLS_ACUITY_SCORE: 18

## 2025-05-09 NOTE — PROGRESS NOTES
Data: [facility]: 2025  5:31 PM.  Reason for maternal/fetal assessment is leaking vaginal fluid and abdominal pain. Patient reports she felt a trickle of fluid after coming home from the store today around 1530, leaking did not stop or slow. Patient denies vaginal bleeding, pelvic pressure, vomiting, visual disturbances, epigastric or RUQ pain, significant edema. Patient reports fetal movement is normal. Patient is a 29w0d .  Prenatal record reviewed. Pregnancy has been complicated by nausea and failed 1hr GTT.    Vital signs BP elevated. Support person is not present.     Action: Verbal consent for EFM. Triage assessment completed.     Response: Patient verbalized agreement with plan. Dr. Barba at bedside to assess patient. Plan on GC chlamydia per patient request, wet prep and Fern. See orders.

## 2025-05-09 NOTE — PROGRESS NOTES
OBSTETRICS TRIAGE ASSESSMENT NOTE    Lucinda Ji is a 34 year old  at 29w0d gestation based on 1st trimester ultrasound who has presented to maternity care for further evaluation of reported PPROM and r/o labor.    To reports waking up early afternoon and craving bananas.  She went to the grocery store and when she returned to around 1530, she noticed that she had a big gush of fluid. She does not think that it was urine.  Denies seeing blood. She continues to have abdominal cramping, but this is not new.  She reports nausea and hunger pains, no episodes of emesis today.  Ongoing headache and dizziness.    Of note, she was seen in OB triage yesterday  for N/V and abdominal cramping.  Workup was largely negative at that time other than a possible UTI.  A prescription for Keflex was sent, the patient reports that she has not been able to pick it up and has not taken any doses today.  Denies having any urinary symptoms, including increased urinary frequency, dysuria, malodorous urine.    Between leaving triage and coming in this morning, she does report that she had intercourse with father of baby protection.    PRENATAL CARE  Seen by Dr. Ghosh at [facility].     Prenatal course c/b:  - Elevated BMI  - Limited prenatal cares   - Borderline polyhydramnios -- last growth US 25  -MDD/SANDRA--not on meds   -Multiple fibroids status post myomectomy  -Chronic hypertension --BPs WNL this pregnancy, currently not on medication  -Did not pass 1 hour glucose test, has not done 3-hour GTT  -Hx of stillbirth @ 31w2d,  22wks ()  -Hx of IAB x3 (, , ), SAB x1 ()       PAST MEDICAL HISTORY  Past Medical History:   Diagnosis Date    Asthma     Fetal demise, greater than 22 weeks, antepartum, single gestation 10/29/2020    Gastritis     GBS bacteriuria 08/10/2020    Hypertension     Obesity        PAST SURGICAL HISTORY   Past Surgical History:   Procedure Laterality Date    DAVINCI MYOMECTOMY  N/A 09/07/2022    Procedure: ROBOTIC MYOMECTOMY;  Surgeon: Indra Reis MD;  Location: [facility]    LYSIS, ADHESIONS, ROBOT-ASSISTED, LAPAROSCOPIC, USING DA BROOKE XI N/A 09/07/2022    Procedure: LYSIS, ADHESIONS, ROBOT-ASSISTED, LAPAROSCOPIC, USING DA BROOKE XI;  Surgeon: Indra Reis MD;  Location: [facility]       MEDICATIONS  No current facility-administered medications for this encounter.    SOCIAL HISTORY:   Social History     Socioeconomic History    Marital status: Single     Spouse name: Not on file    Number of children: 1    Years of education: Not on file    Highest education level: Not on file   Occupational History    Occupation: unemployed   Tobacco Use    Smoking status: Former     Types: Cigarettes    Smokeless tobacco: Never   Vaping Use    Vaping status: Never Used   Substance and Sexual Activity    Alcohol use: Not Currently     Comment: occasional    Drug use: Not Currently     Types: Marijuana    Sexual activity: Yes     Partners: Male     Birth control/protection: Condom   Other Topics Concern    Not on file   Social History Narrative    Not on file     Social Drivers of Health     Financial Resource Strain: Low Risk  (5/3/2025)    Financial Resource Strain     Within the past 12 months, have you or your family members you live with been unable to get utilities (heat, electricity) when it was really needed?: No   Food Insecurity: Low Risk  (5/3/2025)    Food Insecurity     Within the past 12 months, did you worry that your food would run out before you got money to buy more?: No     Within the past 12 months, did the food you bought just not last and you didn t have money to get more?: No   Transportation Needs: Low Risk  (5/3/2025)    Transportation Needs     Within the past 12 months, has lack of transportation kept you from medical appointments, getting your medicines, non-medical meetings or appointments, work, or from getting things that you need?: No    Physical Activity: Not on file   Stress: Not on file   Social Connections: Not on file   Interpersonal Safety: Low Risk  (2025)    Interpersonal Safety     Do you feel physically and emotionally safe where you currently live?: Yes     Within the past 12 months, have you been hit, slapped, kicked or otherwise physically hurt by someone?: No     Within the past 12 months, have you been humiliated or emotionally abused in other ways by your partner or ex-partner?: No   Housing Stability: High Risk (5/3/2025)    Housing Stability     Do you have housing? : No     Are you worried about losing your housing?: No       PHYSICAL EXAMINATION   BP (!) 144/86 (BP Location: Right arm, Patient Position: Semi-Jalloh's, Cuff Size: Adult Regular)   Temp 98.3  F (36.8  C) (Oral)   Resp 18   LMP 10/20/2024 (Approximate)   Gen: appears comfortable, no acute distress  CV: regular rate and rhythm, no murmur appreciated  Lungs: clear to ausculation, good air movement throughout  Abdomen: Gravid, non-tender fundus  Extremities: no lower extremity edema  Cervix: Fingertip, long.    FHT: Category 1 tracing; baseline 150 with moderate variability and accelerations, no decelerations  Contractions: none    LAB RESULTS  Personally reviewed.  No results found for this or any previous visit (from the past 24 hours).    ASSESSMENT:  34 year old year old at 29w0d weeks,   - high risk pregnancy (see prenatal complications above), presenting for possible PPROM.  Also incidentally found to have elevated blood pressures.    High risk pregnancy   PPROM rule out  Possible PPROM at 1530 on . Initial fern test positive, but ROM plus negative and BPP also /8, which argues against this. Could still be related to UTI as she has not started treatment for this.   - OB/GYN consult, recs appreciated   -Repeat ROM plus and fern    -If patient truly did PPROM, will need to admit and monitor until she is 34 weeks and scheduled CS if possible.       History of chronic hypertension  High risk for preeclampsia  BP elevated on admission to as high as 150/86.  Other than chronic headaches, no acute changes and/or new symptoms. Repeat labs today show: AST and ALT mildly elevated, CBC wnl, urine protein neg. Does not meet criteria for preeclampsia, though will need to monitor closely. Patient does move a a lot d/t pain and unable to sit still, so possible that this elevation is just pain-related.   -No indications for mag gtt at this time  -Close BP monitoring   -Pain management:    -Tylenol 975mg TID PRN   -Vistril and/or morphine once    -Unisom at bedtime PRN   -Close monitoring of BP  -May need to admit for monitoring of preeclampsia  -OB/GYN consult per above    UTI in pregnancy  UA 5/8 suggestive of UTI. Keflex ordered and sent to pharmacy, but patient did not , thus not yet started. UCX 5/8 still pending. Repeat UA with similar findings as yesterday. Repeat wet prep neg. GC swab 5/8 also neg.   -Repeat UCX pending  -Repeat wet prep, G/C swab  -Encouraged patient to go  keflex to treat UTI. Discussed risks to baby and mom if persists w/o tx. Patient appeared nonchalant about this. Reports that she did not have transportation to pick it up. Will try to arrange for patient to have better access to keflex.     Marques Barba MD PGY2  [facility]       Precepted patient with Dr. Hilaria Elliott who agrees with the plan above.     11:08 PM  Addendum  Repeat fern and ROM plus neg. Likely no PPROM.   -Okay for patient to stay overnight. Will reassess sx, including monitoring for leakage of fluid. If none and sx well-controlled, okay to discharge.   -BP check qshift and PRN  -Will resend keflex to [facility] and see if they can bring it over before patient discharges in the AM.     Discussed plan w/[facility] Dr. Aguirre.

## 2025-05-10 VITALS — DIASTOLIC BLOOD PRESSURE: 85 MMHG | TEMPERATURE: 98.1 F | RESPIRATION RATE: 18 BRPM | SYSTOLIC BLOOD PRESSURE: 137 MMHG

## 2025-05-10 PROBLEM — O09.93 SUPERVISION OF HIGH RISK PREGNANCY IN THIRD TRIMESTER: Status: ACTIVE | Noted: 2025-05-10

## 2025-05-10 LAB
BACTERIA UR CULT: NORMAL
C TRACH DNA SPEC QL PROBE+SIG AMP: POSITIVE
N GONORRHOEA DNA SPEC QL NAA+PROBE: NEGATIVE
SPECIMEN TYPE: ABNORMAL

## 2025-05-10 RX ORDER — CEPHALEXIN 500 MG/1
500 CAPSULE ORAL 2 TIMES DAILY
Qty: 14 CAPSULE | Refills: 0 | Status: SHIPPED | OUTPATIENT
Start: 2025-05-10 | End: 2025-05-12

## 2025-05-10 RX ORDER — CEPHALEXIN 500 MG/1
500 CAPSULE ORAL 2 TIMES DAILY
Qty: 14 CAPSULE | Refills: 0 | Status: SHIPPED | OUTPATIENT
Start: 2025-05-10 | End: 2025-05-10

## 2025-05-10 ASSESSMENT — ACTIVITIES OF DAILY LIVING (ADL)
ADLS_ACUITY_SCORE: 18
ADLS_ACUITY_SCORE: 18
ADLS_ACUITY_SCORE: 23
ADLS_ACUITY_SCORE: 18
ADLS_ACUITY_SCORE: 18
ADLS_ACUITY_SCORE: 23
ADLS_ACUITY_SCORE: 18

## 2025-05-10 NOTE — PLAN OF CARE
Goal Outcome Evaluation:      Plan of Care Reviewed With: patient    Overall Patient Progress: improvingOverall Patient Progress: improving             Discharged home at 1145, plan to follow up with primary OB on Monday

## 2025-05-10 NOTE — CONSULTS
[facility] LABOR & DELIVERY   METROPARTNERS CONSULTATION    NAME:Lucinda Ji  : 1990   MRN: [id number]   GESTATIONAL AGE: 29w0d    ADMISSION DATE: 2025     PCP:  Joie Ghosh     CHIEF COMPLAINT:  leakage of fluid     HPI: I have been requested by Dr. Elliott to evaluate Lucinda Ji for possible rupture of membranes. Patient is a 34 year old,  female at 29w0d gestation. History obtained through the patient and chart review. Patient reports that she had leakage of fluid today at 1530 when walking home from the grocery store.  She thought she urinated some, but her family was  concerned it was ROM, so she called the ambulance to come in.  She was writhing around in pain when she got here and was checked and 1 cm/ long/  high per RN.  She had intercourse this morning. Last BM was this morning.  She has had very little sleep and is tired.     She was evaluated yesterday on labor and delivery and consultation obtained by Dr Hutchison at that time.    She has a history of myomectomy in  with Dr. Reis and it was recommended that she undergo  section for delivery.  Yesterday she was having nausea and vomiting which has resolved.     DIAGNOSIS COMPLICATING PREGNANCY  She failed her 1 hour GTT and has not had the 3-hour GTT yet  She has a history of an IUFD at 8 months of unknown cause.  She had a 20-week ultrasound at maternal-fetal medicine that was normal.    OBSTETRICAL HISTORY:     OB History    Para Term  AB Living   8 3 1 2 4 1   SAB IAB Ectopic Multiple Live Births   1 3 0 0 1      # Outcome Date GA Lbr Daquan/2nd Weight Sex Type Anes PTL Lv   8 Current            7  10/30/20 31w2d  1.32 kg (2 lb 14.6 oz) M Vag-Spont EPI N FD      Name: Uday   6 Term 14 39w0d  2.778 kg (6 lb 2 oz) F Vag-Spont None  LENI      Birth Comments: none      Complications: Hyperemesis gravidarum      Name: Irma Ji   5 IAB 2012     IAB      4 IAB      AB,  COMPLETE         Birth Comments: elective    3 IAB 2009     AB, COMPLETE         Birth Comments: elective    2 SAB 2006 7w0d    SAB         Birth Comments: D&C   1   22w0d    Vag-Spont         Birth Comments: fell into a glass doorknob and went into labor few hrs later, had D&C         PAST MEDICAL HISTORY:  Past Medical History:   Diagnosis Date    Asthma     Fetal demise, greater than 22 weeks, antepartum, single gestation 10/29/2020    Gastritis     GBS bacteriuria 08/10/2020    Hypertension     Obesity         PAST SURGICAL HISTORY:  Past Surgical History:   Procedure Laterality Date    DAVINCI MYOMECTOMY N/A 2022    Procedure: ROBOTIC MYOMECTOMY;  Surgeon: Indra Reis MD;  Location: [facility] OR    LYSIS, ADHESIONS, ROBOT-ASSISTED, LAPAROSCOPIC, USING DA BROOKE XI N/A 2022    Procedure: LYSIS, ADHESIONS, ROBOT-ASSISTED, LAPAROSCOPIC, USING DA BROOKE XI;  Surgeon: Indra Reis MD;  Location: [facility] OR        SOCIAL HISTORY:   reports that she has quit smoking. Her smoking use included cigarettes. She has never used smokeless tobacco. She reports that she does not currently use alcohol. She reports that she does not currently use drugs after having used the following drugs: Marijuana.     MEDICATIONS:  Current Facility-Administered Medications   Medication Dose Route Frequency Provider Last Rate Last Admin    acetaminophen (TYLENOL) tablet 975 mg  975 mg Oral Q8H PRN Marques Barba MD   975 mg at 25 1915    doxylamine (UNISOM) tablet 25 mg  25 mg Oral At Bedtime PRN Marques Barba MD        hydrOXYzine HCl (ATARAX) tablet 100 mg  100 mg Oral At Bedtime PRN Marques Barba MD   100 mg at 25    lactated ringers infusion   mL/hr Intravenous Continuous Marques Barba MD   Held at 25    lidocaine (LMX4) cream   Topical Q1H PRN Marques Barba MD        lidocaine (LMX4) cream   Topical Q1H PRN Marques Barba MD     "    lidocaine 1 % 0.1-1 mL  0.1-1 mL Other Q1H PRN Marques Barba MD        lidocaine 1 % 0.1-1 mL  0.1-1 mL Other Q1H PRN Marques Barba MD        ondansetron (ZOFRAN) injection 4 mg  4 mg Intravenous Q6H PRN Marques Braba MD        prochlorperazine (COMPAZINE) injection 5 mg  5 mg Intravenous Q6H PRN Marques Barba MD        Or    prochlorperazine (COMPAZINE) tablet 5 mg  5 mg Oral Q6H PRN Marques Barba MD        Or    prochlorperazine (COMPAZINE) suppository 25 mg  25 mg Rectal Q12H PRN Marques Barba MD        sodium chloride (PF) 0.9% PF flush 3 mL  3 mL Intracatheter Q8H ROX Marques Barba MD        sodium chloride (PF) 0.9% PF flush 3 mL  3 mL Intracatheter q1 min prn Marques Barba MD        sodium chloride (PF) 0.9% PF flush 3 mL  3 mL Intracatheter Q8H RXO Marques Barba MD        sodium chloride (PF) 0.9% PF flush 3 mL  3 mL Intracatheter q1 min prn Marques Barba MD            ALLERGIES:  Allergies   Allergen Reactions    No Clinical Screening - See Comments Other (See Comments)     \"I was all jittery and they had to give me benadryl.\"  \"I was all jittery and they had to give me benadryl.\"        REVIEW OF SYSTEMS   Negative except what is stated in the HPI    PHYSICAL EXAM:  BP (!) 146/65   Temp 98.2  F (36.8  C)   Resp 18   LMP 10/20/2024 (Approximate)   GEN: NAD; Alert and oriented, appropriate affect.  Abdomen:   negative, Abdomen soft, non-tender. BS normal. No masses, organomegaly  Extremities:  Normal and Warm  Sterile speculum exam: scant fluid in vagina.  Cervix is closed.  Negative cough. Fern and ROM+ collected from the fluid.     Fetal heart Rate Tracing: reactive NST  Contractions: external monitor and frequency q 2-5 minutes. Patient appears comfortable       IMAGING:  US OB Biophys Prf wo NST wo Measure  Narrative: EXAM: US OB BIOPHYSICAL PROFILE WO NST WO MEASURE  LOCATION: [facility]  DATE: 2025    INDICATION:  rupture of membranes  COMPARISON: " 2025.    FINDINGS:  Single fetus, vertex presentation. Several small fibroids are demonstrated.    HEART RATE: 1:30 bpm.  SDP: 7.5 cm .  PLACENTA: Posterior. No previa.    2/2 fetal breathing  2/2 fetal movements  2/2 fetal tone  2/2 amniotic fluid    Total biophysical profile   Impression: IMPRESSION:  1.  Single intrauterine gestation in vertex presentation.  2.  Normal  biophysical profile.     I have reviewed the radiologists interpretation       Latest Reference Range & Units 25 19:26   Sodium 135 - 145 mmol/L 136   Potassium 3.4 - 5.3 mmol/L 3.7   Chloride 98 - 107 mmol/L 105   Carbon Dioxide (CO2) 22 - 29 mmol/L 19 (L)   Urea Nitrogen 6.0 - 20.0 mg/dL 3.6 (L)   Creatinine 0.51 - 0.95 mg/dL 0.53   GFR Estimate >60 mL/min/1.73m2 >90   Calcium 8.8 - 10.4 mg/dL 9.3   Anion Gap 7 - 15 mmol/L 12   Albumin 3.5 - 5.2 g/dL 3.3 (L)   Protein Total 6.4 - 8.3 g/dL 7.0   Alkaline Phosphatase 40 - 150 U/L 132   ALT 0 - 50 U/L 62 (H)   AST 0 - 45 U/L 57 (H)   Bilirubin Total <=1.2 mg/dL 0.4   Glucose 70 - 99 mg/dL 83   WBC 4.0 - 11.0 10e3/uL 10.0   Hemoglobin 11.7 - 15.7 g/dL 12.2   Hematocrit 35.0 - 47.0 % 37.4   Platelet Count 150 - 450 10e3/uL 268   RBC Count 3.80 - 5.20 10e6/uL 4.29   MCV 78 - 100 fL 87   MCH 26.5 - 33.0 pg 28.4   MCHC 31.5 - 36.5 g/dL 32.6   RDW 10.0 - 15.0 % 13.5       + Ferning initially  ROM plus negative   Wet prep positive WBCs    Repeat speculum exam and Fern negative and ROM + negative.      IMPRESSION:  34 year old,  ,female with possible , premature rupture of membranes  H/o myomectomy  Obesity   Initial ferning positive, but repeat testing with speculum is negative.     RECOMMENDATIONS:  Recommend to keep as observation overnight and  assess for continued leakage of fluid or evidence of contractions. She was already given hydroxyzine.   Potential discharge tomorrow if no further loss of fluid.   She has yet to do 3 hour gtt, so will hold off on BMZ unless they  are more obvious signs of rupture or eminent labor.     Thank you for allowing us to participate in the care of this patient.  Please contact us with any questions/concerns.      Cecilia Aguirre MD on 5/9/2025 at 9:31 PM

## 2025-05-10 NOTE — PROGRESS NOTES
Patient back from Ultrasound. Dr. Barba notified that the results are back and so are some of the labs.

## 2025-05-10 NOTE — PROGRESS NOTES
Data: Patient assessed in the [facility] for leaking vaginal fluid. Cervix 1 cm dilated and 20% effaced. Fetal station -3. Membranes intact. Contractions are not present. See flowsheets for fetal assessment documentation.     Action: Presumed adequate fetal oxygenation documented. Discharge instructions reviewed. Patient instructed to report change in fetal movement, vaginal leaking of fluid or bleeding, abdominal pain, or any concerns related to the pregnancy to provider/clinic.      Response: Orders to discharge home per Resident OB. Patient verbalized understanding of education and agreement with plan. Transportation arranged for taxi service utilized by hospital social work with planned intermediate stop to patient's pharmacy to  prescription (attempted to fill at hospital pharmacy but this was denied by insurance due to restrictions to only one pharmacy) Discharged to home at 1145.

## 2025-05-10 NOTE — PROGRESS NOTES
AWILDA received call from L&D RN.  Patient brought in via EMS, did not have clothing.  Patient to discharge home today, RN called requesting upper and lower body dressing for patient.  AWILDA brought upper and lower body clothing and pair shoes, provided to L&D RN who will bring to patient.    BREANA Avalos

## 2025-05-10 NOTE — DISCHARGE INSTRUCTIONS
Seen at [facility]  for cramping. Prescription given for Keflex for UTI    Seen at [facility] -5/10 due to possible  premature rupture of membranes. Initial testing showed possible rupture; however, repeat testing with speculum exam was negative. Monitored overnight for any further leakage of fluid or contractions, which did not occur. Biophysical Profile was completed  and scored 8/8 with borderline elevated levels of amniotic fluid. No betamethasone was given due to patient requiring further gestational diabetes testing within next 2 weeks since this can alter blood sugar testing.         PLAN:  Discharge home 5/10  Continue Keflex 500 mg twice a day for 7 days, please finish all medication even if symptoms improve.  Follow-up with primary OB team this week *You may check if ultrasound appointment includes visit with provider- if it does not, you should schedule one for this week*    Ultrasound appointment on May 12th at 2:45PM  [facility] [address] [place] [address] [phone]

## 2025-05-10 NOTE — PROGRESS NOTES
Dr. Barba at bedside to assess patient. Patient thrashing around in bed saying she feels like she needs to poop. OK for an SVE. 1/20/-3. Patient reporting that she is just uncomfortable, wants to eat and is cold. Also reporting a pounding headache and blurry vision    Verbal order for patient to eat given. Warm blankets and the room temp turned up.     Labs sent. SWAT RN called for IV start.     Fetal monitoring difficult to trace due to patients frequent moving in bed. Provider aware.

## 2025-05-10 NOTE — PLAN OF CARE
Problem: Adult Inpatient Plan of Care  Goal: Plan of Care Review  Description: The Plan of Care Review/Shift note should be completed every shift.  The Outcome Evaluation is a brief statement about your assessment that the patient is improving, declining, or no change.  This information will be displayed automatically on your shiftnote.  Outcome: Progressing  Flowsheets (Taken 5/10/2025 6740)  Plan of Care Reviewed With: patient  Overall Patient Progress: improving  Goal: Optimal Comfort and Wellbeing  Outcome: Progressing  Intervention: Monitor Pain and Promote Comfort  Recent Flowsheet Documentation  Taken 5/9/2025 2454 by Alia White, RN  Pain Management Interventions: medication (see MAR)   Goal Outcome Evaluation:      Plan of Care Reviewed With: patient    Overall Patient Progress: improvingOverall Patient Progress: improving           Patient able to sleep overnight. Denies pain or contractions. Reports not feeling any leaking of fluid but has not been up to check her pad.. Patient still very tired this AM and did not want to get up to use the bathroom and have her pad checked yet.

## 2025-05-10 NOTE — PROGRESS NOTES
Dr. Aguirre consulted. Requested to do a speculum exam. Patient in agreement. Exam done, more labs collected with patients permission.    Waiting for results

## 2025-05-10 NOTE — PROGRESS NOTES
Patient is a 34 year-old  at 29w1d who presented with possible  premature rupture of membranes. Initial ferning was positive but repeat testing with speculum exam was negative. Monitored overnight for any further leakage of fluid or contractions, which did not occur. She has a known UTI but did not  her outpatient antibiotics yet. Please see Dr Barba and Dr Aguirre's notes from 25 for details.     Plan:   - NST this morning  - Discharge to home   - Keflex 500 mg BID x 7 days   - Follow-up early this week with primary OB team    Josselyn Lal MD, PGY-3  [facility]      Discussed plan of care with attending Dr Elliott who is in agreement.      positive

## 2025-05-11 LAB — BACTERIA UR CULT: NORMAL

## 2025-05-12 ENCOUNTER — RESULTS FOLLOW-UP (OUTPATIENT)
Dept: FAMILY MEDICINE | Facility: CLINIC | Age: 35
End: 2025-05-12

## 2025-05-12 DIAGNOSIS — G44.209 TENSION HEADACHE: ICD-10-CM

## 2025-05-12 DIAGNOSIS — N30.00 ACUTE CYSTITIS WITHOUT HEMATURIA: ICD-10-CM

## 2025-05-12 DIAGNOSIS — R73.9 ELEVATED BLOOD SUGAR LEVEL: ICD-10-CM

## 2025-05-12 DIAGNOSIS — A74.9 CHLAMYDIA INFECTION: Primary | ICD-10-CM

## 2025-05-12 DIAGNOSIS — O09.93 HIGH-RISK PREGNANCY IN THIRD TRIMESTER: ICD-10-CM

## 2025-05-12 PROBLEM — O09.91 SUPERVISION OF HIGH-RISK PREGNANCY, FIRST TRIMESTER: Status: RESOLVED | Noted: 2025-01-02 | Resolved: 2025-05-12

## 2025-05-12 PROBLEM — Z36.89 ENCOUNTER FOR TRIAGE IN PREGNANT PATIENT: Status: RESOLVED | Noted: 2025-05-03 | Resolved: 2025-05-12

## 2025-05-12 RX ORDER — BLOOD PRESSURE TEST KIT
1 KIT MISCELLANEOUS 4 TIMES DAILY
Qty: 120 EACH | Refills: 1 | Status: SHIPPED | OUTPATIENT
Start: 2025-05-12

## 2025-05-12 RX ORDER — AZITHROMYCIN 500 MG/1
1000 TABLET, FILM COATED ORAL DAILY
Qty: 2 TABLET | Refills: 0 | Status: ON HOLD | OUTPATIENT
Start: 2025-05-12 | End: 2025-05-14

## 2025-05-12 RX ORDER — AZITHROMYCIN 250 MG/1
1000 TABLET, FILM COATED ORAL ONCE
Status: DISCONTINUED | OUTPATIENT
Start: 2025-05-12 | End: 2025-05-12 | Stop reason: HOSPADM

## 2025-05-12 RX ORDER — CEPHALEXIN 500 MG/1
500 CAPSULE ORAL 2 TIMES DAILY
Qty: 14 CAPSULE | Refills: 0 | Status: SHIPPED | OUTPATIENT
Start: 2025-05-12

## 2025-05-12 RX ORDER — AZITHROMYCIN 250 MG/1
1000 TABLET, FILM COATED ORAL ONCE
Status: DISCONTINUED | OUTPATIENT
Start: 2025-05-12 | End: 2025-05-12

## 2025-05-12 RX ORDER — ACETAMINOPHEN 325 MG/1
325-650 TABLET ORAL EVERY 6 HOURS PRN
Qty: 100 TABLET | Refills: 2 | Status: SHIPPED | OUTPATIENT
Start: 2025-05-12

## 2025-05-12 NOTE — PROGRESS NOTES
8:36 AM  Brief Progress Note    Patient seen in OB triage at [facility] on 5/8 for r/o PPROM - testing for this negative. Repeat STI testing positive for chlamydia.     Will plan to treat with one-time dose of azithromycin 1g. Recommended close follow-up w/PCP. Also encouraged patient to  keflex for UTI as well. Does not appear this has been done per chart rvw.    Called patient to share results. Clarified questions/concerns. Advised patient to inform partner to get tested and treated as well. Discussed return precautions to OB triage including, but not limited to: vaginal bleeding, leakage of fluid, worsening abdominal pain not resolved w/tylenol, elevated BP > 140/90, fevers/chills. Patient expressed understanding and agreeable to plan.     Marques Barba MD PGY2  [facility]

## 2025-05-12 NOTE — TELEPHONE ENCOUNTER
Called pt and she states that she was not able to get the prescriptions that she was prescribed by the hospital doctor due to her restricted insurance.  She would like Dr Ghosh to send the prescriptions for Keflex (UTI), Azithromycin (chlamydia), and Tylenol (for HA's).    She states that she is not able to do 3 hr GTT due to her nausea and vomiting.  She is agreeable to checking her BG up to 4 x's per day at home for a week.  Discussed that she would need to check AM fasting  (goal <95) and 1 hr after meals (goal <140).  Sent message to Dr Ghosh to order generic glucometer, test strips, and lancets.  She states that she has had her BG checked many times and feels comfortable checking.  She will call if she has any questions.    Assisted her with scheduling weekly JAKE appts: Wed, 25 at 4:30 PM and Wed, 25 at 9:00 AM with Dr Ghosh.  She will see Dr Feldman on  as previously scheduled.  She would like to hold on scheduling any more appts until she sees when her daughter will be graduating.    Assisted her with scheduling with Dr Hutchison on Thursday, 5/15/25 at 9:15 AM.  She is agreeable to seeing him for a 1 time consult for  but would prefer to do all ultrasound and JAKE's at [place].    Routed note to Dr Ghosh, Dr Feldman, and Dr Wade./Effie Prescott RN BSN

## 2025-05-13 ENCOUNTER — HOSPITAL ENCOUNTER (INPATIENT)
Facility: HOSPITAL | Age: 35
LOS: 1 days | Discharge: HOME | End: 2025-05-14
Attending: STUDENT IN AN ORGANIZED HEALTH CARE EDUCATION/TRAINING PROGRAM | Admitting: STUDENT IN AN ORGANIZED HEALTH CARE EDUCATION/TRAINING PROGRAM
Payer: COMMERCIAL

## 2025-05-13 DIAGNOSIS — O10.919 CHRONIC HYPERTENSION AFFECTING PREGNANCY: Primary | ICD-10-CM

## 2025-05-13 PROBLEM — O16.3 ELEVATED BLOOD PRESSURE AFFECTING PREGNANCY IN THIRD TRIMESTER, ANTEPARTUM: Status: ACTIVE | Noted: 2025-05-13

## 2025-05-13 LAB
ABO + RH BLD: NORMAL
ALBUMIN MFR UR ELPH: 34.9 MG/DL
ALBUMIN SERPL BCG-MCNC: 3.4 G/DL (ref 3.5–5.2)
ALBUMIN UR-MCNC: 50 MG/DL
ALP SERPL-CCNC: 142 U/L (ref 40–150)
ALT SERPL W P-5'-P-CCNC: 92 U/L (ref 0–50)
ANION GAP SERPL CALCULATED.3IONS-SCNC: 11 MMOL/L (ref 7–15)
APPEARANCE UR: ABNORMAL
AST SERPL W P-5'-P-CCNC: 67 U/L (ref 0–45)
BASOPHILS # BLD AUTO: 0 10E3/UL (ref 0–0.2)
BASOPHILS NFR BLD AUTO: 0 %
BILIRUB SERPL-MCNC: 0.3 MG/DL
BILIRUB UR QL STRIP: NEGATIVE
BLD GP AB SCN SERPL QL: NEGATIVE
BUN SERPL-MCNC: 5.7 MG/DL (ref 6–20)
CALCIUM SERPL-MCNC: 9.2 MG/DL (ref 8.8–10.4)
CHLORIDE SERPL-SCNC: 102 MMOL/L (ref 98–107)
COLOR UR AUTO: YELLOW
CREAT SERPL-MCNC: 0.59 MG/DL (ref 0.51–0.95)
CREAT UR-MCNC: 366.8 MG/DL
EGFRCR SERPLBLD CKD-EPI 2021: >90 ML/MIN/1.73M2
EOSINOPHIL # BLD AUTO: 0.3 10E3/UL (ref 0–0.7)
EOSINOPHIL NFR BLD AUTO: 4 %
ERYTHROCYTE [DISTWIDTH] IN BLOOD BY AUTOMATED COUNT: 13.7 % (ref 10–15)
EST. AVERAGE GLUCOSE BLD GHB EST-MCNC: 114 MG/DL
GLUCOSE BLDC GLUCOMTR-MCNC: 119 MG/DL (ref 70–99)
GLUCOSE BLDC GLUCOMTR-MCNC: 132 MG/DL (ref 70–99)
GLUCOSE SERPL-MCNC: 125 MG/DL (ref 70–99)
GLUCOSE UR STRIP-MCNC: NEGATIVE MG/DL
HBA1C MFR BLD: 5.6 %
HCO3 SERPL-SCNC: 20 MMOL/L (ref 22–29)
HCT VFR BLD AUTO: 37.3 % (ref 35–47)
HGB BLD-MCNC: 11.9 G/DL (ref 11.7–15.7)
HGB UR QL STRIP: ABNORMAL
HOLD SPECIMEN: NORMAL
IMM GRANULOCYTES # BLD: 0.1 10E3/UL
IMM GRANULOCYTES NFR BLD: 1 %
KETONES UR STRIP-MCNC: ABNORMAL MG/DL
LEUKOCYTE ESTERASE UR QL STRIP: NEGATIVE
LYMPHOCYTES # BLD AUTO: 2.4 10E3/UL (ref 0.8–5.3)
LYMPHOCYTES NFR BLD AUTO: 26 %
MCH RBC QN AUTO: 28.3 PG (ref 26.5–33)
MCHC RBC AUTO-ENTMCNC: 31.9 G/DL (ref 31.5–36.5)
MCV RBC AUTO: 89 FL (ref 78–100)
MONOCYTES # BLD AUTO: 0.7 10E3/UL (ref 0–1.3)
MONOCYTES NFR BLD AUTO: 7 %
MUCOUS THREADS #/AREA URNS LPF: PRESENT /LPF
NEUTROPHILS # BLD AUTO: 6 10E3/UL (ref 1.6–8.3)
NEUTROPHILS NFR BLD AUTO: 63 %
NITRATE UR QL: NEGATIVE
NRBC # BLD AUTO: 0 10E3/UL
NRBC BLD AUTO-RTO: 0 /100
PH UR STRIP: 6 [PH] (ref 5–7)
PLATELET # BLD AUTO: 411 10E3/UL (ref 150–450)
POTASSIUM SERPL-SCNC: 4.1 MMOL/L (ref 3.4–5.3)
PROT SERPL-MCNC: 7.2 G/DL (ref 6.4–8.3)
PROT/CREAT 24H UR: 0.1 MG/MG CR (ref 0–0.2)
RBC # BLD AUTO: 4.21 10E6/UL (ref 3.8–5.2)
RBC URINE: 17 /HPF
SODIUM SERPL-SCNC: 133 MMOL/L (ref 135–145)
SP GR UR STRIP: 1.01 (ref 1–1.03)
SPECIMEN EXP DATE BLD: NORMAL
SQUAMOUS EPITHELIAL: 6 /HPF
TRANSITIONAL EPI: <1 /HPF
UROBILINOGEN UR STRIP-MCNC: NORMAL MG/DL
WBC # BLD AUTO: 9.5 10E3/UL (ref 4–11)
WBC URINE: 4 /HPF

## 2025-05-13 PROCEDURE — 80053 COMPREHEN METABOLIC PANEL: CPT | Performed by: STUDENT IN AN ORGANIZED HEALTH CARE EDUCATION/TRAINING PROGRAM

## 2025-05-13 PROCEDURE — 81001 URINALYSIS AUTO W/SCOPE: CPT | Performed by: STUDENT IN AN ORGANIZED HEALTH CARE EDUCATION/TRAINING PROGRAM

## 2025-05-13 PROCEDURE — 80074 ACUTE HEPATITIS PANEL: CPT | Performed by: STUDENT IN AN ORGANIZED HEALTH CARE EDUCATION/TRAINING PROGRAM

## 2025-05-13 PROCEDURE — 250N000013 HC RX MED GY IP 250 OP 250 PS 637: Performed by: STUDENT IN AN ORGANIZED HEALTH CARE EDUCATION/TRAINING PROGRAM

## 2025-05-13 PROCEDURE — 86900 BLOOD TYPING SEROLOGIC ABO: CPT | Performed by: STUDENT IN AN ORGANIZED HEALTH CARE EDUCATION/TRAINING PROGRAM

## 2025-05-13 PROCEDURE — 36415 COLL VENOUS BLD VENIPUNCTURE: CPT | Performed by: STUDENT IN AN ORGANIZED HEALTH CARE EDUCATION/TRAINING PROGRAM

## 2025-05-13 PROCEDURE — 83036 HEMOGLOBIN GLYCOSYLATED A1C: CPT | Performed by: STUDENT IN AN ORGANIZED HEALTH CARE EDUCATION/TRAINING PROGRAM

## 2025-05-13 PROCEDURE — 84156 ASSAY OF PROTEIN URINE: CPT | Performed by: STUDENT IN AN ORGANIZED HEALTH CARE EDUCATION/TRAINING PROGRAM

## 2025-05-13 PROCEDURE — 85004 AUTOMATED DIFF WBC COUNT: CPT | Performed by: STUDENT IN AN ORGANIZED HEALTH CARE EDUCATION/TRAINING PROGRAM

## 2025-05-13 PROCEDURE — 120N000001 HC R&B MED SURG/OB

## 2025-05-13 RX ORDER — HYDROXYZINE HYDROCHLORIDE 50 MG/1
50 TABLET, FILM COATED ORAL
Status: DISCONTINUED | OUTPATIENT
Start: 2025-05-13 | End: 2025-05-14 | Stop reason: HOSPADM

## 2025-05-13 RX ORDER — AMOXICILLIN 250 MG
1 CAPSULE ORAL 2 TIMES DAILY
Status: DISCONTINUED | OUTPATIENT
Start: 2025-05-13 | End: 2025-05-14 | Stop reason: HOSPADM

## 2025-05-13 RX ORDER — ONDANSETRON 2 MG/ML
4 INJECTION INTRAMUSCULAR; INTRAVENOUS EVERY 6 HOURS PRN
Status: DISCONTINUED | OUTPATIENT
Start: 2025-05-13 | End: 2025-05-14 | Stop reason: HOSPADM

## 2025-05-13 RX ORDER — PANTOPRAZOLE SODIUM 40 MG/1
40 TABLET, DELAYED RELEASE ORAL
Status: DISCONTINUED | OUTPATIENT
Start: 2025-05-14 | End: 2025-05-14 | Stop reason: HOSPADM

## 2025-05-13 RX ORDER — BISACODYL 10 MG
10 SUPPOSITORY, RECTAL RECTAL DAILY PRN
Status: DISCONTINUED | OUTPATIENT
Start: 2025-05-13 | End: 2025-05-14 | Stop reason: HOSPADM

## 2025-05-13 RX ORDER — CALCIUM CARBONATE 500 MG/1
1000 TABLET, CHEWABLE ORAL EVERY 6 HOURS PRN
Status: DISCONTINUED | OUTPATIENT
Start: 2025-05-13 | End: 2025-05-14 | Stop reason: HOSPADM

## 2025-05-13 RX ORDER — SODIUM CHLORIDE, SODIUM LACTATE, POTASSIUM CHLORIDE, CALCIUM CHLORIDE 600; 310; 30; 20 MG/100ML; MG/100ML; MG/100ML; MG/100ML
10-125 INJECTION, SOLUTION INTRAVENOUS CONTINUOUS
Status: DISCONTINUED | OUTPATIENT
Start: 2025-05-13 | End: 2025-05-14 | Stop reason: HOSPADM

## 2025-05-13 RX ORDER — ACETAMINOPHEN 325 MG/1
650 TABLET ORAL EVERY 4 HOURS PRN
Status: DISCONTINUED | OUTPATIENT
Start: 2025-05-13 | End: 2025-05-14 | Stop reason: HOSPADM

## 2025-05-13 RX ORDER — DEXTROSE MONOHYDRATE 25 G/50ML
25-50 INJECTION, SOLUTION INTRAVENOUS
Status: DISCONTINUED | OUTPATIENT
Start: 2025-05-13 | End: 2025-05-14 | Stop reason: HOSPADM

## 2025-05-13 RX ORDER — NICOTINE POLACRILEX 4 MG
15-30 LOZENGE BUCCAL
Status: DISCONTINUED | OUTPATIENT
Start: 2025-05-13 | End: 2025-05-14 | Stop reason: HOSPADM

## 2025-05-13 RX ORDER — SIMETHICONE 80 MG
160 TABLET,CHEWABLE ORAL EVERY 6 HOURS PRN
Status: DISCONTINUED | OUTPATIENT
Start: 2025-05-13 | End: 2025-05-14 | Stop reason: HOSPADM

## 2025-05-13 RX ORDER — SODIUM PHOSPHATE,MONO-DIBASIC 19G-7G/118
1 ENEMA (ML) RECTAL DAILY PRN
Status: DISCONTINUED | OUTPATIENT
Start: 2025-05-13 | End: 2025-05-14 | Stop reason: HOSPADM

## 2025-05-13 RX ORDER — PROCHLORPERAZINE MALEATE 10 MG
10 TABLET ORAL EVERY 6 HOURS PRN
Status: DISCONTINUED | OUTPATIENT
Start: 2025-05-13 | End: 2025-05-14 | Stop reason: HOSPADM

## 2025-05-13 RX ORDER — ONDANSETRON 4 MG/1
4 TABLET, ORALLY DISINTEGRATING ORAL EVERY 6 HOURS PRN
Status: DISCONTINUED | OUTPATIENT
Start: 2025-05-13 | End: 2025-05-14 | Stop reason: HOSPADM

## 2025-05-13 RX ORDER — LIDOCAINE 40 MG/G
CREAM TOPICAL
Status: DISCONTINUED | OUTPATIENT
Start: 2025-05-13 | End: 2025-05-14 | Stop reason: HOSPADM

## 2025-05-13 RX ORDER — LABETALOL HYDROCHLORIDE 5 MG/ML
20-80 INJECTION, SOLUTION INTRAVENOUS EVERY 10 MIN PRN
Status: DISCONTINUED | OUTPATIENT
Start: 2025-05-13 | End: 2025-05-14 | Stop reason: HOSPADM

## 2025-05-13 RX ORDER — PRENATAL VIT/IRON FUM/FOLIC AC 27MG-0.8MG
1 TABLET ORAL DAILY
Status: DISCONTINUED | OUTPATIENT
Start: 2025-05-13 | End: 2025-05-14 | Stop reason: HOSPADM

## 2025-05-13 RX ORDER — HYDRALAZINE HYDROCHLORIDE 20 MG/ML
10 INJECTION INTRAMUSCULAR; INTRAVENOUS
Status: DISCONTINUED | OUTPATIENT
Start: 2025-05-13 | End: 2025-05-14 | Stop reason: HOSPADM

## 2025-05-13 RX ORDER — DOCUSATE SODIUM 100 MG/1
100 CAPSULE, LIQUID FILLED ORAL 2 TIMES DAILY
Status: DISCONTINUED | OUTPATIENT
Start: 2025-05-13 | End: 2025-05-14 | Stop reason: HOSPADM

## 2025-05-13 RX ORDER — METOCLOPRAMIDE 10 MG/1
10 TABLET ORAL EVERY 6 HOURS PRN
Status: DISCONTINUED | OUTPATIENT
Start: 2025-05-13 | End: 2025-05-14 | Stop reason: HOSPADM

## 2025-05-13 RX ORDER — AMOXICILLIN 250 MG
2 CAPSULE ORAL 2 TIMES DAILY
Status: DISCONTINUED | OUTPATIENT
Start: 2025-05-13 | End: 2025-05-14 | Stop reason: HOSPADM

## 2025-05-13 RX ORDER — METOCLOPRAMIDE HYDROCHLORIDE 5 MG/ML
10 INJECTION INTRAMUSCULAR; INTRAVENOUS EVERY 6 HOURS PRN
Status: DISCONTINUED | OUTPATIENT
Start: 2025-05-13 | End: 2025-05-14 | Stop reason: HOSPADM

## 2025-05-13 RX ADMIN — DOCUSATE SODIUM 100 MG: 100 CAPSULE, LIQUID FILLED ORAL at 21:32

## 2025-05-13 RX ADMIN — PRENATAL VIT W/ FE FUMARATE-FA TAB 27-0.8 MG 1 TABLET: 27-0.8 TAB at 17:25

## 2025-05-13 RX ADMIN — HYDROXYZINE HYDROCHLORIDE 50 MG: 50 TABLET, FILM COATED ORAL at 21:32

## 2025-05-13 RX ADMIN — SENNOSIDES AND DOCUSATE SODIUM 1 TABLET: 50; 8.6 TABLET ORAL at 21:32

## 2025-05-13 ASSESSMENT — ACTIVITIES OF DAILY LIVING (ADL)
ADLS_ACUITY_SCORE: 20
ADLS_ACUITY_SCORE: 46

## 2025-05-13 NOTE — PROGRESS NOTES
See below for phone conversation between patient and Effie COVARRUBIAS:    Called pt and she states that she was not able to get the prescriptions that she was prescribed by the hospital doctor due to her restricted insurance.  She would like Dr Ghosh to send the prescriptions for Keflex (UTI), Azithromycin (chlamydia), and Tylenol (for HA's).     She states that she is not able to do 3 hr GTT due to her nausea and vomiting.  She is agreeable to checking her BG up to 4 x's per day at home for a week.  Discussed that she would need to check AM fasting  (goal <95) and 1 hr after meals (goal <140).  Sent message to Dr Ghosh to order generic glucometer, test strips, and lancets.  She states that she has had her BG checked many times and feels comfortable checking.  She will call if she has any questions.     Assisted her with scheduling weekly [facility] appts: Wed, 25 at 4:30 PM and Wed, 25 at 9:00 AM with Dr Ghosh.  She will see Dr Feldman on  as previously scheduled.  She would like to hold on scheduling any more appts until she sees when her daughter will be graduating.     Assisted her with scheduling with Dr Hutchison on Thursday, 5/15/25 at 9:15 AM.  She is agreeable to seeing him for a 1 time consult for  but would prefer to do all ultrasound and JAKE's at [place].     Routed note to Dr Ghosh, Dr Feldman, and Dr Wade./Effie Prescott RN BSN

## 2025-05-13 NOTE — H&P
Date: 2025  Time: 3:59 PM    Admission H&P  Lucinda Ji,  1990, MRN [id number]    PCP: Joie Ghosh, [phone]  Primary OB: [place]    Code status:  Full Code              Chief Complaint: cHTN, elevated AST/ALT     OBSTETRICAL / DATING HISTORY:  Estimated Date of Delivery: Estimated Date of Delivery: 2025  Gestational Age: 29w4d    OB History    Para Term  AB Living   8 3 1 2 4 1   SAB IAB Ectopic Multiple Live Births   1 3 0 0 1      # Outcome Date GA Lbr Daquan/2nd Weight Sex Type Anes PTL Lv   8 Current            7  10/30/20 31w2d  1.32 kg (2 lb 14.6 oz) M Vag-Spont EPI N FD      Name: Uday   6 Term 14 39w0d  2.778 kg (6 lb 2 oz) F Vag-Spont None  LENI      Birth Comments: none      Complications: Hyperemesis gravidarum      Name: Irma Ji   5 IAB      IAB      4 IAB      AB, COMPLETE         Birth Comments: elective    3 IAB      AB, COMPLETE         Birth Comments: elective    2 SAB  7w0d    SAB         Birth Comments: D&C   1   22w0d    Vag-Spont         Birth Comments: fell into a glass doorknob and went into labor few hrs later, had D&C       HPI:    Lucinda Ji is a 34 year old year old at 29w4d weeks. She presented to L&D today as a transfer from [facility].  Patient had presented to the ED at [facility] today with symptom of vomiting.  She was found to have mild-range BP in the ED and AST/ALT were elevated above baseline but less than 2x the upper limit of normal.  OB was consulted there and had recommended admission for observation, however patient's insurance only covers admission at [facility] thus she was transferred here.    On arrival patient is resting in bed.  She denies current HA.  She reports she has a history of cHTN since she was 22 years old.  BP's had been well-controlled without medications during pregnancy.  She has had all vaginal deliveries in the past, she did have a  myomectomy about 2 years ago and will require delivery via cesareans in the future.  Her only complaint upon arrival is that she is hungry.      She was scheduled to see Dr Hutchison later this week for  consultation.  She reports that she failed her 1-hour GCT and was not able to complete here 3 hour GTT due to nausea.  She had planned to check her sugars at home for 2 weeks but had not yet started.    She was recently admitted at [facility] with concern for PPROM, evaluation revealed her to not be ruptured and she was discharged to home.  She did test positive for chlamydia , her primary clinic had sent in antibiotics for her, but she was not able to fill these.  She reports that she did receive antibiotics at [facility] today.      Medical History  Past Medical History:   Diagnosis Date    Asthma     Fetal demise, greater than 22 weeks, antepartum, single gestation 10/29/2020    Gastritis     GBS bacteriuria 08/10/2020    Hypertension     Obesity        Surgical History  Past Surgical History:   Procedure Laterality Date    DAVINCI MYOMECTOMY N/A 2022    Procedure: ROBOTIC MYOMECTOMY;  Surgeon: Indra Reis MD;  Location: [facility] OR    LYSIS, ADHESIONS, ROBOT-ASSISTED, LAPAROSCOPIC, USING DA BROOKE XI N/A 2022    Procedure: LYSIS, ADHESIONS, ROBOT-ASSISTED, LAPAROSCOPIC, USING DA BROOKE XI;  Surgeon: Indra Reis MD;  Location: [facility] OR       Social History  Social History     Socioeconomic History    Marital status: Single     Spouse name: Not on file    Number of children: 1    Years of education: Not on file    Highest education level: Not on file   Occupational History    Occupation: unemployed   Tobacco Use    Smoking status: Former     Types: Cigarettes    Smokeless tobacco: Never   Vaping Use    Vaping status: Never Used   Substance and Sexual Activity    Alcohol use: Not Currently     Comment: occasional    Drug use: Not Currently     Types: Marijuana  "   Sexual activity: Yes     Partners: Male     Birth control/protection: Condom   Other Topics Concern    Not on file   Social History Narrative    Not on file     Social Drivers of Health     Financial Resource Strain: Low Risk  (5/9/2025)    Financial Resource Strain     Within the past 12 months, have you or your family members you live with been unable to get utilities (heat, electricity) when it was really needed?: No   Food Insecurity: Low Risk  (5/9/2025)    Food Insecurity     Within the past 12 months, did you worry that your food would run out before you got money to buy more?: No     Within the past 12 months, did the food you bought just not last and you didn t have money to get more?: No   Transportation Needs: High Risk (5/9/2025)    Transportation Needs     Within the past 12 months, has lack of transportation kept you from medical appointments, getting your medicines, non-medical meetings or appointments, work, or from getting things that you need?: Yes   Physical Activity: Not on file   Stress: Not on file   Social Connections: Not on file   Interpersonal Safety: Low Risk  (5/9/2025)    Interpersonal Safety     Do you feel physically and emotionally safe where you currently live?: Yes     Within the past 12 months, have you been hit, slapped, kicked or otherwise physically hurt by someone?: No     Within the past 12 months, have you been humiliated or emotionally abused in other ways by your partner or ex-partner?: No   Housing Stability: Low Risk  (5/9/2025)    Housing Stability     Do you have housing? : Yes     Are you worried about losing your housing?: No   Recent Concern: Housing Stability - High Risk (5/3/2025)    Housing Stability     Do you have housing? : No     Are you worried about losing your housing?: No       Allergies   Allergen Reactions    No Clinical Screening - See Comments Other (See Comments)     \"I was all jittery and they had to give me benadryl.\"  \"I was all jittery and they " "had to give me benadryl.\"       Medications Prior to Admission   Medication Sig Dispense Refill Last Dose/Taking    acetaminophen (TYLENOL) 325 MG tablet Take 1-2 tablets (325-650 mg) by mouth every 6 hours as needed for mild pain. 100 tablet 2     albuterol (PROAIR HFA/PROVENTIL HFA/VENTOLIN HFA) 108 (90 Base) MCG/ACT inhaler Inhale 1-2 puffs into the lungs every 4 hours as needed for shortness of breath or wheezing 18 g 11     Alcohol Swabs PADS 1 Units 4 times daily. 120 each 1     aspirin 81 MG EC tablet Take 1 tablet (81 mg) by mouth daily. 90 tablet 2     azithromycin (ZITHROMAX) 500 MG tablet Take 2 tablets (1,000 mg) by mouth daily for 1 day. 2 tablet 0     blood glucose (NO BRAND SPECIFIED) lancets standard Use to test blood sugar 4 times daily or as directed. 120 each 1     blood glucose (NO BRAND SPECIFIED) test strip Use to test blood sugar 4 times daily or as directed. 120 strip 1     blood glucose monitoring (NO BRAND SPECIFIED) meter device kit Use to test blood sugar 4 times daily or as directed. 1 kit 0     cephALEXin (KEFLEX) 500 MG capsule Take 1 capsule (500 mg) by mouth 2 times daily. 14 capsule 0     cyclobenzaprine (FLEXERIL) 10 MG tablet Take 1 tablet (10 mg) by mouth 3 times daily as needed for muscle spasms. 30 tablet 0     doxylamine (UNISOM) 25 MG TABS tablet Take 0.5-1 tablets (12.5-25 mg) by mouth at bedtime. 30 tablet 1     famotidine (PEPCID) 20 MG tablet Take 1 tablet (20 mg) by mouth 2 times daily. 90 tablet 2     metoclopramide (REGLAN) 5 MG tablet Take 1 tablet (5 mg) by mouth 3 times daily as needed for vomiting. 30 tablet 0     ondansetron (ZOFRAN) 4 MG tablet Take 1 tablet (4 mg) by mouth every 6 hours as needed for nausea or vomiting. 30 tablet 3     Prenatal Vit-Fe Fumarate-FA (PRENATAL MULTIVITAMIN  WITH IRON) 28-0.8 MG TABS Take 1 tablet by mouth daily. 100 tablet 3     pyridOXINE (VITAMIN B6) 25 MG tablet Take 1 tablet (25 mg) by mouth 3 times daily. 90 tablet 1  "       Review of Systems:  Pertinent items are noted in HPI.    Physical Exam:       LMP 10/20/2024 (Approximate)     General: NAD  CV: RRR  Lungs: clear  Abdomen: soft, gravid    Pertinent Labs  Admission on 05/09/2025, Discharged on 05/10/2025   Component Date Value Ref Range Status    Fern Crystallization 05/09/2025 Ferning present (A)  No ferning present Final    Chlamydia Trachomatis 05/09/2025 Positive (A)  Negative Final    Positive for C. trachomatis rRNA by transcription mediated amplification.   As is true for all non-culture methods, a positive specimen obtained from a patient after therapeutic treatment cannot be interpreted as indicating the presence of viable organism.    Neisseria gonorrhoeae 05/09/2025 Negative  Negative Final    Negative for N. gonorrhoeae rRNA by transcription mediated amplification. A negative result by transcription mediated amplification does not preclude the presence of C. trachomatis infection because results are dependent on proper and adequate collection, absence of inhibitors and sufficient rRNA to be detected.    CTNG Specimen Source 05/09/2025 Vagina   Final    Trichomonas 05/09/2025 Absent  Absent Final    Yeast 05/09/2025 Absent  Absent Final    Clue Cells 05/09/2025 Absent  Absent Final    WBCs/high power field 05/09/2025 2+ (A)  None Final    Color Urine 05/09/2025 Orange (A)  Colorless, Straw, Light Yellow, Yellow Final    Appearance Urine 05/09/2025 Clear  Clear Final    Glucose Urine 05/09/2025 Negative  Negative mg/dL Final    Bilirubin Urine 05/09/2025 Negative  Negative Final    Ketones Urine 05/09/2025 Negative  Negative mg/dL Final    Specific Gravity Urine 05/09/2025 1.021  1.001 - 1.030 Final    Blood Urine 05/09/2025 0.2 mg/dL (A)  Negative Final    pH Urine 05/09/2025 6.5  5.0 - 7.0 Final    Protein Albumin Urine 05/09/2025 20 (A)  Negative mg/dL Final    Urobilinogen Urine 05/09/2025 Normal  Normal mg/dL Final    Nitrite Urine 05/09/2025 Negative  Negative  Final    Leukocyte Esterase Urine 05/09/2025 75 Caro/uL (A)  Negative Final    Bacteria Urine 05/09/2025 Few (A)  None Seen /HPF Final    Mucus Urine 05/09/2025 Present (A)  None Seen /LPF Final    RBC Urine 05/09/2025 5 (H)  <=2 /HPF Final    WBC Urine 05/09/2025 3  <=5 /HPF Final    Squamous Epithelials Urine 05/09/2025 3 (H)  <=1 /HPF Final    Culture 05/09/2025 10,000-50,000 CFU/mL Mixture of Urogenital Carolynn   Final    Sodium 05/09/2025 136  135 - 145 mmol/L Final    Potassium 05/09/2025 3.7  3.4 - 5.3 mmol/L Final    Carbon Dioxide (CO2) 05/09/2025 19 (L)  22 - 29 mmol/L Final    Anion Gap 05/09/2025 12  7 - 15 mmol/L Final    Urea Nitrogen 05/09/2025 3.6 (L)  6.0 - 20.0 mg/dL Final    Creatinine 05/09/2025 0.53  0.51 - 0.95 mg/dL Final    GFR Estimate 05/09/2025 >90  >60 mL/min/1.73m2 Final    eGFR calculated using 2021 CKD-EPI equation.    Calcium 05/09/2025 9.3  8.8 - 10.4 mg/dL Final    Chloride 05/09/2025 105  98 - 107 mmol/L Final    Glucose 05/09/2025 83  70 - 99 mg/dL Final    Alkaline Phosphatase 05/09/2025 132  40 - 150 U/L Final    AST 05/09/2025 57 (H)  0 - 45 U/L Final    ALT 05/09/2025 62 (H)  0 - 50 U/L Final    Protein Total 05/09/2025 7.0  6.4 - 8.3 g/dL Final    Albumin 05/09/2025 3.3 (L)  3.5 - 5.2 g/dL Final    Bilirubin Total 05/09/2025 0.4  <=1.2 mg/dL Final    WBC Count 05/09/2025 10.0  4.0 - 11.0 10e3/uL Final    RBC Count 05/09/2025 4.29  3.80 - 5.20 10e6/uL Final    Hemoglobin 05/09/2025 12.2  11.7 - 15.7 g/dL Final    Hematocrit 05/09/2025 37.4  35.0 - 47.0 % Final    MCV 05/09/2025 87  78 - 100 fL Final    MCH 05/09/2025 28.4  26.5 - 33.0 pg Final    MCHC 05/09/2025 32.6  31.5 - 36.5 g/dL Final    RDW 05/09/2025 13.5  10.0 - 15.0 % Final    Platelet Count 05/09/2025 268  150 - 450 10e3/uL Final    Total Protein Urine mg/dL 05/09/2025 20.9    mg/dL Final    The reference ranges have not been established in urine protein. The results should be integrated into the clinical context for  interpretation.    Total Protein Urine mg/mg Creat 05/09/2025 0.07  0.00 - 0.20 mg/mg Cr Final    Creatinine Urine mg/dL 05/09/2025 295.1  mg/dL Final    The reference ranges have not been established in urine creatinine. The results should be integrated into the clinical context for interpretation.    Rupture of Fetal Membranes by ROM * 05/09/2025 Negative  Negative, Invalid, Suggest Repeat Final    Rupture of Fetal Membranes by ROM * 05/09/2025 Negative  Negative, Invalid, Suggest Repeat Final    Fern Crystallization 05/09/2025 No ferning present  No ferning present Final   Admission on 05/08/2025, Discharged on 05/08/2025   Component Date Value Ref Range Status    Culture 05/08/2025 <10,000 CFU/mL Mixture of Urogenital Carolynn   Final    Color Urine 05/08/2025 Yellow  Colorless, Straw, Light Yellow, Yellow Final    Appearance Urine 05/08/2025 Clear  Clear Final    Glucose Urine 05/08/2025 Negative  Negative mg/dL Final    Bilirubin Urine 05/08/2025 Negative  Negative Final    Ketones Urine 05/08/2025 Trace (A)  Negative mg/dL Final    Specific Gravity Urine 05/08/2025 1.024  1.001 - 1.030 Final    Blood Urine 05/08/2025 0.06 mg/dL (A)  Negative Final    pH Urine 05/08/2025 7.5 (H)  5.0 - 7.0 Final    Protein Albumin Urine 05/08/2025 Negative  Negative mg/dL Final    Urobilinogen Urine 05/08/2025 Normal  Normal mg/dL Final    Nitrite Urine 05/08/2025 Negative  Negative Final    Leukocyte Esterase Urine 05/08/2025 25 Caro/uL (A)  Negative Final    Bacteria Urine 05/08/2025 Few (A)  None Seen /HPF Final    RBC Urine 05/08/2025 5 (H)  <=2 /HPF Final    WBC Urine 05/08/2025 6 (H)  <=5 /HPF Final    Squamous Epithelials Urine 05/08/2025 2 (H)  <=1 /HPF Final    Mucus Urine 05/08/2025 Present (A)  None Seen /LPF Final    Trichomonas 05/08/2025 Absent  Absent Final    Yeast 05/08/2025 Absent  Absent Final    Clue Cells 05/08/2025 Absent  Absent Final    WBCs/high power field 05/08/2025 2+ (A)  None Final    Chlamydia  Trachomatis 05/08/2025 Negative  Negative Final    Negative for C. trachomatis rRNA by transcription mediated amplification.   A negative result by transcription mediated amplification does not preclude the presence of infection because results are dependent on proper and adequate collection, absence of inhibitors and sufficient rRNA to be detected.    Neisseria gonorrhoeae 05/08/2025 Negative  Negative Final    Negative for N. gonorrhoeae rRNA by transcription mediated amplification. A negative result by transcription mediated amplification does not preclude the presence of C. trachomatis infection because results are dependent on proper and adequate collection, absence of inhibitors and sufficient rRNA to be detected.    CTNG Specimen Source 05/08/2025 Urine, Voided   Final    Rupture of Fetal Membranes by ROM * 05/08/2025 Negative  Negative, Invalid, Suggest Repeat Final    Sodium 05/08/2025 136  135 - 145 mmol/L Final    Potassium 05/08/2025 4.2  3.4 - 5.3 mmol/L Final    Carbon Dioxide (CO2) 05/08/2025 25  22 - 29 mmol/L Final    Anion Gap 05/08/2025 8  7 - 15 mmol/L Final    Urea Nitrogen 05/08/2025 5.1 (L)  6.0 - 20.0 mg/dL Final    Creatinine 05/08/2025 0.65  0.51 - 0.95 mg/dL Final    GFR Estimate 05/08/2025 >90  >60 mL/min/1.73m2 Final    eGFR calculated using 2021 CKD-EPI equation.    Calcium 05/08/2025 8.8  8.8 - 10.4 mg/dL Final    Chloride 05/08/2025 103  98 - 107 mmol/L Final    Glucose 05/08/2025 92  70 - 99 mg/dL Final    Alkaline Phosphatase 05/08/2025 130  40 - 150 U/L Final    AST 05/08/2025 36  0 - 45 U/L Final    ALT 05/08/2025 47  0 - 50 U/L Final    Protein Total 05/08/2025 7.2  6.4 - 8.3 g/dL Final    Albumin 05/08/2025 3.4 (L)  3.5 - 5.2 g/dL Final    Bilirubin Total 05/08/2025 0.4  <=1.2 mg/dL Final    Magnesium 05/08/2025 1.7  1.7 - 2.3 mg/dL Final    Phosphorus 05/08/2025 3.0  2.5 - 4.5 mg/dL Final    Hold Specimen 05/08/2025 [facility]   Final   Office Visit on 05/05/2025   Component Date  Value Ref Range Status    Total Protein Urine mg/dL 05/05/2025 26.8    mg/dL Final    The reference ranges have not been established in urine protein. The results should be integrated into the clinical context for interpretation.    Total Protein Urine mg/mg Creat 05/05/2025 0.09  0.00 - 0.20 mg/mg Cr Final    Creatinine Urine mg/dL 05/05/2025 313.0  mg/dL Final    The reference ranges have not been established in urine creatinine. The results should be integrated into the clinical context for interpretation.   Admission on 05/03/2025, Discharged on 05/03/2025   Component Date Value Ref Range Status    Color Urine 05/03/2025 Light Yellow  Colorless, Straw, Light Yellow, Yellow Final    Appearance Urine 05/03/2025 Clear  Clear Final    Glucose Urine 05/03/2025 Negative  Negative mg/dL Final    Bilirubin Urine 05/03/2025 Negative  Negative Final    Ketones Urine 05/03/2025 20 (A)  Negative mg/dL Final    Specific Gravity Urine 05/03/2025 1.018  1.003 - 1.035 Final    Blood Urine 05/03/2025 Small (A)  Negative Final    pH Urine 05/03/2025 7.0  5.0 - 7.0 Final    Protein Albumin Urine 05/03/2025 Negative  Negative mg/dL Final    Urobilinogen Urine 05/03/2025 Normal  Normal mg/dL Final    Nitrite Urine 05/03/2025 Negative  Negative Final    Leukocyte Esterase Urine 05/03/2025 Negative  Negative Final    Mucus Urine 05/03/2025 Present (A)  None Seen /LPF Final    RBC Urine 05/03/2025 16 (H)  <=2 /HPF Final    WBC Urine 05/03/2025 2  <=5 /HPF Final    Squamous Epithelials Urine 05/03/2025 3 (H)  <=1 /HPF Final    Transitional Epithelials Urine 05/03/2025 <1  <=1 /HPF Final    WBC Count 05/03/2025 12.2 (H)  4.0 - 11.0 10e3/uL Final    RBC Count 05/03/2025 4.12  3.80 - 5.20 10e6/uL Final    Hemoglobin 05/03/2025 12.0  11.7 - 15.7 g/dL Final    Hematocrit 05/03/2025 36.5  35.0 - 47.0 % Final    MCV 05/03/2025 89  78 - 100 fL Final    MCH 05/03/2025 29.1  26.5 - 33.0 pg Final    MCHC 05/03/2025 32.9  31.5 - 36.5 g/dL Final     RDW 05/03/2025 13.2  10.0 - 15.0 % Final    Platelet Count 05/03/2025 386  150 - 450 10e3/uL Final    INR 05/03/2025 0.98  0.85 - 1.15 Final    PT 05/03/2025 13.5  11.8 - 14.8 Seconds Final    aPTT 05/03/2025 27  22 - 38 Seconds Final    Fibrinogen Activity 05/03/2025 645 (H)  170 - 510 mg/dL Final    Hold Specimen 05/03/2025 JIC   Final    Hold Specimen 05/03/2025 JI   Final    Culture 05/03/2025 <10,000 CFU/mL Mixture of Urogenital Carolynn   Final   Admission on 05/01/2025, Discharged on 05/01/2025   Component Date Value Ref Range Status    Color Urine 05/01/2025 Light Yellow  Colorless, Straw, Light Yellow, Yellow Final    Appearance Urine 05/01/2025 Clear  Clear Final    Glucose Urine 05/01/2025 Negative  Negative mg/dL Final    Bilirubin Urine 05/01/2025 Negative  Negative Final    Ketones Urine 05/01/2025 Negative  Negative mg/dL Final    Specific Gravity Urine 05/01/2025 1.017  1.003 - 1.035 Final    Blood Urine 05/01/2025 Trace (A)  Negative Final    pH Urine 05/01/2025 7.0  5.0 - 7.0 Final    Protein Albumin Urine 05/01/2025 10 (A)  Negative mg/dL Final    Urobilinogen Urine 05/01/2025 Normal  Normal mg/dL Final    Nitrite Urine 05/01/2025 Negative  Negative Final    Leukocyte Esterase Urine 05/01/2025 Trace (A)  Negative Final    WBC Count 05/01/2025 9.9  4.0 - 11.0 10e3/uL Final    RBC Count 05/01/2025 3.95  3.80 - 5.20 10e6/uL Final    Hemoglobin 05/01/2025 11.4 (L)  11.7 - 15.7 g/dL Final    Hematocrit 05/01/2025 34.8 (L)  35.0 - 47.0 % Final    MCV 05/01/2025 88  78 - 100 fL Final    MCH 05/01/2025 28.9  26.5 - 33.0 pg Final    MCHC 05/01/2025 32.8  31.5 - 36.5 g/dL Final    RDW 05/01/2025 13.2  10.0 - 15.0 % Final    Platelet Count 05/01/2025 405  150 - 450 10e3/uL Final    Sodium 05/01/2025 135  135 - 145 mmol/L Final    Potassium 05/01/2025 3.8  3.4 - 5.3 mmol/L Final    Carbon Dioxide (CO2) 05/01/2025 20 (L)  22 - 29 mmol/L Final    Anion Gap 05/01/2025 12  7 - 15 mmol/L Final    Urea Nitrogen  05/01/2025 3.5 (L)  6.0 - 20.0 mg/dL Final    Creatinine 05/01/2025 0.54  0.51 - 0.95 mg/dL Final    GFR Estimate 05/01/2025 >90  >60 mL/min/1.73m2 Final    eGFR calculated using 2021 CKD-EPI equation.    Calcium 05/01/2025 8.8  8.8 - 10.4 mg/dL Final    Chloride 05/01/2025 103  98 - 107 mmol/L Final    Glucose 05/01/2025 99  70 - 99 mg/dL Final    Alkaline Phosphatase 05/01/2025 118  40 - 150 U/L Final    AST 05/01/2025 15  0 - 45 U/L Final    ALT 05/01/2025 17  0 - 50 U/L Final    Protein Total 05/01/2025 6.9  6.4 - 8.3 g/dL Final    Albumin 05/01/2025 3.2 (L)  3.5 - 5.2 g/dL Final    Bilirubin Total 05/01/2025 0.3  <=1.2 mg/dL Final    Trichomonas 05/01/2025 Absent  Absent Final    Yeast 05/01/2025 Absent  Absent Final    Clue Cells 05/01/2025 Absent  Absent Final    WBCs/high power field 05/01/2025 1+ (A)  None Final    Chlamydia Trachomatis 05/01/2025 Negative  Negative Final    Negative for C. trachomatis rRNA by transcription mediated amplification.   A negative result by transcription mediated amplification does not preclude the presence of infection because results are dependent on proper and adequate collection, absence of inhibitors and sufficient rRNA to be detected.    Neisseria gonorrhoeae 05/01/2025 Negative  Negative Final    Negative for N. gonorrhoeae rRNA by transcription mediated amplification. A negative result by transcription mediated amplification does not preclude the presence of C. trachomatis infection because results are dependent on proper and adequate collection, absence of inhibitors and sufficient rRNA to be detected.    CTNG Specimen Source 05/01/2025 Vagina   Final   Office Visit on 04/23/2025   Component Date Value Ref Range Status    Total Protein Urine mg/dL 04/23/2025 23.7    mg/dL Final    The reference ranges have not been established in urine protein. The results should be integrated into the clinical context for interpretation.    Total Protein Urine mg/mg Creat 04/23/2025  0.08  0.00 - 0.20 mg/mg Cr Final    Creatinine Urine mg/dL 04/23/2025 312.0  mg/dL Final    The reference ranges have not been established in urine creatinine. The results should be integrated into the clinical context for interpretation.    Color Urine 04/23/2025 Yellow  Colorless, Straw, Light Yellow, Yellow Final    Appearance Urine 04/23/2025 Clear  Clear Final    Glucose Urine 04/23/2025 Negative  Negative mg/dL Final    Bilirubin Urine 04/23/2025 Small (A)  Negative Final    Ketones Urine 04/23/2025 Trace (A)  Negative mg/dL Final    Specific Gravity Urine 04/23/2025 1.020  1.005 - 1.030 Final    Blood Urine 04/23/2025 Moderate (A)  Negative Final    pH Urine 04/23/2025 7.5  5.0 - 8.0 Final    Protein Albumin Urine 04/23/2025 30 (A)  Negative mg/dL Final    Urobilinogen Urine 04/23/2025 0.2  0.2, 1.0 E.U./dL Final    Nitrite Urine 04/23/2025 Negative  Negative Final    Leukocyte Esterase Urine 04/23/2025 Negative  Negative Final    Bacteria Urine 04/23/2025 Moderate (A)  None Seen /HPF Final    RBC Urine 04/23/2025 0-2  0-2 /HPF /HPF Final    WBC Urine 04/23/2025 0-5  0-5 /HPF /HPF Final    Squamous Epithelials Urine 04/23/2025 Many (A)  None Seen /LPF Final    Budding Yeast Urine 04/23/2025 Few (A)  None Seen /HPF Final    Mucus Urine 04/23/2025 Present (A)  None Seen /LPF Final    WBC Count 04/23/2025 10.9  4.0 - 11.0 10e3/uL Final    RBC Count 04/23/2025 4.07  3.80 - 5.20 10e6/uL Final    Hemoglobin 04/23/2025 11.8  11.7 - 15.7 g/dL Final    Hematocrit 04/23/2025 36.1  35.0 - 47.0 % Final    MCV 04/23/2025 89  78 - 100 fL Final    MCH 04/23/2025 29.0  26.5 - 33.0 pg Final    MCHC 04/23/2025 32.7  31.5 - 36.5 g/dL Final    RDW 04/23/2025 13.1  10.0 - 15.0 % Final    Platelet Count 04/23/2025 421  150 - 450 10e3/uL Final    Glu Gest Screen 1hr 50g 04/23/2025 146 (H)  70 - 129 mg/dL Final    Sodium 04/23/2025 134 (L)  135 - 145 mmol/L Final    Potassium 04/23/2025 3.7  3.4 - 5.3 mmol/L Final    Chloride  04/23/2025 104  98 - 107 mmol/L Final    Carbon Dioxide (CO2) 04/23/2025 19 (L)  22 - 29 mmol/L Final    Anion Gap 04/23/2025 11  7 - 15 mmol/L Final    Urea Nitrogen 04/23/2025 4.3 (L)  6.0 - 20.0 mg/dL Final    Creatinine 04/23/2025 0.49 (L)  0.51 - 0.95 mg/dL Final    GFR Estimate 04/23/2025 >90  >60 mL/min/1.73m2 Final    eGFR calculated using 2021 CKD-EPI equation.    Calcium 04/23/2025 8.9  8.8 - 10.4 mg/dL Final    Glucose 04/23/2025 154 (H)  70 - 99 mg/dL Final    Protein Total 04/23/2025 7.0  6.4 - 8.3 g/dL Final    Albumin 04/23/2025 3.4 (L)  3.5 - 5.2 g/dL Final    Bilirubin Total 04/23/2025 0.3  <=1.2 mg/dL Final    Alkaline Phosphatase 04/23/2025 120  40 - 150 U/L Final    AST 04/23/2025 24  0 - 45 U/L Final    ALT 04/23/2025 14  0 - 50 U/L Final    Bilirubin Direct 04/23/2025 0.12  0.00 - 0.30 mg/dL Final   Office Visit on 02/12/2025   Component Date Value Ref Range Status    Specimen Status 02/12/2025 Specimen received. Reordered and sent to performing laboratory. Report to follow upon completion.   Final    Performing Laboratory 02/12/2025 [facility]   Final    Test Name 02/12/2025 Hereditary Brain Cancers panel + the Multi-Cancer panel   Final    See Scanned Result 02/12/2025 LABORATORY MISCELLANEOUS RESULT-Scanned (A)   Final   Lab on 01/17/2025   Component Date Value Ref Range Status    See Scanned Result 01/17/2025 [facility] NON-INVASIVE PRENATAL SCREENING PREQUEL-Scanned   Final    Specimen Status 01/17/2025 Specimen received. Reordered and sent to performing laboratory. Report to follow upon completion.   Final    Performing Laboratory 01/17/2025 [facility]   Final    Test Name 01/17/2025 [facility] single-gene non-invasive prenatal testing   Final    See Scanned Result 01/17/2025 LABORATORY MISCELLANEOUS RESULT-Scanned   Final   Office Visit on 01/16/2025   Component Date Value Ref Range Status    Beta 2 Glycoprotein 1 Antibody IgG 01/16/2025 1.0  <7.0 U/mL Final    Negative    Beta 2  Glycoprotein 1 Antibody IgM 01/16/2025 <2.4  <7.0 U/mL Final    Negative    Cardiolipin Anyi IgG Instrument Abril* 01/16/2025 <2.0  <10.0 GPL-U/mL Final    Cardiolipin Antibody IgG 01/16/2025 Negative  Negative Final    Cardiolipin Anyi IgM Instrument Abril* 01/16/2025 4.6  <10.0 MPL-U/mL Final    Cardiolipin Antibody IgM 01/16/2025 Negative  Negative Final    INR 01/16/2025 1.00  0.85 - 1.15 Final    Thrombin Time 01/16/2025 18.1  13.0 - 19.0 Seconds Final    PTT Ratio 01/16/2025 1.03  <1.30 Final    Effective 7/31/2024, the reference range for this assay has changed. There may be difference in the flagging of prior results with similar values performed with this method.    DRVVT Screen Ratio 01/16/2025 1.07  <1.08 Final    Lupus Result 01/16/2025 Negative  Negative Final    Lupus Interpretation 01/16/2025    Final                    Value:Results    The INR is normal.  APTT ratio is normal.    DRVVT Screen ratio is normal.  Thrombin time is normal.  NEGATIVE TEST; A LUPUS ANTICOAGULANT WAS NOT DETECTED IN THIS SPECIMEN WITHIN THE LIMITS OF THE TESTING REPERTOIRE.  If the clinical picture is strongly suggestive of an antiphospholipid syndrome, recommend anticardiolipin and beta-2-glycoprotein (IgG and IgM) antibody tests.        Stiven Owens MD  [facility]            TSH 01/16/2025 0.43  0.30 - 4.20 uIU/mL Final    Total Protein Urine mg/dL 01/16/2025 20.7    mg/dL Final    The reference ranges have not been established in urine protein. The results should be integrated into the clinical context for interpretation.    Total Protein Urine mg/mg Creat 01/16/2025 0.05  0.00 - 0.20 mg/mg Cr Final    Creatinine Urine mg/dL 01/16/2025 388.0  mg/dL Final    The reference ranges have not been established in urine creatinine. The results should be integrated into the clinical context for interpretation.    WBC Count 01/16/2025 8.7  4.0 - 11.0 10e3/uL Final    RBC Count 01/16/2025 3.91  3.80 - 5.20 10e6/uL Final    Hemoglobin  01/16/2025 11.4 (L)  11.7 - 15.7 g/dL Final    Hematocrit 01/16/2025 35.3  35.0 - 47.0 % Final    MCV 01/16/2025 90  78 - 100 fL Final    MCH 01/16/2025 29.2  26.5 - 33.0 pg Final    MCHC 01/16/2025 32.3  31.5 - 36.5 g/dL Final    RDW 01/16/2025 13.7  10.0 - 15.0 % Final    Platelet Count 01/16/2025 337  150 - 450 10e3/uL Final    Sodium 01/16/2025 135  135 - 145 mmol/L Final    Potassium 01/16/2025 4.1  3.4 - 5.3 mmol/L Final    Chloride 01/16/2025 106  98 - 107 mmol/L Final    Carbon Dioxide (CO2) 01/16/2025 21 (L)  22 - 29 mmol/L Final    Anion Gap 01/16/2025 8  7 - 15 mmol/L Final    Urea Nitrogen 01/16/2025 5.7 (L)  6.0 - 20.0 mg/dL Final    Creatinine 01/16/2025 0.64  0.51 - 0.95 mg/dL Final    GFR Estimate 01/16/2025 >90  >60 mL/min/1.73m2 Final    eGFR calculated using 2021 CKD-EPI equation.    Calcium 01/16/2025 9.1  8.8 - 10.4 mg/dL Final    Reference intervals for this test were updated on 7/16/2024 to reflect our healthy population more accurately. There may be differences in the flagging of prior results with similar values performed with this method. Those prior results can be interpreted in the context of the updated reference intervals.    Glucose 01/16/2025 115 (H)  70 - 99 mg/dL Final    AST 01/16/2025 17  0 - 45 U/L Final    ALT 01/16/2025 18  0 - 50 U/L Final    Estimated Average Glucose 01/16/2025 114  <117 mg/dL Final    Hemoglobin A1C 01/16/2025 5.6  0.0 - 5.6 % Final    Normal <5.7%   Prediabetes 5.7-6.4%    Diabetes 6.5% or higher     Note: Adopted from ADA consensus guidelines.    Hemoglobin A 01/16/2025 97.6  94.5 - 97.8 % Final    Hgb A2 Quant 01/16/2025 2.4  2.2 - 3.5 % Final    Hgb F Quant 01/16/2025 <0.5  0.0 - 2.0 % Final    Hemoglobin ELP 01/16/2025    Final                    Value:Normal hemoglobin evaluation. Normal capillary electrophoresis results do not rule out the possibility of alpha globin gene deletions associated with silent carrier status or alpha thalassemia  "trait.Individuals who carry a rare Frisian beta thalassemia variant often have a normal Hb A2 and may not be identified by this assay. Recommend correlation with clinical and laboratory findings.    Neisseria gonorrhoeae 01/16/2025 Negative  Negative Final    Negative for N. gonorrhoeae rRNA by transcription mediated amplification. A negative result by transcription mediated amplification does not preclude the presence of C. trachomatis infection because results are dependent on proper and adequate collection, absence of inhibitors and sufficient rRNA to be detected.    Neisseria gonorrhoeae Specimen Radha* 01/16/2025 Urine, Voided   Final    Chlamydia trachomatis 01/16/2025 Negative  Negative Final    A negative result by transcription mediated amplification does not preclude the presence of C. trachomatis infection because results are dependent on proper and adequate collection, absence of inhibitors and sufficient rRNA to be detected.    Chlamydia trachomatis Specimen Radha* 01/16/2025 Urine, Voided   Final    Treponema Antibody Total 01/16/2025 Nonreactive  Nonreactive Final    Rubella Anyi IgG Instrument Value 01/16/2025 9.20  <0.90 Index Final    Rubella Antibody IgG 01/16/2025 Positive   Final    Suggests previous exposure or immunization and probable immunity.    Lead Venous Blood 01/16/2025 <2.0  <=4.9 ug/dL Final    Comment: INTERPRETIVE INFORMATION: Lead, Blood (Venous)    Analysis performed by Inductively Coupled Plasma-Mass   Spectrometry (ICP-MS).    Elevated results may be due to skin or collection-related   contamination, including the use of a noncertified   lead-free tube. If contamination concerns exist due to   elevated levels of blood lead, confirmation with a second   specimen collected in a certified lead-free tube is   recommended.    Information sources for blood lead reference intervals and   interpretive comments include the CDC's \"Childhood Lead   Poisoning Prevention: Recommended Actions Based " "on Blood   Lead Level\" and the \"Adult Blood Lead Epidemiology and   Surveillance: Reference Blood Lead Levels (BLLs) for Adults   in the [place].\" Thresholds and time intervals for retesting,   medical evaluation, and response vary by state and   regulatory body. Contact your State Department of Health   and/or applicable regulatory agency for specific guidance   on medical management                            recommendations.    This test was developed and its performance characteristics   determined by [facility]. It has not been cleared or   approved by the U.S. Food and Drug Administration. This   test was performed in a CLIA-certified laboratory and is   intended for clinical purposes.         Group          Concentration   Comment    Children       3.5-19.9 ug/dL  Children under the age of 6                                 years are the most vulnerable                                 to the harmful effects of                                  lead exposure. Environmental                                  investigation and exposure                                  history to identify potential                                 sources of lead. Biological                                  and nutritional monitoring                                 are recommended. Follow-up                                  blood lead monitoring is                                  recommended.                                                           20-44.9 ug/dL   Lead hazard reduction and                                  prompt medical evaluation are                                 recommended. Contact a                                  Pediatric Environmental                                  Health Specialty Unit or                                  poison control center for                                  guidance.                   Greater than    Critical. Immediate medical                  44.9 ug/dL      evaluation, " including                                  detailed neurological exam is                                 recommended. Consider                                  chelation therapy when                                 symptoms of lead toxicity   are                                  present. Contact a Pediatric                                  Environmental Health                                  Specialty Unit or poison                                  control center for                                                             assistance.    Adult          5-19.9 ug/dL    Medical removal is                                  recommended for pregnant                                  women or those who are trying                                 or may become pregnant.                                  Adverse health effects are                                  possible. Reduced lead                                  exposure and increased blood                                  lead monitoring are                                  recommended.                    20-69.9 ug/dL   Adverse health effects are                                  indicated. Medical removal                                  from lead exposure is                                  required by OSHA if blood                                  lead level exceeds 50 ug/dL.                                 Prompt medical evaluation is                                 recommended.                    Greater than    Critical. Immediate medical                                             69.9 ug/dL      evaluation is recommended.                                  Consider chelation therapy                                 when symptoms of lead                                  toxicity are present.  Performed By: [facility]  [address] [place] [address]  : Melchor Pedraza MD, PhD  CLIA Number: [id number]    HIV Antigen Antibody  Combo 01/16/2025 Nonreactive  Nonreactive Final    Negative HIV-1 p24 antigen and HIV-1/2 antibody screening test results usually indicate the absence of HIV-1 and HIV-2 infection. However, such negative results do not rule-out acute HIV infection.  If acute HIV-1 or HIV-2 infection is suspected, detection of HIV-1 or HIV-2 RNA  is recommended. This result is obtained using the Roche Elecsys HIV Duo method on the audi [id number] immunoassay analyzer.    Hepatitis B Surface Antigen 01/16/2025 Nonreactive  Nonreactive Final    Culture 01/16/2025 <10,000 CFU/mL Mixture of Urogenital Carolynn   Final    Antibody Screen 01/16/2025 Negative  Negative Final    SPECIMEN EXPIRATION DATE 01/16/2025 20250119235900   Final    ABO/RH(D) 01/16/2025 B POS   Final    SPECIMEN EXPIRATION DATE 01/16/2025 [id number]   Final   Admission on 01/08/2025, Discharged on 01/08/2025   Component Date Value Ref Range Status    Sodium 01/08/2025 135  135 - 145 mmol/L Final    Potassium 01/08/2025 4.1  3.4 - 5.3 mmol/L Final    Chloride 01/08/2025 101  98 - 107 mmol/L Final    Carbon Dioxide (CO2) 01/08/2025 22  22 - 29 mmol/L Final    Anion Gap 01/08/2025 12  7 - 15 mmol/L Final    Urea Nitrogen 01/08/2025 4.2 (L)  6.0 - 20.0 mg/dL Final    Creatinine 01/08/2025 0.64  0.51 - 0.95 mg/dL Final    GFR Estimate 01/08/2025 >90  >60 mL/min/1.73m2 Final    eGFR calculated using 2021 CKD-EPI equation.    Calcium 01/08/2025 9.5  8.8 - 10.4 mg/dL Final    Reference intervals for this test were updated on 7/16/2024 to reflect our healthy population more accurately. There may be differences in the flagging of prior results with similar values performed with this method. Those prior results can be interpreted in the context of the updated reference intervals.    Glucose 01/08/2025 88  70 - 99 mg/dL Final    WBC Count 01/08/2025 11.6 (H)  4.0 - 11.0 10e3/uL Final    RBC Count 01/08/2025 4.48  3.80 - 5.20 10e6/uL Final    Hemoglobin 01/08/2025 13.3  11.7 - 15.7  g/dL Final    Hematocrit 01/08/2025 40.2  35.0 - 47.0 % Final    MCV 01/08/2025 90  78 - 100 fL Final    MCH 01/08/2025 29.7  26.5 - 33.0 pg Final    MCHC 01/08/2025 33.1  31.5 - 36.5 g/dL Final    RDW 01/08/2025 13.6  10.0 - 15.0 % Final    Platelet Count 01/08/2025 369  150 - 450 10e3/uL Final    % Neutrophils 01/08/2025 72  % Final    % Lymphocytes 01/08/2025 20  % Final    % Monocytes 01/08/2025 5  % Final    % Eosinophils 01/08/2025 2  % Final    % Basophils 01/08/2025 1  % Final    % Immature Granulocytes 01/08/2025 1  % Final    NRBCs per 100 WBC 01/08/2025 0  <1 /100 Final    Absolute Neutrophils 01/08/2025 8.3  1.6 - 8.3 10e3/uL Final    Absolute Lymphocytes 01/08/2025 2.3  0.8 - 5.3 10e3/uL Final    Absolute Monocytes 01/08/2025 0.6  0.0 - 1.3 10e3/uL Final    Absolute Eosinophils 01/08/2025 0.3  0.0 - 0.7 10e3/uL Final    Absolute Basophils 01/08/2025 0.1  0.0 - 0.2 10e3/uL Final    Absolute Immature Granulocytes 01/08/2025 0.1  <=0.4 10e3/uL Final    Absolute NRBCs 01/08/2025 0.0  10e3/uL Final   There may be more visits with results that are not included.     GBS unknown    Pertinent Radiology:       Impression/Plan:  1. IUP at 29w4d weeks, cHTN, elevated AST/ALT  -Patient reports a history of elevated BP's since age 22, BP's have been stable throughout pregnancy without meds.  She has had mild-range BP's recently  -Today at [facility] AST/ALT found to be elevated, but less than twice the upper limit of normal.  AST/ALT had also been elevated 05/09  -Will admit now for observation and repeat labs in AM  -Currently no severe features but will continue to monitor  -Plan NST BID  -Will add hepatitis panel to evaluate for other sources of elevated AST/ALT  2. Failed 1-hour GCT, did not tolerate 3-hour GTT  -Patient had planned to check accuchecks for diabetes screening but had not yet started checking as an outpatient, will start accuchecks now  3. History of myomectomy: plan to deliver via pLTCS at 36-37  weeks    Provider: Sintia Cottrell MD    Date: 2025  Time: 3:59 PM    Lucinda Ji,  1990, MRN [id number]

## 2025-05-13 NOTE — PLAN OF CARE
Goal Outcome Evaluation:      Plan of Care Reviewed With: patient     Patient is progressing well. VSS and afebrile. NSTs BID. Recheck labs tomorrow morning. Will continue to encourage drinking fluids.

## 2025-05-13 NOTE — PROGRESS NOTES
Prescriptions for Keflex, Azithromycin, tylenol, glucometer, strips, alcohol swabs, lancets sent in for patient.     I look forward to seeing her on 5/14 in the clinic.     Glad she also has appt already scheduled with Dr. Hutchison at [facility]    Joie Ghosh MD    Routed to SATISH Chou, Dr. Feldman, Dr. Wade

## 2025-05-13 NOTE — PROGRESS NOTES
[facility]: [facility].  Reason for maternal/fetal assessment is hypertension disorder of pregnancy. Patient was sent from [facility] via ambulance for observation of elevated blood pressures. Patient was originally seen for a panic attack and was found to have elevated bps and liver enzymes.  Patient is a .  Prenatal record reviewed. Pregnancy has been complicated by chronic hypertension.  Gestational Age 29w4d. VSS. Fetal movement present. Patient denies leaking of vaginal fluid/rupture of membranes, abdominal pain, pelvic pressure. Support person is not present.   Action: Verbal consent for EFM. Triage assessment completed. Bill of rights reviewed.  Response: Patient verbalized agreement with plan. Dr. Cottrell at bedside to discuss POC. Orders to admit for observation overnight.

## 2025-05-13 NOTE — PROGRESS NOTES
Patient received discharge instructions, danger signs, and follow up appointment plan. Questions and concerns answered at this time. Patient verbalized understanding. Patient discharged home.

## 2025-05-14 ENCOUNTER — TELEPHONE (OUTPATIENT)
Dept: FAMILY MEDICINE | Facility: CLINIC | Age: 35
End: 2025-05-14

## 2025-05-14 VITALS
DIASTOLIC BLOOD PRESSURE: 89 MMHG | RESPIRATION RATE: 18 BRPM | SYSTOLIC BLOOD PRESSURE: 136 MMHG | TEMPERATURE: 98.3 F | HEART RATE: 101 BPM | WEIGHT: 293 LBS | BODY MASS INDEX: 47.09 KG/M2 | HEIGHT: 66 IN

## 2025-05-14 DIAGNOSIS — O10.919 CHRONIC HYPERTENSION AFFECTING PREGNANCY: ICD-10-CM

## 2025-05-14 PROBLEM — R79.89 ELEVATED LIVER FUNCTION TESTS: Status: ACTIVE | Noted: 2025-05-14

## 2025-05-14 LAB
ALT SERPL W P-5'-P-CCNC: 88 U/L (ref 0–50)
AST SERPL W P-5'-P-CCNC: 67 U/L (ref 0–45)
CREAT SERPL-MCNC: 0.52 MG/DL (ref 0.51–0.95)
EGFRCR SERPLBLD CKD-EPI 2021: >90 ML/MIN/1.73M2
GLUCOSE BLDC GLUCOMTR-MCNC: 114 MG/DL (ref 70–99)
GLUCOSE BLDC GLUCOMTR-MCNC: 116 MG/DL (ref 70–99)
GLUCOSE BLDC GLUCOMTR-MCNC: 97 MG/DL (ref 70–99)
HAV IGM SERPL QL IA: NONREACTIVE
HBV CORE IGM SERPL QL IA: NONREACTIVE
HBV SURFACE AG SERPL QL IA: NONREACTIVE
HCV AB SERPL QL IA: NONREACTIVE
HGB BLD-MCNC: 11.2 G/DL (ref 11.7–15.7)
MCV RBC AUTO: 89 FL (ref 78–100)
MCV RBC AUTO: 89 FL (ref 78–100)
PLATELET # BLD AUTO: 356 10E3/UL (ref 150–450)

## 2025-05-14 PROCEDURE — 36415 COLL VENOUS BLD VENIPUNCTURE: CPT | Performed by: STUDENT IN AN ORGANIZED HEALTH CARE EDUCATION/TRAINING PROGRAM

## 2025-05-14 PROCEDURE — 82565 ASSAY OF CREATININE: CPT | Performed by: STUDENT IN AN ORGANIZED HEALTH CARE EDUCATION/TRAINING PROGRAM

## 2025-05-14 PROCEDURE — 84450 TRANSFERASE (AST) (SGOT): CPT | Performed by: STUDENT IN AN ORGANIZED HEALTH CARE EDUCATION/TRAINING PROGRAM

## 2025-05-14 PROCEDURE — 85049 AUTOMATED PLATELET COUNT: CPT | Performed by: STUDENT IN AN ORGANIZED HEALTH CARE EDUCATION/TRAINING PROGRAM

## 2025-05-14 PROCEDURE — 85018 HEMOGLOBIN: CPT | Performed by: STUDENT IN AN ORGANIZED HEALTH CARE EDUCATION/TRAINING PROGRAM

## 2025-05-14 PROCEDURE — 84460 ALANINE AMINO (ALT) (SGPT): CPT | Performed by: STUDENT IN AN ORGANIZED HEALTH CARE EDUCATION/TRAINING PROGRAM

## 2025-05-14 PROCEDURE — 250N000013 HC RX MED GY IP 250 OP 250 PS 637: Performed by: STUDENT IN AN ORGANIZED HEALTH CARE EDUCATION/TRAINING PROGRAM

## 2025-05-14 RX ORDER — LABETALOL 100 MG/1
100 TABLET, FILM COATED ORAL 2 TIMES DAILY
Qty: 120 TABLET | Refills: 0 | Status: SHIPPED | OUTPATIENT
Start: 2025-05-14

## 2025-05-14 RX ORDER — LABETALOL 100 MG/1
100 TABLET, FILM COATED ORAL 2 TIMES DAILY
Qty: 60 TABLET | Refills: 0 | Status: SHIPPED | OUTPATIENT
Start: 2025-05-14 | End: 2025-05-14

## 2025-05-14 RX ADMIN — PANTOPRAZOLE SODIUM 40 MG: 40 TABLET, DELAYED RELEASE ORAL at 08:38

## 2025-05-14 RX ADMIN — PRENATAL VIT W/ FE FUMARATE-FA TAB 27-0.8 MG 1 TABLET: 27-0.8 TAB at 08:38

## 2025-05-14 RX ADMIN — SENNOSIDES AND DOCUSATE SODIUM 1 TABLET: 50; 8.6 TABLET ORAL at 08:37

## 2025-05-14 RX ADMIN — DOCUSATE SODIUM 100 MG: 100 CAPSULE, LIQUID FILLED ORAL at 08:37

## 2025-05-14 ASSESSMENT — ACTIVITIES OF DAILY LIVING (ADL)
ADLS_ACUITY_SCORE: 20

## 2025-05-14 NOTE — TELEPHONE ENCOUNTER
General Call    Contacts       Contact Date/Time Type Contact Phone/Fax    05/14/2025 03:01 PM CDT Phone (Incoming) DR PERES (Provider) [phone]          Reason for Call:  RESTRICTED PATIENT     What are your questions or concerns:  the pt is in [facility] and is a restricted recipient patient who is restricted to [facility] and is waiting to be released from the hospital but the Dr would like to rui to Dr montalvo about the Pt  and needs her to prescribe medications and would like a call back at  [phone]     Date of last appointment with provider: 4/23/25    Could we send this information to you in [facility] or would you prefer to receive a phone call?:   Patient would prefer a phone call   Okay to leave a detailed message?: Yes at Cell number on file:    Telephone Information:   Mobile [phone]           Does this patient currently have active insurance coverage?  Yes, Pt has active insurance coverage.     Does patient or caller know when to expect a call? Yes, Nurses will return call within 2-3 business hours.    Franc Troy on 5/14/2025 at 3:02 PM

## 2025-05-14 NOTE — TELEPHONE ENCOUNTER
Please advise.     Called Dr. Hayden back to clarify regarding restricted OB PT.     Admitted for chronic htn, sent in labetalol 100 mg bid #60.  Prescribe dm supplies also so she can check them at home.   Sent home with bp and to should be checking glucose at home.     Route discharge note to [facility], wants pt to be scheduled within 2 days.   NISHANT Wang on 5/14/2025 at 3:27 PM

## 2025-05-14 NOTE — DISCHARGE SUMMARY
Physician Discharge Summary     Patient ID:  Lucinda Ji  [id number]  34 year old  1990    Admit date: 5/13/2025    Discharge date and time: 5/14/2025     Admitting Physician: Sintia Cottrell MD     Discharge Physician: Nasreen Hayden. MD    Admission Diagnoses: Elevated blood pressure affecting pregnancy in third trimester, antepartum [O16.3]    Discharge Diagnoses: same as admission  Chronic hypertension affecting pregnancy  Elevated liver functions-unexplained  Elevated blood sugars and failed 1hr GTT-unable to tolerate 3hr GTT      Hospital Course: pt was transferred from [facility] for concern for preeclampsia with elevated blood pressures and abnormal liver functions. She was monitored overnight and did not have any severe range blood pressures. Her liver functions have remained stable and not 2x/upper limit of normal and therefore, not diagnostic for preeclampsia with severe features. Other preeclampsia labs normal.  Her hepatitis panel is pending.  Blood sugar monitoring, some have been abnormal    Consults: none    Discharge Exam:    Vitals:    05/13/25 2336 05/14/25 0325 05/14/25 0618 05/14/25 1025   BP: 125/56 121/64 134/60 (!) 146/87   BP Location: Right arm Right arm Right arm Left arm   Patient Position: Left side Left side Left side Semi-Jalloh's   Cuff Size: Adult Large Adult Large Adult Large Adult Large   Pulse:    101   Resp: 18 18 18 18   Temp: 97.8  F (36.6  C) 98.1  F (36.7  C) 98  F (36.7  C) 98.3  F (36.8  C)   TempSrc: Oral Oral Oral Oral   Weight:       Height:           General appearance: alert, cooperative, and no distress  Abdomen: soft, non-tender; bowel sounds normal; no masses,  no organomegaly and uterus gravid and non-tender  Extremities: no pain    Disposition: home    Patient Instructions:   Current Discharge Medication List        START taking these medications    Details   labetalol (NORMODYNE) 100 MG tablet Take 1 tablet (100 mg) by mouth 2 times daily.  Qty: 60 tablet,  Refills: 0    Associated Diagnoses: Chronic hypertension affecting pregnancy           CONTINUE these medications which have NOT CHANGED    Details   acetaminophen (TYLENOL) 325 MG tablet Take 1-2 tablets (325-650 mg) by mouth every 6 hours as needed for mild pain.  Qty: 100 tablet, Refills: 2    Associated Diagnoses: Tension headache; High-risk pregnancy in third trimester      albuterol (PROAIR HFA/PROVENTIL HFA/VENTOLIN HFA) 108 (90 Base) MCG/ACT inhaler Inhale 1-2 puffs into the lungs every 4 hours as needed for shortness of breath or wheezing  Qty: 18 g, Refills: 11    Comments: Pharmacy may dispense brand covered by insurance (Proair, or proventil or ventolin or generic albuterol inhaler)  Associated Diagnoses: History of asthma      ondansetron (ZOFRAN) 4 MG tablet Take 1 tablet (4 mg) by mouth every 6 hours as needed for nausea or vomiting.  Qty: 30 tablet, Refills: 3    Associated Diagnoses: Nausea/vomiting in pregnancy      Prenatal Vit-Fe Fumarate-FA (PRENATAL MULTIVITAMIN  WITH IRON) 28-0.8 MG TABS Take 1 tablet by mouth daily.  Qty: 100 tablet, Refills: 3    Associated Diagnoses: Supervision of high-risk pregnancy, first trimester      Alcohol Swabs PADS 1 Units 4 times daily.  Qty: 120 each, Refills: 1    Associated Diagnoses: Elevated blood sugar level; High-risk pregnancy in third trimester      aspirin 81 MG EC tablet Take 1 tablet (81 mg) by mouth daily.  Qty: 90 tablet, Refills: 2    Associated Diagnoses: Supervision of high-risk pregnancy, first trimester      blood glucose (NO BRAND SPECIFIED) lancets standard Use to test blood sugar 4 times daily or as directed.  Qty: 120 each, Refills: 1    Associated Diagnoses: Elevated blood sugar level; High-risk pregnancy in third trimester      blood glucose (NO BRAND SPECIFIED) test strip Use to test blood sugar 4 times daily or as directed.  Qty: 120 strip, Refills: 1    Associated Diagnoses: Elevated blood sugar level; High-risk pregnancy in third  trimester      blood glucose monitoring (NO BRAND SPECIFIED) meter device kit Use to test blood sugar 4 times daily or as directed.  Qty: 1 kit, Refills: 0    Associated Diagnoses: Elevated blood sugar level; High-risk pregnancy in third trimester      cephALEXin (KEFLEX) 500 MG capsule Take 1 capsule (500 mg) by mouth 2 times daily.  Qty: 14 capsule, Refills: 0    Associated Diagnoses: Acute cystitis without hematuria; High-risk pregnancy in third trimester      cyclobenzaprine (FLEXERIL) 10 MG tablet Take 1 tablet (10 mg) by mouth 3 times daily as needed for muscle spasms.  Qty: 30 tablet, Refills: 0    Associated Diagnoses: Supervision of high-risk pregnancy, first trimester; Encounter for triage in pregnant patient      doxylamine (UNISOM) 25 MG TABS tablet Take 0.5-1 tablets (12.5-25 mg) by mouth at bedtime.  Qty: 30 tablet, Refills: 1    Associated Diagnoses: Pregnancy test positive      famotidine (PEPCID) 20 MG tablet Take 1 tablet (20 mg) by mouth 2 times daily.  Qty: 90 tablet, Refills: 2    Associated Diagnoses: Supervision of high-risk pregnancy, first trimester      metoclopramide (REGLAN) 5 MG tablet Take 1 tablet (5 mg) by mouth 3 times daily as needed for vomiting.  Qty: 30 tablet, Refills: 0    Associated Diagnoses: Nausea and vomiting in pregnancy      pyridOXINE (VITAMIN B6) 25 MG tablet Take 1 tablet (25 mg) by mouth 3 times daily.  Qty: 90 tablet, Refills: 1    Associated Diagnoses: Pregnancy test positive           STOP taking these medications       azithromycin (ZITHROMAX) 500 MG tablet Comments:   Reason for Stopping:             Activity: activity as tolerated  Diet: regular diet    Close Follow-up with Joie Ghosh  in 2 days to check Bps and hepatitis testing which is still pending.  Needs close clinic follow up and needs supplies for diabetic testing.  Will be sent home with BP cuff and blood pressure parameters.        Signed:  Nasreen Hayden MD  5/14/2025  1:12 PM

## 2025-05-14 NOTE — PROCEDURES
NST Procedure note    Date: 5/13/2025    Indication: HTN    Procedure: Fetal non-stress test    Results: Reactive    Sintia Cottrell MD  5/13/2025 10:18 PM

## 2025-05-14 NOTE — TELEPHONE ENCOUNTER
Message reviewed, and spoke with SATISH Chou here at [facility].  Call was able to connect with Dr. Feldman, who prescribed labetalol for her blood pressure.  Also sent in a glucometer and strips that she can check blood sugars at home, as some of these were elevated at her hospital visit and she has not been able to tolerate the 3-hour glucose test.    Will call her to confirm that she is able to  the medications, that she has a blood pressure cuff at home to monitor this.  Scheduled for follow-up in clinic tomorrow, 5 -15 - 25.    Joie Ghosh MD    Routed to SATISH Chou, [place], CMA

## 2025-05-14 NOTE — PLAN OF CARE
Goal Outcome Evaluation:      Plan of Care Reviewed With: patient    Overall Patient Progress: improvingOverall Patient Progress: improving    Outcome Evaluation: Patient vitally stable and afebrile. /79. Reflexes normal, no clonus, denies headache, epigastric pain and vision changes. Pt given hydroxyzine and preparing to sleep. Plan for redraw labs in the morning. NSTs BID.      Problem: Adult Inpatient Plan of Care  Goal: Optimal Comfort and Wellbeing  Outcome: Progressing  Intervention: Provide Person-Centered Care  Recent Flowsheet Documentation  Taken 5/13/2025 1915 by Brandon Pollock RN  Trust Relationship/Rapport:   care explained   choices provided   questions encouraged   thoughts/feelings acknowledged     Problem: Adult Inpatient Plan of Care  Goal: Absence of Hospital-Acquired Illness or Injury  Outcome: Progressing  Intervention: Prevent Skin Injury  Recent Flowsheet Documentation  Taken 5/13/2025 1915 by Brandon Pollock RN  Body Position:   left   position changed independently  Intervention: Prevent and Manage VTE (Venous Thromboembolism) Risk  Recent Flowsheet Documentation  Taken 5/13/2025 1915 by Brandon Pollock RN  VTE Prevention/Management: SCDs on (sequential compression devices)  Intervention: Prevent Infection  Recent Flowsheet Documentation  Taken 5/13/2025 1915 by Brandon Pollock RN  Infection Prevention:   environmental surveillance performed   hand hygiene promoted   rest/sleep promoted   single patient room provided   visitors restricted/screened

## 2025-05-14 NOTE — DISCHARGE INSTRUCTIONS
"  High Blood Pressure and Pregnancy  What is high blood pressure?  A normal blood pressure is 120/80 (or \"120 over 80\"). When blood pressure stays at 140/90 or higher, it is called high blood pressure (hypertension).   High blood pressure may develop during pregnancy, or you may have had it before you were pregnant. There are several kinds of high blood pressure: chronic, gestational and a condition called pre-eclampsia.  Chronic high blood pressure  This is high blood pressure that existed before pregnancy or before the 20th week of pregnancy. The blood pressure may remain high during the pregnancy and after birth. If it is not controlled, it can lead to serious health problems such as stroke and heart failure.  Gestational high blood pressure  This is high blood pressure that first starts after the 20th week of pregnancy. It usually goes away   12 weeks after the baby is born. Women who have this may need to see the doctor more often to get their blood pressure checked.  Pre-eclampsia  Women with pre-eclampsia develop high blood pressure after the 20th week of pregnancy. They may also have protein in the urine--a sign of too much stress on the kidneys.  If not treated, it can develop into a life-threatening complication called eclampsia (seizures). It will only get better after the baby is born.  Signs of pre-eclampsia include:  High blood pressure  Protein in your urine  Sudden weight gain (more than 2 pounds per week)  Swelling of the hands and face, especially under the eyes  Headaches  Sudden changes in vision, including double or blurry vision, flashing lights.  Feeling sick to your stomach or throwing up. This isn't normal later in pregnancy.  Upper belly pain.  What causes pre-eclampsia?  No one knows for sure what causes pre-eclampsia. The risk is higher in women who:  Are pregnant for the first time or carrying more than one baby  Have a history of high blood pressure or pre-eclampsia in a previous " pregnancy  Are age 40 or older  Have certain medical conditions such as diabetes, kidney disease or lupus  Are obese (very overweight)  Are .  Will high blood pressure affect my baby?  High blood pressure may cause early (premature) birth and a smaller than normal baby. When blood pressure is high, it can constrict blood vessels that supply blood to the placenta. This may reduce the amount of oxygen and nutrients your baby receives and affect your baby's growth.  An active baby is a good sign that the placenta is bringing enough oxygen and nutrients to your baby. To check how active your baby is:  We may ask you to count and record your baby's movements for a short time each day.  You may have tests once or twice a week to make sure the placenta is still doing its job to keep your baby healthy. (These are biophysical profile tests and non-stress tests.)  At each prenatal visit, we may check your weight, blood pressure and urine (to test for protein).  How can I reduce the effects of high blood pressure?  During pregnancy you will need to:  See your doctor regularly. This will help detect changes as soon as they occur.  Tell your doctor right away if you notice any of signs of pre-eclampsia.  Check your weight and blood pressure at home, if your doctor advises you to.  Follow your doctor's advice. He or she may suggest that you rest in bed at home or in the hospital.  Will I have problems after the baby is born?  This depends on the kind of high blood pressure you have while pregnant. Always follow your doctor's advice.  If you have chronic high blood pressure: You will need ongoing treatment to control it.   If you have gestational high blood pressure: This will often go away after your baby is born. You will need to follow up with your care provider. Have your blood pressure checked at your post-partum visit and then once a year at your doctor's office.   If you have pre-eclampsia: This will often  go away in the days or weeks after the baby is born.   Have your blood pressure checked at your post-partum visit. If your blood pressure is still high, ask your care team what you can do to lower it.  You may have an increased risk for high blood pressure and heart disease later in life. You will need to take steps to reduce your risk. Have your blood pressure checked at least once a year.  For informational purposed only. Not to replace the advice of your health care provider. Copyright   2007 [employer] [facility]. All rights reserved. Clinically reviewed by Chanda Pérez RN. [employer] [id number] - REV 02/21.

## 2025-05-14 NOTE — PROCEDURES
NST Procedure note    Date: 5/14/2025    Indication: chronic htn in pregnancy    Procedure: Fetal non-stress test    Results: Reactive    Nasreen Hayden MD  5/14/2025 3:29 PM

## 2025-05-14 NOTE — PROGRESS NOTES
NST completed. Some gaps in tracing d/t active fetal movement. Heart rate and movement audible. One contraction traced, palpates mild. Tracing reviewed by Ghazala THOMASON RN and Dr. Cottrell. Tracing reactive.

## 2025-05-14 NOTE — PLAN OF CARE
Problem: Adult Inpatient Plan of Care  Goal: Optimal Comfort and Wellbeing  Outcome: Progressing  Intervention: Provide Person-Centered Care  Recent Flowsheet Documentation  Taken 5/13/2025 2336 by Susie Valles RN  Trust Relationship/Rapport:   care explained   choices provided   questions answered   reassurance provided   thoughts/feelings acknowledged     Problem: Hypertension Acute  Goal: Blood Pressure Within Desired Range  Outcome: Progressing  Intervention: Normalize Blood Pressure  Recent Flowsheet Documentation  Taken 5/13/2025 2336 by Susie Valles, RN  Sensory Stimulation Regulation:   lighting decreased   care clustered   quiet environment promoted   Goal Outcome Evaluation:      Plan of Care Reviewed With: patient    Overall Patient Progress: improvingOverall Patient Progress: improving     VSS, slept well through the night. Denies pain, stable. NST is BID.

## 2025-05-14 NOTE — PLAN OF CARE
Goal Outcome Evaluation:      Plan of Care Reviewed With: patient    Overall Patient Progress: improvingOverall Patient Progress: improving     Patient is progressing well. VSS and afebrile. Patient asymptomatic for pre-e symptoms. Labs drawn this morning. NST reactive. Awaiting provider to round for POC.

## 2025-05-14 NOTE — PROGRESS NOTES
Patient received discharge instructions, danger signs, and follow up appointment plan. Patient received blood pressure cuff and paperwork with parameters of what to look for. Questions and concerns answered at this time. Patient verbalized understanding. Transportation arranged for taxi to bring patient home.

## 2025-05-15 ENCOUNTER — TELEPHONE (OUTPATIENT)
Dept: FAMILY MEDICINE | Facility: CLINIC | Age: 35
End: 2025-05-15

## 2025-05-15 ENCOUNTER — OFFICE VISIT (OUTPATIENT)
Dept: FAMILY MEDICINE | Facility: CLINIC | Age: 35
End: 2025-05-15
Payer: COMMERCIAL

## 2025-05-15 VITALS
SYSTOLIC BLOOD PRESSURE: 102 MMHG | HEART RATE: 121 BPM | OXYGEN SATURATION: 98 % | RESPIRATION RATE: 24 BRPM | BODY MASS INDEX: 47.97 KG/M2 | WEIGHT: 293 LBS | TEMPERATURE: 97.9 F | DIASTOLIC BLOOD PRESSURE: 60 MMHG

## 2025-05-15 DIAGNOSIS — O09.291 HISTORY OF STILLBIRTH IN PREGNANT PATIENT IN FIRST TRIMESTER, ANTEPARTUM: ICD-10-CM

## 2025-05-15 DIAGNOSIS — O09.93 SUPERVISION OF HIGH RISK PREGNANCY IN THIRD TRIMESTER: Primary | ICD-10-CM

## 2025-05-15 DIAGNOSIS — O10.919 CHRONIC HYPERTENSION AFFECTING PREGNANCY: ICD-10-CM

## 2025-05-15 ASSESSMENT — ASTHMA QUESTIONNAIRES
QUESTION_1 LAST FOUR WEEKS HOW MUCH OF THE TIME DID YOUR ASTHMA KEEP YOU FROM GETTING AS MUCH DONE AT WORK, SCHOOL OR AT HOME: A LITTLE OF THE TIME
QUESTION_1 LAST FOUR WEEKS HOW MUCH OF THE TIME DID YOUR ASTHMA KEEP YOU FROM GETTING AS MUCH DONE AT WORK, SCHOOL OR AT HOME: SOME OF THE TIME
ACT_TOTALSCORE: 17
ACUTE_EXACERBATION_TODAY: NO
ACT_TOTALSCORE: 19
QUESTION_3 LAST FOUR WEEKS HOW OFTEN DID YOUR ASTHMA SYMPTOMS (WHEEZING, COUGHING, SHORTNESS OF BREATH, CHEST TIGHTNESS OR PAIN) WAKE YOU UP AT NIGHT OR EARLIER THAN USUAL IN THE MORNING: ONCE OR TWICE
QUESTION_4 LAST FOUR WEEKS HOW OFTEN HAVE YOU USED YOUR RESCUE INHALER OR NEBULIZER MEDICATION (SUCH AS ALBUTEROL): ONCE A WEEK OR LESS
QUESTION_5 LAST FOUR WEEKS HOW WOULD YOU RATE YOUR ASTHMA CONTROL: SOMEWHAT CONTROLLED
QUESTION_3 LAST FOUR WEEKS HOW OFTEN DID YOUR ASTHMA SYMPTOMS (WHEEZING, COUGHING, SHORTNESS OF BREATH, CHEST TIGHTNESS OR PAIN) WAKE YOU UP AT NIGHT OR EARLIER THAN USUAL IN THE MORNING: ONCE OR TWICE
QUESTION_2 LAST FOUR WEEKS HOW OFTEN HAVE YOU HAD SHORTNESS OF BREATH: ONCE OR TWICE A WEEK
QUESTION_2 LAST FOUR WEEKS HOW OFTEN HAVE YOU HAD SHORTNESS OF BREATH: ONCE OR TWICE A WEEK
QUESTION_4 LAST FOUR WEEKS HOW OFTEN HAVE YOU USED YOUR RESCUE INHALER OR NEBULIZER MEDICATION (SUCH AS ALBUTEROL): TWO OR THREE TIMES PER WEEK
QUESTION_5 LAST FOUR WEEKS HOW WOULD YOU RATE YOUR ASTHMA CONTROL: SOMEWHAT CONTROLLED

## 2025-05-15 NOTE — PATIENT INSTRUCTIONS
new blood pressure medication:  labetalol  Two times per day check your blood pressure and put it in your phone.     NORMAL is 120/80.  If you are above that number then take your blood pressure medicaiton.   If you are below 120/80 DO NOT take your blood pressure medicine.   Write down you numbers in your phone and bring it with to every appointment.      Watch for headaches, blurry vision, increased swelling in legs.  Pain in upper right abdomen, and just feeling yucky.  If this happens, call and come in.      Check blood sugars 2x per day right once in morning before you eat.  And once in the evening just before you eat.   Normal blood sugars are less than 90 if you are pregnant before eating.

## 2025-05-15 NOTE — PROGRESS NOTES
"  Assessment & Plan     Supervision of high risk pregnancy in third trimester  Patient is not having any contractions. Ongoing pelvic pain and pressure, no change. Ongoing hyperemesis, Zofran had been helpful. No blood or bile in emesis. Plan for US OB in 3 weeks.   - US OB Biophys Prf wo NST wo Measure  - US OB Biophys Single Gestation Measure    History of stillbirth in pregnant patient in first trimester, antepartum  -- US OB Biophys Prf wo NST wo Measure  - US OB Biophys Single Gestation Measure    Chronic hypertension affecting pregnancy  Blood pressure in clinic today is 102/60, significantly decreased from those measured during recently hospital admission. Patient has not taken Labetalol yet. No symptoms of preeclampsia today. Patient has a blood pressure cuff for at home monitoring, but has not yet been using it. Discussed measuring and recording blood pressures at home 2x daily, and taking a dose of Labetalol only if blood pressure is elevated about 120/80. Counseled patient on symptoms of preeclampsia and indications for return to the ED.     Plan to check blood sugars 2x daily, once before eating in the morning and again before eating in the evening.     Plan for return to clinic in one week or sooner if symptoms worsen.       MED REC REQUIRED{TIP  Click the link below to document or use med rec list, use list to pull in response :[id number]}  Post Medication Reconciliation Status: {MED REC LIST:133535}  BMI  Estimated body mass index is 47.97 kg/m  as calculated from the following:    Height as of 5/13/25: 1.676 m (5' 6\").    Weight as of this encounter: 134.8 kg (297 lb 3.2 oz).   {Weight Management Plan needed for ACO:357093}        Shar Coronado is a 34 year old, presenting for the following health issues:  No chief complaint on file.        5/15/2025     1:52 PM   Additional Questions   Roomed by REMEDIOS senior MA   Accompanied by Self         5/15/2025    Information    services " provided? No     HPI      Ivonne Ji is a 34 year old female (), at 29w6d of pregnancy, with history of hyperemesis in pregnancy, chronic hypertension affecting pregnancy, pelvic pain, obesity, anxiety and depression with panic attacks, mild intermittent asthma, myomectomy,  who presents today for hospital follow-up.     The patient was admitted to the hospital on 25 (2 days ago), after presenting to the ED with chest pressure, nausea, and vomiting. EKG was reassuring. Patient had transiently elevated blood pressures and abnormal liver function tests. Monitored overnight. Patient underwent workup to evaluate for HELLP.Labs not diagnostic for preeclampsia with severe features. Patient was previously diagnosed with UTI and Chlamydia, and had not yet completed treatment. She was given antibiotics for this during her admission.      Since discharge from the hospital, the patient has been feeling much more calm. No additional stressors, and no episodes of increased anxiety.   She has not yet picked up the blood pressure medication she was prescribed.   Vomiting once today, triggered by clearing her throat. No blood or bile in the emesis. Patient continues to take Zofran daily, which sometimes helps. She continues to experience pelvic pain, typically when walking. This sometimes radiates to lower back, not to legs. No new or worsened pain recently. No recent contractions.  She also has had some right lower abdominal pain which waxes and wanes in intensity, which is consistent with her typical fibroid associated pain. This pain is worse at night when she is trying to sleep.  Patient has been feeling her baby move as normal.    Patient was given a blood pressure monitor for measuring blood pressures at home. She has not yet finished setting this up.    Patient denies blurry vision, headache, chest pain, heart racing or pounding, shortness of breath, swelling of feet, lightheadedness, dizziness, fever, cough,  urinary symptoms, diarrhea, constipation, blood in stool, no vaginal fluid, bleeding, or abnormal discharge.    Wendy Hatch, MS3  [facility]            Objective    LMP 10/20/2024 (Approximate)   There is no height or weight on file to calculate BMI.  Physical Exam   GENERAL: alert and no distress  RESP: lungs clear to auscultation - no rales, rhonchi or wheezes  CV: regular rate and rhythm, normal S1 S2, no S3 or S4, no murmur, click or rub, no peripheral edema  ABDOMEN: Gravid abdomen. Soft, nontender, no hepatosplenomegaly, no masses and bowel sounds normal. Fetal heart rate 142. Fundal height 30 cm.  MS: no gross musculoskeletal defects noted, no edema  SKIN: no suspicious lesions or rashes  NEURO: Normal strength and tone, mentation intact and speech normal  PSYCH: mentation appears normal, affect normal          Signed Electronically by: Joie Ghosh MD  {Email feedback regarding this note to [email or web address]   :[id number]}

## 2025-05-15 NOTE — TELEPHONE ENCOUNTER
Medical Transportation Coordination    Appt Date: 5/16/25               Arrival Time: 10:00 AM    Appt Location & Address:   [facility] [id number]    Total family members:   1    Tranportation Name & Phone Number:   [employer] [phone]    Estimated Pick-up Time:   10:00 AM    Roundtrip?  Yes  Patient Notified?  Yes            Medical Transportation Coordination    Appt Date: 5/22/25               Arrival Time: 9:30 AM    Appt Location & Address:   [facility]    Total family members:   1    Tranportation Name & Phone Number:   [employer] [phone]    Estimated Pick-up Time:   8:45-9:00 AM    Roundtrip?  Yes  Patient Notified?  Yes    Effie Prescott RN BSN

## 2025-05-16 PROBLEM — O16.3 ELEVATED BLOOD PRESSURE AFFECTING PREGNANCY IN THIRD TRIMESTER, ANTEPARTUM: Status: RESOLVED | Noted: 2025-05-13 | Resolved: 2025-05-16

## 2025-05-16 PROBLEM — R03.0 ELEVATED BLOOD PRESSURE READING WITHOUT DIAGNOSIS OF HYPERTENSION: Status: RESOLVED | Noted: 2025-01-02 | Resolved: 2025-05-16

## 2025-05-16 PROBLEM — R79.89 ELEVATED LIVER FUNCTION TESTS: Status: ACTIVE | Noted: 2025-05-13

## 2025-05-20 ENCOUNTER — HOSPITAL ENCOUNTER (OUTPATIENT)
Facility: HOSPITAL | Age: 35
Discharge: HOME | End: 2025-05-20
Attending: STUDENT IN AN ORGANIZED HEALTH CARE EDUCATION/TRAINING PROGRAM | Admitting: STUDENT IN AN ORGANIZED HEALTH CARE EDUCATION/TRAINING PROGRAM
Payer: COMMERCIAL

## 2025-05-20 VITALS
OXYGEN SATURATION: 97 % | RESPIRATION RATE: 20 BRPM | DIASTOLIC BLOOD PRESSURE: 75 MMHG | TEMPERATURE: 99 F | BODY MASS INDEX: 47.09 KG/M2 | WEIGHT: 293 LBS | HEIGHT: 66 IN | SYSTOLIC BLOOD PRESSURE: 135 MMHG

## 2025-05-20 PROBLEM — Z36.89 ENCOUNTER FOR TRIAGE IN PREGNANT PATIENT: Status: ACTIVE | Noted: 2025-05-20

## 2025-05-20 LAB
ALBUMIN MFR UR ELPH: 18.5 MG/DL
ALBUMIN SERPL BCG-MCNC: 2.9 G/DL (ref 3.5–5.2)
ALP SERPL-CCNC: 124 U/L (ref 40–150)
ALT SERPL W P-5'-P-CCNC: 72 U/L (ref 0–50)
ANION GAP SERPL CALCULATED.3IONS-SCNC: 10 MMOL/L (ref 7–15)
AST SERPL W P-5'-P-CCNC: 60 U/L (ref 0–45)
BILIRUB SERPL-MCNC: 0.3 MG/DL
BUN SERPL-MCNC: 5.3 MG/DL (ref 6–20)
CALCIUM SERPL-MCNC: 8.9 MG/DL (ref 8.8–10.4)
CHLORIDE SERPL-SCNC: 105 MMOL/L (ref 98–107)
CREAT SERPL-MCNC: 0.57 MG/DL (ref 0.51–0.95)
CREAT UR-MCNC: 299.8 MG/DL
EGFRCR SERPLBLD CKD-EPI 2021: >90 ML/MIN/1.73M2
GLUCOSE SERPL-MCNC: 100 MG/DL (ref 70–99)
HCO3 SERPL-SCNC: 22 MMOL/L (ref 22–29)
POTASSIUM SERPL-SCNC: 3.5 MMOL/L (ref 3.4–5.3)
PROT SERPL-MCNC: 6.2 G/DL (ref 6.4–8.3)
PROT/CREAT 24H UR: 0.06 MG/MG CR (ref 0–0.2)
SODIUM SERPL-SCNC: 137 MMOL/L (ref 135–145)

## 2025-05-20 PROCEDURE — 84156 ASSAY OF PROTEIN URINE: CPT | Performed by: OBSTETRICS & GYNECOLOGY

## 2025-05-20 PROCEDURE — 250N000011 HC RX IP 250 OP 636: Performed by: OBSTETRICS & GYNECOLOGY

## 2025-05-20 PROCEDURE — 36415 COLL VENOUS BLD VENIPUNCTURE: CPT | Performed by: OBSTETRICS & GYNECOLOGY

## 2025-05-20 PROCEDURE — 250N000013 HC RX MED GY IP 250 OP 250 PS 637: Performed by: OBSTETRICS & GYNECOLOGY

## 2025-05-20 PROCEDURE — 84155 ASSAY OF PROTEIN SERUM: CPT | Performed by: OBSTETRICS & GYNECOLOGY

## 2025-05-20 RX ORDER — LABETALOL 100 MG/1
100 TABLET, FILM COATED ORAL ONCE
Status: COMPLETED | OUTPATIENT
Start: 2025-05-20 | End: 2025-05-20

## 2025-05-20 RX ORDER — LIDOCAINE 40 MG/G
CREAM TOPICAL
Status: DISCONTINUED | OUTPATIENT
Start: 2025-05-20 | End: 2025-05-21 | Stop reason: HOSPADM

## 2025-05-20 RX ORDER — NALOXONE HYDROCHLORIDE 0.4 MG/ML
0.4 INJECTION, SOLUTION INTRAMUSCULAR; INTRAVENOUS; SUBCUTANEOUS
Status: DISCONTINUED | OUTPATIENT
Start: 2025-05-20 | End: 2025-05-21 | Stop reason: HOSPADM

## 2025-05-20 RX ORDER — ACETAMINOPHEN 325 MG/1
650 TABLET ORAL ONCE
Status: COMPLETED | OUTPATIENT
Start: 2025-05-20 | End: 2025-05-20

## 2025-05-20 RX ORDER — NALOXONE HYDROCHLORIDE 0.4 MG/ML
0.2 INJECTION, SOLUTION INTRAMUSCULAR; INTRAVENOUS; SUBCUTANEOUS
Status: DISCONTINUED | OUTPATIENT
Start: 2025-05-20 | End: 2025-05-21 | Stop reason: HOSPADM

## 2025-05-20 RX ORDER — ONDANSETRON 8 MG/1
8 TABLET, ORALLY DISINTEGRATING ORAL ONCE
Status: COMPLETED | OUTPATIENT
Start: 2025-05-20 | End: 2025-05-20

## 2025-05-20 RX ORDER — OXYCODONE HYDROCHLORIDE 5 MG/1
5 TABLET ORAL EVERY 4 HOURS PRN
Refills: 0 | Status: COMPLETED | OUTPATIENT
Start: 2025-05-20 | End: 2025-05-20

## 2025-05-20 RX ADMIN — ONDANSETRON 8 MG: 8 TABLET, ORALLY DISINTEGRATING ORAL at 22:14

## 2025-05-20 RX ADMIN — LABETALOL HYDROCHLORIDE 100 MG: 100 TABLET, FILM COATED ORAL at 21:22

## 2025-05-20 RX ADMIN — ACETAMINOPHEN 650 MG: 325 TABLET ORAL at 21:22

## 2025-05-20 RX ADMIN — OXYCODONE HYDROCHLORIDE 5 MG: 5 TABLET ORAL at 22:39

## 2025-05-20 ASSESSMENT — ACTIVITIES OF DAILY LIVING (ADL)
ADLS_ACUITY_SCORE: 23

## 2025-05-21 NOTE — PROVIDER NOTIFICATION
05/20/25 2237   Provider Notification   Provider Name/Title Dr. Minerva Pro/OB   Method of Notification Phone   Request Evaluate - Remote   Notification Reason Status Update;Uterine Activity     Ok to give oxycodone with EFM. Updated provider on EFM picking up increasing uterine activity after patient has changed positions. Difficult to determine whether abdominal pain patient is experiencing is related to contractions, as she is unable to specify if pain is continuous or intermittent. Patient believes pain is related to fibroids. Patient has recently had wet prep, per Dr. Pro so will not send today.

## 2025-05-21 NOTE — DISCHARGE INSTRUCTIONS
"Return immediately to OB triage department for vaginal bleeding, leaking of fluid (you think your water is broken), or decreased fetal movement. Call OB provider's office main line or after-hours line for other concerns, such as recurring headache that is unrelieved by Tylenol, pain behind your right ribs or right breast, changes in vision (seeing spots, stars, \"floaters\", blurry vision), new-onset urinary symptoms (burning or pain with urination, increased frequency or urgency, hesitancy), changes in vaginal discharge, or contractions. Your provider will be able to give you guidance as to whether you should be seen in the OB triage department or in the OB clinic.    Continue taking your labetalol as prescribed, 100mg at a time, twice per day.  "

## 2025-05-21 NOTE — PROGRESS NOTES
Pt resting quietly in bed when RN in to offer assistance to front door. Offered pt that she can walk or use a wheelchair and pt opted for w/c. Assisted pt to w/c as pt stated she is very sleepy. This RN pushed pt to front entrance, and per pt preference, brought pt to atrium to wait for the cab. .Shannon Dubose RN

## 2025-05-21 NOTE — PROVIDER NOTIFICATION
05/20/25 6627   Provider Notification   Provider Name/Title Dr. Pro/OB   Method of Notification Phone   Request Evaluate - Remote   Notification Reason Patient Request;Pain     Notified Dr. Pro patient just provided urine sample, so that was sent to lab within last five minutes. Patient reporting new onset nausea, which she states happens every night. States she believes she will throw up PO medication and would like IV anti-emetics. Declines rectal suppository. New onset constant, severe pelvic pain she feels is related to uterine fibroids. Patient is rocking back and forth in bed and vocalizing with pain. Orders received for 8mg ODT Zofran to start with, and 5mg oxycodone. Contact provider if patient has an episode of emesis.

## 2025-05-21 NOTE — PROGRESS NOTES
Discharge education/instructions provided to patient (written and verbal). Patient verbalizes understanding and denies further questions/concerns at this time.     Hospital to arrange taxi to drive patient home, pending pickup.

## 2025-05-21 NOTE — PROVIDER NOTIFICATION
05/20/25 2054   Provider Notification   Provider Name/Title Dr. Pro/OB   Method of Notification Phone   Request Evaluate - Remote   Notification Reason Patient Arrived     SBAR to Dr. Pro. Send pre-eclampsia labs and provider will re-evaluate when results received.

## 2025-05-21 NOTE — PROGRESS NOTES
"Patient arrived via EMS transport from home. Ambulance was called for 3 episodes today of a \"splitting\" 10/10 headache, which occurred around 5739-4706, 1400, then shortly prior to arrival. Lucinda (\"Ivonne\") has not taken any medications for this, but has minimized environmental stimuli. Each repeat headache has seemed worse than the last. She has been having headaches for around 3 days, all three of which she reports 10/10 pain with no medical management. She has not contacted her clinic about this yet.    Ivonne endorses a diagnosis of gestational hypertension during this pregnancy, which she did not have with her first pregnancy 11 years ago, and that \"They think that I have preeclampsia.\" She was prescribed labetalol in clinic last week, which she was not able to  until yesterday. She took her first dose around 1900 yesterday and her second dose around 1400 today.    She also reported visual changes to EMS staff. She has had multiple transient episodes of blurry vision today, which occurs at the same time as her headaches. Blood glucose in ambulance was 94 per report.    Heart and lung sounds clear to auscultation, no hyperreflexia or clonus. Trace edema bilateral ankles.    Denies vaginal bleeding, leaking of fluid. Reports active fetal movement and occasional Tony-Amaya contractions she has been feeling \"for awhile.\"       "

## 2025-05-21 NOTE — PROVIDER NOTIFICATION
05/20/25 8027   Provider Notification   Provider Name/Title Dr. Pro/OB   Method of Notification Phone     Dr. Pro called unit - discharge patient to home as pain is improving and overall OB picture is stable from previous clinic and hospital documentation. No diagnosis of pre-eclampsia- urine protein creatinine ratio is normal.

## 2025-05-21 NOTE — PROGRESS NOTES
Patient declines fetal monitoring at this time due to discomfort with abdominal pressure during ultrasound monitor adjustments. Dr. Pro notified. Hold oxycodone dose for inability to monitor for potential fetal side effects.

## 2025-05-21 NOTE — PROGRESS NOTES
Patient verbally consents to re-applying EFM monitors and to continued monitoring after oxycodone administration. Abdominal pain is right-sided and difficult to describe when asked if pain is continuous or intermittent. Pain increases during EFM adjustments. Denies urinary changes or changes in vaginal discharge.

## 2025-05-21 NOTE — PROGRESS NOTES
CMP resulted, awaiting urine sample for urine protein creatinine ratio. Patient declines to get up to the bathroom at this time. Provided water and encouraged patient to drink entire 16oz cup, RN can provide more water if necessary.

## 2025-05-22 ENCOUNTER — TELEPHONE (OUTPATIENT)
Dept: FAMILY MEDICINE | Facility: CLINIC | Age: 35
End: 2025-05-22
Payer: COMMERCIAL

## 2025-05-22 NOTE — TELEPHONE ENCOUNTER
Called pt and she states that she went to the wrong office within [facility] [place] on the 1st Floor instead of going to see Dr Hutchison on the 2nd floor ([address]).  Pt states that she already called for the return ride and is not feeling well so would just like to go home.  Asked if she would be willing to go up to [facility] to at least have her BP checked and pt states that she would just prefer to reschedule.  She states that she will call later for nurse to help her reschedule.  Routed note to Dr Ghosh./Effie Prescott RN BSN

## 2025-05-22 NOTE — TELEPHONE ENCOUNTER
General Call      Reason for Call:  call back.    What are your questions or concerns:  She Is at her doctor appointment for her thyroid but they are telling her she doesn't have anything on their schedule,however she stating you called her this morning and told her she does have an appointment there. She would like to speak to you.    Date of last appointment with provider: 5/15    Could we send this information to you in [employer] or would you prefer to receive a phone call?:   Patient would prefer a phone call   Okay to leave a detailed message?: Yes at Home number on file [phone] (home)      Does this patient currently have active insurance coverage?  Yes, Pt has active insurance coverage.     Does patient or caller know when to expect a call? Yes, Nurses will return call within 2-3 business hours.    Génesis Johnson on 5/22/2025 at 9:36 AM

## 2025-06-02 ENCOUNTER — RESULTS FOLLOW-UP (OUTPATIENT)
Dept: FAMILY MEDICINE | Facility: CLINIC | Age: 35
End: 2025-06-02

## 2025-06-02 ENCOUNTER — ANCILLARY PROCEDURE (OUTPATIENT)
Dept: ULTRASOUND IMAGING | Facility: CLINIC | Age: 35
End: 2025-06-02
Attending: STUDENT IN AN ORGANIZED HEALTH CARE EDUCATION/TRAINING PROGRAM
Payer: COMMERCIAL

## 2025-06-02 DIAGNOSIS — O09.291 HISTORY OF STILLBIRTH IN PREGNANT PATIENT IN FIRST TRIMESTER, ANTEPARTUM: ICD-10-CM

## 2025-06-02 DIAGNOSIS — O10.919 CHRONIC HYPERTENSION AFFECTING PREGNANCY: ICD-10-CM

## 2025-06-02 DIAGNOSIS — O09.93 SUPERVISION OF HIGH RISK PREGNANCY IN THIRD TRIMESTER: ICD-10-CM

## 2025-06-02 PROCEDURE — 76819 FETAL BIOPHYS PROFIL W/O NST: CPT | Mod: TC | Performed by: RADIOLOGY

## 2025-06-04 ENCOUNTER — OFFICE VISIT (OUTPATIENT)
Dept: FAMILY MEDICINE | Facility: CLINIC | Age: 35
End: 2025-06-04
Payer: COMMERCIAL

## 2025-06-04 VITALS
BODY MASS INDEX: 48.87 KG/M2 | RESPIRATION RATE: 20 BRPM | DIASTOLIC BLOOD PRESSURE: 85 MMHG | OXYGEN SATURATION: 96 % | TEMPERATURE: 97.8 F | WEIGHT: 293 LBS | HEART RATE: 92 BPM | SYSTOLIC BLOOD PRESSURE: 132 MMHG

## 2025-06-04 DIAGNOSIS — O21.9 NAUSEA/VOMITING IN PREGNANCY: ICD-10-CM

## 2025-06-04 DIAGNOSIS — O09.93 SUPERVISION OF HIGH RISK PREGNANCY IN THIRD TRIMESTER: ICD-10-CM

## 2025-06-04 DIAGNOSIS — R10.2 PELVIC PAIN IN FEMALE: Primary | ICD-10-CM

## 2025-06-04 DIAGNOSIS — O47.03 PRETERM UTERINE CONTRACTIONS IN THIRD TRIMESTER, ANTEPARTUM: ICD-10-CM

## 2025-06-04 LAB
ALBUMIN MFR UR ELPH: 17.9 MG/DL
ALBUMIN SERPL BCG-MCNC: 3.2 G/DL (ref 3.5–5.2)
ALBUMIN UR-MCNC: ABNORMAL MG/DL
ALP SERPL-CCNC: 138 U/L (ref 40–150)
ALT SERPL W P-5'-P-CCNC: 48 U/L (ref 0–50)
ANION GAP SERPL CALCULATED.3IONS-SCNC: 9 MMOL/L (ref 7–15)
APPEARANCE UR: CLEAR
AST SERPL W P-5'-P-CCNC: 45 U/L (ref 0–45)
BACTERIA #/AREA URNS HPF: ABNORMAL /HPF
BILIRUB SERPL-MCNC: 0.3 MG/DL
BILIRUB UR QL STRIP: NEGATIVE
BILIRUBIN DIRECT (ROCHE PRO & PURE): 0.15 MG/DL (ref 0–0.45)
BUN SERPL-MCNC: 4.4 MG/DL (ref 6–20)
C TRACH DNA SPEC QL PROBE+SIG AMP: NEGATIVE
CALCIUM SERPL-MCNC: 8.9 MG/DL (ref 8.8–10.4)
CHLORIDE SERPL-SCNC: 105 MMOL/L (ref 98–107)
COLOR UR AUTO: ABNORMAL
CREAT SERPL-MCNC: 0.57 MG/DL (ref 0.51–0.95)
CREAT UR-MCNC: 194 MG/DL
EGFRCR SERPLBLD CKD-EPI 2021: >90 ML/MIN/1.73M2
ERYTHROCYTE [DISTWIDTH] IN BLOOD BY AUTOMATED COUNT: 13.5 % (ref 10–15)
GLUCOSE SERPL-MCNC: 118 MG/DL (ref 70–99)
GLUCOSE UR STRIP-MCNC: NEGATIVE MG/DL
HCO3 SERPL-SCNC: 21 MMOL/L (ref 22–29)
HCT VFR BLD AUTO: 36.8 % (ref 35–47)
HGB BLD-MCNC: 12 G/DL (ref 11.7–15.7)
HGB UR QL STRIP: ABNORMAL
KETONES UR STRIP-MCNC: NEGATIVE MG/DL
LEUKOCYTE ESTERASE UR QL STRIP: NEGATIVE
MCH RBC QN AUTO: 28.8 PG (ref 26.5–33)
MCHC RBC AUTO-ENTMCNC: 32.6 G/DL (ref 31.5–36.5)
MCV RBC AUTO: 89 FL (ref 78–100)
N GONORRHOEA DNA SPEC QL NAA+PROBE: NEGATIVE
NITRATE UR QL: NEGATIVE
PH UR STRIP: 7 [PH] (ref 5–8)
PLATELET # BLD AUTO: 415 10E3/UL (ref 150–450)
POTASSIUM SERPL-SCNC: 4.3 MMOL/L (ref 3.4–5.3)
PROT SERPL-MCNC: 6.9 G/DL (ref 6.4–8.3)
PROT/CREAT 24H UR: 0.09 MG/MG CR (ref 0–0.2)
RBC # BLD AUTO: 4.16 10E6/UL (ref 3.8–5.2)
RBC #/AREA URNS AUTO: ABNORMAL /HPF
SODIUM SERPL-SCNC: 135 MMOL/L (ref 135–145)
SP GR UR STRIP: 1.02 (ref 1–1.03)
SPECIMEN TYPE: NORMAL
SQUAMOUS #/AREA URNS AUTO: ABNORMAL /LPF
UROBILINOGEN UR STRIP-ACNC: 1 E.U./DL
WBC # BLD AUTO: 9.1 10E3/UL (ref 4–11)
WBC #/AREA URNS AUTO: ABNORMAL /HPF

## 2025-06-04 RX ORDER — ONDANSETRON 4 MG/1
4 TABLET, FILM COATED ORAL EVERY 6 HOURS PRN
Qty: 30 TABLET | Refills: 3 | Status: SHIPPED | OUTPATIENT
Start: 2025-06-04

## 2025-06-04 NOTE — Clinical Note
Thanks for your help today.  When you connect with her, my to be able to also schedule her for a nurse visit with you on the day she comes in for her BPP/NST?  I think it be helpful for us to check her blood sugar and blood pressure at the clinic more often, as she has been struggling to do this at home.  Thank you!

## 2025-06-04 NOTE — PATIENT INSTRUCTIONS
Keep changing positions.     Check for infection in the bladder/vagina and see if that might be causing worsening pain.     Referral for pelvic floor PT.  They will call you.  If you don't hear from them by the end of the week, please call us!    Check labs to rule out pre-eclampsia.     You are having only minimal contractions - that is good news.      Refill the ondansetron for nausea.

## 2025-06-04 NOTE — PROGRESS NOTES
"Assessment & Plan  Tri DAVE Ji is a 34 year old , at 32w5d weeks of pregnancy with ASCENCION of 2025 by 9 week ultrasound.  9Patient's history is high risk for the following reasons:     # High risk concerns (medical problems, high risk surgical/obstetric history, high risk social situation, etc):     # Gestational hypertension  Blood pressure borderline high today.  No tachycardia.  Says she is checking at home and it is \"good\" but can't give me numbers.  Endorses taking the labetalol BID, but notes that it makes her feel funny.  Took this AM.  Re-emphasized checking blood pressure.  Will recheck Pre-E labs today given symptoms of headache and RUQ pain.     # Chlamydial infection  Recently treated with azithromycin.  Retest today - urine. Denies sexual activity since last infection.     # Intrauterine fibroids  Consult placed to speak with [facility] about  - Effie to reach out later this week to help her schedule with Dr. Hutchison.   Plan for delivery via  between 36-37 weeks.    # History of recurrent miscarriage with term stillbirth  Scheduled for weekly BPP's starting  at 32 weeks.  Normal BPP on . NST today appropriate.     # Failed 1 hour glucose screen, unable to tolerate 3-hour test with elevated blood sugars while inpatient  Not checking sugars at home.  Emphasized the importance of this.      # Polyhydramnios  Follow BPP's    # Elevated LFTs  Repeat LFTs today.  Schedule RUQ US to evaluate for gallstones and other hepatic etiology with next BPP.     # Hyperemesis  Zofran refilled, helping, taking 1x per day.   This is still problematic.    # Morbid obesity  Weight gain has been appropriate for goal.    # Chronic pelvic pain  Severe today.  Continue with position changes, rest, and bathtub.  Referral for PT.  Recommended prenatal yoga videos on Youtube.     # History of anxiety and depression  Multiple stressors today, tearful  Difficult to do much with the pain.      # " Challenging social situation with restricted insurance status and limited transportation  Weekly visits until delivery, with frequent blood pressure and glucose checks here at the clinic.  Will continue to use teach back regularly to ensure she is understanding of risk factors, warning signs, and when to call the clinic.    The longitudinal plan of care for the diagnosis(es)/condition(s) as documented were addressed during this visit. Due to the added complexity in care, I will continue to support Ivonne in the subsequent management and with ongoing continuity of care.    Ivonne was seen today for prenatal care, musculoskeletal problem and dizziness.    Diagnoses and all orders for this visit:    Pelvic pain in female  -     Wet preparation  -     Chlamydia trachomatis/Neisseria gonorrhoeae by PCR - Clinic Collect  -     CBC with platelets; Future  -     Hepatic function panel; Future  -     Protein  random urine; Future  -     Basic metabolic panel; Future  -     CBC with platelets  -     Hepatic function panel  -     Protein  random urine  -     Basic metabolic panel  -     UA Macroscopic with reflex to Microscopic and Culture; Future  -     Physical Therapy  Referral; Future  -     UA Macroscopic with reflex to Microscopic and Culture  -     UA Microscopic with Reflex to Culture      Weight gain adequate: 4.445 kg (9 lb 12.8 oz) to date, out of recommended total of 11-20 lbs (pregravid BMI >30)    Patient Instructions   Keep changing positions.     Check for infection in the bladder/vagina and see if that might be causing worsening pain.     Referral for pelvic floor PT.  They will call you.  If you don't hear from them by the end of the week, please call us!    Check labs to rule out pre-eclampsia.     You are having only minimal contractions - that is good news.      Refill the ondansetron for nausea.          Return to clinic in 1 weeks.    Joie Ghosh MD    Subjective  Concerns:     Experiencing  "severe pelvic pain.  Baby is sitting alone pushing on her pubic bone.  Has bilateral hip pain as well.  Often at times has to squat or kneel to be comfortable.  Very tearful and emotional about this today.  Pregnancy belt did not help.  Would be interested in trying prenatal yoga videos, pelvic floor PT, and what ever else might help her be more comfortable.    She is also noting headache, vision changes, neck pain, right upper quadrant pain.    Has not been checking blood sugars at home.    Reports checking blood pressures occasionally and that they are \"normal \"but has difficulty telling me what her numbers are.    Continues to have nausea regularly.  Taking about 1 Zofran per day.  This is helpful.  Would like a refill.    No bleeding, no loss of fluid, no change in vaginal discharge.  Has not been sexually active since her diagnosis of chlamydia.  Did finish the treatment.  Does have some pain and discomfort with urination.    Does not feel up to getting skin appointments today as her pain is too severe.    We reviewed her ultrasound from yesterday that looked normal.    Daughters elementary school graduation is coming up at the end of the week and this is stressful and exciting too.    ROS:  YES - Headache  YES - Changes in vision  YES - Chest Pain  No - Shortness of Breath  YES - Nausea   YES - Vomitin  YES - Abdominal pain   YES - Contractions  No - Dysuria   No - Vaginal Discharge    No - Vaginal bleeding   No - Loss of Fluid   No - Extremity swelling   Present - Fetal movement       Patient Active Problem List   Diagnosis    Anxiety and depression    Class 3 severe obesity due to excess calories without serious comorbidity with body mass index (BMI) of 40.0 to 44.9 in adult (H)    Mild intermittent asthma in adult without complication    Hiatal hernia    Uterine leiomyoma    History of stillbirth in pregnant patient in first trimester, antepartum    Pregnancy w/ hx of uterine myomectomy    Monoallelic " mutation of BRCA2 gene    Nausea and vomiting in pregnancy    Supervision of high risk pregnancy in third trimester    Chronic hypertension affecting pregnancy    Elevated liver function tests    Encounter for triage in pregnant patient       Guidance:  fetal growth and movement  signs of  labor    Objective  /85   Pulse 92   Temp 97.8  F (36.6  C) (Tympanic)   Resp 20   Wt (!) 137.3 kg (302 lb 12.8 oz)   LMP 10/20/2024 (Approximate)   SpO2 96%   BMI 48.87 kg/m    No distress.  Gravid abdomen.  =450.  Fundal height Not obtained cm.  trace edema.    NST was obtained.  It shows baseline 130, moderate variability, and accelerations present.    Results  Blood type: B POS  Results for orders placed or performed in visit on 25   CBC with platelets     Status: Normal   Result Value Ref Range    WBC Count 9.1 4.0 - 11.0 10e3/uL    RBC Count 4.16 3.80 - 5.20 10e6/uL    Hemoglobin 12.0 11.7 - 15.7 g/dL    Hematocrit 36.8 35.0 - 47.0 %    MCV 89 78 - 100 fL    MCH 28.8 26.5 - 33.0 pg    MCHC 32.6 31.5 - 36.5 g/dL    RDW 13.5 10.0 - 15.0 %    Platelet Count 415 150 - 450 10e3/uL   UA Macroscopic with reflex to Microscopic and Culture     Status: Abnormal    Specimen: Urine, Clean Catch   Result Value Ref Range    Color Urine Dark Yellow (A) Colorless, Straw, Light Yellow, Yellow    Appearance Urine Clear Clear    Glucose Urine Negative Negative mg/dL    Bilirubin Urine Negative Negative    Ketones Urine Negative Negative mg/dL    Specific Gravity Urine 1.020 1.005 - 1.030    Blood Urine Small (A) Negative    pH Urine 7.0 5.0 - 8.0    Protein Albumin Urine Trace (A) Negative mg/dL    Urobilinogen Urine 1.0 0.2, 1.0 E.U./dL    Nitrite Urine Negative Negative    Leukocyte Esterase Urine Negative Negative   UA Microscopic with Reflex to Culture     Status: Abnormal   Result Value Ref Range    Bacteria Urine Moderate (A) None Seen /HPF    RBC Urine 2-5 (A) 0-2 /HPF /HPF    WBC Urine 0-5 0-5 /HPF /HPF     Squamous Epithelials Urine Moderate (A) None Seen /LPF    Narrative    Urine Culture not indicated

## 2025-06-05 ENCOUNTER — RESULTS FOLLOW-UP (OUTPATIENT)
Dept: FAMILY MEDICINE | Facility: CLINIC | Age: 35
End: 2025-06-05

## 2025-06-05 PROBLEM — R79.89 ELEVATED LIVER FUNCTION TESTS: Status: RESOLVED | Noted: 2025-05-13 | Resolved: 2025-06-05

## 2025-06-05 PROBLEM — A56.8 CHLAMYDIA TRACHOMATIS INFECTION IN MOTHER DURING THIRD TRIMESTER OF PREGNANCY: Status: ACTIVE | Noted: 2025-05-13

## 2025-06-05 PROBLEM — O98.313 CHLAMYDIA TRACHOMATIS INFECTION IN MOTHER DURING THIRD TRIMESTER OF PREGNANCY: Status: ACTIVE | Noted: 2025-05-13

## 2025-06-06 PROCEDURE — 99232 SBSQ HOSP IP/OBS MODERATE 35: CPT | Mod: GC

## 2025-06-07 ENCOUNTER — HOSPITAL ENCOUNTER (INPATIENT)
Facility: HOSPITAL | Age: 35
LOS: 1 days | Discharge: HOME | End: 2025-06-08
Attending: FAMILY MEDICINE | Admitting: FAMILY MEDICINE
Payer: COMMERCIAL

## 2025-06-07 DIAGNOSIS — G47.00 INSOMNIA, UNSPECIFIED TYPE: ICD-10-CM

## 2025-06-07 DIAGNOSIS — B37.31 YEAST INFECTION OF THE VAGINA: Primary | ICD-10-CM

## 2025-06-07 DIAGNOSIS — O60.03 PRETERM LABOR IN THIRD TRIMESTER WITHOUT DELIVERY: ICD-10-CM

## 2025-06-07 PROBLEM — O60.00 PRETERM LABOR: Status: ACTIVE | Noted: 2025-06-07

## 2025-06-07 LAB
ABO + RH BLD: NORMAL
ALBUMIN MFR UR ELPH: 15.8 MG/DL
ALBUMIN SERPL BCG-MCNC: 3.1 G/DL (ref 3.5–5.2)
ALBUMIN UR-MCNC: 10 MG/DL
ALP SERPL-CCNC: 140 U/L (ref 40–150)
ALT SERPL W P-5'-P-CCNC: 39 U/L (ref 0–50)
ANION GAP SERPL CALCULATED.3IONS-SCNC: 11 MMOL/L (ref 7–15)
APPEARANCE UR: CLEAR
AST SERPL W P-5'-P-CCNC: 32 U/L (ref 0–45)
BACTERIA #/AREA URNS HPF: ABNORMAL /HPF
BILIRUB SERPL-MCNC: 0.3 MG/DL
BILIRUB UR QL STRIP: NEGATIVE
BLD GP AB SCN SERPL QL: NEGATIVE
BUN SERPL-MCNC: 4.5 MG/DL (ref 6–20)
CALCIUM SERPL-MCNC: 9 MG/DL (ref 8.8–10.4)
CHLORIDE SERPL-SCNC: 106 MMOL/L (ref 98–107)
CLUE CELLS: ABNORMAL
COLOR UR AUTO: YELLOW
CREAT SERPL-MCNC: 0.62 MG/DL (ref 0.51–0.95)
CREAT UR-MCNC: 252.9 MG/DL
EGFRCR SERPLBLD CKD-EPI 2021: >90 ML/MIN/1.73M2
ERYTHROCYTE [DISTWIDTH] IN BLOOD BY AUTOMATED COUNT: 13.9 % (ref 10–15)
GLUCOSE SERPL-MCNC: 83 MG/DL (ref 70–99)
GLUCOSE UR STRIP-MCNC: NEGATIVE MG/DL
HCO3 SERPL-SCNC: 20 MMOL/L (ref 22–29)
HCT VFR BLD AUTO: 35.6 % (ref 35–47)
HGB BLD-MCNC: 11.7 G/DL (ref 11.7–15.7)
HGB UR QL STRIP: ABNORMAL
HOLD SPECIMEN: NORMAL
HOLD SPECIMEN: NORMAL
KETONES UR STRIP-MCNC: NEGATIVE MG/DL
LEUKOCYTE ESTERASE UR QL STRIP: ABNORMAL
MCH RBC QN AUTO: 28.8 PG (ref 26.5–33)
MCHC RBC AUTO-ENTMCNC: 32.9 G/DL (ref 31.5–36.5)
MCV RBC AUTO: 88 FL (ref 78–100)
MUCOUS THREADS #/AREA URNS LPF: PRESENT /LPF
NITRATE UR QL: NEGATIVE
PH UR STRIP: 6.5 [PH] (ref 5–7)
PLATELET # BLD AUTO: 410 10E3/UL (ref 150–450)
POTASSIUM SERPL-SCNC: 4.1 MMOL/L (ref 3.4–5.3)
PROT SERPL-MCNC: 6.6 G/DL (ref 6.4–8.3)
PROT/CREAT 24H UR: 0.06 MG/MG CR (ref 0–0.2)
RBC # BLD AUTO: 4.06 10E6/UL (ref 3.8–5.2)
RBC URINE: 2 /HPF
SODIUM SERPL-SCNC: 137 MMOL/L (ref 135–145)
SP GR UR STRIP: 1.02 (ref 1–1.03)
SPECIMEN EXP DATE BLD: NORMAL
SQUAMOUS EPITHELIAL: 3 /HPF
TRICHOMONAS, WET PREP: ABNORMAL
UROBILINOGEN UR STRIP-MCNC: NORMAL MG/DL
WBC # BLD AUTO: 11.2 10E3/UL (ref 4–11)
WBC URINE: 2 /HPF
WBC'S/HIGH POWER FIELD, WET PREP: ABNORMAL
YEAST #/AREA URNS HPF: ABNORMAL /HPF
YEAST, WET PREP: PRESENT

## 2025-06-07 PROCEDURE — 85014 HEMATOCRIT: CPT

## 2025-06-07 PROCEDURE — 86901 BLOOD TYPING SEROLOGIC RH(D): CPT

## 2025-06-07 PROCEDURE — 84156 ASSAY OF PROTEIN URINE: CPT

## 2025-06-07 PROCEDURE — 258N000003 HC RX IP 258 OP 636

## 2025-06-07 PROCEDURE — 250N000011 HC RX IP 250 OP 636: Performed by: OBSTETRICS & GYNECOLOGY

## 2025-06-07 PROCEDURE — 36415 COLL VENOUS BLD VENIPUNCTURE: CPT

## 2025-06-07 PROCEDURE — 999N000248 HC STATISTIC IV INSERT WITH US BY RN

## 2025-06-07 PROCEDURE — 250N000013 HC RX MED GY IP 250 OP 250 PS 637

## 2025-06-07 PROCEDURE — 120N000001 HC R&B MED SURG/OB

## 2025-06-07 PROCEDURE — 250N000013 HC RX MED GY IP 250 OP 250 PS 637: Performed by: OBSTETRICS & GYNECOLOGY

## 2025-06-07 PROCEDURE — 96372 THER/PROPH/DIAG INJ SC/IM: CPT | Performed by: OBSTETRICS & GYNECOLOGY

## 2025-06-07 PROCEDURE — 87210 SMEAR WET MOUNT SALINE/INK: CPT | Performed by: FAMILY MEDICINE

## 2025-06-07 PROCEDURE — 84155 ASSAY OF PROTEIN SERUM: CPT

## 2025-06-07 PROCEDURE — 81001 URINALYSIS AUTO W/SCOPE: CPT

## 2025-06-07 RX ORDER — BETAMETHASONE SODIUM PHOSPHATE AND BETAMETHASONE ACETATE 3; 3 MG/ML; MG/ML
12 INJECTION, SUSPENSION INTRA-ARTICULAR; INTRALESIONAL; INTRAMUSCULAR; SOFT TISSUE EVERY 24 HOURS
Status: DISCONTINUED | OUTPATIENT
Start: 2025-06-07 | End: 2025-06-08 | Stop reason: HOSPADM

## 2025-06-07 RX ORDER — NIFEDIPINE 10 MG/1
20 CAPSULE ORAL ONCE
Status: COMPLETED | OUTPATIENT
Start: 2025-06-07 | End: 2025-06-07

## 2025-06-07 RX ORDER — NIFEDIPINE 10 MG/1
20 CAPSULE ORAL EVERY 6 HOURS
Status: DISCONTINUED | OUTPATIENT
Start: 2025-06-07 | End: 2025-06-08 | Stop reason: HOSPADM

## 2025-06-07 RX ORDER — NIFEDIPINE 10 MG/1
20 CAPSULE ORAL EVERY 30 MIN PRN
Status: COMPLETED | OUTPATIENT
Start: 2025-06-07 | End: 2025-06-07

## 2025-06-07 RX ORDER — HYDROXYZINE HYDROCHLORIDE 50 MG/1
50 TABLET, FILM COATED ORAL EVERY 6 HOURS PRN
Status: DISCONTINUED | OUTPATIENT
Start: 2025-06-07 | End: 2025-06-08 | Stop reason: HOSPADM

## 2025-06-07 RX ORDER — LABETALOL 100 MG/1
100 TABLET, FILM COATED ORAL ONCE
Status: COMPLETED | OUTPATIENT
Start: 2025-06-07 | End: 2025-06-07

## 2025-06-07 RX ORDER — HYDROXYZINE HYDROCHLORIDE 50 MG/1
50 TABLET, FILM COATED ORAL
Status: DISCONTINUED | OUTPATIENT
Start: 2025-06-07 | End: 2025-06-08 | Stop reason: HOSPADM

## 2025-06-07 RX ORDER — LIDOCAINE 40 MG/G
CREAM TOPICAL
Status: DISCONTINUED | OUTPATIENT
Start: 2025-06-07 | End: 2025-06-08 | Stop reason: HOSPADM

## 2025-06-07 RX ORDER — DOCUSATE SODIUM 100 MG/1
100 CAPSULE, LIQUID FILLED ORAL 2 TIMES DAILY
Status: DISCONTINUED | OUTPATIENT
Start: 2025-06-07 | End: 2025-06-08 | Stop reason: HOSPADM

## 2025-06-07 RX ORDER — ACETAMINOPHEN 325 MG/1
975 TABLET ORAL EVERY 6 HOURS PRN
Status: DISCONTINUED | OUTPATIENT
Start: 2025-06-07 | End: 2025-06-08 | Stop reason: HOSPADM

## 2025-06-07 RX ORDER — CLOTRIMAZOLE 1 %
1 CREAM WITH APPLICATOR VAGINAL AT BEDTIME
Status: DISCONTINUED | OUTPATIENT
Start: 2025-06-07 | End: 2025-06-08 | Stop reason: HOSPADM

## 2025-06-07 RX ADMIN — HYDROXYZINE HYDROCHLORIDE 50 MG: 50 TABLET ORAL at 18:55

## 2025-06-07 RX ADMIN — NIFEDIPINE 20 MG: 10 CAPSULE ORAL at 18:46

## 2025-06-07 RX ADMIN — LABETALOL HYDROCHLORIDE 100 MG: 100 TABLET, FILM COATED ORAL at 17:22

## 2025-06-07 RX ADMIN — BETAMETHASONE SODIUM PHOSPHATE AND BETAMETHASONE ACETATE 12 MG: 3; 3 INJECTION, SUSPENSION INTRA-ARTICULAR; INTRALESIONAL; INTRAMUSCULAR at 17:23

## 2025-06-07 RX ADMIN — ACETAMINOPHEN 975 MG: 325 TABLET ORAL at 16:22

## 2025-06-07 RX ADMIN — NIFEDIPINE 20 MG: 10 CAPSULE ORAL at 17:10

## 2025-06-07 RX ADMIN — SODIUM CHLORIDE, SODIUM LACTATE, POTASSIUM CHLORIDE, AND CALCIUM CHLORIDE 500 ML: .6; .31; .03; .02 INJECTION, SOLUTION INTRAVENOUS at 17:12

## 2025-06-07 RX ADMIN — NIFEDIPINE 20 MG: 10 CAPSULE ORAL at 17:56

## 2025-06-07 ASSESSMENT — ACTIVITIES OF DAILY LIVING (ADL)
ADLS_ACUITY_SCORE: 23

## 2025-06-07 NOTE — PROGRESS NOTES
"Data: Patient presented to [facility]: 2025  2:06 PM.  Reason for maternal/fetal assessment is abdominal pain and nausea and vomiting. Patient reports that she has had abdominal pain for the \"past couple of days\", and started vomiting this morning around 0300. She has vomited 3 or 4 times today. Patient denies leaking of vaginal fluid/rupture of membranes, vaginal bleeding, headache. Patient reports fetal movement is normal. Patient is a 33w1d .  Prenatal record reviewed. Pregnancy has been complicated by diabetes and hypertension.    Vital signs wnl. Support person is not present.     Action: Verbal consent for EFM. Triage assessment completed. Verbal orders from Dr. Skinner for SVE.     Response: Patient verbalized agreement with plan. Will contact Dr. Skinner with update and further orders.     "

## 2025-06-07 NOTE — PROGRESS NOTES
Patient received Nifedipine 20 mg for PTL contractions at 1710 after consulting with Dr Vinson.  Repeat dosing given at 1756 as patient continues to contract.      Dr Skinner updated, will plan to do repeat SVE when patient ready.

## 2025-06-07 NOTE — CONSULTS
[facility] LABOR & DELIVERY   METROPARTNERS CONSULTATION    NAME:Lucinda Ji  : 1990   MRN: [id number]   GESTATIONAL AGE: 33w1d    ADMISSION DATE: 2025     PCP:  Joie Ghosh     CHIEF COMPLAINT:  Abdominal pain in pregnancy.     HPI: I have been requested by Dr. Skinner to evaluate Lucinda Ji for lower abdominal pain. She is followed by [facility]. Patient is a 34 year old,  female AT 33w1d gestation. She reports pain over her entire lower abdomen. Pain is constant. She states that she does not know if she is gabriel. She has h/o fibroids and states that her fibroid pain feels different. She is s/p robotic myomectomy for 8 cm fibroid in . She plans  given h/o myomectomy. She has struggled with N/V this pregnancy and reports 3-4 episodes over the past day. She has had previous consults with  on ,  and . She was transferred from [facility] on  for cHTN in pregnancy with elevated LFTs. Her LFTs were not double normal and did return to baseline. Her last labs on  showed normal LFTs with trace protein in her urine. She reports a headache which she rates 10/10. Her pregnancy was also complicated by chlamydia in  and she is s/p azithromycin. History obtained through the patient and chart review. Patient denies any burning with urination, increased frequency or urgency. Denies increase in vaginal discharge. Patient reports good fetal movement.      DIAGNOSIS COMPLICATING PREGNANCY  She failed her 1 hour GTT and has not had the 3-hour GTT yet  She has a history of an IUFD at 8 months of unknown cause.  She had a 20-week ultrasound at maternal-fetal medicine that was normal.  She has cHTN.  She had elevated LFTs in  which have returned to normal.    She had chlamydia in .     OBSTETRICAL HISTORY:                       OB History    Para Term  AB Living    8 3 1 2 4 1    SAB IAB Ectopic Multiple Live Births        1 3 0 0 1           # Outcome Date GA Lbr Daquan/2nd Weight Sex Type Anes PTL Lv   8 Current                     7  10/30/20 31w2d   1.32 kg (2 lb 14.6 oz) M Vag-Spont EPI N FD      Name: Uday   6 Term 14 39w0d   2.778 kg (6 lb 2 oz) F Vag-Spont None   LENI      Birth Comments: none      Complications: Hyperemesis gravidarum      Name: Irma Ji   5 IAB          IAB         4 IAB          AB, COMPLETE            Birth Comments: elective    3 IAB 2009         AB, COMPLETE            Birth Comments: elective    2 SAB  7w0d       SAB            Birth Comments: D&C   1   22w0d       Vag-Spont            Birth Comments: fell into a glass doorknob and went into labor few hrs later, had D&C      PAST MEDICAL HISTORY:  Past Medical History:   Diagnosis Date    Asthma     Fetal demise, greater than 22 weeks, antepartum, single gestation 10/29/2020    Gastritis     GBS bacteriuria 08/10/2020    Hypertension     Obesity         PAST SURGICAL HISTORY:  Past Surgical History:   Procedure Laterality Date    DAVINCI MYOMECTOMY N/A 2022    Procedure: ROBOTIC MYOMECTOMY;  Surgeon: Indra Reis MD;  Location: [facility] OR    LYSIS, ADHESIONS, ROBOT-ASSISTED, LAPAROSCOPIC, USING DA BROOKE XI N/A 2022    Procedure: LYSIS, ADHESIONS, ROBOT-ASSISTED, LAPAROSCOPIC, USING DA BROOKE XI;  Surgeon: Indra Reis MD;  Location: [facility] OR        SOCIAL HISTORY:   reports that she has quit smoking. Her smoking use included cigarettes. She has never used smokeless tobacco. She reports that she does not currently use alcohol. She reports that she does not currently use drugs after having used the following drugs: Marijuana.     MEDICATIONS:  Current Facility-Administered Medications   Medication Dose Route Frequency Provider Last Rate Last Admin    acetaminophen (TYLENOL) tablet 975 mg  975 mg Oral Q6H PRN Brittany Skinner MD   975 mg at 25 3980     betamethasone acet & sod phos (CELESTONE) injection 12 mg  12 mg Intramuscular Q24H Wanda Vinson MD        labetalol (NORMODYNE) tablet 100 mg  100 mg Oral Once Brittany Skinner MD        lidocaine (LMX4) cream   Topical Q1H PRN Hilaria Elliott MD        lidocaine 1 % 0.1-1 mL  0.1-1 mL Other Q1H PRN Hilaria Elliott MD        NIFEdipine (PROCARDIA) capsule 20 mg  20 mg Oral Q30 Min PRN Wanda Vinson MD        NIFEdipine (PROCARDIA) capsule 20 mg  20 mg Oral Q6H Wanda Vinson MD        sodium chloride (PF) 0.9% PF flush 3 mL  3 mL Intracatheter Q8H [facility] Hilaria Elliott MD        sodium chloride (PF) 0.9% PF flush 3 mL  3 mL Intracatheter q1 min prHilaria Solorzano MD            ALLERGIES:  No Known Allergies     REVIEW OF SYSTEMS   Negative except what is stated in the HPI    PHYSICAL EXAM:  BP (!) 151/92   Temp 98.4  F (36.9  C) (Oral)   Resp 22   LMP 10/20/2024 (Approximate)   SpO2 97%   GEN: NAD; Alert and oriented, appropriate affect. Uncomfortable   HEENT  negative  Abdomen   gravid, tender across lower abdomen  Extremities  Normal, Warm, and No edema  Vaginal exam: Deferred, 1.5 cm per RN (she was noted to be 1 cm on 5/8)    Fetal heart Rate Tracing: reactive and reassuring  Contractions: occasional       IMAGING:  US OB Biophys Prf wo NST wo Measure  Narrative: EXAM: US OB BIOPHYSICAL PROFILE WO NST WO MEASURE  LOCATION: [facility]  DATE: 6/2/2025    INDICATION:  Supervision of high risk pregnancy in third trimester, History of stillbirth in pregnant patient in first trimester, antepartum, Chronic hypertension affecting pregnancy  COMPARISON: 5/9/2025.    FINDINGS:  Single fetus, cephalic presentation.    HEART RATE: 141 bpm.  SDP: 6.5 cm.  PLACENTA: Posterior. No previa.  CERVIX: Obscured due to fetal lie.     2/2 fetal breathing  2/2 fetal movements  2/2 fetal tone  2/2 amniotic fluid    Total biophysical profile 8/8  Impression:  IMPRESSION:  1.  Single intrauterine gestation in cephalic presentation.  2.  Normal  biophysical profile.     I have reviewed the radiologists interpretation     IMPRESSION:  34 year old  @ 33w1d with cHTN, fibroids, recent chlamydia who presents with lower abdominal pain and contractions.     RECOMMENDATIONS:  1)  labor- given contractions and SVE 1.5 cm, recommend treating for  labor with nifedipine protocol. Given GA under 34 weeks, recommend betamethasone. Plan to reevaluate her SVE in 1-2 hours. She was 1 cm on .   2) cHTN- her blood pressure is elevated but she is obviously uncomfortable. She reports a headache. Recommend monitoring blood pressures closely. Recommend labs now. If she meets criteria for preE with SF, then would still try to delay delivery until 34 weeks if possible.   3) Fibroids- discussed degenerating fibroids may be a source of pain. Recommend pain medication prn. She is s/p myomectomy so will need to have a primary  for delivery. Discussed ultrasound if not in labor to assess relationship of pain to fibroids.   4) Abdominal pain- may be due to PTL, fibroids, UTI, viral gastroenteritis. Plan CBC with diff. Will manage PTL. Tylenol and vistaril at this time.   5) History of chlamydia s/p recent treatment- will follow up with primary team. This is being followed as a outpatient and likely not responsible for current sx.       Thank you for allowing us to participate in the care of this patient.  Please contact us with any questions/concerns.      Wanda Vinson MD on 2025 at 5:22 PM

## 2025-06-07 NOTE — PROGRESS NOTES
Update     In to reassess pt. She reports that she is still having 10/10 constant pain. She states that her pain has been constant for the past 3 days. [employer] shows that she is gabriel every 3-4 minutes. SVE 1.5/70/-2, posterior. She has received 2 doses of nifedipine 20 mg for  labor. She has received BMZ 1st dose.     We discussed the need for primary  if her labor progresses. We also discussed the possibility of uterine rupture given her h/o myomectomy. We also discussed the possibility of degenerating fibroids resulting in pain and possibly contractions. Discussed procedure for primary  prn. Discussed risk of PPH given fibroids.     Plan to reassess in 1-2 hours. NPO at this time.     Her labs are normal. Urine for PCR has not been sent.     Wanda Vinson MD

## 2025-06-07 NOTE — PROGRESS NOTES
OBSTETRICS TRIAGE ASSESSMENT NOTE  Lucinda Ji is a 34 year old  at 33w1d gestation based on dating ultrasound who has presented to maternity care for further evaluation of lower abdominal pain nausea vomiting. No bleeding, leakage of fluids, contractions. Baby is moving normally.       Patient presented bilateral lower abdominal pain worsening over the past 3 to 4 days. She describes it as sharp and crampy.  She also had nausea and had emesis 3 times this morning.  No dysuria, diarrhea or vaginal bleeding. no leaking of fluid.  Patient reports pain got significantly worse this morning and she felt like she could not walk due to pain and called EMS.     PRENATAL CARE  Seen by Dr. Ghosh at [facility].         PAST MEDICAL HISTORY  Past Medical History:   Diagnosis Date    Asthma     Fetal demise, greater than 22 weeks, antepartum, single gestation 10/29/2020    Gastritis     GBS bacteriuria 08/10/2020    Hypertension     Obesity        PAST SURGICAL HISTORY   Past Surgical History:   Procedure Laterality Date    DAVINCI MYOMECTOMY N/A 2022    Procedure: ROBOTIC MYOMECTOMY;  Surgeon: Indra Reis MD;  Location: [facility] OR    LYSIS, ADHESIONS, ROBOT-ASSISTED, LAPAROSCOPIC, USING DA BROOKE XI N/A 2022    Procedure: LYSIS, ADHESIONS, ROBOT-ASSISTED, LAPAROSCOPIC, USING DA BROOKE XI;  Surgeon: Indra Reis MD;  Location: [facility] OR       MEDICATIONS    Current Facility-Administered Medications:     acetaminophen (TYLENOL) tablet 975 mg, 975 mg, Oral, Q6H PRN, Brittany Skinner MD, 975 mg at 25 1622    betamethasone acet & sod phos (CELESTONE) injection 12 mg, 12 mg, Intramuscular, Q24H, Wanda Vinson MD, 12 mg at 25 1723    lidocaine (LMX4) cream, , Topical, Q1H PRN, Hilaria Elliott MD    lidocaine 1 % 0.1-1 mL, 0.1-1 mL, Other, Q1H PRN, Hilaria Elliott MD    NIFEdipine (PROCARDIA) capsule 20 mg, 20 mg, Oral, Q30 Min  Karel CHAMBERLAIN Stephanie F, MD    NIFEdipine (PROCARDIA) capsule 20 mg, 20 mg, Oral, Q6H, Wanda Vinson MD    sodium chloride (PF) 0.9% PF flush 3 mL, 3 mL, Intracatheter, Q8H Earl GRAMAJO Angela Jw, MD    sodium chloride (PF) 0.9% PF flush 3 mL, 3 mL, Intracatheter, q1 min Earl chamberlain Angela Jw, MD    SOCIAL HISTORY:   Social History     Socioeconomic History    Marital status: Single     Spouse name: Not on file    Number of children: 1    Years of education: Not on file    Highest education level: Not on file   Occupational History    Occupation: unemployed   Tobacco Use    Smoking status: Former     Types: Cigarettes    Smokeless tobacco: Never   Vaping Use    Vaping status: Never Used   Substance and Sexual Activity    Alcohol use: Not Currently     Comment: occasional    Drug use: Not Currently     Types: Marijuana     Comment: not in pregnancy    Sexual activity: Yes     Partners: Male     Birth control/protection: Condom   Other Topics Concern    Not on file   Social History Narrative    Not on file     Social Drivers of Health     Financial Resource Strain: Low Risk  (5/13/2025)    Financial Resource Strain     Within the past 12 months, have you or your family members you live with been unable to get utilities (heat, electricity) when it was really needed?: No   Food Insecurity: Low Risk  (5/13/2025)    Food Insecurity     Within the past 12 months, did you worry that your food would run out before you got money to buy more?: No     Within the past 12 months, did the food you bought just not last and you didn t have money to get more?: No   Transportation Needs: Low Risk  (5/13/2025)    Transportation Needs     Within the past 12 months, has lack of transportation kept you from medical appointments, getting your medicines, non-medical meetings or appointments, work, or from getting things that you need?: No   Recent Concern: Transportation Needs - High Risk (5/9/2025)    Transportation Needs      Within the past 12 months, has lack of transportation kept you from medical appointments, getting your medicines, non-medical meetings or appointments, work, or from getting things that you need?: Yes   Physical Activity: Not on file   Stress: Not on file   Social Connections: Not on file   Interpersonal Safety: Low Risk  (5/13/2025)    Interpersonal Safety     Do you feel physically and emotionally safe where you currently live?: Yes     Within the past 12 months, have you been hit, slapped, kicked or otherwise physically hurt by someone?: No     Within the past 12 months, have you been humiliated or emotionally abused in other ways by your partner or ex-partner?: No   Housing Stability: Low Risk  (5/13/2025)    Housing Stability     Do you have housing? : Yes     Are you worried about losing your housing?: No   Recent Concern: Housing Stability - High Risk (5/3/2025)    Housing Stability     Do you have housing? : No     Are you worried about losing your housing?: No       PHYSICAL EXAMINATION   BP (!) 184/93   Temp 98.4  F (36.9  C) (Oral)   Resp 22   LMP 10/20/2024 (Approximate)   SpO2 97%   Gen: appears comfortable in no acute distress  HEENT: Alert, oriented, NAD  CV: regular rate and rhythm without murmur  Lungs: clear to ausculation, good air movement throughout  Abdomen: Gravid, non-tender  Cervix: 1.5 cm/40-50% %/-2  Extremities: no lower extremity edema    FETAL HEART MONITORING   Category I strip    CONTRACTIONS  Every 3-4 minutes    LAB RESULTS  Personally reviewed.  Recent Results (from the past 24 hours)   Comprehensive metabolic panel    Collection Time: 06/07/25  3:25 PM   Result Value Ref Range    Sodium 137 135 - 145 mmol/L    Potassium 4.1 3.4 - 5.3 mmol/L    Carbon Dioxide (CO2) 20 (L) 22 - 29 mmol/L    Anion Gap 11 7 - 15 mmol/L    Urea Nitrogen 4.5 (L) 6.0 - 20.0 mg/dL    Creatinine 0.62 0.51 - 0.95 mg/dL    GFR Estimate >90 >60 mL/min/1.73m2    Calcium 9.0 8.8 - 10.4 mg/dL    Chloride  106 98 - 107 mmol/L    Glucose 83 70 - 99 mg/dL    Alkaline Phosphatase 140 40 - 150 U/L    AST 32 0 - 45 U/L    ALT 39 0 - 50 U/L    Protein Total 6.6 6.4 - 8.3 g/dL    Albumin 3.1 (L) 3.5 - 5.2 g/dL    Bilirubin Total 0.3 <=1.2 mg/dL       ASSESSMENT/PLAN:   34 year old  at 33w1d gestation presenting to labor & delivery for worsening lower abdominal pain nausea and vomiting.  Prenatal course has been complicated by possible gestational diabetes (3-hour GTT has not tolerated), chronic pelvic pain, gestational hypertension and fibroids s/p myomectomy.  Patient also had chlamydia earlier this pregnancy, which was treated and repeat testing was negative on .  Patient has history of  birth at 22 weeks in .  Presented with lower abdominal pain nausea and vomiting.  Differential diagnosis at the time of presentation included UTI, Pre-E, gastroenteritis, MSK pelvic pain, or  labor.  Initial plan was to hydrate and start workup including CMP and UA.  However, she began having regular contractions while in triage.  Cervical check showed she was 1.5 cm dilated.  Given her OB history and current symptoms, the case was discussed with the OB provider Dr. Vinson and  labor management was started.   -OB consulted, appreciate     -  labor management     - Check cervix  - LFTs return to baseline  - UA pending  - CBC, type and screen      Options for treatment and follow-up care were reviewed with the patient and/or guardian. Pt and/or guardian engaged in the decision making process and verbalized understanding of the options discussed and agreed with the final plan.    Precepted today with: MD Brittany Lemus MBBS  [facility]

## 2025-06-07 NOTE — PROGRESS NOTES
Dr Elliott in to see patient.  PICC here to place IV.  Dr Skinner requesting Dr Vinson to consult.      Patient sleeping at times quietly, then wakes up writhing, then goes back to sleep.

## 2025-06-07 NOTE — PROGRESS NOTES
"Patient reports headache 10/10 with photosensitivity.  Contractions palpate mild, patient notices and breathes through some of them.  Assistance provided to get patient up to bathroom for urine for UA as ordered.  Patient slow and painful, but able to ambulate with SBA and encouragement.  She reports her \"whole lower body hurts.\" Dr Skinner notified of findings and patient complaints.  Tylenol order obtained.  Urine dark and sent for UA, order for fluid bolus.  EFM as able, difficulty with clear tracing due to maternal habitus.    BP elevated in 150s/90s upon my assessment.  Patient reports headache.  Ordered labetalol but reports she hasn't been taking due to it making her feel lightheaded.  MD notified of Bps.     Difficulty with IV start, PICC messaged for requested IV.  MD notified of status.    " 20

## 2025-06-08 ENCOUNTER — APPOINTMENT (OUTPATIENT)
Dept: ULTRASOUND IMAGING | Facility: HOSPITAL | Age: 35
End: 2025-06-08
Attending: OBSTETRICS & GYNECOLOGY
Payer: COMMERCIAL

## 2025-06-08 ENCOUNTER — HOSPITAL ENCOUNTER (OUTPATIENT)
Facility: HOSPITAL | Age: 35
Discharge: HOME | End: 2025-06-08
Attending: FAMILY MEDICINE | Admitting: FAMILY MEDICINE
Payer: COMMERCIAL

## 2025-06-08 VITALS
DIASTOLIC BLOOD PRESSURE: 75 MMHG | RESPIRATION RATE: 20 BRPM | HEART RATE: 117 BPM | OXYGEN SATURATION: 97 % | SYSTOLIC BLOOD PRESSURE: 139 MMHG | TEMPERATURE: 97.8 F

## 2025-06-08 VITALS — DIASTOLIC BLOOD PRESSURE: 66 MMHG | SYSTOLIC BLOOD PRESSURE: 138 MMHG

## 2025-06-08 LAB — GLUCOSE BLDC GLUCOMTR-MCNC: 134 MG/DL (ref 70–99)

## 2025-06-08 PROCEDURE — 250N000011 HC RX IP 250 OP 636: Mod: JZ

## 2025-06-08 PROCEDURE — 76815 OB US LIMITED FETUS(S): CPT

## 2025-06-08 PROCEDURE — 76805 OB US >/= 14 WKS SNGL FETUS: CPT

## 2025-06-08 PROCEDURE — 250N000011 HC RX IP 250 OP 636: Performed by: FAMILY MEDICINE

## 2025-06-08 PROCEDURE — 99239 HOSP IP/OBS DSCHRG MGMT >30: CPT | Mod: GC

## 2025-06-08 PROCEDURE — 96372 THER/PROPH/DIAG INJ SC/IM: CPT | Performed by: FAMILY MEDICINE

## 2025-06-08 PROCEDURE — 250N000013 HC RX MED GY IP 250 OP 250 PS 637: Performed by: OBSTETRICS & GYNECOLOGY

## 2025-06-08 PROCEDURE — 250N000013 HC RX MED GY IP 250 OP 250 PS 637

## 2025-06-08 PROCEDURE — G0463 HOSPITAL OUTPT CLINIC VISIT: HCPCS

## 2025-06-08 RX ORDER — NIFEDIPINE 20 MG/1
20 CAPSULE ORAL EVERY 6 HOURS
Qty: 2 CAPSULE | Refills: 0 | Status: SHIPPED | OUTPATIENT
Start: 2025-06-08 | End: 2025-06-10

## 2025-06-08 RX ORDER — HYDROXYZINE HYDROCHLORIDE 50 MG/1
50 TABLET, FILM COATED ORAL
Qty: 14 TABLET | Refills: 0 | Status: SHIPPED | OUTPATIENT
Start: 2025-06-08 | End: 2025-06-10

## 2025-06-08 RX ORDER — MORPHINE SULFATE 2 MG/ML
1 INJECTION, SOLUTION INTRAMUSCULAR; INTRAVENOUS
Status: COMPLETED | OUTPATIENT
Start: 2025-06-08 | End: 2025-06-08

## 2025-06-08 RX ORDER — LIDOCAINE 40 MG/G
CREAM TOPICAL
Status: DISCONTINUED | OUTPATIENT
Start: 2025-06-08 | End: 2025-06-09 | Stop reason: HOSPADM

## 2025-06-08 RX ORDER — HYDRALAZINE HYDROCHLORIDE 20 MG/ML
10 INJECTION INTRAMUSCULAR; INTRAVENOUS
Status: DISCONTINUED | OUTPATIENT
Start: 2025-06-08 | End: 2025-06-08 | Stop reason: HOSPADM

## 2025-06-08 RX ORDER — SODIUM CHLORIDE, SODIUM LACTATE, POTASSIUM CHLORIDE, CALCIUM CHLORIDE 600; 310; 30; 20 MG/100ML; MG/100ML; MG/100ML; MG/100ML
10-125 INJECTION, SOLUTION INTRAVENOUS CONTINUOUS
Status: DISCONTINUED | OUTPATIENT
Start: 2025-06-08 | End: 2025-06-08 | Stop reason: HOSPADM

## 2025-06-08 RX ORDER — LABETALOL HYDROCHLORIDE 5 MG/ML
20-80 INJECTION, SOLUTION INTRAVENOUS EVERY 10 MIN PRN
Status: DISCONTINUED | OUTPATIENT
Start: 2025-06-08 | End: 2025-06-08 | Stop reason: HOSPADM

## 2025-06-08 RX ORDER — CLOTRIMAZOLE 1 %
1 CREAM WITH APPLICATOR VAGINAL AT BEDTIME
Qty: 1 G | Refills: 0 | Status: SHIPPED | OUTPATIENT
Start: 2025-06-08 | End: 2025-06-10

## 2025-06-08 RX ORDER — LIDOCAINE 40 MG/G
CREAM TOPICAL
Status: DISCONTINUED | OUTPATIENT
Start: 2025-06-08 | End: 2025-06-08 | Stop reason: HOSPADM

## 2025-06-08 RX ORDER — BETAMETHASONE SODIUM PHOSPHATE AND BETAMETHASONE ACETATE 3; 3 MG/ML; MG/ML
12 INJECTION, SUSPENSION INTRA-ARTICULAR; INTRALESIONAL; INTRAMUSCULAR; SOFT TISSUE ONCE
Status: COMPLETED | OUTPATIENT
Start: 2025-06-08 | End: 2025-06-08

## 2025-06-08 RX ADMIN — DOCUSATE SODIUM 100 MG: 100 CAPSULE, LIQUID FILLED ORAL at 08:41

## 2025-06-08 RX ADMIN — MORPHINE SULFATE 1 MG: 2 INJECTION, SOLUTION INTRAMUSCULAR; INTRAVENOUS at 04:02

## 2025-06-08 RX ADMIN — DOCUSATE SODIUM 100 MG: 100 CAPSULE, LIQUID FILLED ORAL at 00:43

## 2025-06-08 RX ADMIN — NIFEDIPINE 20 MG: 10 CAPSULE ORAL at 08:41

## 2025-06-08 RX ADMIN — BETAMETHASONE SODIUM PHOSPHATE AND BETAMETHASONE ACETATE 12 MG: 3; 3 INJECTION, SUSPENSION INTRA-ARTICULAR; INTRALESIONAL; INTRAMUSCULAR at 21:28

## 2025-06-08 RX ADMIN — CLOTRIMAZOLE 1 APPLICATORFUL: 1 CREAM VAGINAL at 01:04

## 2025-06-08 RX ADMIN — NIFEDIPINE 20 MG: 10 CAPSULE ORAL at 00:43

## 2025-06-08 ASSESSMENT — ACTIVITIES OF DAILY LIVING (ADL)
ADLS_ACUITY_SCORE: 23
ADLS_ACUITY_SCORE: 46
ADLS_ACUITY_SCORE: 23

## 2025-06-08 NOTE — PROGRESS NOTES
Brief update:     Patient is now admitted for  labor management. OB consulted, plan for ultrasound to check for degenerating fibroids.  RN noted white clumpy discharge, wet prep positive for yeast.  Will start topical treatment.    - Continue  labor management as per OB  - Topical clotrimazole x 7 days  - Hydroxyzine as needed for sleep    MILAGRO Reardon  [facility]

## 2025-06-08 NOTE — PROGRESS NOTES
"Patient reports constant pain of 10/10 when Dr Vinson in to evaluate.  Oral 50 mg Atarax given.  Upon recheck patietn resting on an off and reports \"constant abdominal pain 8/10\".  Headache pain 4/10.  Clarified constant vs intermittent pain with contractions as patient breathing increased with contractions but appears to rest well otherwise.      Dr Vinson reviewed plan of care with RN.  Possible recheck of SVE later.  Dr Vinson updating Dr Elliott.      RN as bedside and patient resting at current.    "

## 2025-06-08 NOTE — PROGRESS NOTES
In with Dr Vinson to see patient around 10 pm with recheck SVE and review of plan of care.  SVE unchanged, will continue to monitor.  No orders at this time for wet prep results.  Will defer to primary for treatment on yeast.  Dr Vinson discussed plan with patient.  Patient complains of pain just to move in the bed.      Dr Brody carrasuqillo messaged around 2230 for further overnight orders and treatment.      Patient appears to rest comfortably, but when awoken for adjustment of fetal monitoring, she writhes in pain.  She will not point to or describe specific pains.  She reports the pain is constant but it appears intermittent with occasional contractions the feels.      2300 Message back from Dr Skinner with new orders.   2330 Report to SATISH RING.  Notified of new orders recently placed by MD.

## 2025-06-08 NOTE — DISCHARGE INSTRUCTIONS
Please return to [facility] at 5:30pm for second dose of betamethasone.     Learning About When to Call Your Doctor During Pregnancy (After 20 Weeks)  Overview  It's common to have concerns about what might be a problem when you're pregnant. Most pregnancies don't have any serious problems. But it's still important to know when to call your doctor if you have certain symptoms or signs of labor.  These are general suggestions. Your doctor may give you some more information about when to call.  When to call your doctor (after 20 weeks)  Call 911  anytime you think you may need emergency care. For example, call if:  You have severe vaginal bleeding. This means you are soaking through a pad each hour for 2 or more hours.  You have sudden, severe pain in your belly.  You have chest pain, are short of breath, or cough up blood.  You passed out (lost consciousness).  You have a seizure.  You see or feel the umbilical cord.  You think you are about to deliver your baby and can't make it safely to the hospital or birthing center.  Call your doctor now or seek immediate medical care if:  You have vaginal bleeding.  You have belly pain.  You have a fever.  You are dizzy or lightheaded, or you feel like you may faint.  You have signs of a blood clot in your leg (called a deep vein thrombosis), such as:  Pain in the calf, back of the knee, thigh, or groin.  Swelling in your leg or groin.  A color change on the leg or groin. The skin may be reddish or purplish, depending on your usual skin color.  You have symptoms of preeclampsia, such as:  Sudden swelling of your face, hands, or feet.  New vision problems (such as dimness, blurring, or seeing spots).  A severe headache.  You have a sudden release of fluid from your vagina. (You think your water broke.)  You've been having regular contractions for an hour. This means that you've had at least 6 contractions within 1 hour, even after you change your position and drink  "fluids.  You notice that your baby has stopped moving or is moving less than normal.  You have signs of heart failure, such as:  New or increased shortness of breath.  New or worse swelling in your legs, ankles, or feet.  Sudden weight gain, such as more than 2 to 3 pounds in a day or 5 pounds in a week.  Feeling so tired or weak that you cannot do your usual activities.  You have symptoms of a urinary tract infection. These may include:  Pain or burning when you urinate.  A frequent need to urinate without being able to pass much urine.  Pain in the flank, which is just below the rib cage and above the waist on either side of the back.  Blood in your urine.  Watch closely for changes in your health, and be sure to contact your doctor if:  You have vaginal discharge that smells bad.  You feel sad, anxious, or hopeless for more than a few days.  You have skin changes, such as a rash, itching, or a yellow color to your skin.  You have other concerns about your pregnancy.  If you have labor signs at 37 weeks or more  If you have signs of labor at 37 weeks or more, your doctor may tell you to call when your labor becomes more active. Symptoms of active labor include:  Contractions that are regular.  Contractions that are less than 5 minutes apart.  Contractions that are hard to talk through.  Follow-up care is a key part of your treatment and safety. Be sure to make and go to all appointments, and call your doctor if you are having problems. It's also a good idea to know your test results and keep a list of the medicines you take.  Where can you learn more?  Go to https://www.[employer].net/patiented  Enter N531 in the search box to learn more about \"Learning About When to Call Your Doctor During Pregnancy (After 20 Weeks).\"  Current as of: April 30, 2024  Content Version: 14.4    6881-2945 [employer].   Care instructions adapted under license by your healthcare professional. If you have questions about a " medical condition or this instruction, always ask your healthcare professional. [employer] disclaims any warranty or liability for your use of this information.

## 2025-06-08 NOTE — PROCEDURES
NST Procedure note    Date: 2025    Indication: abdominal pain,  contractions    Procedure: Fetal non-stress test    Results: Reactive    Wanda Vinson MD  2025 7:12 PM

## 2025-06-08 NOTE — PLAN OF CARE
"Goal Outcome Evaluation:      Plan of Care Reviewed With: patient    Overall Patient Progress: improvingOverall Patient Progress: improving       Pt had a severe /85 and serial BP's after that with one more elevated but not in severe range the rest WDL. Hypertensive order set placed by Dr Vinson. Pt stopped gabriel around midnight, but pain to lateral part of the abdomen and radiating to front of legs continuing. Resident MD contacted and morphine was ordered. IHOB debriefed and stated an ultrasound would be ordered today to rule out degenerative fibroids. Pt rested most of shift, but when awaking in 5/10 pain. Pt had no c/o HA, back pain, n/v. Pt able to void and drink PO throughout shift. Fasting . Pt stated emesis around 0630. Pt called nurse to room around 0650 to state she has \"wet the bed\" the bed and floor were saturated, likely over 500 mL or more of urine was noted and bed was stripped and cleaned. Oncoming SATISH ACOSTA, given report.        "

## 2025-06-08 NOTE — PROGRESS NOTES
Patient denies having any change in vaginal discharge.  Continues to contract after Nifedipine initial dosing.  Patient very uncomfortable with vaginal exams.  RN again suggested to MD for wet prep.  Order obtained for wet prep from Dr Elliott.  Collected by RN, noted to have white chunky discharge,  sent to lab, awaiting results.

## 2025-06-08 NOTE — PROCEDURES
[facility]    In to see patient. She is sitting at bedside and requesting to eat.  No longer having pain.  Lots of good fetal movement.  No vaginal  bleeding.   Hoping to go home.    US ordered earlier to assess the size of the fibroids- largest was 3.5 cm on last US.    BMZ #2 due at 1743 today.  Discussed if US is normal and pain has resolved, then okay to discharge and come back for Z at 1730 today..  Would recommend she also be discharged with PO nifedipine to complete at 48 hour window.    Remainder of care per primary team.  Cecilia Aguirre MD 6/8/2025 9:13 AM

## 2025-06-08 NOTE — PROGRESS NOTES
Update    In to reassess patient at 2145 and repeat SVE showed no cervical change. She appear more comfortable. Contractions appear to be spacing.     Plan to admit overnight. Continue oral nifedipine per PTL protocol. Plan 2nd BMZ tomorrow.     Discussed possible ultrasound in assess for degenerating fibroids and further assess if pain is over fibroids.     Wanda Vinson MD

## 2025-06-08 NOTE — PROGRESS NOTES
Upon entering room, patient was asleep. She reports that she is only currently feeling pain in her stomach due to nausea and hunger. Dr. Owens updated and requested to ask IH about US and Nifedipine. Dr. Aguirre updated via phone, plan to continue the Nifedipine until second dose of betamethasone I given. Orders received for US.

## 2025-06-08 NOTE — PROGRESS NOTES
Patient seen this evening.    Stated headache has resolved    Still feels painful contractions.  Is hungry as well.  Overall slightly less discomfort.    Informed that we would hold of eating while still dealing with contractions and concerns of  labor.    Will admit patient, continue nifedipine per protocol orally.  Anticipate for another betamethasone per protocol.  Watch closely.      OB continuing to follow and eventual delivery plan is for  section.

## 2025-06-09 DIAGNOSIS — O60.03 PRETERM LABOR IN THIRD TRIMESTER WITHOUT DELIVERY: ICD-10-CM

## 2025-06-09 RX ORDER — NIFEDIPINE 20 MG/1
20 CAPSULE ORAL EVERY 6 HOURS
Qty: 2 CAPSULE | Refills: 0 | Status: CANCELLED | OUTPATIENT
Start: 2025-06-09

## 2025-06-09 NOTE — PROGRESS NOTES
"Data: Patient presented to [facility]: 2025  9:11 PM.  Reason for maternal/fetal assessment is betamethasone injection. Patient denies uterine contractions, leaking of vaginal fluid/rupture of membranes, vaginal bleeding, abdominal pain, pelvic pressure, nausea, vomiting, headache, visual disturbances, epigastric or RUQ pain, significant edema. Patient reports fetal movement is normal. Patient is a 33w2d .  Prenatal record reviewed. Pregnancy has been complicated by obesity (pre-pregnancy BMI >=35) and fibroids.    Vital signs wnl. Support person is not present.     Orders for, \"2nd dose of betamethasone. No monitoring needed unless having contractions.\"      Betamethasone injection given.     Pt discharged home at 2230.       "

## 2025-06-10 ENCOUNTER — HOSPITAL ENCOUNTER (OUTPATIENT)
Facility: HOSPITAL | Age: 35
End: 2025-06-10
Admitting: FAMILY MEDICINE
Payer: COMMERCIAL

## 2025-06-10 ENCOUNTER — OFFICE VISIT (OUTPATIENT)
Dept: FAMILY MEDICINE | Facility: CLINIC | Age: 35
End: 2025-06-10
Payer: COMMERCIAL

## 2025-06-10 ENCOUNTER — HOSPITAL ENCOUNTER (OUTPATIENT)
Facility: HOSPITAL | Age: 35
Discharge: HOME | End: 2025-06-10
Attending: FAMILY MEDICINE | Admitting: FAMILY MEDICINE
Payer: COMMERCIAL

## 2025-06-10 VITALS
TEMPERATURE: 98.3 F | HEART RATE: 90 BPM | RESPIRATION RATE: 20 BRPM | BODY MASS INDEX: 49.13 KG/M2 | SYSTOLIC BLOOD PRESSURE: 143 MMHG | OXYGEN SATURATION: 97 % | WEIGHT: 293 LBS | DIASTOLIC BLOOD PRESSURE: 77 MMHG

## 2025-06-10 VITALS
SYSTOLIC BLOOD PRESSURE: 133 MMHG | TEMPERATURE: 97.9 F | RESPIRATION RATE: 18 BRPM | OXYGEN SATURATION: 100 % | DIASTOLIC BLOOD PRESSURE: 83 MMHG

## 2025-06-10 DIAGNOSIS — O10.919 CHRONIC HYPERTENSION AFFECTING PREGNANCY: ICD-10-CM

## 2025-06-10 DIAGNOSIS — R10.2 PELVIC PAIN IN FEMALE: Primary | ICD-10-CM

## 2025-06-10 DIAGNOSIS — B37.31 YEAST INFECTION OF THE VAGINA: ICD-10-CM

## 2025-06-10 DIAGNOSIS — O60.03 PRETERM LABOR IN THIRD TRIMESTER WITHOUT DELIVERY: ICD-10-CM

## 2025-06-10 DIAGNOSIS — O21.9 NAUSEA/VOMITING IN PREGNANCY: ICD-10-CM

## 2025-06-10 DIAGNOSIS — Z87.09 HISTORY OF ASTHMA: ICD-10-CM

## 2025-06-10 LAB
ALBUMIN UR-MCNC: NEGATIVE MG/DL
APPEARANCE UR: CLEAR
BACTERIA #/AREA URNS HPF: ABNORMAL /HPF
BILIRUB UR QL STRIP: NEGATIVE
CLUE CELLS: NORMAL
COLOR UR AUTO: ABNORMAL
CRYSTALS AMN MICRO: NORMAL
GLUCOSE UR STRIP-MCNC: NEGATIVE MG/DL
HGB UR QL STRIP: ABNORMAL
KETONES UR STRIP-MCNC: NEGATIVE MG/DL
LEUKOCYTE ESTERASE UR QL STRIP: ABNORMAL
NITRATE UR QL: NEGATIVE
PH UR STRIP: 6.5 [PH] (ref 5–7)
RBC URINE: 3 /HPF
RUPTURE OF FETAL MEMBRANES BY ROM PLUS: NEGATIVE
SP GR UR STRIP: 1.01 (ref 1–1.03)
SQUAMOUS EPITHELIAL: <1 /HPF
TRICHOMONAS, WET PREP: NORMAL
UROBILINOGEN UR STRIP-MCNC: NORMAL MG/DL
WBC URINE: <1 /HPF
WBC'S/HIGH POWER FIELD, WET PREP: NORMAL
YEAST, WET PREP: NORMAL

## 2025-06-10 PROCEDURE — 87653 STREP B DNA AMP PROBE: CPT | Performed by: FAMILY MEDICINE

## 2025-06-10 PROCEDURE — 81001 URINALYSIS AUTO W/SCOPE: CPT | Performed by: FAMILY MEDICINE

## 2025-06-10 PROCEDURE — 84112 EVAL AMNIOTIC FLUID PROTEIN: CPT | Performed by: FAMILY MEDICINE

## 2025-06-10 PROCEDURE — 59025 FETAL NON-STRESS TEST: CPT | Mod: 76

## 2025-06-10 PROCEDURE — 87210 SMEAR WET MOUNT SALINE/INK: CPT | Performed by: FAMILY MEDICINE

## 2025-06-10 PROCEDURE — 99214 OFFICE O/P EST MOD 30 MIN: CPT | Mod: GC

## 2025-06-10 PROCEDURE — G0463 HOSPITAL OUTPT CLINIC VISIT: HCPCS | Mod: 25 | Performed by: FAMILY MEDICINE

## 2025-06-10 PROCEDURE — G0463 HOSPITAL OUTPT CLINIC VISIT: HCPCS | Mod: 6MC

## 2025-06-10 RX ORDER — LIDOCAINE 40 MG/G
CREAM TOPICAL
Status: DISCONTINUED | OUTPATIENT
Start: 2025-06-10 | End: 2025-06-10 | Stop reason: HOSPADM

## 2025-06-10 RX ORDER — NIFEDIPINE 20 MG/1
20 CAPSULE ORAL EVERY 6 HOURS
Qty: 5 CAPSULE | Refills: 0 | Status: SHIPPED | OUTPATIENT
Start: 2025-06-10

## 2025-06-10 RX ORDER — LABETALOL 200 MG/1
200 TABLET, FILM COATED ORAL 2 TIMES DAILY
Qty: 60 TABLET | Refills: 1 | Status: SHIPPED | OUTPATIENT
Start: 2025-06-10

## 2025-06-10 RX ORDER — ONDANSETRON 4 MG/1
4 TABLET, FILM COATED ORAL EVERY 6 HOURS PRN
Qty: 30 TABLET | Refills: 3 | Status: SHIPPED | OUTPATIENT
Start: 2025-06-10

## 2025-06-10 RX ORDER — ALBUTEROL SULFATE 90 UG/1
1-2 INHALANT RESPIRATORY (INHALATION) EVERY 4 HOURS PRN
Qty: 18 G | Refills: 11 | Status: SHIPPED | OUTPATIENT
Start: 2025-06-10

## 2025-06-10 RX ORDER — HYDROXYZINE PAMOATE 50 MG/1
50 CAPSULE ORAL
Qty: 90 CAPSULE | Refills: 1 | Status: SHIPPED | OUTPATIENT
Start: 2025-06-10

## 2025-06-10 RX ORDER — MICONAZOLE NITRATE 2 %
1 CREAM WITH APPLICATOR VAGINAL AT BEDTIME
Qty: 45 G | Refills: 0 | Status: ON HOLD | OUTPATIENT
Start: 2025-06-10 | End: 2025-06-10

## 2025-06-10 RX ORDER — FLUCONAZOLE 150 MG/1
150 TABLET ORAL ONCE
Qty: 1 TABLET | Refills: 0 | Status: SHIPPED | OUTPATIENT
Start: 2025-06-10 | End: 2025-06-10

## 2025-06-10 ASSESSMENT — ANXIETY QUESTIONNAIRES
GAD7 TOTAL SCORE: 1
5. BEING SO RESTLESS THAT IT IS HARD TO SIT STILL: NOT AT ALL
8. IF YOU CHECKED OFF ANY PROBLEMS, HOW DIFFICULT HAVE THESE MADE IT FOR YOU TO DO YOUR WORK, TAKE CARE OF THINGS AT HOME, OR GET ALONG WITH OTHER PEOPLE?: NOT DIFFICULT AT ALL
4. TROUBLE RELAXING: NOT AT ALL
7. FEELING AFRAID AS IF SOMETHING AWFUL MIGHT HAPPEN: NOT AT ALL
7. FEELING AFRAID AS IF SOMETHING AWFUL MIGHT HAPPEN: NOT AT ALL
2. NOT BEING ABLE TO STOP OR CONTROL WORRYING: NOT AT ALL
IF YOU CHECKED OFF ANY PROBLEMS ON THIS QUESTIONNAIRE, HOW DIFFICULT HAVE THESE PROBLEMS MADE IT FOR YOU TO DO YOUR WORK, TAKE CARE OF THINGS AT HOME, OR GET ALONG WITH OTHER PEOPLE: NOT DIFFICULT AT ALL
GAD7 TOTAL SCORE: 1
6. BECOMING EASILY ANNOYED OR IRRITABLE: NOT AT ALL
GAD7 TOTAL SCORE: 1
3. WORRYING TOO MUCH ABOUT DIFFERENT THINGS: SEVERAL DAYS
1. FEELING NERVOUS, ANXIOUS, OR ON EDGE: NOT AT ALL

## 2025-06-10 ASSESSMENT — PATIENT HEALTH QUESTIONNAIRE - PHQ9
SUM OF ALL RESPONSES TO PHQ QUESTIONS 1-9: 8
10. IF YOU CHECKED OFF ANY PROBLEMS, HOW DIFFICULT HAVE THESE PROBLEMS MADE IT FOR YOU TO DO YOUR WORK, TAKE CARE OF THINGS AT HOME, OR GET ALONG WITH OTHER PEOPLE: SOMEWHAT DIFFICULT
SUM OF ALL RESPONSES TO PHQ QUESTIONS 1-9: 8

## 2025-06-10 ASSESSMENT — ACTIVITIES OF DAILY LIVING (ADL)
ADLS_ACUITY_SCORE: 23

## 2025-06-10 NOTE — PROGRESS NOTES
Critical Vital Report    Did patient have a critical vital during today's visit? Yes  Which vital is reporting as critical?: Blood Pressure    I personally notified the following: Provider    Action(s) taken: I left room and notified provider personally      BP Readings from Last 3 Encounters:   06/10/25 (!) 143/77   06/08/25 138/66   06/08/25 139/75     Prior to immunization administration, verified patients identity using patient s name and date of birth. Please see Immunization Activity for additional information.     Screening Questionnaire for Adult Immunization    Are you sick today?   No   Do you have allergies to medications, food, a vaccine component or latex?   No   Have you ever had a serious reaction after receiving a vaccination?   No   Do you have a long-term health problem with heart, lung, kidney, or metabolic disease (e.g., diabetes), asthma, a blood disorder, no spleen, complement component deficiency, a cochlear implant, or a spinal fluid leak?  Are you on long-term aspirin therapy?   No   Do you have cancer, leukemia, HIV/AIDS, or any other immune system problem?   No   Do you have a parent, brother, or sister with an immune system problem?   No   In the past 3 months, have you taken medications that affect  your immune system, such as prednisone, other steroids, or anticancer drugs; drugs for the treatment of rheumatoid arthritis, Crohn s disease, or psoriasis; or have you had radiation treatments?   No   Have you had a seizure, or a brain or other nervous system problem?   No   During the past year, have you received a transfusion of blood or blood    products, or been given immune (gamma) globulin or antiviral drug?   No   For women: Are you pregnant or is there a chance you could become       pregnant during the next month?   Yes   Have you received any vaccinations in the past 4 weeks?   No     Immunization questionnaire was positive for at least one answer.  Notified   Augie.      Patient instructed to remain in clinic for 15 minutes afterwards, and to report any adverse reactions.     Screening performed by Fernando Urbina on 6/10/2025 at 11:10 AM.

## 2025-06-10 NOTE — PROGRESS NOTES
"Dr. Elliott paged, \"Pt arrival via ambulance in [facility] room 01. MRN [id number]. , 33+4. She thinks her water broke around 1330 and is reporting contractions every 2-3 minutes lasting 30 seconds. Of note, she was here over the weekend and treated for a yeast infection. She was not able to  the medication to treat at home. Are you able to assess her?\"    MD will come and assess patient.     Addendum: in the meantime, will do ROM+, fern, wet prep, GBS swab and SVE.  "

## 2025-06-10 NOTE — PROVIDER NOTIFICATION
06/10/25 1806   Provider Notification   Provider Name/Title Dr. Jesus   Method of Notification Electronic Page   Request Evaluate-Remote   Notification Reason Status Update     Dr. Jesus notified that urine results came back. Asked to review fetal tracing before taking off the monitor.

## 2025-06-10 NOTE — PROGRESS NOTES
Assessment & Plan  Lucinda Ji is a 34 year old , at 33w4d weeks of pregnancy with ASCENCION of 2025 by 8 week ultrasound.  Patient's history is high risk for the following reasons: .     # High risk concerns (medical problems, high risk surgical/obstetric history, high risk social situation, etc):   - In addition to routine prenatal care, patient will need Regular intrapartum monitoring.  Already schedule.    - Problem list reviewed and updated.  - Contraceptive plan: Not discussed    Ivonne was seen today for prenatal care and recheck medication.    Diagnoses and all orders for this visit:    Teen pregnancy care  Will do Tdap today.  Needs appointment with Dr. Hutchison.  At next visit, will discuss postpartum depression, contraception, breast-feeding, and plan for after delivery.  -     TDAP 10-64Y (ADACEL,BOOSTRIX)     labor in third trimester without delivery  Recent evaluation at [facility].  Received betamethasone.  I am having trouble confirming she did go back in for the second dose -will see if Effie can help with this.  She was unable to  [place] nifedipine so I did restart this today, and we will do a 2-day course.  Evidence of contractions today.  I did not check her cervix again.  -     NIFEdipine (PROCARDIA) 20 MG capsule; Take 1 capsule (20 mg) by mouth every 6 hours.    # Chronic hypertension affecting pregnancy  Initial blood pressure high today.  Blood pressure remains borderline elevated at home.  Did not take her blood pressure medicine today.  I will increase her labetalol to 200 mg twice daily, as she is not having low blood pressures later.  Will uptitrate after she is finished the home nifedipine.  Re-emphasized checking blood pressure.     -     labetalol (NORMODYNE) 200 MG tablet; Take 1 tablet (200 mg) by mouth 2 times daily.    # Vaginal infections  Chlamydia infection in May.  Treated with azithromycin.  Retest at last visit was negative.  Denies sexual activity since last  infection.   Yeast infection with recent hospitalization.  Unable to  medications.  Resent in vaginal miconazole and will do a one-time dose of Diflucan.  -     fluconazole (DIFLUCAN) 150 MG tablet; Take 1 tablet (150 mg) by mouth once for 1 dose.  -     miconazole (MICONAZOLE 7) 2 % cream; Place 1 applicator vaginally at bedtime.     # Intrauterine fibroids  Consult placed to speak with [facility] about  - Effie to reach out later this week to help her schedule with Dr. Hutchison.   Plan for delivery via  between 36-37 weeks.     # History of recurrent miscarriage with term stillbirth  Scheduled for weekly BPP's starting  at 32 weeks.  Normal BPP on . NST today appropriate.      # Failed 1 hour glucose screen, unable to tolerate 3-hour test with elevated blood sugars while inpatient  Not checking sugars at home.  Will get her niece to come help with these checks.  Most recent hospital checks were only mildly elevated.  -Home checks, will start with 2 times daily prior to meals     # Polyhydramnios  Follow BPP's     # Elevated LFTs  Recent LFTs were negative.  Will do right upper quadrant ultrasound in tandem with her next BPP.  -Right upper quadrant ultrasound ordered.     # Hyperemesis  Zofran refilled, helping, taking 1x per day.   This is still problematic.  -     ondansetron (ZOFRAN) 4 MG tablet; Take 1 tablet (4 mg) by mouth every 6 hours as needed for nausea or vomiting.         # Morbid obesity  Weight gain has been appropriate for goal.     # Chronic pelvic pain  Pelvic pain in female  Pain improved today.  Will prescribe hydroxyzine to take at night.  Use the bathtub in [employer] ([place]  Has upcoming pelvic PT appointment on .  -     hydrOXYzine (VISTARIL) 50 MG capsule; Take 1 capsule (50 mg) by mouth nightly as needed for itching. Can take 1 additional pill if needed.     # History of anxiety and depression  Mood improved with improvement in pain.    #History of asthma  -      albuterol (PROAIR HFA/PROVENTIL HFA/VENTOLIN HFA) 108 (90 Base) MCG/ACT inhaler; Inhale 1-2 puffs into the lungs every 4 hours as needed for shortness of breath or wheezing.       # Challenging social situation with restricted insurance status and limited transportation  Weekly visits until delivery, with frequent blood pressure and glucose checks here at the clinic.  Will continue to use teach back regularly to ensure she is understanding of risk factors, warning signs, and when to call the clinic.     The longitudinal plan of care for the diagnosis(es)/condition(s) as documented were addressed during this visit. Due to the added complexity in care, I will continue to support Ivonne in the subsequent management and with ongoing continuity of care.    Weight gain adequate: 5.171 kg (11 lb 6.4 oz) to date, out of recommended total of 11-20 lbs (pregravid BMI >30)    Patient Instructions   Effie will call to help get scheduled with Dr. Hutchison.     Keep checking your blood pressures.    Call if the blood pressure is higher than 150 on the top.    Watching for headaches, chest pain, worsening shortness of breath.     --Finish taking the Nifedipine:  Take 2 pills of the Nifedipine today (once in the afternoon, once before bed)  Take 3 pills tomorrow (morning, afternoon, bedtime)  While taking the Nifedipine, continue to take the 100mg of Labetalol (what you already have at home)  AFTER THAT - start taking the labetalol 200mg 2x daily.      Physical therapy for the pelvis coming up.     Talk to your niece about helping with the sugars.     Bring in a list of blood pressures and blood sugars.  Check 2x per day before you eat.      Refill nausea medication    For yeast, take the pill once - also sent in prescriptions for cream if this does not work.    Take the hydroxyzine at night to help with pain and sleep.      Tdap shot today.      Refill asthma medication    Return to clinic in 1 weeks.    Joie Ghosh,  "MD    Subjective  Concerns:   Patient was recently seen for evaluation of contractions and  labor at [facility].  Received betamethasone.  Was started on nifedipine.  Was diagnosed with a yeast infection, but did not get a chance to start the cream that was prescribed.    She received multiple medications for pain while she was there including morphine and hydroxyzine.  Has been using Eps[place] salt baths as well, and pelvic pain is still bothersome but is somewhat better.  She does have a appointment scheduled on  for pelvic physical therapy.    Continues to feel baby move.  No vaginal discharge, no leakage of fluid, no bleeding.  She denies headaches, vision changes, chest pain.  Has been feeling more short of breath with activity.  Is requesting a refill on her albuterol inhaler.  Needs to use it when she is walking stairs or further distances.    She reports that her blood pressure is high today because she has been arguing with her baby's father.  Has been checking blood pressures at home and reports that they are good.  She describes \"good\" as the upper 130s, lower 140s.  Nothing above 150 systolic.  Blood pressure tends to go down if she sits for a full 5 minutes.  Recheck in clinic is in that similar range.  I also rechecked it, and blood pressure was 142/80.    She has not been checking blood sugars.  Cannot figure out how to make the lancets work.  Does have the lancets, test strips, and glucometer at home and niece who is going to come over and show her how to do them.    She will bring a list of blood pressures and blood sugars to her next follow-up visit.    Graduation went well for her older daughter, and she feels good about that.    We discussed important next steps for her delivery plan.  Will have Effie call her to schedule an appointment with Dr. Garcia for her  consult.  She is open to doing her tetanus shot today.  Has follow-up monitoring scheduled at [facility] weekly, " as well as weekly visits with me.    Most Recent Immunizations   Administered Date(s) Administered    TDAP (Adacel,Boostrix) 06/10/2025    TDAP Vaccine (Boostrix) 2014   Deferred Date(s) Deferred    COVID-19 12+ (Pfizer) 05/15/2025    Hepatitis B, Adult (Energix-B/Recombivax HB) 05/15/2025    Pneumococcal 20 valent Conjugate (Prevnar 20) 05/15/2025    TDAP (Adacel,Boostrix) 05/15/2025       ROS:  No - Headache  No - Changes in vision  No - Chest Pain  YES - Shortness of Breath  YES - Nausea   No - Vomiting  No - Abdominal pain   YES - Contractions  No - Dysuria   No - Vaginal Discharge    No - Vaginal bleeding   No - Loss of Fluid   No - Extremity swelling   Present - Fetal movement     Going to [facility]? Yes    Patient Active Problem List   Diagnosis    Anxiety and depression    Class 3 severe obesity due to excess calories without serious comorbidity with body mass index (BMI) of 40.0 to 44.9 in adult (H)    Mild intermittent asthma in adult without complication    Hiatal hernia    Uterine leiomyoma    History of stillbirth in pregnant patient in first trimester, antepartum    Pregnancy w/ hx of uterine myomectomy    Monoallelic mutation of BRCA2 gene    Nausea and vomiting in pregnancy    Supervision of high risk pregnancy in third trimester    Chronic hypertension affecting pregnancy    Encounter for triage in pregnant patient    Chlamydia trachomatis infection in mother during third trimester of pregnancy     labor       Lucinda Ji speaks English so an  was not used today.    Guidance:  warning signs/PIH    Objective  BP (!) 143/77   Pulse 90   Temp 98.3  F (36.8  C) (Oral)   Resp 20   Wt (!) 138.1 kg (304 lb 6.4 oz)   LMP 10/20/2024 (Approximate)   SpO2 97%   BMI 49.13 kg/m    No distress.  Gravid abdomen.  .  Fundal height 34.5 cm.  trace edema.        6/10/2019     4:42 PM 2024     1:35 PM 6/10/2025    10:21 AM   PHQ   PHQ-9 Total Score 16 14 8    Q9: Thoughts of better  off dead/self-harm past 2 weeks Not at all  Not at all Not at all        Proxy-reported    Data saved with a previous flowsheet row definition        Results  Blood type: B POS  No results found for any visits on 06/10/25.   Answers submitted by the patient for this visit:  Patient Health Questionnaire (Submitted on 6/10/2025)  If you checked off any problems, how difficult have these problems made it for you to do your work, take care of things at home, or get along with other people?: Somewhat difficult  PHQ9 TOTAL SCORE: 8  Patient Health Questionnaire (G7) (Submitted on 6/10/2025)  SANDRA 7 TOTAL SCORE: 1

## 2025-06-10 NOTE — PROGRESS NOTES
"Data: [facility]: 6/10/2025  3:12 PM via ambulance.  Reason for maternal/fetal assessment is leaking vaginal fluid and abdominal pain. Patient reports that she thinks her water broke around 1330. She was out running errands and felt a \"gush\" and then pressure in her lower abdomen. No visible fluid on patients underwear or clothes. Reporting that she is having uterine contractions every 2-3 minutes that last 30 seconds. Patient denies vaginal bleeding, nausea, vomiting, headache, visual disturbances, epigastric or RUQ pain. Patient reports fetal movement is normal. Patient is a 33w4d .  Prenatal record reviewed. Pregnancy has been complicated by diabetes, hypertension, and obesity (pre-pregnancy BMI >=35).    Vital signs wnl. Support person is not present.     Action: Verbal consent for EFM. Triage assessment completed.     Response: Patient verbalized agreement with plan. Will contact Dr. Elliott with update and further orders.     " Pt calling would like to have an order to check urine in clinic instead of going off of home test at this time.  If test is positive would like referral to urology.  If needs medication will take the medication that was ordered yesterday.    Pt can be reached at [phone].    Patsy VIERA RN  EP Triage

## 2025-06-10 NOTE — PATIENT INSTRUCTIONS
Effie will call to help get scheduled with Dr. Hutchison.     Keep checking your blood pressures.    Call if the blood pressure is higher than 150 on the top.    Watching for headaches, chest pain, worsening shortness of breath.     --Finish taking the Nifedipine:  Take 2 pills of the Nifedipine today (once in the afternoon, once before bed)  Take 3 pills tomorrow (morning, afternoon, bedtime)  While taking the Nifedipine, continue to take the 100mg of Labetalol (what you already have at home)  AFTER THAT - start taking the labetalol 200mg 2x daily.      Physical therapy for the pelvis coming up.     Talk to your niece about helping with the sugars.     Bring in a list of blood pressures and blood sugars.  Check 2x per day before you eat.      Refill nausea medication    For yeast, take the pill once - also sent in prescriptions for cream if this does not work.    Take the hydroxyzine at night to help with pain and sleep.      Tdap shot today.      Refill asthma medication

## 2025-06-10 NOTE — PROGRESS NOTES
OBSTETRICS TRIAGE ASSESSMENT NOTE    Lucinda Ji is a 34 year old  at 33w4d gestation based on 8 week ultrasound who has presented to maternity care for further evaluation of rupture of membranes.     Was out running errands when she thinks her water broke around 1:30pm. No visible fluid on underwear or clothes.     No bleeding. Feeling contractions every 2-3 minutes she says. Baby is moving normally. No other symptoms. Denies dysuria.     Was here a few days ago and prescribed treatment for a yeast infection. Did receive 2 doses of betamethasone.     Says has been unable to  some of her medications due to restricted provider/pharmacy and pharmacy being out of stock. Unable to recall all of the medication names.     PRENATAL CARE  Seen by Dr. Ghosh at [facility].    - chronic HTN - on labetalol   -  labor - prescribed nifedipine x2d  - chlamydia, resolved   - intrauterine fibroids with hx myomectomy - needs C/S  - hx of recurrent miscarriage with term stillbirth  - failed 1 hour GTT, unable to tolerate 3 hr GTT  - polyhydramnios  - hypermesis - prescribed Zofran  - morbid obesity       PAST MEDICAL HISTORY  Past Medical History:   Diagnosis Date    Asthma     Fetal demise, greater than 22 weeks, antepartum, single gestation 10/29/2020    Gastritis     GBS bacteriuria 08/10/2020    Hypertension     Obesity        PAST SURGICAL HISTORY   Past Surgical History:   Procedure Laterality Date    DAVINCI MYOMECTOMY N/A 2022    Procedure: ROBOTIC MYOMECTOMY;  Surgeon: Indra Reis MD;  Location: [facility] OR    LYSIS, ADHESIONS, ROBOT-ASSISTED, LAPAROSCOPIC, USING DA BROOKE XI N/A 2022    Procedure: LYSIS, ADHESIONS, ROBOT-ASSISTED, LAPAROSCOPIC, USING DA BROOKE XI;  Surgeon: Indra Reis MD;  Location: [facility] OR       MEDICATIONS  No current facility-administered medications for this encounter.    Current Outpatient Medications:     acetaminophen  (TYLENOL) 325 MG tablet, Take 1-2 tablets (325-650 mg) by mouth every 6 hours as needed for mild pain., Disp: 100 tablet, Rfl: 2    albuterol (PROAIR HFA/PROVENTIL HFA/VENTOLIN HFA) 108 (90 Base) MCG/ACT inhaler, Inhale 1-2 puffs into the lungs every 4 hours as needed for shortness of breath or wheezing., Disp: 18 g, Rfl: 11    famotidine (PEPCID) 20 MG tablet, Take 1 tablet (20 mg) by mouth 2 times daily., Disp: 90 tablet, Rfl: 2    labetalol (NORMODYNE) 200 MG tablet, Take 1 tablet (200 mg) by mouth 2 times daily., Disp: 60 tablet, Rfl: 1    NIFEdipine (PROCARDIA) 20 MG capsule, Take 1 capsule (20 mg) by mouth every 6 hours., Disp: 5 capsule, Rfl: 0    Prenatal Vit-Fe Fumarate-FA (PRENATAL MULTIVITAMIN  WITH IRON) 28-0.8 MG TABS, Take 1 tablet by mouth daily., Disp: 100 tablet, Rfl: 3    Alcohol Swabs PADS, 1 Units 4 times daily., Disp: 120 each, Rfl: 1    blood glucose (NO BRAND SPECIFIED) lancets standard, Use to test blood sugar 4 times daily or as directed., Disp: 120 each, Rfl: 1    blood glucose (NO BRAND SPECIFIED) test strip, Use to test blood sugar 4 times daily or as directed., Disp: 120 strip, Rfl: 1    blood glucose monitoring (NO BRAND SPECIFIED) meter device kit, Use to test blood sugar 4 times daily or as directed., Disp: 1 kit, Rfl: 0    fluconazole (DIFLUCAN) 150 MG tablet, Take 1 tablet (150 mg) by mouth once for 1 dose., Disp: 1 tablet, Rfl: 0    hydrOXYzine (VISTARIL) 50 MG capsule, Take 1 capsule (50 mg) by mouth nightly as needed for itching. Can take 1 additional pill if needed., Disp: 90 capsule, Rfl: 1    ondansetron (ZOFRAN) 4 MG tablet, Take 1 tablet (4 mg) by mouth every 6 hours as needed for nausea or vomiting., Disp: 30 tablet, Rfl: 3    SOCIAL HISTORY:   Social History     Socioeconomic History    Marital status: Single     Spouse name: Not on file    Number of children: 1    Years of education: Not on file    Highest education level: Not on file   Occupational History    Occupation:  unemployed   Tobacco Use    Smoking status: Former     Types: Cigarettes    Smokeless tobacco: Never   Vaping Use    Vaping status: Never Used   Substance and Sexual Activity    Alcohol use: Not Currently     Comment: occasional    Drug use: Not Currently     Types: Marijuana     Comment: not in pregnancy    Sexual activity: Yes     Partners: Male     Birth control/protection: Condom   Other Topics Concern    Not on file   Social History Narrative    Not on file     Social Drivers of Health     Financial Resource Strain: Low Risk  (6/7/2025)    Financial Resource Strain     Within the past 12 months, have you or your family members you live with been unable to get utilities (heat, electricity) when it was really needed?: No   Food Insecurity: Low Risk  (6/7/2025)    Food Insecurity     Within the past 12 months, did you worry that your food would run out before you got money to buy more?: No     Within the past 12 months, did the food you bought just not last and you didn t have money to get more?: No   Transportation Needs: Low Risk  (6/7/2025)    Transportation Needs     Within the past 12 months, has lack of transportation kept you from medical appointments, getting your medicines, non-medical meetings or appointments, work, or from getting things that you need?: No   Recent Concern: Transportation Needs - High Risk (5/9/2025)    Transportation Needs     Within the past 12 months, has lack of transportation kept you from medical appointments, getting your medicines, non-medical meetings or appointments, work, or from getting things that you need?: Yes   Physical Activity: Not on file   Stress: Not on file   Social Connections: Not on file   Interpersonal Safety: Low Risk  (6/7/2025)    Interpersonal Safety     Do you feel physically and emotionally safe where you currently live?: Yes     Within the past 12 months, have you been hit, slapped, kicked or otherwise physically hurt by someone?: No     Within the past  12 months, have you been humiliated or emotionally abused in other ways by your partner or ex-partner?: No   Housing Stability: Low Risk  (6/7/2025)    Housing Stability     Do you have housing? : Yes     Are you worried about losing your housing?: No   Recent Concern: Housing Stability - High Risk (5/3/2025)    Housing Stability     Do you have housing? : No     Are you worried about losing your housing?: No       PHYSICAL EXAMINATION   /83   Temp 97.9  F (36.6  C) (Oral)   Resp 18   LMP 10/20/2024 (Approximate)   SpO2 100%   Gen: appears comfortable, no acute distress  Lungs: No audible wheeze, cough, or visible cyanosis.  No visible retractions or increased work of breathing.    Abdomen: Gravid, non-tender fundus  Cervix: 1.5/60%/- 2  Membranes: are intact  FHT: Category 1 tracing; baseline normal with moderate variability and accelerations, no decelerations  Contractions: irregular    LAB RESULTS  Personally reviewed.  Recent Results (from the past 24 hours)   Rupture of Fetal Membranes by ROM Plus    Collection Time: 06/10/25  3:56 PM   Result Value Ref Range    Rupture of Fetal Membranes by ROM Plus Negative Negative, Invalid, Suggest Repeat   Wet preparation    Collection Time: 06/10/25  3:56 PM    Specimen: Vagina; Swab   Result Value Ref Range    Trichomonas Absent Absent    Yeast Absent Absent    Clue Cells Absent Absent    WBCs/high power field None None   Fern Test for Rupture of Membranes    Collection Time: 06/10/25  3:57 PM   Result Value Ref Range    Fern Crystallization No ferning present No ferning present   UA with Microscopic reflex to Culture    Collection Time: 06/10/25  5:06 PM    Specimen: Urine, Clean Catch   Result Value Ref Range    Color Urine Light Yellow Colorless, Straw, Light Yellow, Yellow    Appearance Urine Clear Clear    Glucose Urine Negative Negative mg/dL    Bilirubin Urine Negative Negative    Ketones Urine Negative Negative mg/dL    Specific Gravity Urine 1.011  1.001 - 1.030    Blood Urine 0.2 mg/dL (A) Negative    pH Urine 6.5 5.0 - 7.0    Protein Albumin Urine Negative Negative mg/dL    Urobilinogen Urine Normal Normal mg/dL    Nitrite Urine Negative Negative    Leukocyte Esterase Urine 25 Caro/uL (A) Negative    Bacteria Urine Few (A) None Seen /HPF    RBC Urine 3 (H) <=2 /HPF    WBC Urine <1 <=5 /HPF    Squamous Epithelials Urine <1 <=1 /HPF     ASSESSMENT:  Lucinda Ji is a 34 year old year old at 33w4d weeks not in labor, presenting with concern for rupture of membranes.    PLAN:  ROM, fern  Wet Prep  UA  GBS    ROM and fern negative. Also checked Wet prep which was grossly negative. Reviewed UA and less leukocyte esterase than prior and no symptoms so will defer treatment at this time. Contraction pattern irregular and palpates mild. Prescribed nifedipine. Discussed results with patient and okay to discharge home. Recommended continued close follow up with Dr. Ghosh.     GBS in process.     Gilma Jesus MD  [facility]     Precepted patient with Dr. Hilaria Elliott who agrees with the plan above.

## 2025-06-11 ENCOUNTER — TELEPHONE (OUTPATIENT)
Dept: FAMILY MEDICINE | Facility: CLINIC | Age: 35
End: 2025-06-11
Payer: COMMERCIAL

## 2025-06-11 DIAGNOSIS — J45.20 INTERMITTENT ASTHMA, UNSPECIFIED ASTHMA SEVERITY, UNSPECIFIED WHETHER COMPLICATED: Primary | ICD-10-CM

## 2025-06-11 LAB — GP B STREP DNA SPEC QL NAA+PROBE: NEGATIVE

## 2025-06-11 NOTE — TELEPHONE ENCOUNTER
Juliann Ghosh,     Prior Authorization needed on:  Albuterol HFA  puffs  Please advise if you would like to send an alternative or start a PA?     If you would like to proceed with a PA,   Indicate Rationale & Previously Tried & Failed. (see below)   Rationale: ______?_______  Previously Tried and Failed:_______?________    Route to Primary [facility] to complete PRIOR AUTH PROCESS.        Fernando Urbina on 6/11/2025 at 4:33 PM

## 2025-06-11 NOTE — TELEPHONE ENCOUNTER
11:26 AM    Refill requested for Nifedipine. I assess patient while hospitalized 25. Patient was to complete 48 hours of nifedipine for  labor. See note 25 for further documentation.     Patient to follow up with primary OB provider for subsequent OB care.       Josselyn Thomas, DO

## 2025-06-11 NOTE — PROGRESS NOTES
Data: [facility] assessed in the [facility] for leaking vaginal fluid. Cervical exam deferred. Membranes intact. Contractions are present. Contactions are  , x3 minutes apart, and last  seconds. Uterine assessment is no contractions during contractions and soft by palpation at rest. See flowsheets for fetal assessment documentation.     Action: Presumed adequate fetal oxygenation documented. Discharge instructions reviewed. Patient instructed to report change in fetal movement, vaginal leaking of fluid or bleeding, abdominal pain, or any concerns related to the pregnancy to provider/clinic.      Response: Orders to discharge home per Dr. Jesus. Patient verbalized understanding of education and agreement with plan. Discharged to home at 1845.

## 2025-06-12 RX ORDER — BUDESONIDE AND FORMOTEROL FUMARATE DIHYDRATE 80; 4.5 UG/1; UG/1
1-2 AEROSOL RESPIRATORY (INHALATION) EVERY 6 HOURS PRN
Qty: 10.2 G | Refills: 3 | Status: SHIPPED | OUTPATIENT
Start: 2025-06-12

## 2025-06-12 NOTE — TELEPHONE ENCOUNTER
This feels odd.  I will send Symbicort and see if it is covered.   I suspect 1 form is covered - aka ventolin but not pro-air, but I do not want her to be without.      Fernando, can you see if she was able to  an inhaler?  Thanks    Joie Ghosh MD    Routed to Fernando, PCS

## 2025-06-16 ENCOUNTER — MEDICAL CORRESPONDENCE (OUTPATIENT)
Dept: HEALTH INFORMATION MANAGEMENT | Facility: CLINIC | Age: 35
End: 2025-06-16

## 2025-06-16 ENCOUNTER — ALLIED HEALTH/NURSE VISIT (OUTPATIENT)
Dept: FAMILY MEDICINE | Facility: CLINIC | Age: 35
End: 2025-06-16
Payer: COMMERCIAL

## 2025-06-16 ENCOUNTER — OFFICE VISIT (OUTPATIENT)
Dept: FAMILY MEDICINE | Facility: CLINIC | Age: 35
End: 2025-06-16
Payer: COMMERCIAL

## 2025-06-16 VITALS
RESPIRATION RATE: 20 BRPM | SYSTOLIC BLOOD PRESSURE: 152 MMHG | OXYGEN SATURATION: 98 % | TEMPERATURE: 98.4 F | DIASTOLIC BLOOD PRESSURE: 86 MMHG | HEART RATE: 102 BPM

## 2025-06-16 DIAGNOSIS — O10.919 CHRONIC HYPERTENSION AFFECTING PREGNANCY: ICD-10-CM

## 2025-06-16 DIAGNOSIS — L02.31 ABSCESS OF BUTTOCK: Primary | ICD-10-CM

## 2025-06-16 DIAGNOSIS — O09.291 HISTORY OF STILLBIRTH IN PREGNANT PATIENT IN FIRST TRIMESTER, ANTEPARTUM: ICD-10-CM

## 2025-06-16 DIAGNOSIS — O09.93 SUPERVISION OF HIGH RISK PREGNANCY IN THIRD TRIMESTER: Primary | ICD-10-CM

## 2025-06-16 DIAGNOSIS — E66.813 CLASS 3 SEVERE OBESITY DUE TO EXCESS CALORIES WITHOUT SERIOUS COMORBIDITY WITH BODY MASS INDEX (BMI) OF 40.0 TO 44.9 IN ADULT (H): ICD-10-CM

## 2025-06-16 PROCEDURE — 10160 PNXR ASPIR ABSC HMTMA BULLA: CPT | Mod: GC

## 2025-06-16 PROCEDURE — 3077F SYST BP >= 140 MM HG: CPT

## 2025-06-16 PROCEDURE — 99207 PR NO BILLABLE SERVICE THIS VISIT: CPT

## 2025-06-16 PROCEDURE — 99213 OFFICE O/P EST LOW 20 MIN: CPT | Mod: 24

## 2025-06-16 PROCEDURE — 3079F DIAST BP 80-89 MM HG: CPT

## 2025-06-16 RX ORDER — CEPHALEXIN 500 MG/1
500 CAPSULE ORAL 4 TIMES DAILY
Qty: 20 CAPSULE | Refills: 0 | Status: SHIPPED | OUTPATIENT
Start: 2025-06-16 | End: 2025-06-21

## 2025-06-16 ASSESSMENT — ASTHMA QUESTIONNAIRES
ACUTE_EXACERBATION_TODAY: NO
QUESTION_1 LAST FOUR WEEKS HOW MUCH OF THE TIME DID YOUR ASTHMA KEEP YOU FROM GETTING AS MUCH DONE AT WORK, SCHOOL OR AT HOME: NONE OF THE TIME
ACT_TOTALSCORE: 23
QUESTION_5 LAST FOUR WEEKS HOW WOULD YOU RATE YOUR ASTHMA CONTROL: COMPLETELY CONTROLLED
QUESTION_4 LAST FOUR WEEKS HOW OFTEN HAVE YOU USED YOUR RESCUE INHALER OR NEBULIZER MEDICATION (SUCH AS ALBUTEROL): ONCE A WEEK OR LESS
QUESTION_3 LAST FOUR WEEKS HOW OFTEN DID YOUR ASTHMA SYMPTOMS (WHEEZING, COUGHING, SHORTNESS OF BREATH, CHEST TIGHTNESS OR PAIN) WAKE YOU UP AT NIGHT OR EARLIER THAN USUAL IN THE MORNING: NOT AT ALL
ACT_TOTALSCORE: 23
QUESTION_2 LAST FOUR WEEKS HOW OFTEN HAVE YOU HAD SHORTNESS OF BREATH: ONCE OR TWICE A WEEK

## 2025-06-16 NOTE — Clinical Note
See note: c-sect consult with Dr Hutchison is scheduled for next Thursday, 6/26/25.  I gave her resources today for the baby. She will see tomorrow for RAMEZ

## 2025-06-16 NOTE — PROGRESS NOTES
Preceptor Attestation:    I discussed the patient with the resident and evaluated the patient in person. I was present for the I+D of the infection.  I personally viewed the NST and agree with the interpretation documented by the resident. I have verified the content of the note, which accurately reflects my assessment of the patient and the plan of care.   Supervising Physician:  Ze Gonzalez MD.

## 2025-06-16 NOTE — PROGRESS NOTES
Pt reports that she lost her phone and WIFI not working at her apartment building.  Printed off list of appts for pt and reminded her that she has appt with Dr Ghosh tomorrow.  Gave her resources for free baby supplies incuding diapers/wipes.  Pt had baby shower last night.  She states that she took her Nifedipine last night and states that her BP was 131/?. She states that she had a slight HA last night but denies today.  She does have some swelling in her feet but did not have her feet elevated much yesterday due to baby shower and getting her hair done.  Discussed the need to elevate her feet.  She had some irregular contractions yesterday but has not had any today.  She states that the Hydroxyzine is helping her to sleep.  Called [facility] and rescheduled her  consult for 25 at [facility].  Will send a note to Arminda (u/s tech) to see if RUQ u/s can be done when she has her BPP on Monday, 25.    Pt reports large boil on her buttocks and would like a doctor to evaluate today.  Appt scheduled with Dr Ruby at 11:20 AM and info given to Dr Ruby.    Reactive NST after more than 1 hour of monitoring.  See scanned documentation for details./Effie Prescott RN BSN

## 2025-06-16 NOTE — PATIENT INSTRUCTIONS
[facility]  [address]  [place] [address]    Phone: [phone]    APPT: 25 at 11:45 AM with Dr Hutchison for  consult.

## 2025-06-16 NOTE — PROGRESS NOTES
Critical Vital Report    Did patient have a critical vital during today's visit? Yes  Which vital is reporting as critical?: Blood Pressure    I personally notified the following: Provider    Action(s) taken: I left room and notified provider personally      BP Readings from Last 3 Encounters:   06/16/25 (!) 152/95   06/10/25 133/83   06/10/25 (!) 143/77                 Josselyn Bosch MA on 6/16/2025 at 12:05 PM

## 2025-06-16 NOTE — PROGRESS NOTES
Assessment & Plan     Chronic hypertension affecting pregnancy  Patient with elevated blood pressures 152/95 with repeat 152/86.  No symptoms.  Elevated blood pressures at baseline, but also elevated today likely due to to blood pressures being taken after procedure.  Patient has follow-up tomorrow.  Discussed warning signs and symptoms to look for to return to clinic.  - Follow-up for regular JAKE tomorrow    Abscess of buttock  Drained in clinic, see procedure note below.  Patient tolerated procedure well.  Patient has follow-up in clinic tomorrow with Dr. Ghosh for OB appointment.  Recommended hot compresses and to keep incision open to continue to allow drainage.  - cephALEXin (KEFLEX) 500 MG capsule; Take 1 capsule (500 mg) by mouth 4 times daily for 5 days.  - Puncture (I&D)Drainage of Lesion/Cyst  [85415]  - lidocaine-EPINEPHrine (PF) 1 %-1:816347 injection 2 mL  - Patient was provided extra gauze    PROCEDURE NOTE:  Informed consent obtained  Antiseptic applied  Lidocaine with epinephrine injected  Linear incision made, purulence expressed  Hemostasis achieved  Dressing applied  Patient tolerated procedure well    Dr. Gonzalez was present during the entirety of the procedure    Shar Coronado is a 34 year old, presenting for the following health issues:  boil (Boil on buttocks )      6/16/2025    11:56 AM   Additional Questions   Roomed by E. her MA   Accompanied by daughter         6/16/2025    Information    services provided? No     HPI    Patient here for boils  One on buttock and one in vagina  Noticed around 5 days ago  Gotten worse  Had one before that needed to be drained  No systemic symptoms, no fever  They have not drained        Objective    BP (!) 152/86   Pulse 102   Temp 98.4  F (36.9  C) (Oral)   Resp 20   LMP 10/20/2024 (Approximate)   SpO2 98%   There is no height or weight on file to calculate BMI.  Physical Exam   GEN: No acute distress  SKIN: right buttock  fluctuance approximately 5 x 6 cm with induration.  Approximately 1 x 1 cm induration on right vaginal apex, no fluctuance  ABD: gravid      Signed Electronically by: Mounika Ruby MD

## 2025-06-18 ENCOUNTER — TELEPHONE (OUTPATIENT)
Dept: FAMILY MEDICINE | Facility: CLINIC | Age: 35
End: 2025-06-18

## 2025-06-18 NOTE — TELEPHONE ENCOUNTER
Spoke with Dr. Wade about this patient - plan is to have a  between 36 and 37 weeks due to history of myomectomy.      Patient is also BRCA2 positive, with recommendations for salpingectomy/oophorectomy once she is done having children.     This has been discussed with Dr. Osorio, who would be in agreement with doing the bilateral salpingectomy/oophorectomy at the same time as her scheduled , however, sterilization paperwork has not yet been signed.     Attempted to call patient at number in chart.  No answer, left message to call clinic.      Would like to get paperwork signed ASAP, given these are typically done 30 days prior to surgery, and patient is already 34 + 5 weeks.     Routed to SATISH Chou, Dr. Mauro Ghosh MD

## 2025-06-23 VITALS
RESPIRATION RATE: 16 BRPM | SYSTOLIC BLOOD PRESSURE: 149 MMHG | OXYGEN SATURATION: 100 % | TEMPERATURE: 97.7 F | DIASTOLIC BLOOD PRESSURE: 70 MMHG

## 2025-06-23 LAB
ALBUMIN MFR UR ELPH: 9.5 MG/DL
ALBUMIN SERPL BCG-MCNC: 3 G/DL (ref 3.5–5.2)
ALBUMIN UR-MCNC: NEGATIVE MG/DL
ALP SERPL-CCNC: 141 U/L (ref 40–150)
ALT SERPL W P-5'-P-CCNC: 15 U/L (ref 0–50)
ANION GAP SERPL CALCULATED.3IONS-SCNC: 11 MMOL/L (ref 7–15)
APPEARANCE UR: CLEAR
AST SERPL W P-5'-P-CCNC: 16 U/L (ref 0–45)
BACTERIA #/AREA URNS HPF: ABNORMAL /HPF
BILIRUB SERPL-MCNC: 0.3 MG/DL
BILIRUB UR QL STRIP: NEGATIVE
BUN SERPL-MCNC: 5 MG/DL (ref 6–20)
CALCIUM SERPL-MCNC: 8.9 MG/DL (ref 8.8–10.4)
CHLORIDE SERPL-SCNC: 105 MMOL/L (ref 98–107)
COLOR UR AUTO: ABNORMAL
CREAT SERPL-MCNC: 0.66 MG/DL (ref 0.51–0.95)
CREAT UR-MCNC: 75.8 MG/DL
EGFRCR SERPLBLD CKD-EPI 2021: >90 ML/MIN/1.73M2
ERYTHROCYTE [DISTWIDTH] IN BLOOD BY AUTOMATED COUNT: 14.2 % (ref 10–15)
GLUCOSE SERPL-MCNC: 76 MG/DL (ref 70–99)
GLUCOSE UR STRIP-MCNC: NEGATIVE MG/DL
HCO3 SERPL-SCNC: 21 MMOL/L (ref 22–29)
HCT VFR BLD AUTO: 38.3 % (ref 35–47)
HGB BLD-MCNC: 12.2 G/DL (ref 11.7–15.7)
HGB UR QL STRIP: ABNORMAL
HOLD SPECIMEN: NORMAL
HOLD SPECIMEN: NORMAL
KETONES UR STRIP-MCNC: NEGATIVE MG/DL
LEUKOCYTE ESTERASE UR QL STRIP: NEGATIVE
MCH RBC QN AUTO: 28.3 PG (ref 26.5–33)
MCHC RBC AUTO-ENTMCNC: 31.9 G/DL (ref 31.5–36.5)
MCV RBC AUTO: 89 FL (ref 78–100)
MUCOUS THREADS #/AREA URNS LPF: PRESENT /LPF
NITRATE UR QL: NEGATIVE
PH UR STRIP: 7.5 [PH] (ref 5–7)
PLATELET # BLD AUTO: 397 10E3/UL (ref 150–450)
POTASSIUM SERPL-SCNC: 4.2 MMOL/L (ref 3.4–5.3)
PROT SERPL-MCNC: 6.5 G/DL (ref 6.4–8.3)
PROT/CREAT 24H UR: 0.13 MG/MG CR (ref 0–0.2)
RBC # BLD AUTO: 4.31 10E6/UL (ref 3.8–5.2)
RBC URINE: 2 /HPF
SODIUM SERPL-SCNC: 137 MMOL/L (ref 135–145)
SP GR UR STRIP: 1.01 (ref 1–1.03)
SQUAMOUS EPITHELIAL: 2 /HPF
UROBILINOGEN UR STRIP-MCNC: NORMAL MG/DL
WBC # BLD AUTO: 8 10E3/UL (ref 4–11)
WBC URINE: <1 /HPF

## 2025-06-23 PROCEDURE — 85014 HEMATOCRIT: CPT

## 2025-06-23 PROCEDURE — 81003 URINALYSIS AUTO W/O SCOPE: CPT

## 2025-06-23 PROCEDURE — G0463 HOSPITAL OUTPT CLINIC VISIT: HCPCS

## 2025-06-23 PROCEDURE — 84156 ASSAY OF PROTEIN URINE: CPT

## 2025-06-23 PROCEDURE — 36415 COLL VENOUS BLD VENIPUNCTURE: CPT

## 2025-06-23 PROCEDURE — 84155 ASSAY OF PROTEIN SERUM: CPT

## 2025-06-23 RX ORDER — ACETAMINOPHEN 325 MG/1
975 TABLET ORAL ONCE
Status: DISCONTINUED | OUTPATIENT
Start: 2025-06-23 | End: 2025-06-23 | Stop reason: HOSPADM

## 2025-06-23 RX ORDER — LIDOCAINE 40 MG/G
CREAM TOPICAL
Status: DISCONTINUED | OUTPATIENT
Start: 2025-06-23 | End: 2025-06-23 | Stop reason: HOSPADM

## 2025-06-23 ASSESSMENT — ACTIVITIES OF DAILY LIVING (ADL)
ADLS_ACUITY_SCORE: 20

## 2025-06-23 NOTE — PROGRESS NOTES
OBSTETRICS TRIAGE ASSESSMENT NOTE  Lucinda Ji is a 34 year old  at 35w3d gestation based on dating ultrasound who has presented to maternity care for further evaluation of dizziness,  at home.     Pregnancy complicated by chronic HTN on PO labetalol. Treated for  labor when seen at 33w2d with 2 doses betamethasone. Concern for degenerating fibroids s/p myomectomy so recommended to have primary  with OB. Also noted to have yeast infection s/p clotrimazole at that time. Possible gestational diabetes (3-hour GTT has not tolerated), chronic pelvic pain. Patient also had chlamydia earlier this pregnancy, which was treated and repeat testing was negative on . Patient has history of  birth at 22 weeks in .       No bleeding, leakage of fluids. feeling [place] contractions. Baby is moving normally.     Patient came in by EMS today because her blood pressure at home had a systolic of 169 and she was feeling lightheaded.  States that her blood pressures at home usually are in the 120s to 130s.  Also has noticed more swelling in her legs the last 2 days.  Initially stated dizziness, though clarified that it is more of a lightheaded sensation.  No falls.  Does have a mild headache, has not taken Tylenol.    Also having some crampy abdominal pain in the low abdomen.  No fevers or chills, no vision changes, no chest pain or shortness of breath, no dysuria, no constipation or diarrhea.    Because of the complexity of her care, it has been recommended that she establish with [facility] to start planning her  to continue the remainder of her care.  This has not happened yet.  Per chart review, it is likely that she will have a recommended  between 36 to 37 weeks because of the fibroids status post myomectomy.    PRENATAL CARE  Seen by Dr. Ghosh  at [facility].         - chronic HTN - on labetalol   -  labor - prescribed nifedipine x2d  - chlamydia,  resolved   - intrauterine fibroids with hx myomectomy - needs C/S  - hx of recurrent miscarriage with term stillbirth  - failed 1 hour GTT, unable to tolerate 3 hr GTT  - polyhydramnios  - hypermesis - prescribed Zofran  - morbid obesity    PAST MEDICAL HISTORY  Past Medical History:   Diagnosis Date    Asthma     Fetal demise, greater than 22 weeks, antepartum, single gestation 10/29/2020    Gastritis     GBS bacteriuria 08/10/2020    Hypertension     Obesity        PAST SURGICAL HISTORY   Past Surgical History:   Procedure Laterality Date    DAVINCI MYOMECTOMY N/A 09/07/2022    Procedure: ROBOTIC MYOMECTOMY;  Surgeon: Indra Reis MD;  Location: [facility] [place]    LYSIS, ADHESIONS, ROBOT-ASSISTED, LAPAROSCOPIC, USING DA BROOKE XI N/A 09/07/2022    Procedure: LYSIS, ADHESIONS, ROBOT-ASSISTED, LAPAROSCOPIC, USING DA BROOKE XI;  Surgeon: Indra Reis MD;  Location: [facility] OR       MEDICATIONS    Current Facility-Administered Medications:     acetaminophen (TYLENOL) tablet 975 mg, 975 mg, Oral, Once, Alex Owens MD    lidocaine (LMX4) cream, , Topical, Q1H PRN, Joie Pérez MD    lidocaine 1 % 0.1-1 mL, 0.1-1 mL, Other, Q1H PRN, Joie Pérez MD    sodium chloride (PF) 0.9% PF flush 3 mL, 3 mL, Intracatheter, Q8H ROX, Joie Pérez MD    sodium chloride (PF) 0.9% PF flush 3 mL, 3 mL, Intracatheter, q1 min prn, Joie Pérez MD    SOCIAL HISTORY:   Social History     Socioeconomic History    Marital status: Single     Spouse name: Not on file    Number of children: 1    Years of education: Not on file    Highest education level: Not on file   Occupational History    Occupation: unemployed   Tobacco Use    Smoking status: Former     Types: Cigarettes    Smokeless tobacco: Never   Vaping Use    Vaping status: Never Used   Substance and Sexual Activity    Alcohol use: Not Currently     Comment: occasional    Drug use: Not Currently      Types: Marijuana     Comment: not in pregnancy    Sexual activity: Yes     Partners: Male     Birth control/protection: Condom   Other Topics Concern    Not on file   Social History Narrative    Not on file     Social Drivers of Health     Financial Resource Strain: Low Risk  (6/7/2025)    Financial Resource Strain     Within the past 12 months, have you or your family members you live with been unable to get utilities (heat, electricity) when it was really needed?: No   Food Insecurity: Low Risk  (6/7/2025)    Food Insecurity     Within the past 12 months, did you worry that your food would run out before you got money to buy more?: No     Within the past 12 months, did the food you bought just not last and you didn t have money to get more?: No   Transportation Needs: Low Risk  (6/7/2025)    Transportation Needs     Within the past 12 months, has lack of transportation kept you from medical appointments, getting your medicines, non-medical meetings or appointments, work, or from getting things that you need?: No   Recent Concern: Transportation Needs - High Risk (5/9/2025)    Transportation Needs     Within the past 12 months, has lack of transportation kept you from medical appointments, getting your medicines, non-medical meetings or appointments, work, or from getting things that you need?: Yes   Physical Activity: Not on file   Stress: Not on file   Social Connections: Not on file   Interpersonal Safety: Low Risk  (6/7/2025)    Interpersonal Safety     Do you feel physically and emotionally safe where you currently live?: Yes     Within the past 12 months, have you been hit, slapped, kicked or otherwise physically hurt by someone?: No     Within the past 12 months, have you been humiliated or emotionally abused in other ways by your partner or ex-partner?: No   Housing Stability: Low Risk  (6/7/2025)    Housing Stability     Do you have housing? : Yes     Are you worried about losing your housing?: No   Recent  Concern: Housing Stability - High Risk (5/3/2025)    Housing Stability     Do you have housing? : No     Are you worried about losing your housing?: No       PHYSICAL EXAMINATION   BP (!) 142/88   Temp 97.7  F (36.5  C) (Oral)   Resp 16   LMP 10/20/2024 (Approximate)   SpO2 100%   Gen: appears comfortable in no acute distress  HEENT: Moist mucous membranes  CV: regular rate and rhythm without murmur  Lungs: clear to ausculation, good air movement throughout  Abdomen: Gravid.  Tenderness to palpation worst in suprapubic area, some generalized discomfort throughout.  No rebound or guarding.  Extremities: 2+ pitting edema bilateral lower extremities    FETAL HEART MONITORING   Category 1 strip    CONTRACTIONS  none    LAB RESULTS  Personally reviewed.  Recent Results (from the past 24 hours)   CBC with platelets    Collection Time: 06/23/25  2:11 PM   Result Value Ref Range    WBC Count 8.0 4.0 - 11.0 10e3/uL    RBC Count 4.31 3.80 - 5.20 10e6/uL    Hemoglobin 12.2 11.7 - 15.7 g/dL    Hematocrit 38.3 35.0 - 47.0 %    MCV 89 78 - 100 fL    MCH 28.3 26.5 - 33.0 pg    MCHC 31.9 31.5 - 36.5 g/dL    RDW 14.2 10.0 - 15.0 %    Platelet Count 397 150 - 450 10e3/uL   Comprehensive metabolic panel    Collection Time: 06/23/25  2:11 PM   Result Value Ref Range    Sodium 137 135 - 145 mmol/L    Potassium 4.2 3.4 - 5.3 mmol/L    Carbon Dioxide (CO2) 21 (L) 22 - 29 mmol/L    Anion Gap 11 7 - 15 mmol/L    Urea Nitrogen 5.0 (L) 6.0 - 20.0 mg/dL    Creatinine 0.66 0.51 - 0.95 mg/dL    GFR Estimate >90 >60 mL/min/1.73m2    Calcium 8.9 8.8 - 10.4 mg/dL    Chloride 105 98 - 107 mmol/L    Glucose 76 70 - 99 mg/dL    Alkaline Phosphatase 141 40 - 150 U/L    AST 16 0 - 45 U/L    ALT 15 0 - 50 U/L    Protein Total 6.5 6.4 - 8.3 g/dL    Albumin 3.0 (L) 3.5 - 5.2 g/dL    Bilirubin Total 0.3 <=1.2 mg/dL   Extra Red Top Tube (LAB USE ONLY)    Collection Time: 06/23/25  2:14 PM   Result Value Ref Range    Hold Specimen JIC    Extra Purple Top  EDTA (LAB USE ONLY)    Collection Time: 25  2:14 PM   Result Value Ref Range    Hold Specimen JIC    Protein  random urine    Collection Time: 25  2:17 PM   Result Value Ref Range    Total Protein Urine mg/dL 9.5   mg/dL    Total Protein Urine mg/mg Creat 0.13 0.00 - 0.20 mg/mg Cr    Creatinine Urine mg/dL 75.8 mg/dL   UA Macroscopic with reflex to Microscopic and Culture    Collection Time: 25  2:17 PM    Specimen: Urine, Clean Catch   Result Value Ref Range    Color Urine Light Yellow Colorless, Straw, Light Yellow, Yellow    Appearance Urine Clear Clear    Glucose Urine Negative Negative mg/dL    Bilirubin Urine Negative Negative    Ketones Urine Negative Negative mg/dL    Specific Gravity Urine 1.012 1.001 - 1.030    Blood Urine 0.03 mg/dL (A) Negative    pH Urine 7.5 (H) 5.0 - 7.0    Protein Albumin Urine Negative Negative mg/dL    Urobilinogen Urine Normal Normal mg/dL    Nitrite Urine Negative Negative    Leukocyte Esterase Urine Negative Negative    Bacteria Urine Few (A) None Seen /HPF    Mucus Urine Present (A) None Seen /LPF    RBC Urine 2 <=2 /HPF    WBC Urine <1 <=5 /HPF    Squamous Epithelials Urine 2 (H) <=1 /HPF       ASSESSMENT/PLAN:   34 year old  at 35w3d gestation presenting to labor & delivery for severe range blood pressure reading at home. Category 1 fetal tracing.     1. Chronic HTN  Blood pressure on admission 137/69, did have one severe range 172/92 here, recheck 142/88. Mild headache on admission resolved without tylenol. Labs here reassuring against Pre-E. Discussed with Dr. Rashid, OB/GYN. Severe range pressure okay at this moment without uptrending BP and normal labs as long as very close follow up. She will send a message to their schedulers to call patient and move appointment up if able  - Continue PTA labetalol   - Reviewed strict return precautions  - Establish with [facility] as below    2. Abdominal pain  Chronic, worked up extensively in the  past. No new red flag symptoms, no vaginal bleeding. Suspect component of jewels carballo contraction pain/cramping underlying chronic abdominal pain and concern for degenerating fibroid pain. No sign of cystitis on UA. No vaginal discharge, unlikely other infection.  - Recommend PRN tylenol  - Okay to discharge, reviewed strict return precautions.     Appointment already scheduled with [facility]  at 11:45 AM at their [place] [facility] to transition her care and discuss . They will reach out tomorrow to move appointment up to  or  if possible.    Options for treatment and follow-up care were reviewed with the patient and/or guardian. Pt and/or guardian engaged in the decision making process and verbalized understanding of the options discussed and agreed with the final plan.    Precepted today with: Dr. Heladio Owens MD  PGY-1 [facility] Medicine Residency

## 2025-06-23 NOTE — PROGRESS NOTES
Discharge instructions given and explained to patient, states understanding. Escorted to main lobby by RN and assisted into cab for ride home.

## 2025-06-23 NOTE — PROGRESS NOTES
Patient states she feels intermittent low pelvic discomfort which has been ongoing for entire pregnancy. Appears to be comfortable at this time. Labs ordered- CBC, BMP, U/A and urine p/c ratio ordered.

## 2025-06-23 NOTE — PROGRESS NOTES
I assumed care of the patient at 1530.  Updated Dr. Owens, R1 to past two BPs as charted.  Residents consulted with [facility], reviewed labs and plan to discontinue to home at this time.  Dr. Owens reviewed all instructions and follow up with patient.  Patient agrees to plan of care including follow up at [facility].  Will arrange for medical cab ride to home.

## 2025-06-23 NOTE — PROGRESS NOTES
Patient denies sx of preeclampsia. Mild pitting edema in lower legs and feet. /69. Dr. Alex Owens notified.

## 2025-06-23 NOTE — PROGRESS NOTES
Data: [facility]: 2025 11:29 AM via ambulance.  Reason for maternal/fetal assessment is elevated blood pressures and swelling of feet. . Patient reports she checked her BP at  home today and systolic was 169 mmhg, states she does not remember what diastolic measurement was. States she has been slightly dizzy today and reports increased feet swelling for the past few days. Patient denies uterine contractions, leaking of vaginal fluid/rupture of membranes, vaginal bleeding, abdominal pain, pelvic pressure, nausea, vomiting, headache, visual disturbances, epigastric or RUQ pain. Patient reports fetal movement is normal. Patient is a 35w3d .  Prenatal record reviewed. Pregnancy has been complicated by hypertension and obesity (pre-pregnancy BMI >=35).    Vital signs wnl. Support person is not present.     Action: Verbal consent for EFM. Triage assessment completed.     Response: Patient verbalized agreement with plan. Will contact Dr. Pérez with update and further orders.

## 2025-06-25 ENCOUNTER — OFFICE VISIT (OUTPATIENT)
Dept: FAMILY MEDICINE | Facility: CLINIC | Age: 35
End: 2025-06-25
Payer: COMMERCIAL

## 2025-06-25 VITALS
TEMPERATURE: 99 F | DIASTOLIC BLOOD PRESSURE: 85 MMHG | BODY MASS INDEX: 47.09 KG/M2 | RESPIRATION RATE: 20 BRPM | OXYGEN SATURATION: 99 % | SYSTOLIC BLOOD PRESSURE: 137 MMHG | WEIGHT: 293 LBS | HEART RATE: 78 BPM | HEIGHT: 66 IN

## 2025-06-25 DIAGNOSIS — E66.813 CLASS 3 SEVERE OBESITY DUE TO EXCESS CALORIES WITHOUT SERIOUS COMORBIDITY WITH BODY MASS INDEX (BMI) OF 40.0 TO 44.9 IN ADULT (H): ICD-10-CM

## 2025-06-25 DIAGNOSIS — O10.919 CHRONIC HYPERTENSION AFFECTING PREGNANCY: ICD-10-CM

## 2025-06-25 DIAGNOSIS — O09.93 SUPERVISION OF HIGH RISK PREGNANCY IN THIRD TRIMESTER: Primary | ICD-10-CM

## 2025-06-25 NOTE — PATIENT INSTRUCTIONS
"[facility]   [address]  [place] [address]  Phone: [phone]    APPT:  Thursday, 25 at 11:45 AM with Dr Hutchison for  consult    Delivery Plan       Take me to: [facility] Phone number: [phone]  [facility] phone number: [phone]    My name is: Ivonne Ji  : 1990    My Doctor is: Dr. Luther Hutchison  Attending Physician: [facility]  Prenatal care was at [facility] [phone].    34 year old         -para:   Estimated Date of Delivery: 2025  At 35w5d on 2025  Delivery type: Primary  for hx of myomectomy.  Patient should not labor.     Patient Active Problem List   Diagnosis    Anxiety and depression    Class 3 severe obesity due to excess calories without serious comorbidity with body mass index (BMI) of 40.0 to 44.9 in adult (H)    Mild intermittent asthma in adult without complication    Hiatal hernia    Uterine leiomyoma    History of stillbirth in pregnant patient in first trimester, antepartum    Pregnancy w/ hx of uterine myomectomy    Monoallelic mutation of BRCA2 gene    Nausea and vomiting in pregnancy    Supervision of high risk pregnancy in third trimester    Chronic hypertension affecting pregnancy    Encounter for triage in pregnant patient    Chlamydia trachomatis infection in mother during third trimester of pregnancy     labor     Allergies:No Known Allergies    Lab Results:  Blood Type: B POS  GBS:negative Date completed: 6/10/2025    Rubella IgG   Date/Time Value Ref Range Status   2020 03:52 PM Positive  Final     HIV Antigen/Antibody   Date/Time Value Ref Range Status   2021 02:18 PM Negative Negative Final     No results found for: \"N5UV\"  Hepatitis B Surface Antigen   Date/Time Value Ref Range Status   2020 03:52 PM Negative Negative Final     Hemoglobin   Date Value Ref Range Status   2025 12.2 11.7 - 15.7 g/dL " Final   2025 11.7 11.7 - 15.7 g/dL Final   2020 12.8 11.7 - 15.7 g/dL Final       Last cervical check: 2025 Cervical Dilation: Deferred    Immunization History   Administered Date(s) Administered    TDAP (Adacel,Boostrix) 06/10/2025    TDAP Vaccine (Boostrix) 2014       Immunizations needed postpartum: None    Who will be present at the delivery? Patient's Cousin will support her during the .  Partner will be present after delivery    Do you have a ?No If no, would you like one? No     What are your plans for pain control? Repeat     Who will cut the umbilical cord?physician    Do you plan to breastfeed?No    If your baby is a boy, would you like him circumcised?N/A    Name of baby's clinic: [facility]    Would you like your baby to have the recommended vitamin K shot to prevent bleeding, Hepatitis B shot, and eye ointment to prevent eye infections?Yes      Next Appointment is: 1 week - recheck hypertension

## 2025-06-25 NOTE — PROGRESS NOTES
Assessment & Plan  Lucinda Ji is a 34 year old , at 35w5d weeks of pregnancy with ASCENCION of 2025 by 9 week ultrasound.  Patient's history is high risk for the following reasons: .     # High risk concerns (medical problems, high risk surgical/obstetric history, high risk social situation, etc):   - Problem list reviewed and updated.  -Chronic hypertension, on twice daily 200 mg labetalol and appropriately controlled today.  - Delivery plan discussed.  Has appointment tomorrow with Dr. Aggarwal, right scheduled.  Needs  between 36 and 37 weeks and this is right around the corner.  - Plans to use formula.  -GBS negative  -Tdap already given.  -Discussed contraception.  She would like to remain abstinent for now.  Will continue to discuss going forward.  She desires additional children in the long-term.     Ivonne was seen today for prenatal care and recheck medication.     Diagnoses and all orders for this visit:     Routine Pregnancy care.    NST complete today and reactive.  NO contractions on monitor  Needs OB consult appointment with Dr. Hutchison to schedule .  Recommended between 36-37 weeks - she is currently 35+5.  Visit scheduled for tomorrow,  at 11:15.  Appreciate assistance from SATISH Chou in scheduling ride.   TDAP 10-64Y (ADACEL,BOOSTRIX) - completed 6/10  GBS completed - Negative - completed 6/15      labor in third trimester without delivery  Recent evaluation at [facility].    Received betamethasone.   Completed course of nifedipine  No evidence of labor today.      # Chronic hypertension affecting pregnancy  BP within goal range today, 137/85.  Taking labetalol 200mg BID.  Continue.  Take in the morning prior to presentation for .   -     labetalol (NORMODYNE) 200 MG tablet; Take 1 tablet (200 mg) by mouth 2 times daily.     # Vaginal infections  No current complaints of vaginal discharge  Chlamydia infection in May.  Treated with azithromycin.  Retest 6/10 was  negative.  Denies sexual activity since last infection.     # Hx of myomectomy, known Intrauterine fibroids    Plan for delivery via  between 36-37 weeks. Scheduled with Dr. Hutchison for larissa     # History of recurrent miscarriage with term stillbirth  NST reactive today.     # Failed 1 hour glucose screen, unable to tolerate 3-hour test with elevated blood sugars while inpatient  Still Not checking sugars at home.  Likely has gestational diabetes given elevated glucose levels while at [facility]     # Polyhydramnios  Regular BPPs.      # Elevated LFTs  -Right upper quadrant ultrasound ordered still not yet completed.  Recommend repeat LFTs at post partum check and consider RUQ US if remain elevated.       # Hyperemesis  Improved.       # Morbid obesity  Increase weight gain over last 2 weeks.  Otherwise weight gain has been at goal.       # Chronic pelvic pain  Pelvic pain in female  Continue with hydroxyzine, baths.   -     hydrOXYzine (VISTARIL) 50 MG capsule; Take 1 capsule (50 mg) by mouth nightly as needed for itching. Can take 1 additional pill if needed.     # History of anxiety and depression  Symptoms stable.  Very ready for baby to come.      #History of asthma  No shortness of breath today.       # Challenging social situation with restricted insurance status and limited transportation  Appreciate assistance with ride scheduling as this is a big barrier to care.       The longitudinal plan of care for the diagnosis(es)/condition(s) as documented were addressed during this visit. Due to the added complexity in care, I will continue to support Ivonne in the subsequent management and with ongoing continuity of care.         Weight gain above goal 9.344 kg (20 lb 9.6 oz) to date, out of recommended total of 11-20 lbs (pregravid BMI >30)    Patient Instructions   [facility]   [address]  [place] [address]  Phone: [phone]    APPT:  Thursday, 25 at 11:45 AM with Dr Hutchison for  " consult    Delivery Plan       Take me to: [facility] Phone number: [phone]  [facility] phone number: [phone]    My name is: Ivonne Ji  : 1990    My Doctor is: Dr. Luther Hutchison  Attending Physician: [facility]  Prenatal care was at [facility] [phone].    34 year old         -para:   Estimated Date of Delivery: 2025  At 35w5d on 2025  Delivery type: Primary  for hx of myomectomy.  Patient should not labor.     Patient Active Problem List   Diagnosis    Anxiety and depression    Class 3 severe obesity due to excess calories without serious comorbidity with body mass index (BMI) of 40.0 to 44.9 in adult (H)    Mild intermittent asthma in adult without complication    Hiatal hernia    Uterine leiomyoma    History of stillbirth in pregnant patient in first trimester, antepartum    Pregnancy w/ hx of uterine myomectomy    Monoallelic mutation of BRCA2 gene    Nausea and vomiting in pregnancy    Supervision of high risk pregnancy in third trimester    Chronic hypertension affecting pregnancy    Encounter for triage in pregnant patient    Chlamydia trachomatis infection in mother during third trimester of pregnancy     labor     Allergies:No Known Allergies    Lab Results:  Blood Type: B POS  GBS:negative Date completed: 6/10/2025    Rubella IgG   Date/Time Value Ref Range Status   2020 03:52 PM Positive  Final     HIV Antigen/Antibody   Date/Time Value Ref Range Status   2021 02:18 PM Negative Negative Final     No results found for: \"N5UV\"  Hepatitis B Surface Antigen   Date/Time Value Ref Range Status   2020 03:52 PM Negative Negative Final     Hemoglobin   Date Value Ref Range Status   2025 12.2 11.7 - 15.7 g/dL Final   2025 11.7 11.7 - 15.7 g/dL Final   2020 12.8 11.7 - 15.7 g/dL Final       Last cervical check: 2025 Cervical " Dilation: Deferred    Immunization History   Administered Date(s) Administered    TDAP (Adacel,Boostrix) 06/10/2025    TDAP Vaccine (Boostrix) 2014       Immunizations needed postpartum: None    Who will be present at the delivery? Patient's Cousin will support her during the .  Partner will be present after delivery    Do you have a ?No If no, would you like one? No     What are your plans for pain control? Repeat     Who will cut the umbilical cord?physician    Do you plan to breastfeed?No    If your baby is a boy, would you like him circumcised?N/A    Name of baby's clinic: [facility]    Would you like your baby to have the recommended vitamin K shot to prevent bleeding, Hepatitis B shot, and eye ointment to prevent eye infections?Yes      Next Appointment is: 1 week - recheck hypertension after delivery      Joie Ghosh MD      Subjective  Concerns: ready to be done.  Still having lots of pelvic pain.  No significant contractions.  No change in discharge.  Nausea/vomitting better.  Eating well.  New swelling bilaterally in feet to above the ankle.      Not checking blood sugars at home  Has been checking BPs at home - between 130-140 systolic  Take labetalol BID    Needs assistance to schedule a ride to see Dr. Hutchison for  consult.      Most Recent Immunizations   Administered Date(s) Administered    TDAP (Adacel,Boostrix) 06/10/2025    TDAP Vaccine (Boostrix) 2014   Deferred Date(s) Deferred    COVID-19 12+ (Pfizer) 05/15/2025    Hepatitis B, Adult (Energix-B/Recombivax HB) 05/15/2025    Pneumococcal 20 valent Conjugate (Prevnar 20) 05/15/2025    TDAP (Adacel,Boostrix) 05/15/2025       ROS:  No - Headache  No - Changes in vision  No - Chest Pain  No - Shortness of Breath  No - Nausea   No - Vomiting  No - Abdominal pain   Occassional - Contractions  No - Dysuria   No - Vaginal Discharge    No - Vaginal bleeding   No - Loss of Fluid   YES - Extremity  "swelling   Present - Fetal movement     Patient Active Problem List   Diagnosis    Anxiety and depression    Class 3 severe obesity due to excess calories without serious comorbidity with body mass index (BMI) of 40.0 to 44.9 in adult (H)    Mild intermittent asthma in adult without complication    Hiatal hernia    Uterine leiomyoma    History of stillbirth in pregnant patient in first trimester, antepartum    Pregnancy w/ hx of uterine myomectomy    Monoallelic mutation of BRCA2 gene    Nausea and vomiting in pregnancy    Supervision of high risk pregnancy in third trimester    Chronic hypertension affecting pregnancy    Encounter for triage in pregnant patient    Chlamydia trachomatis infection in mother during third trimester of pregnancy     labor       Guidance:  birth control  warning signs/PIH  Patient not interested in contraception right now.  Plans to practice abstinence    Do you need help getting a car seat? No  Do you need help getting a breast pump? No  Does not plan to breast feed.     Objective  /85   Pulse 78   Temp 99  F (37.2  C) (Oral)   Resp 20   Ht 1.676 m (5' 6\")   Wt (!) 142.2 kg (313 lb 9.6 oz)   LMP 10/20/2024 (Approximate)   SpO2 99%   BMI 50.62 kg/m    No distress.  Gravid abdomen.  .  Fundal height 37 cm.  2+ edema.    Results  Blood type: B POS  No results found for any visits on 25.   "

## 2025-06-26 NOTE — NURSING NOTE
Medical Transportation Coordination    Appt Date: 6/26/25               Arrival Time: 11:45 AM    Appt Location & Address:   [facility] location    Total family members:   2    Tranportation Name & Phone Number:   [employer] [phone]    Estimated Pick-up Time:   11:00 AM    Roundtrip?  Yes  Patient Notified?  Yes      Effie Prescott RN BSN

## 2025-06-27 ENCOUNTER — RESULTS FOLLOW-UP (OUTPATIENT)
Dept: FAMILY MEDICINE | Facility: CLINIC | Age: 35
End: 2025-06-27

## 2025-07-01 RX ORDER — OXYTOCIN 10 [USP'U]/ML
10 INJECTION, SOLUTION INTRAMUSCULAR; INTRAVENOUS
OUTPATIENT
Start: 2025-07-07

## 2025-07-01 RX ORDER — ACETAMINOPHEN 325 MG/1
975 TABLET ORAL ONCE
OUTPATIENT
Start: 2025-07-07 | End: 2025-07-01

## 2025-07-01 RX ORDER — CARBOPROST TROMETHAMINE 250 UG/ML
250 INJECTION, SOLUTION INTRAMUSCULAR
OUTPATIENT
Start: 2025-07-01

## 2025-07-01 RX ORDER — METHYLERGONOVINE MALEATE 0.2 MG/ML
200 INJECTION INTRAVENOUS
OUTPATIENT
Start: 2025-07-07

## 2025-07-01 RX ORDER — CITRIC ACID/SODIUM CITRATE 334-500MG
30 SOLUTION, ORAL ORAL
OUTPATIENT
Start: 2025-07-01

## 2025-07-01 RX ORDER — OXYTOCIN/0.9 % SODIUM CHLORIDE 30/500 ML
100-340 PLASTIC BAG, INJECTION (ML) INTRAVENOUS CONTINUOUS PRN
OUTPATIENT
Start: 2025-07-01

## 2025-07-01 RX ORDER — CEFAZOLIN SODIUM/WATER 3 G/30 ML
3 SYRINGE (ML) INTRAVENOUS
OUTPATIENT
Start: 2025-07-01

## 2025-07-01 RX ORDER — LOPERAMIDE HYDROCHLORIDE 2 MG/1
2 CAPSULE ORAL
OUTPATIENT
Start: 2025-07-01

## 2025-07-01 RX ORDER — LIDOCAINE 40 MG/G
CREAM TOPICAL
OUTPATIENT
Start: 2025-07-01

## 2025-07-01 RX ORDER — LOPERAMIDE HYDROCHLORIDE 2 MG/1
4 CAPSULE ORAL
OUTPATIENT
Start: 2025-07-01

## 2025-07-01 RX ORDER — CEFAZOLIN SODIUM/WATER 3 G/30 ML
3 SYRINGE (ML) INTRAVENOUS SEE ADMIN INSTRUCTIONS
OUTPATIENT
Start: 2025-07-01

## 2025-07-01 RX ORDER — MISOPROSTOL 200 UG/1
800 TABLET ORAL
OUTPATIENT
Start: 2025-07-01

## 2025-07-01 RX ORDER — OXYTOCIN/0.9 % SODIUM CHLORIDE 30/500 ML
340 PLASTIC BAG, INJECTION (ML) INTRAVENOUS CONTINUOUS PRN
OUTPATIENT
Start: 2025-07-07

## 2025-07-01 RX ORDER — TRANEXAMIC ACID 10 MG/ML
1 INJECTION, SOLUTION INTRAVENOUS EVERY 30 MIN PRN
OUTPATIENT
Start: 2025-07-07

## 2025-07-01 RX ORDER — SODIUM CHLORIDE, SODIUM LACTATE, POTASSIUM CHLORIDE, CALCIUM CHLORIDE 600; 310; 30; 20 MG/100ML; MG/100ML; MG/100ML; MG/100ML
INJECTION, SOLUTION INTRAVENOUS CONTINUOUS
OUTPATIENT
Start: 2025-07-07

## 2025-07-01 RX ORDER — MISOPROSTOL 200 UG/1
400 TABLET ORAL
OUTPATIENT
Start: 2025-07-01

## 2025-07-01 RX ORDER — OXYTOCIN 10 [USP'U]/ML
10 INJECTION, SOLUTION INTRAMUSCULAR; INTRAVENOUS
OUTPATIENT
Start: 2025-07-01

## 2025-07-03 ENCOUNTER — HOSPITAL ENCOUNTER (INPATIENT)
Facility: HOSPITAL | Age: 35
Setting detail: SURGERY ADMIT
End: 2025-07-03
Attending: OBSTETRICS & GYNECOLOGY | Admitting: OBSTETRICS & GYNECOLOGY
Payer: COMMERCIAL

## 2025-07-03 VITALS — TEMPERATURE: 98.3 F | DIASTOLIC BLOOD PRESSURE: 63 MMHG | RESPIRATION RATE: 20 BRPM | SYSTOLIC BLOOD PRESSURE: 145 MMHG

## 2025-07-03 LAB
ALBUMIN MFR UR ELPH: 39.8 MG/DL
ALBUMIN SERPL BCG-MCNC: 2.9 G/DL (ref 3.5–5.2)
ALP SERPL-CCNC: 139 U/L (ref 40–150)
ALT SERPL W P-5'-P-CCNC: 11 U/L (ref 0–50)
ANION GAP SERPL CALCULATED.3IONS-SCNC: 10 MMOL/L (ref 7–15)
AST SERPL W P-5'-P-CCNC: 15 U/L (ref 0–45)
BILIRUB SERPL-MCNC: 0.3 MG/DL
BUN SERPL-MCNC: 6.9 MG/DL (ref 6–20)
CALCIUM SERPL-MCNC: 8.9 MG/DL (ref 8.8–10.4)
CHLORIDE SERPL-SCNC: 104 MMOL/L (ref 98–107)
CREAT SERPL-MCNC: 0.7 MG/DL (ref 0.51–0.95)
CREAT UR-MCNC: 337.3 MG/DL
EGFRCR SERPLBLD CKD-EPI 2021: >90 ML/MIN/1.73M2
ERYTHROCYTE [DISTWIDTH] IN BLOOD BY AUTOMATED COUNT: 14.4 % (ref 10–15)
GLUCOSE SERPL-MCNC: 97 MG/DL (ref 70–99)
HCO3 SERPL-SCNC: 21 MMOL/L (ref 22–29)
HCT VFR BLD AUTO: 35.5 % (ref 35–47)
HGB BLD-MCNC: 11.4 G/DL (ref 11.7–15.7)
MCH RBC QN AUTO: 28.3 PG (ref 26.5–33)
MCHC RBC AUTO-ENTMCNC: 32.1 G/DL (ref 31.5–36.5)
MCV RBC AUTO: 88 FL (ref 78–100)
PLATELET # BLD AUTO: 364 10E3/UL (ref 150–450)
POTASSIUM SERPL-SCNC: 4 MMOL/L (ref 3.4–5.3)
PROT SERPL-MCNC: 6.1 G/DL (ref 6.4–8.3)
PROT/CREAT 24H UR: 0.12 MG/MG CR (ref 0–0.2)
RBC # BLD AUTO: 4.03 10E6/UL (ref 3.8–5.2)
SODIUM SERPL-SCNC: 135 MMOL/L (ref 135–145)
WBC # BLD AUTO: 8.8 10E3/UL (ref 4–11)

## 2025-07-03 PROCEDURE — 80053 COMPREHEN METABOLIC PANEL: CPT

## 2025-07-03 PROCEDURE — 36415 COLL VENOUS BLD VENIPUNCTURE: CPT

## 2025-07-03 PROCEDURE — 85014 HEMATOCRIT: CPT

## 2025-07-03 PROCEDURE — 250N000013 HC RX MED GY IP 250 OP 250 PS 637

## 2025-07-03 PROCEDURE — 99213 OFFICE O/P EST LOW 20 MIN: CPT | Mod: GE

## 2025-07-03 PROCEDURE — 84156 ASSAY OF PROTEIN URINE: CPT

## 2025-07-03 PROCEDURE — 85027 COMPLETE CBC AUTOMATED: CPT

## 2025-07-03 PROCEDURE — 82947 ASSAY GLUCOSE BLOOD QUANT: CPT

## 2025-07-03 RX ORDER — ACETAMINOPHEN 500 MG
1000 TABLET ORAL EVERY 8 HOURS PRN
Status: DISPENSED | OUTPATIENT
Start: 2025-07-03

## 2025-07-03 RX ORDER — LIDOCAINE 40 MG/G
CREAM TOPICAL
Status: ACTIVE | OUTPATIENT
Start: 2025-07-03

## 2025-07-03 RX ADMIN — ACETAMINOPHEN 1000 MG: 500 TABLET, FILM COATED ORAL at 22:43

## 2025-07-03 ASSESSMENT — ACTIVITIES OF DAILY LIVING (ADL)
ADLS_ACUITY_SCORE: 46

## 2025-07-04 PROCEDURE — G0463 HOSPITAL OUTPT CLINIC VISIT: HCPCS

## 2025-07-04 ASSESSMENT — ACTIVITIES OF DAILY LIVING (ADL): ADLS_ACUITY_SCORE: 46

## 2025-07-04 NOTE — DISCHARGE INSTRUCTIONS
"  Counting Your Baby's Kicks: Care Instructions  Overview     Counting your baby's kicks is one way your doctor can tell that your baby is healthy. You will probably feel your baby move for the first time between 16 and 22 weeks. The movement may feel like flutters rather than kicks. Your baby may move more at certain times of the day. When you are active, you may notice less kicking than when you are resting. At your prenatal visits, your doctor will ask whether the baby is active.  In your last trimester, your doctor may ask you to count the number of times you feel your baby move.  Follow-up care is a key part of your treatment and safety. Be sure to make and go to all appointments, and call your doctor if you are having problems. It's also a good idea to know your test results and keep a list of the medicines you take.  How do you count fetal kicks?  A common method of checking your baby's movement is to note the length of time it takes to count 10 movements (such as kicks, flutters, or rolls).  Pick your baby's most active time of day to count. This may be any time from morning to evening.  If you don't feel 10 movements in an hour, have something to eat or drink and count for another hour. If you don't feel at least 10 movements in the 2-hour period, call your doctor.  Do not use an at-home Doppler heart monitor in place of counting fetal movements.  When should you call for help?   Call your doctor now or seek immediate medical care if:    You feel fewer than 10 movements in a 2-hour period.     You noticed that your baby has stopped moving or is moving less than normal.   Watch closely for changes in your health, and be sure to contact your doctor if you have any problems.  Where can you learn more?  Go to https://www.healthwise.net/patiented  Enter U048 in the search box to learn more about \"Counting Your Baby's Kicks: Care Instructions.\"  Current as of: April 30, 2024  Content Version: 14.5 2024-2025 " [employer].   Care instructions adapted under license by your healthcare professional. If you have questions about a medical condition or this instruction, always ask your healthcare professional. [employer] disclaims any warranty or liability for your use of this information.  Learning About When to Call Your Doctor During Pregnancy (After 20 Weeks)  Overview  It's common to have concerns about what might be a problem when you're pregnant. Most pregnancies don't have any serious problems. But it's still important to know when to call your doctor if you have certain symptoms or signs of labor.  These are general suggestions. Your doctor may give you some more information about when to call.  When to call your doctor (after 20 weeks)  Call 911 anytime you think you may need emergency care. For example, call if:  You have severe vaginal bleeding. You have soaked through one or more pads in an hour, and the bleeding is not slowing down.  You have sudden, severe pain in your belly that does not go away.  You have chest pain, are short of breath, or cough up blood.  You passed out (lost consciousness).  You have a seizure.  You see or feel the umbilical cord.  You think you are about to deliver your baby and can't make it safely to the hospital or birthing center.  Call your doctor now or seek immediate medical care if:  You have vaginal bleeding.  You have belly pain.  You have a fever.  You are dizzy or lightheaded, or you feel like you may faint.  You have signs of a blood clot in your leg (called a deep vein thrombosis), such as:  Pain in the calf, back of the knee, thigh, or groin.  Swelling in your leg or groin.  A color change on the leg or groin. The skin may be reddish or purplish.  You have symptoms of preeclampsia, such as:  Sudden swelling of your face, hands, or feet.  New vision problems (such as dimness, blurring, or seeing spots).  A severe headache that will not go away.  You have  a sudden release or slow trickle of fluid from your vagina. This may mean your water has broken.  You've been having regular contractions for an hour at less than 37 weeks. This means that you've had at least 6 contractions within 1 hour, even after you change your position and drink fluids.  You notice that your baby has stopped moving or is moving less than normal.  You have signs of heart failure, such as:  New or increased shortness of breath.  New or worse swelling in your legs, ankles, or feet.  Sudden weight gain, such as more than 2 to 3 pounds in a day or 5 pounds in a week.  Feeling so tired or weak that you cannot do your usual activities.  You have symptoms of a urinary tract infection. These may include:  Pain or burning when you urinate.  A frequent need to urinate without being able to pass much urine.  Pain in your low back (below the rib cage and above the waist).  Blood in your urine.  Watch closely for changes in your health, and be sure to contact your doctor if:  You have vaginal discharge that smells bad.  You feel sad, anxious, or hopeless for more than a few days.  You have skin changes, such as a rash, itching, or a yellow color to your skin.  You have other concerns about your pregnancy.  If you have labor signs at 37 weeks or more  If you have signs of labor at 37 weeks or more, your doctor may tell you to call when your labor becomes more active. During active labor:  Contractions happen more often and at regular intervals (about every 3 to 5 minutes).  Contractions last longer (about 60 seconds or more).  Contractions get stronger and are hard to talk through.  Follow-up care is a key part of your treatment and safety. Be sure to make and go to all appointments, and call your doctor if you are having problems. It's also a good idea to know your test results and keep a list of the medicines you take.  Where can you learn more?  Go to https://www.healthwise.net/patiented  Enter N531 in the  "search box to learn more about \"Learning About When to Call Your Doctor During Pregnancy (After 20 Weeks).\"  Current as of: April 30, 2024  Content Version: 14.5 2024-2025 [employer].   Care instructions adapted under license by your healthcare professional. If you have questions about a medical condition or this instruction, always ask your healthcare professional. [employer] disclaims any warranty or liability for your use of this information.    "

## 2025-07-04 NOTE — PROGRESS NOTES
Late entry:     Patient arrived to [facility] by EMS at approximately 2110 with complaints of pedal edema and pain that starts around her pelvis and goes down her lower extremities. Patient states that pain is 10/10 and has not taken anything for her pain. Patient declined tylenol. Patient denies any contractions, loss of fluid, or vaginal bleeding. Patient denies vaginal discharge or pain/urgency with urination. Patient also denies symptoms of vision changes, right upper epigastric pain, or headache. Patient placed on the monitor, and vitals obtained. Resident OB vocera messaged regarding patient arrival. Labs ordered, patient refused ROM+.  Tylenol given. Per Dr. Rios, patient okay to discharge. Taxi ride requested.     Sheela Whitmore RN 12:14 AM 07/04/25

## 2025-07-04 NOTE — PROGRESS NOTES
OBSTETRICS TRIAGE ASSESSMENT NOTE    Lucinda Ji is a 34 year old  at 36w6d gestation based on 1st trimester ultrasound who has presented to maternity care for further evaluation of lower extremity edema and pain. No bleeding, leakage of fluids, contractions. Baby is moving normally.    [place] recently? no    Chronic HTN - laetolol 200 twic edaily    C section between 36-37 weeks    Patient has been having lower back/buttock and lower extremity pain for the past couple weeks.  It significantly worsened this evening and made mobility more difficult so she called EMS and came in.  She denies any medications at home.  She says the worst pain is in her right buttocks and radiates to her right groin however does not localize in the suprapubic area and patient declines this feeling like contractions previously.  She has not lost any leakage of fluid no bleeding and no contractions and my baby is moving at this time.  She has not tried any medications at home.    There is a plan for  on Monday    PRENATAL CARE  Seen by Dr. Ghosh and Dr. Feldman at.         PAST MEDICAL HISTORY  Past Medical History:   Diagnosis Date    Asthma     Fetal demise, greater than 22 weeks, antepartum, single gestation 10/29/2020    Gastritis     GBS bacteriuria 08/10/2020    Hypertension     Obesity        PAST SURGICAL HISTORY   Past Surgical History:   Procedure Laterality Date    DAVINCI MYOMECTOMY N/A 2022    Procedure: ROBOTIC MYOMECTOMY;  Surgeon: Indra Reis MD;  Location: [facility] OR    LYSIS, ADHESIONS, ROBOT-ASSISTED, LAPAROSCOPIC, USING DA BROOKE XI N/A 2022    Procedure: LYSIS, ADHESIONS, ROBOT-ASSISTED, LAPAROSCOPIC, USING DA BROOKE XI;  Surgeon: Indra Reis MD;  Location: [facility] OR       MEDICATIONS  No current facility-administered medications for this encounter.    SOCIAL HISTORY:   Social History     Socioeconomic History    Marital status: Single     Spouse  name: Not on file    Number of children: 1    Years of education: Not on file    Highest education level: Not on file   Occupational History    Occupation: unemployed   Tobacco Use    Smoking status: Former     Types: Cigarettes    Smokeless tobacco: Never   Vaping Use    Vaping status: Never Used   Substance and Sexual Activity    Alcohol use: Not Currently     Comment: occasional    Drug use: Not Currently     Types: Marijuana     Comment: not in pregnancy    Sexual activity: Yes     Partners: Male     Birth control/protection: Condom   Other Topics Concern    Not on file   Social History Narrative    Not on file     Social Drivers of Health     Financial Resource Strain: Low Risk  (6/7/2025)    Financial Resource Strain     Within the past 12 months, have you or your family members you live with been unable to get utilities (heat, electricity) when it was really needed?: No   Food Insecurity: Low Risk  (6/7/2025)    Food Insecurity     Within the past 12 months, did you worry that your food would run out before you got money to buy more?: No     Within the past 12 months, did the food you bought just not last and you didn t have money to get more?: No   Transportation Needs: Low Risk  (6/7/2025)    Transportation Needs     Within the past 12 months, has lack of transportation kept you from medical appointments, getting your medicines, non-medical meetings or appointments, work, or from getting things that you need?: No   Recent Concern: Transportation Needs - High Risk (5/9/2025)    Transportation Needs     Within the past 12 months, has lack of transportation kept you from medical appointments, getting your medicines, non-medical meetings or appointments, work, or from getting things that you need?: Yes   Physical Activity: Not on file   Stress: Not on file   Social Connections: Not on file   Interpersonal Safety: Low Risk  (6/7/2025)    Interpersonal Safety     Do you feel physically and emotionally safe where  you currently live?: Yes     Within the past 12 months, have you been hit, slapped, kicked or otherwise physically hurt by someone?: No     Within the past 12 months, have you been humiliated or emotionally abused in other ways by your partner or ex-partner?: No   Housing Stability: Low Risk  (6/7/2025)    Housing Stability     Do you have housing? : Yes     Are you worried about losing your housing?: No   Recent Concern: Housing Stability - High Risk (5/3/2025)    Housing Stability     Do you have housing? : No     Are you worried about losing your housing?: No       PHYSICAL EXAMINATION   BP (!) 148/79   Temp 98.3  F (36.8  C) (Oral)   Resp 20   LMP 10/20/2024 (Approximate)   Gen: appears comfortable, no acute distress  CV: regular rate and rhythm, no murmur appreciated  Lungs: clear to ausculation, good air movement throughout  Abdomen: Gravid, non-tender fundus  Extremities:Trace edema BLE, non-pitting  Membranes:  intact  FHT: Category 1 tracing; baseline 145 with moderate variability and accelerations, no decelerations  Contractions: none    LAB RESULTS  Personally reviewed.  No results found for this or any previous visit (from the past 24 hours).    ASSESSMENT:  Lucinda Ji is a 34 year old year old at 36w6d weeks not in labor, presenting with lower extremity and groin pains and some mild pedal edema consistent with round ligament type pain along with lower extremity swelling and mild hypertension.     Chronic hypertension  Patient 145/63 without previous symptoms other than pedal edema.  Previously had a headache but no headache now.  Labs here are reassuring against PE.  - Continue PTA labetalol  - Return precautions reviewed    Abdominal pain  Chronic abdominal pain has been worked up extensively in the past.  It has been worsening significantly in past couple weeks and today was much more severe.  No new red flag symptoms and no vaginal bleeding.  No signs of labor and no rupture of membranes.  Suspect  Tony Amaya as well as degenerating fibroid pain.  Not having any UTI symptoms or any other infectious signs or symptoms  - Give Tylenol here  - As needed Tylenol at home  - Plan for  on Monday    Safe to discharge now    Thomas Rios MD  [facility]       Precepted patient with Dr. Eid who agrees with the plan above.

## 2025-07-06 NOTE — PROGRESS NOTES
Phone contact on fill reached patients sister, stated patient has no phone. Reiterated if patient could call [facility] labor and delivery at some point today. Plan is to arrive at hospital Day of surgery at 0500. Family member stated will inform patient to call and of arrival time.

## 2025-07-07 ENCOUNTER — HOSPITAL ENCOUNTER (INPATIENT)
Facility: HOSPITAL | Age: 35
End: 2025-07-07
Attending: OBSTETRICS & GYNECOLOGY | Admitting: OBSTETRICS & GYNECOLOGY
Payer: COMMERCIAL

## 2025-07-07 ENCOUNTER — ANESTHESIA EVENT (OUTPATIENT)
Dept: OBGYN | Facility: HOSPITAL | Age: 35
End: 2025-07-07
Payer: COMMERCIAL

## 2025-07-07 ENCOUNTER — ANESTHESIA (OUTPATIENT)
Dept: OBGYN | Facility: HOSPITAL | Age: 35
End: 2025-07-07
Payer: COMMERCIAL

## 2025-07-07 DIAGNOSIS — O09.93 SUPERVISION OF HIGH RISK PREGNANCY IN THIRD TRIMESTER: Primary | ICD-10-CM

## 2025-07-07 DIAGNOSIS — O10.919 CHRONIC HYPERTENSION AFFECTING PREGNANCY: ICD-10-CM

## 2025-07-07 DIAGNOSIS — O14.13 SEVERE PRE-ECLAMPSIA IN THIRD TRIMESTER: ICD-10-CM

## 2025-07-07 PROBLEM — O14.10 SEVERE PREECLAMPSIA: Status: ACTIVE | Noted: 2025-07-07

## 2025-07-07 LAB
ABO + RH BLD: NORMAL
ALBUMIN MFR UR ELPH: 86.4 MG/DL
ALBUMIN SERPL BCG-MCNC: 3 G/DL (ref 3.5–5.2)
ALP SERPL-CCNC: 131 U/L (ref 40–150)
ALT SERPL W P-5'-P-CCNC: 14 U/L (ref 0–50)
ANION GAP SERPL CALCULATED.3IONS-SCNC: 9 MMOL/L (ref 7–15)
APTT PPP: 25 SECONDS (ref 22–38)
AST SERPL W P-5'-P-CCNC: 20 U/L (ref 0–45)
BILIRUB SERPL-MCNC: 0.4 MG/DL
BLD GP AB SCN SERPL QL: NEGATIVE
BUN SERPL-MCNC: 7.2 MG/DL (ref 6–20)
CALCIUM SERPL-MCNC: 8.6 MG/DL (ref 8.8–10.4)
CHLORIDE SERPL-SCNC: 106 MMOL/L (ref 98–107)
CREAT SERPL-MCNC: 0.67 MG/DL (ref 0.51–0.95)
CREAT SERPL-MCNC: 0.71 MG/DL (ref 0.51–0.95)
CREAT UR-MCNC: 265.8 MG/DL
EGFRCR SERPLBLD CKD-EPI 2021: >90 ML/MIN/1.73M2
EGFRCR SERPLBLD CKD-EPI 2021: >90 ML/MIN/1.73M2
ERYTHROCYTE [DISTWIDTH] IN BLOOD BY AUTOMATED COUNT: 14.7 % (ref 10–15)
GLUCOSE BLDC GLUCOMTR-MCNC: 95 MG/DL (ref 70–99)
GLUCOSE SERPL-MCNC: 92 MG/DL (ref 70–99)
HCO3 SERPL-SCNC: 20 MMOL/L (ref 22–29)
HCT VFR BLD AUTO: 38.9 % (ref 35–47)
HGB BLD-MCNC: 11.6 G/DL (ref 11.7–15.7)
HOLD SPECIMEN: NORMAL
INR PPP: 0.92 (ref 0.85–1.15)
MCH RBC QN AUTO: 28 PG (ref 26.5–33)
MCHC RBC AUTO-ENTMCNC: 29.8 G/DL (ref 31.5–36.5)
MCV RBC AUTO: 94 FL (ref 78–100)
PLATELET # BLD AUTO: 338 10E3/UL (ref 150–450)
POTASSIUM SERPL-SCNC: 4.1 MMOL/L (ref 3.4–5.3)
PROT SERPL-MCNC: 6.3 G/DL (ref 6.4–8.3)
PROT/CREAT 24H UR: 0.33 MG/MG CR (ref 0–0.2)
PROTHROMBIN TIME: 12.7 SECONDS (ref 11.8–14.8)
RBC # BLD AUTO: 4.15 10E6/UL (ref 3.8–5.2)
SODIUM SERPL-SCNC: 135 MMOL/L (ref 135–145)
SPECIMEN EXP DATE BLD: NORMAL
T PALLIDUM AB SER QL: NONREACTIVE
WBC # BLD AUTO: 8.2 10E3/UL (ref 4–11)

## 2025-07-07 PROCEDURE — 86780 TREPONEMA PALLIDUM: CPT | Performed by: OBSTETRICS & GYNECOLOGY

## 2025-07-07 PROCEDURE — 250N000011 HC RX IP 250 OP 636: Performed by: OBSTETRICS & GYNECOLOGY

## 2025-07-07 PROCEDURE — 85027 COMPLETE CBC AUTOMATED: CPT | Performed by: OBSTETRICS & GYNECOLOGY

## 2025-07-07 PROCEDURE — 258N000003 HC RX IP 258 OP 636: Performed by: PAIN MEDICINE

## 2025-07-07 PROCEDURE — 250N000009 HC RX 250: Performed by: OBSTETRICS & GYNECOLOGY

## 2025-07-07 PROCEDURE — 84156 ASSAY OF PROTEIN URINE: CPT | Performed by: OBSTETRICS & GYNECOLOGY

## 2025-07-07 PROCEDURE — 360N000076 HC SURGERY LEVEL 3, PER MIN: Performed by: OBSTETRICS & GYNECOLOGY

## 2025-07-07 PROCEDURE — 86900 BLOOD TYPING SEROLOGIC ABO: CPT | Performed by: OBSTETRICS & GYNECOLOGY

## 2025-07-07 PROCEDURE — 999N000249 HC STATISTIC C-SECTION ON UNIT

## 2025-07-07 PROCEDURE — 370N000017 HC ANESTHESIA TECHNICAL FEE, PER MIN: Performed by: OBSTETRICS & GYNECOLOGY

## 2025-07-07 PROCEDURE — 36415 COLL VENOUS BLD VENIPUNCTURE: CPT | Performed by: OBSTETRICS & GYNECOLOGY

## 2025-07-07 PROCEDURE — 85610 PROTHROMBIN TIME: CPT | Performed by: OBSTETRICS & GYNECOLOGY

## 2025-07-07 PROCEDURE — 84155 ASSAY OF PROTEIN SERUM: CPT | Performed by: OBSTETRICS & GYNECOLOGY

## 2025-07-07 PROCEDURE — 85730 THROMBOPLASTIN TIME PARTIAL: CPT | Performed by: OBSTETRICS & GYNECOLOGY

## 2025-07-07 PROCEDURE — 710N000010 HC RECOVERY PHASE 1, LEVEL 2, PER MIN: Performed by: OBSTETRICS & GYNECOLOGY

## 2025-07-07 PROCEDURE — 999N000249 HC STATISTIC C-SECTION ON UNIT: Performed by: OBSTETRICS & GYNECOLOGY

## 2025-07-07 PROCEDURE — 258N000003 HC RX IP 258 OP 636: Performed by: OBSTETRICS & GYNECOLOGY

## 2025-07-07 PROCEDURE — 120N000001 HC R&B MED SURG/OB

## 2025-07-07 PROCEDURE — 82565 ASSAY OF CREATININE: CPT | Performed by: OBSTETRICS & GYNECOLOGY

## 2025-07-07 PROCEDURE — 272N000001 HC OR GENERAL SUPPLY STERILE: Performed by: OBSTETRICS & GYNECOLOGY

## 2025-07-07 PROCEDURE — 250N000013 HC RX MED GY IP 250 OP 250 PS 637: Performed by: OBSTETRICS & GYNECOLOGY

## 2025-07-07 PROCEDURE — 250N000011 HC RX IP 250 OP 636: Performed by: PAIN MEDICINE

## 2025-07-07 RX ORDER — MISOPROSTOL 200 UG/1
800 TABLET ORAL
Status: DISCONTINUED | OUTPATIENT
Start: 2025-07-07 | End: 2025-07-11 | Stop reason: HOSPADM

## 2025-07-07 RX ORDER — LOPERAMIDE HYDROCHLORIDE 2 MG/1
4 CAPSULE ORAL
Status: DISCONTINUED | OUTPATIENT
Start: 2025-07-07 | End: 2025-07-07 | Stop reason: HOSPADM

## 2025-07-07 RX ORDER — OXYTOCIN 10 [USP'U]/ML
10 INJECTION, SOLUTION INTRAMUSCULAR; INTRAVENOUS
Status: DISCONTINUED | OUTPATIENT
Start: 2025-07-07 | End: 2025-07-07 | Stop reason: HOSPADM

## 2025-07-07 RX ORDER — CITRIC ACID/SODIUM CITRATE 334-500MG
30 SOLUTION, ORAL ORAL
Status: DISCONTINUED | OUTPATIENT
Start: 2025-07-07 | End: 2025-07-07 | Stop reason: HOSPADM

## 2025-07-07 RX ORDER — SODIUM PHOSPHATE,MONO-DIBASIC 19G-7G/118
1 ENEMA (ML) RECTAL DAILY PRN
Status: DISCONTINUED | OUTPATIENT
Start: 2025-07-09 | End: 2025-07-11 | Stop reason: HOSPADM

## 2025-07-07 RX ORDER — SODIUM CHLORIDE, SODIUM LACTATE, POTASSIUM CHLORIDE, CALCIUM CHLORIDE 600; 310; 30; 20 MG/100ML; MG/100ML; MG/100ML; MG/100ML
INJECTION, SOLUTION INTRAVENOUS CONTINUOUS
Status: DISCONTINUED | OUTPATIENT
Start: 2025-07-07 | End: 2025-07-07 | Stop reason: HOSPADM

## 2025-07-07 RX ORDER — NALOXONE HYDROCHLORIDE 0.4 MG/ML
0.1 INJECTION, SOLUTION INTRAMUSCULAR; INTRAVENOUS; SUBCUTANEOUS
Status: DISCONTINUED | OUTPATIENT
Start: 2025-07-07 | End: 2025-07-11 | Stop reason: HOSPADM

## 2025-07-07 RX ORDER — FENTANYL CITRATE 50 UG/ML
50 INJECTION, SOLUTION INTRAMUSCULAR; INTRAVENOUS EVERY 5 MIN PRN
Status: DISCONTINUED | OUTPATIENT
Start: 2025-07-07 | End: 2025-07-07 | Stop reason: HOSPADM

## 2025-07-07 RX ORDER — ONDANSETRON 2 MG/ML
INJECTION INTRAMUSCULAR; INTRAVENOUS
Status: COMPLETED
Start: 2025-07-07 | End: 2025-07-07

## 2025-07-07 RX ORDER — OXYCODONE HYDROCHLORIDE 5 MG/1
10 TABLET ORAL
Status: DISCONTINUED | OUTPATIENT
Start: 2025-07-07 | End: 2025-07-11 | Stop reason: HOSPADM

## 2025-07-07 RX ORDER — CARBOPROST TROMETHAMINE 250 UG/ML
250 INJECTION, SOLUTION INTRAMUSCULAR
Status: DISCONTINUED | OUTPATIENT
Start: 2025-07-07 | End: 2025-07-07 | Stop reason: HOSPADM

## 2025-07-07 RX ORDER — DEXTROSE MONOHYDRATE 25 G/50ML
25-50 INJECTION, SOLUTION INTRAVENOUS
Status: DISCONTINUED | OUTPATIENT
Start: 2025-07-07 | End: 2025-07-11 | Stop reason: HOSPADM

## 2025-07-07 RX ORDER — CEFAZOLIN SODIUM/WATER 3 G/30 ML
3 SYRINGE (ML) INTRAVENOUS SEE ADMIN INSTRUCTIONS
Status: DISCONTINUED | OUTPATIENT
Start: 2025-07-07 | End: 2025-07-07 | Stop reason: HOSPADM

## 2025-07-07 RX ORDER — ONDANSETRON 2 MG/ML
4 INJECTION INTRAMUSCULAR; INTRAVENOUS EVERY 30 MIN PRN
Status: DISCONTINUED | OUTPATIENT
Start: 2025-07-07 | End: 2025-07-11 | Stop reason: HOSPADM

## 2025-07-07 RX ORDER — OXYCODONE HYDROCHLORIDE 5 MG/1
5 TABLET ORAL
Status: DISCONTINUED | OUTPATIENT
Start: 2025-07-07 | End: 2025-07-11 | Stop reason: HOSPADM

## 2025-07-07 RX ORDER — FENTANYL CITRATE 50 UG/ML
25 INJECTION, SOLUTION INTRAMUSCULAR; INTRAVENOUS EVERY 5 MIN PRN
Status: DISCONTINUED | OUTPATIENT
Start: 2025-07-07 | End: 2025-07-07 | Stop reason: HOSPADM

## 2025-07-07 RX ORDER — BUPIVACAINE HYDROCHLORIDE 7.5 MG/ML
INJECTION, SOLUTION INTRASPINAL
Status: COMPLETED | OUTPATIENT
Start: 2025-07-07 | End: 2025-07-07

## 2025-07-07 RX ORDER — ACETAMINOPHEN 325 MG/1
975 TABLET ORAL EVERY 6 HOURS
Status: DISCONTINUED | OUTPATIENT
Start: 2025-07-07 | End: 2025-07-11 | Stop reason: HOSPADM

## 2025-07-07 RX ORDER — OXYTOCIN/0.9 % SODIUM CHLORIDE 30/500 ML
340 PLASTIC BAG, INJECTION (ML) INTRAVENOUS CONTINUOUS PRN
Status: DISCONTINUED | OUTPATIENT
Start: 2025-07-07 | End: 2025-07-07 | Stop reason: HOSPADM

## 2025-07-07 RX ORDER — ONDANSETRON 2 MG/ML
4 INJECTION INTRAMUSCULAR; INTRAVENOUS EVERY 6 HOURS PRN
Status: DISCONTINUED | OUTPATIENT
Start: 2025-07-07 | End: 2025-07-11 | Stop reason: HOSPADM

## 2025-07-07 RX ORDER — CALCIUM GLUCONATE 98 MG/ML
1 INJECTION, SOLUTION INTRAVENOUS
Status: DISCONTINUED | OUTPATIENT
Start: 2025-07-07 | End: 2025-07-11 | Stop reason: HOSPADM

## 2025-07-07 RX ORDER — NICOTINE POLACRILEX 4 MG
15-30 LOZENGE BUCCAL
Status: DISCONTINUED | OUTPATIENT
Start: 2025-07-07 | End: 2025-07-11 | Stop reason: HOSPADM

## 2025-07-07 RX ORDER — MISOPROSTOL 200 UG/1
800 TABLET ORAL
Status: DISCONTINUED | OUTPATIENT
Start: 2025-07-07 | End: 2025-07-07 | Stop reason: HOSPADM

## 2025-07-07 RX ORDER — SIMETHICONE 80 MG
80 TABLET,CHEWABLE ORAL EVERY 6 HOURS PRN
Status: DISCONTINUED | OUTPATIENT
Start: 2025-07-07 | End: 2025-07-11 | Stop reason: HOSPADM

## 2025-07-07 RX ORDER — LOPERAMIDE HYDROCHLORIDE 2 MG/1
2 CAPSULE ORAL
Status: DISCONTINUED | OUTPATIENT
Start: 2025-07-07 | End: 2025-07-07 | Stop reason: HOSPADM

## 2025-07-07 RX ORDER — AMOXICILLIN 250 MG
1 CAPSULE ORAL 2 TIMES DAILY
Status: DISCONTINUED | OUTPATIENT
Start: 2025-07-07 | End: 2025-07-11 | Stop reason: HOSPADM

## 2025-07-07 RX ORDER — MISOPROSTOL 200 UG/1
400 TABLET ORAL
Status: DISCONTINUED | OUTPATIENT
Start: 2025-07-07 | End: 2025-07-07 | Stop reason: HOSPADM

## 2025-07-07 RX ORDER — METHYLERGONOVINE MALEATE 0.2 MG/ML
200 INJECTION INTRAVENOUS
Status: DISCONTINUED | OUTPATIENT
Start: 2025-07-07 | End: 2025-07-11 | Stop reason: HOSPADM

## 2025-07-07 RX ORDER — NALOXONE HYDROCHLORIDE 0.4 MG/ML
0.1 INJECTION, SOLUTION INTRAMUSCULAR; INTRAVENOUS; SUBCUTANEOUS
Status: DISCONTINUED | OUTPATIENT
Start: 2025-07-07 | End: 2025-07-07 | Stop reason: HOSPADM

## 2025-07-07 RX ORDER — MAGNESIUM SULFATE HEPTAHYDRATE 40 MG/ML
2 INJECTION, SOLUTION INTRAVENOUS
Status: COMPLETED | OUTPATIENT
Start: 2025-07-07 | End: 2025-07-07

## 2025-07-07 RX ORDER — BISACODYL 10 MG
10 SUPPOSITORY, RECTAL RECTAL DAILY PRN
Status: DISCONTINUED | OUTPATIENT
Start: 2025-07-09 | End: 2025-07-11 | Stop reason: HOSPADM

## 2025-07-07 RX ORDER — OXYCODONE HYDROCHLORIDE 5 MG/1
5 TABLET ORAL EVERY 4 HOURS PRN
Status: DISCONTINUED | OUTPATIENT
Start: 2025-07-07 | End: 2025-07-09

## 2025-07-07 RX ORDER — DEXAMETHASONE SODIUM PHOSPHATE 4 MG/ML
4 INJECTION, SOLUTION INTRA-ARTICULAR; INTRALESIONAL; INTRAMUSCULAR; INTRAVENOUS; SOFT TISSUE
Status: DISCONTINUED | OUTPATIENT
Start: 2025-07-07 | End: 2025-07-11 | Stop reason: HOSPADM

## 2025-07-07 RX ORDER — KETOROLAC TROMETHAMINE 15 MG/ML
INJECTION, SOLUTION INTRAMUSCULAR; INTRAVENOUS
Status: COMPLETED
Start: 2025-07-07 | End: 2025-07-07

## 2025-07-07 RX ORDER — CEFAZOLIN SODIUM/WATER 3 G/30 ML
3 SYRINGE (ML) INTRAVENOUS
Status: COMPLETED | OUTPATIENT
Start: 2025-07-07 | End: 2025-07-07

## 2025-07-07 RX ORDER — AMOXICILLIN 250 MG
2 CAPSULE ORAL 2 TIMES DAILY
Status: DISCONTINUED | OUTPATIENT
Start: 2025-07-07 | End: 2025-07-11 | Stop reason: HOSPADM

## 2025-07-07 RX ORDER — LIDOCAINE 40 MG/G
CREAM TOPICAL
Status: DISCONTINUED | OUTPATIENT
Start: 2025-07-07 | End: 2025-07-11 | Stop reason: HOSPADM

## 2025-07-07 RX ORDER — SODIUM CHLORIDE, SODIUM LACTATE, POTASSIUM CHLORIDE, CALCIUM CHLORIDE 600; 310; 30; 20 MG/100ML; MG/100ML; MG/100ML; MG/100ML
10-125 INJECTION, SOLUTION INTRAVENOUS CONTINUOUS
Status: DISCONTINUED | OUTPATIENT
Start: 2025-07-07 | End: 2025-07-07

## 2025-07-07 RX ORDER — METOCLOPRAMIDE HYDROCHLORIDE 5 MG/ML
10 INJECTION INTRAMUSCULAR; INTRAVENOUS EVERY 6 HOURS PRN
Status: DISCONTINUED | OUTPATIENT
Start: 2025-07-07 | End: 2025-07-11 | Stop reason: HOSPADM

## 2025-07-07 RX ORDER — HYDROCORTISONE 25 MG/G
CREAM TOPICAL 3 TIMES DAILY PRN
Status: DISCONTINUED | OUTPATIENT
Start: 2025-07-07 | End: 2025-07-11 | Stop reason: HOSPADM

## 2025-07-07 RX ORDER — HYDRALAZINE HYDROCHLORIDE 20 MG/ML
5-10 INJECTION INTRAMUSCULAR; INTRAVENOUS
Status: DISCONTINUED | OUTPATIENT
Start: 2025-07-07 | End: 2025-07-07

## 2025-07-07 RX ORDER — LOPERAMIDE HYDROCHLORIDE 2 MG/1
4 CAPSULE ORAL
Status: DISCONTINUED | OUTPATIENT
Start: 2025-07-07 | End: 2025-07-11 | Stop reason: HOSPADM

## 2025-07-07 RX ORDER — LIDOCAINE 40 MG/G
CREAM TOPICAL
Status: DISCONTINUED | OUTPATIENT
Start: 2025-07-07 | End: 2025-07-07 | Stop reason: HOSPADM

## 2025-07-07 RX ORDER — ENOXAPARIN SODIUM 100 MG/ML
40 INJECTION SUBCUTANEOUS EVERY 12 HOURS
Status: DISCONTINUED | OUTPATIENT
Start: 2025-07-07 | End: 2025-07-11 | Stop reason: HOSPADM

## 2025-07-07 RX ORDER — HYDRALAZINE HYDROCHLORIDE 20 MG/ML
10 INJECTION INTRAMUSCULAR; INTRAVENOUS
Status: DISCONTINUED | OUTPATIENT
Start: 2025-07-07 | End: 2025-07-07

## 2025-07-07 RX ORDER — TRANEXAMIC ACID 10 MG/ML
1 INJECTION, SOLUTION INTRAVENOUS EVERY 30 MIN PRN
Status: DISCONTINUED | OUTPATIENT
Start: 2025-07-07 | End: 2025-07-07 | Stop reason: HOSPADM

## 2025-07-07 RX ORDER — OXYTOCIN 10 [USP'U]/ML
10 INJECTION, SOLUTION INTRAMUSCULAR; INTRAVENOUS
Status: DISCONTINUED | OUTPATIENT
Start: 2025-07-07 | End: 2025-07-11 | Stop reason: HOSPADM

## 2025-07-07 RX ORDER — METHYLERGONOVINE MALEATE 0.2 MG/ML
200 INJECTION INTRAVENOUS
Status: DISCONTINUED | OUTPATIENT
Start: 2025-07-07 | End: 2025-07-07 | Stop reason: HOSPADM

## 2025-07-07 RX ORDER — OXYTOCIN/0.9 % SODIUM CHLORIDE 30/500 ML
100-340 PLASTIC BAG, INJECTION (ML) INTRAVENOUS CONTINUOUS PRN
Status: DISCONTINUED | OUTPATIENT
Start: 2025-07-07 | End: 2025-07-11 | Stop reason: HOSPADM

## 2025-07-07 RX ORDER — ONDANSETRON 2 MG/ML
4 INJECTION INTRAMUSCULAR; INTRAVENOUS EVERY 30 MIN PRN
Status: DISCONTINUED | OUTPATIENT
Start: 2025-07-07 | End: 2025-07-07 | Stop reason: HOSPADM

## 2025-07-07 RX ORDER — CEFAZOLIN SODIUM/WATER 3 G/30 ML
3 SYRINGE (ML) INTRAVENOUS
Status: DISCONTINUED | OUTPATIENT
Start: 2025-07-07 | End: 2025-07-07 | Stop reason: HOSPADM

## 2025-07-07 RX ORDER — DEXTROSE, SODIUM CHLORIDE, SODIUM LACTATE, POTASSIUM CHLORIDE, AND CALCIUM CHLORIDE 5; .6; .31; .03; .02 G/100ML; G/100ML; G/100ML; G/100ML; G/100ML
INJECTION, SOLUTION INTRAVENOUS CONTINUOUS
Status: DISCONTINUED | OUTPATIENT
Start: 2025-07-07 | End: 2025-07-11 | Stop reason: HOSPADM

## 2025-07-07 RX ORDER — MORPHINE SULFATE 1 MG/ML
INJECTION, SOLUTION EPIDURAL; INTRATHECAL; INTRAVENOUS
Status: COMPLETED | OUTPATIENT
Start: 2025-07-07 | End: 2025-07-07

## 2025-07-07 RX ORDER — LABETALOL 200 MG/1
200 TABLET, FILM COATED ORAL 2 TIMES DAILY
Status: DISCONTINUED | OUTPATIENT
Start: 2025-07-07 | End: 2025-07-08

## 2025-07-07 RX ORDER — MAGNESIUM SULFATE 4 G/50ML
4 INJECTION INTRAVENOUS
Status: COMPLETED | OUTPATIENT
Start: 2025-07-07 | End: 2025-07-07

## 2025-07-07 RX ORDER — ONDANSETRON 4 MG/1
4 TABLET, ORALLY DISINTEGRATING ORAL EVERY 30 MIN PRN
Status: DISCONTINUED | OUTPATIENT
Start: 2025-07-07 | End: 2025-07-11 | Stop reason: HOSPADM

## 2025-07-07 RX ORDER — LABETALOL HYDROCHLORIDE 5 MG/ML
20-40 INJECTION, SOLUTION INTRAVENOUS EVERY 10 MIN PRN
Status: DISCONTINUED | OUTPATIENT
Start: 2025-07-07 | End: 2025-07-11 | Stop reason: HOSPADM

## 2025-07-07 RX ORDER — ONDANSETRON 4 MG/1
4 TABLET, ORALLY DISINTEGRATING ORAL EVERY 30 MIN PRN
Status: DISCONTINUED | OUTPATIENT
Start: 2025-07-07 | End: 2025-07-07 | Stop reason: HOSPADM

## 2025-07-07 RX ORDER — SODIUM CHLORIDE, SODIUM LACTATE, POTASSIUM CHLORIDE, CALCIUM CHLORIDE 600; 310; 30; 20 MG/100ML; MG/100ML; MG/100ML; MG/100ML
10-125 INJECTION, SOLUTION INTRAVENOUS CONTINUOUS
Status: DISCONTINUED | OUTPATIENT
Start: 2025-07-07 | End: 2025-07-11 | Stop reason: HOSPADM

## 2025-07-07 RX ORDER — METOCLOPRAMIDE 10 MG/1
10 TABLET ORAL EVERY 6 HOURS PRN
Status: DISCONTINUED | OUTPATIENT
Start: 2025-07-07 | End: 2025-07-11 | Stop reason: HOSPADM

## 2025-07-07 RX ORDER — MAGNESIUM SULFATE IN WATER 40 MG/ML
2 INJECTION, SOLUTION INTRAVENOUS CONTINUOUS
Status: DISCONTINUED | OUTPATIENT
Start: 2025-07-07 | End: 2025-07-11 | Stop reason: HOSPADM

## 2025-07-07 RX ORDER — TRANEXAMIC ACID 10 MG/ML
1 INJECTION, SOLUTION INTRAVENOUS EVERY 30 MIN PRN
Status: DISCONTINUED | OUTPATIENT
Start: 2025-07-07 | End: 2025-07-11 | Stop reason: HOSPADM

## 2025-07-07 RX ORDER — LABETALOL HYDROCHLORIDE 5 MG/ML
20 INJECTION, SOLUTION INTRAVENOUS ONCE
Status: COMPLETED | OUTPATIENT
Start: 2025-07-07 | End: 2025-07-07

## 2025-07-07 RX ORDER — KETOROLAC TROMETHAMINE 15 MG/ML
15 INJECTION, SOLUTION INTRAMUSCULAR; INTRAVENOUS EVERY 6 HOURS
Status: COMPLETED | OUTPATIENT
Start: 2025-07-07 | End: 2025-07-07

## 2025-07-07 RX ORDER — LABETALOL HYDROCHLORIDE 5 MG/ML
INJECTION, SOLUTION INTRAVENOUS
Status: COMPLETED
Start: 2025-07-07 | End: 2025-07-07

## 2025-07-07 RX ORDER — LABETALOL HYDROCHLORIDE 5 MG/ML
20-80 INJECTION, SOLUTION INTRAVENOUS EVERY 10 MIN PRN
Status: DISCONTINUED | OUTPATIENT
Start: 2025-07-07 | End: 2025-07-07

## 2025-07-07 RX ORDER — HYDRALAZINE HYDROCHLORIDE 20 MG/ML
5-10 INJECTION INTRAMUSCULAR; INTRAVENOUS
Status: DISCONTINUED | OUTPATIENT
Start: 2025-07-07 | End: 2025-07-11 | Stop reason: HOSPADM

## 2025-07-07 RX ORDER — LABETALOL HYDROCHLORIDE 5 MG/ML
20-40 INJECTION, SOLUTION INTRAVENOUS EVERY 10 MIN PRN
Status: DISCONTINUED | OUTPATIENT
Start: 2025-07-07 | End: 2025-07-07

## 2025-07-07 RX ORDER — DEXAMETHASONE SODIUM PHOSPHATE 4 MG/ML
4 INJECTION, SOLUTION INTRA-ARTICULAR; INTRALESIONAL; INTRAMUSCULAR; INTRAVENOUS; SOFT TISSUE
Status: DISCONTINUED | OUTPATIENT
Start: 2025-07-07 | End: 2025-07-07 | Stop reason: HOSPADM

## 2025-07-07 RX ORDER — ONDANSETRON 2 MG/ML
INJECTION INTRAMUSCULAR; INTRAVENOUS PRN
Status: DISCONTINUED | OUTPATIENT
Start: 2025-07-07 | End: 2025-07-07

## 2025-07-07 RX ORDER — IBUPROFEN 800 MG/1
800 TABLET, FILM COATED ORAL EVERY 6 HOURS
Status: DISCONTINUED | OUTPATIENT
Start: 2025-07-08 | End: 2025-07-11 | Stop reason: HOSPADM

## 2025-07-07 RX ORDER — OXYTOCIN/0.9 % SODIUM CHLORIDE 30/500 ML
340 PLASTIC BAG, INJECTION (ML) INTRAVENOUS CONTINUOUS PRN
Status: DISCONTINUED | OUTPATIENT
Start: 2025-07-07 | End: 2025-07-11 | Stop reason: HOSPADM

## 2025-07-07 RX ORDER — LIDOCAINE 40 MG/G
CREAM TOPICAL
Status: DISCONTINUED | OUTPATIENT
Start: 2025-07-07 | End: 2025-07-07

## 2025-07-07 RX ORDER — MISOPROSTOL 200 UG/1
400 TABLET ORAL
Status: DISCONTINUED | OUTPATIENT
Start: 2025-07-07 | End: 2025-07-11 | Stop reason: HOSPADM

## 2025-07-07 RX ORDER — HYDROMORPHONE HYDROCHLORIDE 1 MG/ML
0.2 INJECTION, SOLUTION INTRAMUSCULAR; INTRAVENOUS; SUBCUTANEOUS EVERY 5 MIN PRN
Status: DISCONTINUED | OUTPATIENT
Start: 2025-07-07 | End: 2025-07-07 | Stop reason: HOSPADM

## 2025-07-07 RX ORDER — CARBOPROST TROMETHAMINE 250 UG/ML
250 INJECTION, SOLUTION INTRAMUSCULAR
Status: DISCONTINUED | OUTPATIENT
Start: 2025-07-07 | End: 2025-07-11 | Stop reason: HOSPADM

## 2025-07-07 RX ORDER — ACETAMINOPHEN 325 MG/1
975 TABLET ORAL ONCE
Status: DISCONTINUED | OUTPATIENT
Start: 2025-07-07 | End: 2025-07-07 | Stop reason: HOSPADM

## 2025-07-07 RX ORDER — ACETAMINOPHEN 325 MG/1
975 TABLET ORAL ONCE
Status: COMPLETED | OUTPATIENT
Start: 2025-07-07 | End: 2025-07-07

## 2025-07-07 RX ORDER — LOPERAMIDE HYDROCHLORIDE 2 MG/1
2 CAPSULE ORAL
Status: DISCONTINUED | OUTPATIENT
Start: 2025-07-07 | End: 2025-07-11 | Stop reason: HOSPADM

## 2025-07-07 RX ORDER — HYDROMORPHONE HYDROCHLORIDE 1 MG/ML
0.4 INJECTION, SOLUTION INTRAMUSCULAR; INTRAVENOUS; SUBCUTANEOUS EVERY 5 MIN PRN
Status: DISCONTINUED | OUTPATIENT
Start: 2025-07-07 | End: 2025-07-07 | Stop reason: HOSPADM

## 2025-07-07 RX ORDER — PROCHLORPERAZINE MALEATE 10 MG
10 TABLET ORAL EVERY 6 HOURS PRN
Status: DISCONTINUED | OUTPATIENT
Start: 2025-07-07 | End: 2025-07-11 | Stop reason: HOSPADM

## 2025-07-07 RX ORDER — ONDANSETRON 4 MG/1
4 TABLET, ORALLY DISINTEGRATING ORAL EVERY 6 HOURS PRN
Status: DISCONTINUED | OUTPATIENT
Start: 2025-07-07 | End: 2025-07-11 | Stop reason: HOSPADM

## 2025-07-07 RX ORDER — BUPIVACAINE HYDROCHLORIDE 2.5 MG/ML
INJECTION, SOLUTION EPIDURAL; INFILTRATION; INTRACAUDAL; PERINEURAL
Status: COMPLETED | OUTPATIENT
Start: 2025-07-07 | End: 2025-07-07

## 2025-07-07 RX ADMIN — KETOROLAC TROMETHAMINE 15 MG: 15 INJECTION, SOLUTION INTRAMUSCULAR; INTRAVENOUS at 17:22

## 2025-07-07 RX ADMIN — BUPIVACAINE HYDROCHLORIDE 20 ML: 2.5 INJECTION, SOLUTION EPIDURAL; INFILTRATION; INTRACAUDAL at 08:18

## 2025-07-07 RX ADMIN — LABETALOL HYDROCHLORIDE 200 MG: 200 TABLET ORAL at 20:24

## 2025-07-07 RX ADMIN — LABETALOL HYDROCHLORIDE 20 MG: 5 INJECTION, SOLUTION INTRAVENOUS at 06:43

## 2025-07-07 RX ADMIN — Medication 340 ML/HR: at 07:52

## 2025-07-07 RX ADMIN — Medication 3 G: at 07:20

## 2025-07-07 RX ADMIN — ENOXAPARIN SODIUM 40 MG: 40 INJECTION SUBCUTANEOUS at 20:25

## 2025-07-07 RX ADMIN — SENNOSIDES AND DOCUSATE SODIUM 1 TABLET: 50; 8.6 TABLET ORAL at 08:59

## 2025-07-07 RX ADMIN — PHENYLEPHRINE HYDROCHLORIDE 100 MCG: 10 INJECTION INTRAVENOUS at 07:30

## 2025-07-07 RX ADMIN — PHENYLEPHRINE HYDROCHLORIDE 0.3 MCG/KG/MIN: 10 INJECTION INTRAVENOUS at 07:24

## 2025-07-07 RX ADMIN — SODIUM CHLORIDE, SODIUM LACTATE, POTASSIUM CHLORIDE, AND CALCIUM CHLORIDE 500 ML: .6; .31; .03; .02 INJECTION, SOLUTION INTRAVENOUS at 06:02

## 2025-07-07 RX ADMIN — ACETAMINOPHEN 975 MG: 325 TABLET ORAL at 07:05

## 2025-07-07 RX ADMIN — KETOROLAC TROMETHAMINE 15 MG: 15 INJECTION, SOLUTION INTRAMUSCULAR; INTRAVENOUS at 11:05

## 2025-07-07 RX ADMIN — ONDANSETRON 4 MG: 2 INJECTION INTRAMUSCULAR; INTRAVENOUS at 07:40

## 2025-07-07 RX ADMIN — ACETAMINOPHEN 975 MG: 325 TABLET ORAL at 20:23

## 2025-07-07 RX ADMIN — BUPIVACAINE HYDROCHLORIDE IN DEXTROSE 1.6 ML: 7.5 INJECTION, SOLUTION SUBARACHNOID at 07:20

## 2025-07-07 RX ADMIN — ONDANSETRON 4 MG: 2 INJECTION, SOLUTION INTRAMUSCULAR; INTRAVENOUS at 06:24

## 2025-07-07 RX ADMIN — HYDRALAZINE HYDROCHLORIDE 10 MG: 20 INJECTION INTRAMUSCULAR; INTRAVENOUS at 05:23

## 2025-07-07 RX ADMIN — LABETALOL HYDROCHLORIDE 200 MG: 200 TABLET ORAL at 08:59

## 2025-07-07 RX ADMIN — HYDRALAZINE HYDROCHLORIDE 10 MG: 20 INJECTION INTRAMUSCULAR; INTRAVENOUS at 06:03

## 2025-07-07 RX ADMIN — BUPIVACAINE HYDROCHLORIDE 20 ML: 2.5 INJECTION, SOLUTION EPIDURAL; INFILTRATION; INTRACAUDAL at 08:16

## 2025-07-07 RX ADMIN — SENNOSIDES AND DOCUSATE SODIUM 1 TABLET: 50; 8.6 TABLET ORAL at 20:25

## 2025-07-07 RX ADMIN — ACETAMINOPHEN 975 MG: 325 TABLET ORAL at 14:25

## 2025-07-07 RX ADMIN — MAGNESIUM SULFATE HEPTAHYDRATE 2 G/HR: 40 INJECTION, SOLUTION INTRAVENOUS at 09:47

## 2025-07-07 RX ADMIN — SODIUM CHLORIDE, SODIUM LACTATE, POTASSIUM CHLORIDE, AND CALCIUM CHLORIDE: .6; .31; .03; .02 INJECTION, SOLUTION INTRAVENOUS at 07:40

## 2025-07-07 RX ADMIN — MAGNESIUM SULFATE HEPTAHYDRATE 4 G: 4 INJECTION, SOLUTION INTRAVENOUS at 09:08

## 2025-07-07 RX ADMIN — KETOROLAC TROMETHAMINE 15 MG: 15 INJECTION, SOLUTION INTRAMUSCULAR; INTRAVENOUS at 23:28

## 2025-07-07 RX ADMIN — Medication 0.15 MG: at 07:20

## 2025-07-07 ASSESSMENT — ACTIVITIES OF DAILY LIVING (ADL)
ADLS_ACUITY_SCORE: 20
ADLS_ACUITY_SCORE: 21
ADLS_ACUITY_SCORE: 20
ADLS_ACUITY_SCORE: 46
ADLS_ACUITY_SCORE: 21
ADLS_ACUITY_SCORE: 20
ADLS_ACUITY_SCORE: 21
ADLS_ACUITY_SCORE: 25
ADLS_ACUITY_SCORE: 20
ADLS_ACUITY_SCORE: 21
ADLS_ACUITY_SCORE: 20
ADLS_ACUITY_SCORE: 25
ADLS_ACUITY_SCORE: 21

## 2025-07-07 NOTE — ANESTHESIA PROCEDURE NOTES
"TAP Procedure Note    Pre-Procedure   Staff -        Anesthesiologist:  Erin Maurer MD       Performed By: anesthesiologist       Location: OR       Procedure Start/Stop Times: 7/7/2025 8:16 AM and 7/7/2025 8:20 AM       Pre-Anesthestic Checklist: patient identified, IV checked, site marked, risks and benefits discussed, informed consent, monitors and equipment checked, pre-op evaluation, at physician/surgeon's request and post-op pain management  Timeout:       Correct Patient: Yes        Correct Procedure: Yes        Correct Site: Yes        Correct Position: Yes        Correct Laterality: Yes        Site Marked: Yes  Procedure Documentation  Procedure: TAP         Laterality: bilateral       Patient Position: supine       Patient Prep/Sterile Barriers: sterile gloves, mask       Skin prep: Chloraprep       Needle Type: insulated       Needle Gauge: 20.        Needle Length (Inches): 4        Ultrasound guided       1. Ultrasound was used to identify targeted nerve, plexus, vascular marker, or fascial plane and place a needle adjacent to it in real-time.       2. Ultrasound was used to visualize the spread of anesthetic in close proximity to the above referenced structure.    Assessment/Narrative         The placement was negative for: blood aspirated, painful injection and site bleeding       Paresthesias: No.       Complications: none    Medication(s) Administered   Bupivacaine 0.25% PF (Infiltration) - Infiltration   20 mL - 7/7/2025 8:16:00 AM   20 mL - 7/7/2025 8:18:00 AM  Medication Administration Time: 7/7/2025 8:16 AM      FOR [facility] ([facility]) ONLY:   Pain Team Contact information: please page the Pain Team Via [employer]. Search \"Pain\". During daytime hours, please page the attending first. At night please page the resident first.      "

## 2025-07-07 NOTE — PROGRESS NOTES
Patient arrived to [facility] at approximately 0400 by EMS for scheduled c section. Pre op and admission completed. Patient came in with high blood pressures and was treated as charted, Dr. Hutchison was available and bedside assessing patient. IV zofran given for nausea as charted. Tylenol given. Bicitra refused by patient. Consents signed.     Report given to SATISH Badillo RN 7:42 AM 07/07/25

## 2025-07-07 NOTE — ANESTHESIA PROCEDURE NOTES
"Intrathecal injection Procedure Note    Pre-Procedure   Staff -        Anesthesiologist:  Erin Maurer MD       Performed By: anesthesiologist       Location: OR       Procedure Start/Stop Times: 7/7/2025 7:20 AM and 7/7/2025 7:23 AM       Pre-Anesthestic Checklist: patient identified, IV checked, risks and benefits discussed, informed consent, monitors and equipment checked, pre-op evaluation, at physician/surgeon's request and post-op pain management  Timeout:       Correct Patient: Yes        Correct Procedure: Yes        Correct Site: Yes        Correct Position: Yes   Procedure Documentation  Procedure: intrathecal injection         Patient Position: sitting       Patient Prep/Sterile Barriers: sterile gloves, mask, patient draped       Insertion Site: L4-5. (midline approach).       Needle Gauge: 24.        Needle Length (Inches): 4        Spinal Needle Type: Valeriy tip       Introducer used       Introducer: 20 G       # of attempts: 1 and  # of redirects:  0    Assessment/Narrative         Paresthesias: No.       CSF fluid: clear.    Medication(s) Administered   0.75% Hyperbaric Bupivacaine (Intrathecal) - Intrathecal   1.6 mL - 7/7/2025 7:20:00 AM  Morphine PF 1 mg/mL (Intrathecal) - Intrathecal   0.15 mg - 7/7/2025 7:20:00 AM  Medication Administration Time: 7/7/2025 7:20 AM      FOR [facility] ([facility]) ONLY:   Pain Team Contact information: please page the Pain Team Via [email or web address]. Search \"Pain\". During daytime hours, please page the attending first. At night please page the resident first.      "

## 2025-07-07 NOTE — PROGRESS NOTES
Dr. Osorio to place postpartum diabetes management orders . Updated pt has had two bp's running 140's postpartum.  Continue to monitor

## 2025-07-07 NOTE — PROGRESS NOTES
Data: Lucinda Ji transferred to [facility]22/ULMV62-4 via Zoom Cart. Patient transferred to Postpartum with .    Action: Receiving unit notified of transfer. Patient and support person notified of room change. Patient and belongings accompanied by Registered Nurse. Bedside report given to Arminda Saldana . Fundal check performed with receiving RN. Oriented patient to surroundings. Call light within reach.Going to be filled out by labor RN and brought back    Response: Patient tolerated transfer.    Josselyn Nayak RN  2025 11:46 AM

## 2025-07-07 NOTE — PLAN OF CARE
Problem: Adult Inpatient Plan of Care  Goal: Plan of Care Review  Description: The Plan of Care Review/Shift note should be completed every shift.  The Outcome Evaluation is a brief statement about your assessment that the patient is improving, declining, or no change.  This information will be displayed automatically on your shift  note.  Outcome: Progressing  Flowsheets (Taken 7/7/2025 1517)  Plan of Care Reviewed With: patient  Overall Patient Progress: improving   Goal Outcome Evaluation:      Plan of Care Reviewed With: patient    Overall Patient Progress: improvingOverall Patient Progress: improving       VSS  Postpartum assessment WNL:  Fundus firm, -1U  Lochia light  Incision covered, old sanguinous drainage marked, no change  Minimal pain managed with scheduled tylenol and toradol  Fang removed at 1500, pt ambulated with bathroom with SBA  Magnesium running at 2gm/hr  Denies any preE/HTN symptoms   +1 reflexes  No clonus  Moderate LE edema

## 2025-07-07 NOTE — OP NOTE
NAME:  Lucinda Ji     DATE OF SERVICE: 2025     PREOPERATIVE DIAGNOSIS: 37 weeks, prior myomectomy, severe gestational hypertension    POSTOPERATIVE DIAGNOSIS: Same primary    PROCEDURE: Primary low transverse  section      SURGEON:  Luther Hutchison MD     ASSISTANT: Jo    ANESTHESIA: Spinal    QBL Delivery QBL (mL): 230    DRAINS: Fang catheter.    COMPLICATIONS: None    FINDINGS: Normal uterus, tubes and ovaries bilateral. Normal appearance to the adnexae.  Live female infant born with Apgars of 8 at one minute, 9 at 5 minutes,  weight unavailable at time of dictation.    PROCEDURE:  Patient was met preoperatively where we discussed the procedure and the risks associated with the procedure.  She understood these to include but not limited to injury to adjacent organs including bowel, bladder, ureter, infection and bleeding. Understanding these risks her consents were signed.      She was brought to the operating room in stable condition.  After induction of a spinal, fetal heart tones were checked and were stable. She was carefully prepped and draped in sterile fashion for the procedure.  A timeout was then performed.      A Pfannenstiel skin incision was made and carried down to the rectus fascia which was incised in the midline and carried out bilaterally.  The superior and inferior aspects of the rectus fascia were elevated up and the underlying rectus muscles dissected off with sharp and blunt techniques.  The rectus muscles were now  at the midline and the peritoneum was identified and entered sharply.  This incision was then extended.  A bladder blade was introduced. The vesicouterine peritoneum was identified and a bladder flap was created.  A low transverse uterine incision was made revealing clear amniotic fluid.  The baby's head was then delivered.The body and shoulders were delivered without complication. The cord was clamped x 2 and cut and the infant handed off to  waiting nursing personnel.    The placenta was then manually removed from the uterus.  The uterus was exteriorized, covered with a moist laparotomy sponge and cleared of all clots and debris.  The uterine incision was now closed with 0 Vicryl from both angles in a running locking suture. I imbricated the incision with a layer of 0 Monocryl. The bladder flap was left to heal by secondary intention. The uterus was returned to the abdominopelvic cavity.  The pericolic gutters were cleared of all clots and debris.  The uterus was again inspected and noted to be hemostatic. The fascia was brought together with looped O PDS. The subcutaneous tissues were irrigated, made hemostatic with use of electrocautery and 3-0 plain gut.  Skin was closed with 3-0 Stratafix.  Patient tolerated this procedure well.  Sponge, lap and needle counts were correct x two.      Luther Hutchison MD     CC:

## 2025-07-07 NOTE — TREATMENT PLAN
D:  Patient admitted to [facility]89-3 via bed. with  and support person Norris (father of infant- not banded)  A:  Bedside report received from Josselyn Nayak RN. Oriented patient and family to surroundings; call light within reach. 4 Part ID bands double checked with reporting RN.  R:  Patient and  tolerated transfer. Care assumed.

## 2025-07-07 NOTE — OP NOTE
I was asked to assist Dr. Hutchison in regards to retraction and delivery of the fetus.  RAFIA Osorio MD

## 2025-07-07 NOTE — PLAN OF CARE
Problem: Adult Inpatient Plan of Care  Goal: Optimal Comfort and Wellbeing  Outcome: Progressing  Intervention: Provide Person-Centered Care  Recent Flowsheet Documentation  Taken 2025 4572 by Josselyn Nayak RN  Trust Relationship/Rapport:   care explained   choices provided   Goal Outcome Evaluation:      Plan of Care Reviewed With: patient    Overall Patient Progress: improvingOverall Patient Progress: improving    Outcome Evaluation: patient delivered via . started on magnesium after delivery for 24 hours. toradol given.

## 2025-07-07 NOTE — PROGRESS NOTES
Updated Dr. Osorio that writer had given 4 gram load dose of magnesium.  Tried to scan 2 gram load dose for total of 6 grams and had questioned order since it said once PRN for loading dose or active management of seizures. Was scanned under loading dose once prn . Pt did get 4 gram load dose. Dr. Osorio okay with this and will just do 2g/hr continuous at this point.

## 2025-07-07 NOTE — H&P
"[facility] Labor and Delivery History and Physical    Lucinda Ji MRN# [id number]   Age: 34 year old YOB: 1990     Date of Admission:  2025    Primary care provider: Joie Ghosh           Chief Complaint:   Lucinda Ji is a 34 year old female who is 37w3d pregnant and being admitted for .          Pregnancy history:     OBSTETRIC HISTORY:    OB History    Para Term  AB Living   8 3 1 2 4 1   SAB IAB Ectopic Multiple Live Births   1 3 0 0 1      # Outcome Date GA Lbr Daquan/2nd Weight Sex Type Anes PTL Lv   8 Current            7  10/30/20 31w2d  1.32 kg (2 lb 14.6 oz) M Vag-Spont EPI N FD      Name: Uday   6 Term 14 39w0d  2.778 kg (6 lb 2 oz) F Vag-Spont None  LENI      Birth Comments: none      Complications: Hyperemesis gravidarum      Name: Irma Ji   5 IAB      IAB      4 IAB      AB, COMPLETE         Birth Comments: elective    3 IAB      AB, COMPLETE         Birth Comments: elective    2 SAB  7w0d    SAB         Birth Comments: D&C   1   22w0d    Vag-Spont         Birth Comments: fell into a glass doorknob and went into labor few hrs later, had D&C       EDC: Estimated Date of Delivery: 25    Prenatal Labs:   Lab Results   Component Value Date    AS Negative 2025    HEPBANG Nonreactive 2025    CHPCRT Negative 2025    GCPCRT Negative 2025    HGB 11.4 (L) 2025       GBS Status:   No results found for: \"GBS\"    Active Problem List  Patient Active Problem List   Diagnosis    Anxiety and depression    Class 3 severe obesity due to excess calories without serious comorbidity with body mass index (BMI) of 40.0 to 44.9 in adult (H)    Mild intermittent asthma in adult without complication    Hiatal hernia    Uterine leiomyoma    History of stillbirth in pregnant patient in first trimester, antepartum    Pregnancy w/ hx of uterine myomectomy    Monoallelic " mutation of BRCA2 gene    Nausea and vomiting in pregnancy    Supervision of high risk pregnancy in third trimester    Chronic hypertension affecting pregnancy    Encounter for triage in pregnant patient    Chlamydia trachomatis infection in mother during third trimester of pregnancy     labor       Medication Prior to Admission  Medications Prior to Admission   Medication Sig Dispense Refill Last Dose/Taking    acetaminophen (TYLENOL) 325 MG tablet Take 1-2 tablets (325-650 mg) by mouth every 6 hours as needed for mild pain. 100 tablet 2     albuterol (PROAIR HFA/PROVENTIL HFA/VENTOLIN HFA) 108 (90 Base) MCG/ACT inhaler Inhale 1-2 puffs into the lungs every 4 hours as needed for shortness of breath or wheezing. 18 g 11     Alcohol Swabs PADS 1 Units 4 times daily. 120 each 1     blood glucose (NO BRAND SPECIFIED) lancets standard Use to test blood sugar 4 times daily or as directed. 120 each 1     blood glucose (NO BRAND SPECIFIED) test strip Use to test blood sugar 4 times daily or as directed. 120 strip 1     blood glucose monitoring (NO BRAND SPECIFIED) meter device kit Use to test blood sugar 4 times daily or as directed. 1 kit 0     budesonide-formoterol (SYMBICORT/BREYNA) 80-4.5 MCG/ACT inhaler Inhale 1-2 puffs into the lungs every 6 hours as needed (shortness of breath). 10.2 g 3     famotidine (PEPCID) 20 MG tablet Take 1 tablet (20 mg) by mouth 2 times daily. 90 tablet 2     hydrOXYzine (VISTARIL) 50 MG capsule Take 1 capsule (50 mg) by mouth nightly as needed for itching. Can take 1 additional pill if needed. 90 capsule 1     labetalol (NORMODYNE) 200 MG tablet Take 1 tablet (200 mg) by mouth 2 times daily. 60 tablet 1     NIFEdipine (PROCARDIA) 20 MG capsule Take 1 capsule (20 mg) by mouth every 6 hours. 5 capsule 0     ondansetron (ZOFRAN) 4 MG tablet Take 1 tablet (4 mg) by mouth every 6 hours as needed for nausea or vomiting. 30 tablet 3     Prenatal Vit-Fe Fumarate-FA (PRENATAL MULTIVITAMIN   WITH IRON) 28-0.8 MG TABS Take 1 tablet by mouth daily. 100 tablet 3    .        Maternal Past Medical History:     Past Medical History:   Diagnosis Date    Asthma     Fetal demise, greater than 22 weeks, antepartum, single gestation 10/29/2020    Gastritis     GBS bacteriuria 08/10/2020    Hypertension     Obesity                        Family History:   This patient has no significant family history            Social History:   This patient has no significant social history         Review of Systems:   CONSTITUTIONAL: NEGATIVE for fever, chills, change in weight  ENT/MOUTH: NEGATIVE for ear, mouth and throat problems  RESP: NEGATIVE for significant cough or SOB  CV: NEGATIVE for chest pain, palpitations or peripheral edema          Physical Exam:   Vitals were reviewed  Temp: 98.4  F (36.9  C) Temp src: Oral BP: (!) 175/93     Resp: 22        Lungs:   No increased work of breathing, good air exchange, clear to auscultation bilaterally, no crackles or wheezing     Cardiovascular:   regular rate and rhythm     Abdomen:   Normal bowel sounds, soft, non-distended, mild epigastric tenderness, no masses palpated, no hepatosplenomegally      Cervix:   Membranes: intact  FHT 120s and Cat 1  Having a few contractions.                        Assessment:   Lucinda Ji is a 37w3d pregnant female admitted with C section due to prior myomectomy.  Chronic HTN and did not take her labetalol recently        Plan:   Prepare for  section  Risks and benefits reviewed. All questions answered. She verbalized understanding.  Will get Pre E labs and treat the BP    Luther Hutchison MD

## 2025-07-07 NOTE — ANESTHESIA PREPROCEDURE EVALUATION
Anesthesia Pre-Procedure Evaluation    Patient: Lucinda Ji   MRN: [id number] : 1990          Procedure : Procedure(s):  REPEAT  SECTION         Past Medical History:   Diagnosis Date    Asthma     Fetal demise, greater than 22 weeks, antepartum, single gestation 10/29/2020    Gastritis     GBS bacteriuria 08/10/2020    Hypertension     Obesity       Past Surgical History:   Procedure Laterality Date    DAVINCI MYOMECTOMY N/A 2022    Procedure: ROBOTIC MYOMECTOMY;  Surgeon: Indra Reis MD;  Location: [facility] OR    LYSIS, ADHESIONS, ROBOT-ASSISTED, LAPAROSCOPIC, USING DA BROOKE XI N/A 2022    Procedure: LYSIS, ADHESIONS, ROBOT-ASSISTED, LAPAROSCOPIC, USING DA BROOKE XI;  Surgeon: Indra Reis MD;  Location: [facility] OR      No Known Allergies   Social History     Tobacco Use    Smoking status: Former     Types: Cigarettes    Smokeless tobacco: Never   Substance Use Topics    Alcohol use: Not Currently     Comment: occasional      Wt Readings from Last 1 Encounters:   25 (!) 146.1 kg (322 lb 1.6 oz)        Anesthesia Evaluation   Pt has had prior anesthetic.     No history of anesthetic complications       ROS/MED HX  ENT/Pulmonary:     (+)                      asthma                  Neurologic:       Cardiovascular:     (+)  hypertension- -   -  - -                                      METS/Exercise Tolerance:     Hematologic:       Musculoskeletal:       GI/Hepatic:     (+) GERD,     hiatal hernia,              Renal/Genitourinary:       Endo:     (+)               Obesity,       Psychiatric/Substance Use:     (+) psychiatric history anxiety and depression       Infectious Disease:       Malignancy:       Other:     (-) previous  and TOLAC candidate         Physical Exam  Airway  Mallampati: III  TM distance: >3 FB  Neck ROM: full  Mouth opening: >= 4 cm    Cardiovascular - normal exam   Dental - normal exam  (+) Completely normal teeth       Pulmonary - normal exam      Neurological - normal exam  She appears awake, alert and oriented x3.    Other Findings       OUTSIDE LABS:  CBC:   Lab Results   Component Value Date    WBC 8.2 07/07/2025    WBC 8.8 07/03/2025    HGB 11.6 (L) 07/07/2025    HGB 11.4 (L) 07/03/2025    HCT 38.9 07/07/2025    HCT 35.5 07/03/2025     07/07/2025     07/03/2025     BMP:   Lab Results   Component Value Date     07/07/2025     07/03/2025    POTASSIUM 4.1 07/07/2025    POTASSIUM 4.0 07/03/2025    CHLORIDE 106 07/07/2025    CHLORIDE 104 07/03/2025    CO2 20 (L) 07/07/2025    CO2 21 (L) 07/03/2025    BUN 7.2 07/07/2025    BUN 6.9 07/03/2025    CR 0.67 07/07/2025    CR 0.70 07/03/2025    GLC 92 07/07/2025    GLC 95 07/07/2025     COAGS:   Lab Results   Component Value Date    PTT 25 07/07/2025    INR 0.92 07/07/2025    FIBR 645 (H) 05/03/2025     POC:   Lab Results   Component Value Date    HCG Positive (A) 11/25/2024    HCGS Negative 05/30/2022     HEPATIC:   Lab Results   Component Value Date    ALBUMIN 3.0 (L) 07/07/2025    PROTTOTAL 6.3 (L) 07/07/2025    ALT 14 07/07/2025    AST 20 07/07/2025    ALKPHOS 131 07/07/2025    BILITOTAL 0.4 07/07/2025     OTHER:   Lab Results   Component Value Date    A1C 5.6 05/13/2025    KRISTEN 8.6 (L) 07/07/2025    PHOS 3.0 05/08/2025    MAG 1.7 05/08/2025    LIPASE 19 01/03/2025    TSH 0.43 01/16/2025    CRP 2.2 (H) 05/26/2020       Anesthesia Plan    ASA Status:  3      NPO Status: NPO Appropriate   Anesthesia Type: Spinal.   Techniques and Equipment:       - Monitoring Plan: standard ASA monitoring     Consents    Anesthesia Plan(s) and associated risks, benefits, and realistic alternatives discussed. Questions answered and patient/representative(s) expressed understanding.     - Discussed: CRNA     - Discussed with:  Patient               Postoperative Care         Comments:                   Erin Maurer MD    I have reviewed the pertinent notes and labs in the  chart from the past 30 days and (re)examined the patient.  Any updates or changes from those notes are reflected in this note.    Clinically Significant Risk Factors Present on Admission               # Hypoalbuminemia: Lowest albumin = 3 g/dL at 7/7/2025  5:36 AM, will monitor as appropriate     # Hypertension: Noted on problem list               # Asthma: noted on problem list

## 2025-07-07 NOTE — ANESTHESIA CARE TRANSFER NOTE
Patient: Lucinda Ji    Procedure: Procedure(s):  REPEAT  SECTION       Diagnosis: Pregnancy w/ hx of uterine myomectomy [O34.29]  Diagnosis Additional Information: No value filed.    Anesthesia Type:   Spinal     Note:    Oropharynx: oropharynx clear of all foreign objects  Level of Consciousness: awake  Oxygen Supplementation: room air    Independent Airway: airway patency satisfactory and stable  Dentition: dentition unchanged  Vital Signs Stable: post-procedure vital signs reviewed and stable  Report to RN Given: handoff report given  Patient transferred to: Labor and Delivery    Handoff Report: Identifed the Patient, Identified the Reponsible Provider, Reviewed the pertinent medical history, Discussed the surgical course, Reviewed Intra-OP anesthesia mangement and issues during anesthesia, Set expectations for post-procedure period and Allowed opportunity for questions and acknowledgement of understanding      Vitals:  Vitals Value Taken Time   /60 25 08:33   Temp 98.1    Pulse 75    Resp 16    SpO2 89 % 25 08:37   Vitals shown include unfiled device data.    Electronically Signed By: KERI Chicas CRNA  2025  8:38 AM

## 2025-07-08 LAB
GLUCOSE BLDC GLUCOMTR-MCNC: 106 MG/DL (ref 70–99)
HGB BLD-MCNC: 9.4 G/DL (ref 11.7–15.7)
MCV RBC AUTO: 95 FL (ref 78–100)

## 2025-07-08 PROCEDURE — 250N000013 HC RX MED GY IP 250 OP 250 PS 637: Performed by: OBSTETRICS & GYNECOLOGY

## 2025-07-08 PROCEDURE — 250N000011 HC RX IP 250 OP 636: Performed by: OBSTETRICS & GYNECOLOGY

## 2025-07-08 PROCEDURE — 85018 HEMOGLOBIN: CPT | Performed by: OBSTETRICS & GYNECOLOGY

## 2025-07-08 PROCEDURE — 120N000001 HC R&B MED SURG/OB

## 2025-07-08 PROCEDURE — 36415 COLL VENOUS BLD VENIPUNCTURE: CPT | Performed by: OBSTETRICS & GYNECOLOGY

## 2025-07-08 RX ORDER — NIFEDIPINE 30 MG
30 TABLET, EXTENDED RELEASE ORAL DAILY
Status: DISCONTINUED | OUTPATIENT
Start: 2025-07-08 | End: 2025-07-08

## 2025-07-08 RX ORDER — LABETALOL 200 MG/1
200 TABLET, FILM COATED ORAL ONCE
Status: COMPLETED | OUTPATIENT
Start: 2025-07-08 | End: 2025-07-08

## 2025-07-08 RX ORDER — LABETALOL 200 MG/1
200 TABLET, FILM COATED ORAL EVERY 8 HOURS SCHEDULED
Status: DISCONTINUED | OUTPATIENT
Start: 2025-07-08 | End: 2025-07-11

## 2025-07-08 RX ORDER — NIFEDIPINE 30 MG
30 TABLET, EXTENDED RELEASE ORAL ONCE
Status: COMPLETED | OUTPATIENT
Start: 2025-07-08 | End: 2025-07-08

## 2025-07-08 RX ORDER — NIFEDIPINE 30 MG
60 TABLET, EXTENDED RELEASE ORAL DAILY
Status: DISCONTINUED | OUTPATIENT
Start: 2025-07-09 | End: 2025-07-11 | Stop reason: HOSPADM

## 2025-07-08 RX ADMIN — IBUPROFEN 800 MG: 800 TABLET ORAL at 17:00

## 2025-07-08 RX ADMIN — NIFEDIPINE 30 MG: 30 TABLET, EXTENDED RELEASE ORAL at 17:02

## 2025-07-08 RX ADMIN — ACETAMINOPHEN 975 MG: 325 TABLET ORAL at 15:06

## 2025-07-08 RX ADMIN — MAGNESIUM SULFATE HEPTAHYDRATE 2 G/HR: 40 INJECTION, SOLUTION INTRAVENOUS at 06:06

## 2025-07-08 RX ADMIN — LABETALOL HYDROCHLORIDE 200 MG: 200 TABLET ORAL at 03:19

## 2025-07-08 RX ADMIN — ACETAMINOPHEN 975 MG: 325 TABLET ORAL at 09:02

## 2025-07-08 RX ADMIN — SENNOSIDES AND DOCUSATE SODIUM 2 TABLET: 50; 8.6 TABLET ORAL at 20:20

## 2025-07-08 RX ADMIN — OXYCODONE HYDROCHLORIDE 5 MG: 5 TABLET ORAL at 21:09

## 2025-07-08 RX ADMIN — HYDRALAZINE HYDROCHLORIDE 10 MG: 20 INJECTION INTRAMUSCULAR; INTRAVENOUS at 02:24

## 2025-07-08 RX ADMIN — OXYCODONE HYDROCHLORIDE 5 MG: 5 TABLET ORAL at 15:06

## 2025-07-08 RX ADMIN — OXYCODONE HYDROCHLORIDE 5 MG: 5 TABLET ORAL at 09:06

## 2025-07-08 RX ADMIN — ACETAMINOPHEN 975 MG: 325 TABLET ORAL at 20:21

## 2025-07-08 RX ADMIN — ACETAMINOPHEN 975 MG: 325 TABLET ORAL at 02:24

## 2025-07-08 RX ADMIN — LABETALOL HYDROCHLORIDE 200 MG: 200 TABLET ORAL at 11:30

## 2025-07-08 RX ADMIN — IBUPROFEN 800 MG: 800 TABLET ORAL at 11:30

## 2025-07-08 RX ADMIN — SENNOSIDES AND DOCUSATE SODIUM 2 TABLET: 50; 8.6 TABLET ORAL at 09:02

## 2025-07-08 RX ADMIN — ENOXAPARIN SODIUM 40 MG: 40 INJECTION SUBCUTANEOUS at 09:01

## 2025-07-08 RX ADMIN — NIFEDIPINE 30 MG: 30 TABLET, EXTENDED RELEASE ORAL at 09:02

## 2025-07-08 RX ADMIN — ENOXAPARIN SODIUM 40 MG: 40 INJECTION SUBCUTANEOUS at 20:20

## 2025-07-08 RX ADMIN — IBUPROFEN 800 MG: 800 TABLET ORAL at 23:37

## 2025-07-08 RX ADMIN — LABETALOL HYDROCHLORIDE 200 MG: 200 TABLET ORAL at 21:10

## 2025-07-08 RX ADMIN — IBUPROFEN 800 MG: 800 TABLET ORAL at 05:35

## 2025-07-08 ASSESSMENT — ACTIVITIES OF DAILY LIVING (ADL)
ADLS_ACUITY_SCORE: 25
ADLS_ACUITY_SCORE: 25
ADLS_ACUITY_SCORE: 24
ADLS_ACUITY_SCORE: 25
ADLS_ACUITY_SCORE: 24
ADLS_ACUITY_SCORE: 25
ADLS_ACUITY_SCORE: 24
ADLS_ACUITY_SCORE: 25
ADLS_ACUITY_SCORE: 24
ADLS_ACUITY_SCORE: 25
ADLS_ACUITY_SCORE: 25
ADLS_ACUITY_SCORE: 24
ADLS_ACUITY_SCORE: 24
ADLS_ACUITY_SCORE: 25
ADLS_ACUITY_SCORE: 24
ADLS_ACUITY_SCORE: 25
ADLS_ACUITY_SCORE: 24

## 2025-07-08 NOTE — PLAN OF CARE
Goal Outcome Evaluation:      Plan of Care Reviewed With: patient    Overall Patient Progress: improving    Outcome Evaluation: Ivonne's BPs remained elevated overnight with a few in the severe range that required treatment - 10mg Hydralazine IV and 200mg Labetalol PO. She denies s/s of preE, reflexes +2, edema +3. Clonus is present. Mag infusing at 2g/hr - MD stated the day team would decide when to turn off the mag. OB assessments WNL. Fundus is firm, bleeding is scant, dressing has scant drainage. Pain controlled with scheduled tylenol and ibuprofen. Ambulating and voiding SBA. Fasting BG this AM was 106. Infant is formula feeding. No family present at the bedside overnight.    Yas Garcia RN on 7/8/2025 at 6:30 AM

## 2025-07-08 NOTE — PROGRESS NOTES
Obstetrics Post-Op Progress Note         Assessment and Plan:    Assessment: Lucinda Ji is a 34 year old  Post-operative day #1 s/p Low transverse primary  section given previous myomectomy, doing well. She has chronic HTN with superimposed preE with severe features.      L&D complications: Pregnancy w/ hx of uterine myomectomy [O34.29]  Chronic HTN with superimposed preE with severe features  Gestational diabetes  Obesity  Depression  Acute blood loss anemia          Plan:   Ambulation encouraged  Magnesium off   Blood pressure management prn, she is now on nifedipine XL 30 mg and labetalol 200 mg tid  Start iron supplementation at discharge  Support prn given minimal family support           Interval History:   Doing well.  Pain is well-controlled.  No fevers.  No history of wound drainage, warmth or significant erythema.  Good appetite.  Denies chest pain, shortness of breath, nausea or vomiting.  Ambulatory.               Physical Exam:   Temp: 98.1  F (36.7  C) Temp src: Oral BP: 126/68 Pulse: 79   Resp: 20 SpO2: 99 % O2 Device: None (Room air)    Vitals:    25 0439 25 0500   Weight: (!) 146.1 kg (322 lb 1.6 oz) (!) 145 kg (319 lb 9.6 oz)     Vital Signs with Ranges  Temp:  [97.6  F (36.4  C)-98.2  F (36.8  C)] 98.1  F (36.7  C)  Pulse:  [65-79] 79  Resp:  [16-20] 20  BP: (117-169)/(55-91) 126/68  SpO2:  [96 %-100 %] 99 %  I/O last 3 completed shifts:  In: 2705 [P.O.:1705; I.V.:1000]  Out: 3461 [Urine:3025; Blood:436]    Uterine fundus is firm, non-tender and at the level of the umbilicus  Incision 7 day dressing in place          Data:     Recent Results (from the past 24 hours)   Glucose by meter    Collection Time: 25  5:33 AM   Result Value Ref Range    GLUCOSE BY METER POCT 106 (H) 70 - 99 mg/dL   Hemoglobin    Collection Time: 25  6:46 AM   Result Value Ref Range    Hemoglobin 9.4 (L) 11.7 - 15.7 g/dL    MCV 95 78 - 100 fL     Recent Labs   Lab Test  07/07/25  0536   AS Negative     Recent Labs   Lab Test 07/08/25  0646 07/07/25 0536   HGB 9.4* 11.6*     Recent Labs   Lab Test 01/16/25  1119   RUQIGG Positive       Wanda Vinson MD

## 2025-07-08 NOTE — PROGRESS NOTES
Update    Notified by RN that her blood pressures have been elevated this afternoon. She had one severe range BP which was not sustained but has had several readings > 140/90. She is now off magnesium. She is currently on labetalol 200 mg tid and nifedipine XL 30 qAM. Will give one time dose of 30 mg nifedipine XL now and then increase to 60 mg nifedipine XL tomorrow morning.     Wanda Vinson MD

## 2025-07-08 NOTE — PLAN OF CARE
"Goal Outcome Evaluation: Progressing      Plan of Care Reviewed With: patient    Overall Patient Progress: improving    Patient's VSS with BP intermittently slightly elevated, MD aware. Reflexes WNL, intermittent clonus. Patient denies any headache, vision disturbances, or epigastric pain. Fundus is firm at umbilicus with scant lochia. Patient is up ad gwen and performing self-cares independently. Reporting incisional pain being treated with acetaminophen, ibuprofen, and oxycodone prn as ordered; patient declines ice, heat or any other non-pharmacological pain interventions. Patient is alone in the room with her infant, attentive/supportive and participating in cares with reminders/prompts from bedside RN.    IV removed due to PIV leaking at site. Patient declines incisional dressing removal at 24 hours postpartum, states she would like to wait until she can shower to remove it.     Patient aware to fill out ROP, birth certificate and PPMA; has not yet completed them.    BP (!) 140/71 (BP Location: Right arm, Patient Position: Semi-Jalloh's, Cuff Size: Adult Large)   Pulse 81   Temp 98.1  F (36.7  C) (Oral)   Resp 18   Ht 1.676 m (5' 6\")   Wt (!) 145 kg (319 lb 9.6 oz)   LMP 10/20/2024 (Approximate)   SpO2 100%   Breastfeeding Unknown   BMI 51.58 kg/m                "

## 2025-07-08 NOTE — PROVIDER NOTIFICATION
Notified Dr. Rashid of severe range pressures at 0200. Give 10mg of Hydralazine.     Yas Garcia RN on 7/8/2025 at 2:17 AM

## 2025-07-08 NOTE — PROVIDER NOTIFICATION
Notified Dr. Rashid of /78 45 mins following hydralazine administration.   Writer: Would you like to treat with another 10mg of Hydralazine?  Give 200mg of Labetalol PO.     Yas Garcia RN on 7/8/2025 at 3:15 AM

## 2025-07-08 NOTE — PROVIDER NOTIFICATION
Dr Vinson notified of persistent elevated BP; Per MD, give another dose of nifedipine now and she will increase the daily dose effective tomorrow AM. Resume unit routine VS.

## 2025-07-09 PROCEDURE — 250N000013 HC RX MED GY IP 250 OP 250 PS 637: Performed by: OBSTETRICS & GYNECOLOGY

## 2025-07-09 PROCEDURE — 250N000011 HC RX IP 250 OP 636: Performed by: OBSTETRICS & GYNECOLOGY

## 2025-07-09 PROCEDURE — 120N000001 HC R&B MED SURG/OB

## 2025-07-09 RX ORDER — OXYCODONE HYDROCHLORIDE 5 MG/1
5-10 TABLET ORAL EVERY 4 HOURS PRN
Refills: 0 | Status: DISCONTINUED | OUTPATIENT
Start: 2025-07-09 | End: 2025-07-11 | Stop reason: HOSPADM

## 2025-07-09 RX ORDER — HYDROXYZINE HYDROCHLORIDE 50 MG/1
50 TABLET, FILM COATED ORAL EVERY 6 HOURS PRN
Status: DISCONTINUED | OUTPATIENT
Start: 2025-07-09 | End: 2025-07-11 | Stop reason: HOSPADM

## 2025-07-09 RX ORDER — HYDROXYZINE HYDROCHLORIDE 50 MG/1
100 TABLET, FILM COATED ORAL EVERY 6 HOURS PRN
Status: DISCONTINUED | OUTPATIENT
Start: 2025-07-09 | End: 2025-07-11 | Stop reason: HOSPADM

## 2025-07-09 RX ADMIN — ENOXAPARIN SODIUM 40 MG: 40 INJECTION SUBCUTANEOUS at 21:44

## 2025-07-09 RX ADMIN — HYDROXYZINE HYDROCHLORIDE 50 MG: 50 TABLET ORAL at 08:18

## 2025-07-09 RX ADMIN — ACETAMINOPHEN 975 MG: 325 TABLET ORAL at 16:24

## 2025-07-09 RX ADMIN — IBUPROFEN 800 MG: 800 TABLET ORAL at 12:19

## 2025-07-09 RX ADMIN — IBUPROFEN 800 MG: 800 TABLET ORAL at 23:17

## 2025-07-09 RX ADMIN — OXYCODONE HYDROCHLORIDE 10 MG: 5 TABLET ORAL at 08:19

## 2025-07-09 RX ADMIN — NIFEDIPINE 60 MG: 30 TABLET, EXTENDED RELEASE ORAL at 08:23

## 2025-07-09 RX ADMIN — LABETALOL HYDROCHLORIDE 200 MG: 200 TABLET ORAL at 14:16

## 2025-07-09 RX ADMIN — OXYCODONE HYDROCHLORIDE 10 MG: 5 TABLET ORAL at 01:44

## 2025-07-09 RX ADMIN — ACETAMINOPHEN 975 MG: 325 TABLET ORAL at 02:57

## 2025-07-09 RX ADMIN — ENOXAPARIN SODIUM 40 MG: 40 INJECTION SUBCUTANEOUS at 08:22

## 2025-07-09 RX ADMIN — LABETALOL HYDROCHLORIDE 200 MG: 200 TABLET ORAL at 05:41

## 2025-07-09 RX ADMIN — OXYCODONE HYDROCHLORIDE 10 MG: 5 TABLET ORAL at 12:19

## 2025-07-09 RX ADMIN — HYDROXYZINE HYDROCHLORIDE 50 MG: 50 TABLET ORAL at 01:45

## 2025-07-09 RX ADMIN — HYDROXYZINE HYDROCHLORIDE 50 MG: 50 TABLET ORAL at 14:17

## 2025-07-09 RX ADMIN — IBUPROFEN 800 MG: 800 TABLET ORAL at 05:40

## 2025-07-09 RX ADMIN — LABETALOL HYDROCHLORIDE 200 MG: 200 TABLET ORAL at 21:44

## 2025-07-09 RX ADMIN — ACETAMINOPHEN 975 MG: 325 TABLET ORAL at 21:44

## 2025-07-09 RX ADMIN — IBUPROFEN 800 MG: 800 TABLET ORAL at 17:29

## 2025-07-09 RX ADMIN — OXYCODONE HYDROCHLORIDE 10 MG: 5 TABLET ORAL at 16:25

## 2025-07-09 RX ADMIN — SENNOSIDES AND DOCUSATE SODIUM 2 TABLET: 50; 8.6 TABLET ORAL at 08:18

## 2025-07-09 RX ADMIN — SENNOSIDES AND DOCUSATE SODIUM 2 TABLET: 50; 8.6 TABLET ORAL at 21:44

## 2025-07-09 RX ADMIN — ACETAMINOPHEN 975 MG: 325 TABLET ORAL at 10:06

## 2025-07-09 ASSESSMENT — ACTIVITIES OF DAILY LIVING (ADL)
ADLS_ACUITY_SCORE: 24
ADLS_ACUITY_SCORE: 26
ADLS_ACUITY_SCORE: 24
ADLS_ACUITY_SCORE: 24
ADLS_ACUITY_SCORE: 26
ADLS_ACUITY_SCORE: 24
ADLS_ACUITY_SCORE: 26
ADLS_ACUITY_SCORE: 24
ADLS_ACUITY_SCORE: 26
ADLS_ACUITY_SCORE: 24
ADLS_ACUITY_SCORE: 26
ADLS_ACUITY_SCORE: 24
ADLS_ACUITY_SCORE: 24
ADLS_ACUITY_SCORE: 26
ADLS_ACUITY_SCORE: 24
ADLS_ACUITY_SCORE: 26
ADLS_ACUITY_SCORE: 24
ADLS_ACUITY_SCORE: 26

## 2025-07-09 NOTE — PLAN OF CARE
Goal Outcome Evaluation:      Plan of Care Reviewed With: patient    Overall Patient Progress: improvingOverall Patient Progress: improving    Outcome Evaluation: Patient's VSS with the exception of one severe range pressure- see previous provider notification note. No prn medication needed to treat BP's overnight. Patient has been reporting 7-10 incisional pain, managed with ice packs, scheduled and prn medication. Provider increased prn oxy to 5-10 mg and added atarax. Patient reports pain decreased to 5/10 on reassessment. BLE edema present. Voiding without difficulty. Patient needs to be reminded to leave urine in hat for accurate I&Os. Fundus is firm and bleeding is scant. Dressing CDI. Patient is bottle feeding  q2-3h and on demand. Bonding well with baby.          Problem: Postpartum ( Delivery)  Goal: Successful Parent Role Transition  Outcome: Progressing     Problem: Postpartum ( Delivery)  Goal: Optimal Pain Control and Function  Outcome: Progressing  Intervention: Prevent or Manage Pain  Recent Flowsheet Documentation  Taken 2025 [id number] by Guido Enriquez, RN  Pain Management Interventions:   medication (see MAR)   cold applied  Taken 2025 by Guido Enriquez, RN  Pain Management Interventions: medication (see MAR)     Problem: Postpartum ( Delivery)  Goal: Effective Urinary Elimination  Outcome: Progressing

## 2025-07-09 NOTE — PROVIDER NOTIFICATION
Provider notified of severe range pressure of 172/84. Patient reports a slight headache, otherwise asymptomatic. Will continue q15m BP's for one hour per protocol. Per provider, give scheduled labetalol now and continue to monitor.

## 2025-07-09 NOTE — DISCHARGE INSTRUCTIONS
Warning Signs after Having a Baby    Keep this paper on your fridge or somewhere else where you can see it.    Call your provider if you have any of these symptoms up to 12 weeks after having your baby.    Thoughts of hurting yourself or your baby  Pain in your chest or trouble breathing  Severe headache not helped by pain medicine  Eyesight concerns (blurry vision, seeing spots or flashes of light, other changes to eyesight)  Fainting, shaking or other signs of a seizure    Call 9-1-1 if you feel that it is an emergency.     The symptoms below can happen to anyone after giving birth. They can be very serious. Call your provider if you have any of these warning signs.    My provider s phone number: _______________________    Losing too much blood (hemorrhage)    Call your provider if you soak through a pad in less than an hour or pass blood clots bigger than a golf ball. These may be signs that you are bleeding too much.    Blood clots in the legs or lungs    After you give birth, your body naturally clots its blood to help prevent blood loss. Sometimes this increased clotting can happen in other areas of the body, like the legs or lungs. This can block your blood flow and be very dangerous.     Call your provider if you:  Have a red, swollen spot on the back of your leg that is warm or painful when you touch it.   Are coughing up blood.     Infection    Call your provider if you have any of these symptoms:  Fever of 100.4 F (38 C) or higher.  Pain or redness around your stitches if you had an incision.   Any yellow, white, or green fluid coming from places where you had stitches or surgery.    Mood Problems (postpartum depression)    Many people feel sad or have mood changes after having a baby. But for some people, these mood swings are worse.     Call your provider right away if you feel so anxious or nervous that you can't care for yourself or your baby.    Preeclampsia (high blood pressure)    Even if you  didn't have high blood pressure when you were pregnant, you are at risk for the high blood pressure disease called preeclampsia. This risk can last up to 12 weeks after giving birth.     Call your provider if you have:   Pain on your right side under your rib cage  Sudden swelling in the hands and face    Remember: You know your body. If something doesn't feel right, get medical help.     For informational purposes only. Not to replace the advice of your health care provider. Copyright 2020 [facility]. All rights reserved. Clinically reviewed by Lisa Nunez, RNC-OB, MSN. edupristine 180935 - Rev 02/23.        When Mother is formula feeding    Breastmilk often increases in volume between days 3-5, even if you are not planning to feed your baby your breastmilk.  During this time, you may experience temporary swelling and firmness in your breasts.      Wear a supportive bra 24 hours a day.  Use an ice pack (wrapped in a cloth) on breasts for 20 minutes as often as needed.   If you experience discomfort, you can express a small amount of milk to relieve the pain, but avoid emptying your breasts, as this can stimulate your breast to make more milk.     Speak to your health care provider about using a pain/anti-inflammatory medication.     For additional support talk to your provider or call outpatient lactation: [phone]

## 2025-07-09 NOTE — PROGRESS NOTES
Patient after 15:00 completed a shower, changed clothes, and linen was changed. Patient completed PPMA (score of 5) and started working on the birth certificate, states she is waiting for the FOB to decide if he wants to be on it or not but is aware it needs to be turned in before discharge. Still undecided on if she wants to complete an ROP or not.     No other changes in patient condition from previous note.

## 2025-07-09 NOTE — PROGRESS NOTES
RN administering scheduled ibuprofen to patient. Patient awoken from sleep. When RN asked what patient's pain level was, patient replied 10. Patient took the ibuprofen and fell right back asleep before RN left the room. Patient did not ask for prn medication at this time.

## 2025-07-09 NOTE — PROGRESS NOTES
"    Postpartum Day 2:       Subjective:  The patient feels well. The patient has no emotional concerns. Pain is controlled somewhat with current medications, she continues to report 8-10/10 pain to the nursing staff. The patient is ambulating minimally in the room but reports a desire to ambulate more. She is voiding and passing gas.The amount and color of the lochia is appropriate for the duration of recovery, patient denies clots. The baby is well and in the room.    Objective   The patient has a blood pressure which is within the normal range using a forearm cuff for measurement. Urinary output is adequate.     Exam:   /56 (BP Location: Left arm, Patient Position: Right side, Cuff Size: Adult Large)   Pulse 68   Temp 97.6  F (36.4  C) (Oral)   Resp 18   Ht 1.676 m (5' 6\")   Wt (!) 145 kg (319 lb 9.6 oz)   LMP 10/20/2024 (Approximate)   SpO2 99%   Breastfeeding Unknown   BMI 51.58 kg/m    General: NAD  Abdomen: soft, NT  Fundus: firm, tender appropriately  Incision: C/D/I  Ext: no pain       Impression:   POD#2, LTCS secondary to history of myomectomy  Pre-eclampsia with severe features  - started on nifedipine during hospital stay and increased to 60mg on 7/9 AM  - labetalol antepartum (200mg BID) which was increased to TID postpartum  - normal blood pressure this AM with forearm cuff  Gestational diabetes  Postoperative pain  - limiting her mobility    Plan:   - Continue current care.  - encourage use of binder to help with mobility (she has been taking Tylenol, Ibuprofen and 10mg oxycodone scheduled)  - recommend she shower today and remove the dressing, declined help with removal during rounds  - discussed risks of DVT/pneumonia with limited mobility    Deann Hassan, DO  [facility]  "

## 2025-07-09 NOTE — PLAN OF CARE
"Goal Outcome Evaluation: Progressing      Plan of Care Reviewed With: patient    Overall Patient Progress: improving    Patient's VSS except for BP which has been intermittently elevated; re-fitted BP cuff for patient this AM and confirmed with charge RN that an adult large cuff on either forearm is more appropriate for this patient. Reflexes WNL, no clonus noted. Patient denies any headache, vision disturbances, or epigastric pain.     Fundus is firm 1 cm below umbilicus with scant lochia. Patient is up ad gwen and performing self-cares independently. Reporting incisional pain being treated with acetaminophen, ibuprofen, oxycodone, hydroxyzine. Patient declines any non-pharmacological pain interventions at this time.     Patient also declines to allow this RN and Dr Hassan the rounding provider this AM to remove her incisional dressing until she showers, but declines a shower at this time as well. Infection risk discussed with the patient, she verbalized an understanding and stated she would let her RN know when she is ready to remove her incisional dressing.     Patient's alone in the room with her infant, attentive/supportive and participating in cares.    Patient aware to fill out ROP, birth certificate and PPMA; has not yet completed them.    /59 (BP Location: Right arm, Patient Position: Left side, Cuff Size: Adult Large)   Pulse 80   Temp 97.6  F (36.4  C) (Oral)   Resp 20   Ht 1.676 m (5' 6\")   Wt (!) 145 kg (319 lb 9.6 oz)   LMP 10/20/2024 (Approximate)   SpO2 98%   Breastfeeding Unknown   BMI 51.58 kg/m        Problem: Adult Inpatient Plan of Care  Goal: Optimal Comfort and Wellbeing  Outcome: Progressing     Problem: Hypertension Acute  Goal: Blood Pressure Within Desired Range  Outcome: Progressing               "

## 2025-07-10 LAB
MCV RBC AUTO: 90 FL (ref 78–100)
PLATELET # BLD AUTO: 355 10E3/UL (ref 150–450)

## 2025-07-10 PROCEDURE — 36415 COLL VENOUS BLD VENIPUNCTURE: CPT | Performed by: OBSTETRICS & GYNECOLOGY

## 2025-07-10 PROCEDURE — 250N000013 HC RX MED GY IP 250 OP 250 PS 637: Performed by: OBSTETRICS & GYNECOLOGY

## 2025-07-10 PROCEDURE — 120N000001 HC R&B MED SURG/OB

## 2025-07-10 PROCEDURE — 85049 AUTOMATED PLATELET COUNT: CPT | Performed by: OBSTETRICS & GYNECOLOGY

## 2025-07-10 PROCEDURE — 250N000011 HC RX IP 250 OP 636: Performed by: OBSTETRICS & GYNECOLOGY

## 2025-07-10 RX ADMIN — IBUPROFEN 800 MG: 800 TABLET ORAL at 05:39

## 2025-07-10 RX ADMIN — ACETAMINOPHEN 975 MG: 325 TABLET ORAL at 22:52

## 2025-07-10 RX ADMIN — IBUPROFEN 800 MG: 800 TABLET ORAL at 22:52

## 2025-07-10 RX ADMIN — IBUPROFEN 800 MG: 800 TABLET ORAL at 16:55

## 2025-07-10 RX ADMIN — ACETAMINOPHEN 975 MG: 325 TABLET ORAL at 16:55

## 2025-07-10 RX ADMIN — ENOXAPARIN SODIUM 40 MG: 40 INJECTION SUBCUTANEOUS at 08:42

## 2025-07-10 RX ADMIN — ENOXAPARIN SODIUM 40 MG: 40 INJECTION SUBCUTANEOUS at 21:16

## 2025-07-10 RX ADMIN — LABETALOL HYDROCHLORIDE 200 MG: 200 TABLET ORAL at 14:27

## 2025-07-10 RX ADMIN — LABETALOL HYDROCHLORIDE 200 MG: 200 TABLET ORAL at 05:39

## 2025-07-10 RX ADMIN — ACETAMINOPHEN 975 MG: 325 TABLET ORAL at 04:06

## 2025-07-10 RX ADMIN — LABETALOL HYDROCHLORIDE 200 MG: 200 TABLET ORAL at 22:51

## 2025-07-10 RX ADMIN — SENNOSIDES AND DOCUSATE SODIUM 2 TABLET: 50; 8.6 TABLET ORAL at 08:41

## 2025-07-10 RX ADMIN — IBUPROFEN 800 MG: 800 TABLET ORAL at 11:39

## 2025-07-10 RX ADMIN — NIFEDIPINE 60 MG: 30 TABLET, EXTENDED RELEASE ORAL at 08:41

## 2025-07-10 RX ADMIN — SENNOSIDES AND DOCUSATE SODIUM 1 TABLET: 50; 8.6 TABLET ORAL at 21:15

## 2025-07-10 RX ADMIN — ACETAMINOPHEN 975 MG: 325 TABLET ORAL at 10:07

## 2025-07-10 RX ADMIN — OXYCODONE HYDROCHLORIDE 10 MG: 5 TABLET ORAL at 08:41

## 2025-07-10 ASSESSMENT — ACTIVITIES OF DAILY LIVING (ADL)
ADLS_ACUITY_SCORE: 26

## 2025-07-10 NOTE — CONSULTS
INITIAL SOCIAL WORK MATERNITY ASSESSMENT     DATA:      Reason for Social Work Consult:  discharge planing     Presenting Information: chart review, met with mom in her postpartum room    Living Situation:  Single parents (11 yrs old daughter) in [place] apartment     Social Support/Professional Community Support:  Ivonne reports having strong family support: her father, sister and cousin, also FOB.    Insurance: [facility]     Source of Financial Support: [facility], [facility]    Baby Supplies: patient reports having all needed supplies at home. New car seat was already in her room. She has crib, diapers and a lot of clothes from her sister and cousin kids donated.     Mental Health History: none reported     History of Postpartum Mood Disorders: denies issues, had some depression after her first child, but per patient due to death of the baby's father.     Chemical Health History: denies usage, no tox screen initiated        INTERVENTION:      SW completed chart review and collaborated with the multidisciplinary team.   Psychosocial Assessment   Introduction to Maternal Child Health  role and scope of practice   Reviewed Hospital and Community Resources   Assessed Chemical Health History and Current Symptoms   Assessed Mental Health History and Current Symptoms   Identified stressors, barriers and family concerns   Provided support and active empathetic listening and validation.   Provided psychoeducation on  mood and anxiety disorders, assessed for any current symptoms or history    ASSESSMENT:    Patient has her baby (Daughter) in her room, sleeping in her bed. Patient reports she has strong family support. She is not in a relationship with FOB who apparently has 16 children with another women. He was quite supportive through patient's pregnancy and is planing to stay involved in his daughter life.   Patient reports she doesn't have many visitors, including FOB as he doesn't have car, and  "her sister shares care with her father and \"the all are working\". Per patient they are calling frequently and can't wait for patient and the baby to be discharge to home.  Per patient there is no safety issues or concerns at home or in the community. \" I'm hollie loner and keep to myself\"  Patient reports having all needed baby supplies including crib, clothes and car seat. Her PCP is just cross the street and she has no issues accessing medical care. Patient didn't sign up her daughter for MA yet but has [facility] already approved. She will talk to her [facility] [employer] worker probably tomorrow.  Patient denies any issues or concerns. Seems to be able to advocate for herself and her family.  PLAN:    Single mom with strong family support. Denies issues or concern, including financial problems at this time. SW will follow to assist with patient's discharge needs. Community resources provided, including parents resources and accessing home care and nurse visits, which states she doesn't need since her clinic is cross the street and walking distance.    Shayna Alfonso, W  Acute Care Management  [facility]  For further questions/concerns you can reach us at Vocera Web Console      "

## 2025-07-10 NOTE — PROGRESS NOTES
Obstetrics Post-Op Progress Note         Assessment and Plan:    Assessment: Lucinda Ji is a 34 year old  Post-operative day #3 s/p Low transverse primary  section doing well.     L&D complications: Pregnancy w/ hx of uterine myomectomy [O34.29]        Gestational HTN  Plan:Will watch for BP control today-meds increased yesterday.    Anticipate discharge tomorrow    Will have SW see today for issues at home.        Interval History:   Doing well.  Pain is well-controlled.  No fevers.  No history of wound drainage, warmth or significant erythema.  Good appetite.  Denies chest pain, shortness of breath, nausea or vomiting.  Ambulatory.  Breastfeeding well.             Physical Exam:   Temp: 97.9  F (36.6  C) Temp src: Oral BP: (!) 140/76 Pulse: 82   Resp: 20 SpO2: 98 % O2 Device: None (Room air)    Vitals:    25 0500 07/10/25 0830   Weight: (!) 145 kg (319 lb 9.6 oz) (!) 140.8 kg (310 lb 6.4 oz)     Vital Signs with Ranges  Temp:  [97.9  F (36.6  C)-98.2  F (36.8  C)] 97.9  F (36.6  C)  Pulse:  [76-95] 82  Resp:  [18-20] 20  BP: (110-154)/(52-83) 140/76  SpO2:  [98 %-99 %] 98 %  I/O last 3 completed shifts:  In: 1500 [P.O.:1500]  Out: 3350 [Urine:3350]    Uterine fundus is firm, non-tender and at the level of the umbilicus  Incision C/D/I          Data:     Recent Results (from the past 24 hours)   Platelet count    Collection Time: 07/10/25  5:29 AM   Result Value Ref Range    Platelet Count 355 150 - 450 10e3/uL    MCV 90 78 - 100 fL     Recent Labs   Lab Test 25  0536   AS Negative     Recent Labs   Lab Test 25  0646 25  0536   HGB 9.4* 11.6*     Recent Labs   Lab Test 25  1119   RUQIGG Positive       Luther Hutchison MD

## 2025-07-10 NOTE — PROGRESS NOTES
SW attempted to see patient for SW consult. Patient was sleeping bur aroused to voice. States is very tired and sleepy, and asked SW to return in the afternoon. Shayna Alfonso, VANDANAW

## 2025-07-10 NOTE — PLAN OF CARE
Goal Outcome Evaluation:      Plan of Care Reviewed With: patient    Overall Patient Progress: improvingOverall Patient Progress: improving    Outcome Evaluation: Patient's VSS. Denies symptoms of preeclampsia. Reflexes WDL and clonus absent. BLE edema present. Encouraging activity. Patient is voiding without difficulty. Fundus is firm and bleeding is scant. Incision is ASHLEE and well approximated. Patient is attentive to baby's needs and is bonding well with infant.          Problem: Postpartum ( Delivery)  Goal: Successful Parent Role Transition  Outcome: Progressing     Problem: Postpartum ( Delivery)  Goal: Optimal Pain Control and Function  Outcome: Progressing  Intervention: Prevent or Manage Pain  Recent Flowsheet Documentation  Taken 2025 by Guido Enriquez, RN  Pain Management Interventions: medication (see MAR)  Taken 2025 by Guido Enriquez, RN  Perineal Care: perineal hygiene encouraged     Problem: Hypertension Acute  Goal: Blood Pressure Within Desired Range  Outcome: Progressing

## 2025-07-10 NOTE — PLAN OF CARE
Goal Outcome Evaluation:      Plan of Care Reviewed With: patient    Overall Patient Progress: improvingOverall Patient Progress: improving    Outcome Evaluation: Improving towards discharge        HTN protocol. /76 and 140/68 today. Scheduled blood pressure meds. Denies pre-eclampsia symptoms.     Up independently. Encouraged ambulation. Voiding. Tolerating regular diet.    Pain managed with Tylenol, Ibuprofen and Oxycodone. Denies ice or abdominal binder.    Significant other at the bedside for a short period today otherwise patient has been by herself. Independent with baby cares.    Has birth certificate and ROP at her bedside (filled out but waiting for notary).    Anticipated discharge home tomorrow.

## 2025-07-11 VITALS
TEMPERATURE: 98.1 F | DIASTOLIC BLOOD PRESSURE: 79 MMHG | OXYGEN SATURATION: 97 % | HEIGHT: 66 IN | SYSTOLIC BLOOD PRESSURE: 140 MMHG | BODY MASS INDEX: 47.09 KG/M2 | HEART RATE: 85 BPM | RESPIRATION RATE: 20 BRPM | WEIGHT: 293 LBS

## 2025-07-11 VITALS
BODY MASS INDEX: 47.09 KG/M2 | OXYGEN SATURATION: 99 % | HEIGHT: 66 IN | RESPIRATION RATE: 20 BRPM | SYSTOLIC BLOOD PRESSURE: 141 MMHG | TEMPERATURE: 97.4 F | DIASTOLIC BLOOD PRESSURE: 77 MMHG | WEIGHT: 293 LBS | HEART RATE: 92 BPM

## 2025-07-11 PROBLEM — O11.9 CHRONIC HYPERTENSION WITH SUPERIMPOSED PREECLAMPSIA: Status: ACTIVE | Noted: 2025-07-11

## 2025-07-11 PROCEDURE — 250N000013 HC RX MED GY IP 250 OP 250 PS 637: Performed by: OBSTETRICS & GYNECOLOGY

## 2025-07-11 PROCEDURE — 250N000011 HC RX IP 250 OP 636: Performed by: OBSTETRICS & GYNECOLOGY

## 2025-07-11 RX ORDER — LABETALOL HYDROCHLORIDE 5 MG/ML
20 INJECTION, SOLUTION INTRAVENOUS
Status: DISCONTINUED | OUTPATIENT
Start: 2025-07-11 | End: 2025-07-11 | Stop reason: HOSPADM

## 2025-07-11 RX ORDER — SODIUM CHLORIDE, SODIUM LACTATE, POTASSIUM CHLORIDE, CALCIUM CHLORIDE 600; 310; 30; 20 MG/100ML; MG/100ML; MG/100ML; MG/100ML
10-125 INJECTION, SOLUTION INTRAVENOUS CONTINUOUS
Status: DISCONTINUED | OUTPATIENT
Start: 2025-07-11 | End: 2025-07-11 | Stop reason: HOSPADM

## 2025-07-11 RX ORDER — NIFEDIPINE 10 MG/1
CAPSULE ORAL
Status: COMPLETED
Start: 2025-07-11 | End: 2025-07-11

## 2025-07-11 RX ORDER — IBUPROFEN 200 MG
600 TABLET ORAL EVERY 6 HOURS
COMMUNITY
Start: 2025-07-11

## 2025-07-11 RX ORDER — FUROSEMIDE 20 MG/1
40 TABLET ORAL ONCE
Status: DISCONTINUED | OUTPATIENT
Start: 2025-07-11 | End: 2025-07-11

## 2025-07-11 RX ORDER — FUROSEMIDE 20 MG/1
20 TABLET ORAL ONCE
Status: COMPLETED | OUTPATIENT
Start: 2025-07-11 | End: 2025-07-11

## 2025-07-11 RX ORDER — NIFEDIPINE 10 MG/1
10-20 CAPSULE ORAL
Status: DISCONTINUED | OUTPATIENT
Start: 2025-07-11 | End: 2025-07-11 | Stop reason: HOSPADM

## 2025-07-11 RX ADMIN — LABETALOL HYDROCHLORIDE 200 MG: 200 TABLET ORAL at 05:41

## 2025-07-11 RX ADMIN — OXYCODONE HYDROCHLORIDE 5 MG: 5 TABLET ORAL at 00:38

## 2025-07-11 RX ADMIN — IBUPROFEN 800 MG: 800 TABLET ORAL at 05:18

## 2025-07-11 RX ADMIN — ACETAMINOPHEN 975 MG: 325 TABLET ORAL at 11:52

## 2025-07-11 RX ADMIN — SENNOSIDES AND DOCUSATE SODIUM 1 TABLET: 50; 8.6 TABLET ORAL at 09:00

## 2025-07-11 RX ADMIN — IBUPROFEN 800 MG: 800 TABLET ORAL at 11:53

## 2025-07-11 RX ADMIN — ACETAMINOPHEN 975 MG: 325 TABLET ORAL at 05:18

## 2025-07-11 RX ADMIN — NIFEDIPINE 60 MG: 30 TABLET, EXTENDED RELEASE ORAL at 08:06

## 2025-07-11 RX ADMIN — ENOXAPARIN SODIUM 40 MG: 40 INJECTION SUBCUTANEOUS at 08:38

## 2025-07-11 RX ADMIN — NIFEDIPINE 20 MG: 10 CAPSULE ORAL at 08:34

## 2025-07-11 RX ADMIN — NIFEDIPINE 10 MG: 10 CAPSULE ORAL at 08:05

## 2025-07-11 RX ADMIN — LABETALOL HYDROCHLORIDE 300 MG: 100 TABLET, FILM COATED ORAL at 13:17

## 2025-07-11 RX ADMIN — FUROSEMIDE 20 MG: 20 TABLET ORAL at 09:48

## 2025-07-11 ASSESSMENT — ACTIVITIES OF DAILY LIVING (ADL)
ADLS_ACUITY_SCORE: 26

## 2025-07-11 NOTE — PLAN OF CARE
Goal Outcome Evaluation:      Plan of Care Reviewed With: patient    Overall Patient Progress: improvingOverall Patient Progress: improving    Outcome Evaluation: New order to discharge patient from Dr. Aguirre after she personally checked patient's Blood Pressure=141/77 at nurse's station with writer and charge nurse.   Provider ordered discharge to home with follow up with Public Health Nurse over the weekend for Hypertension Pre-Eclampsia with Severe Features monitoring.  Patient has own BP at home and patient verbalized understanding of conversation with Dr. Aguirre to call MD and report high Blood Pressures.  Patient able to verbalize symptoms of Pre-Eclampsia to watch for at home like headache, visual changes, epigastric or RUQ abdominal pain.  Discharge After Visit Summary discussed with patient including follow up appointments already set up next week after home care visit, medications and change in medications and dosage, prescriptions filled at outpatient pharmacy and patient verbalized understanding.  Patient refused to be weighed this morning and refused to stay overnight for BP monitoring and management.  Provider talked to Dr. Ghosh who is also taking care of the patient's infant for continued follow up outpatient.          Problem: Postpartum ( Delivery)  Goal: Successful Parent Role Transition  Outcome: Adequate for Care Transition  Intervention: Support Parent Role Transition  Recent Flowsheet Documentation  Taken 2025 0812 by Inocencia Boyle RN  Supportive Measures:   active listening utilized   decision-making supported   goal-setting facilitated   positive reinforcement provided   problem-solving facilitated   relaxation techniques promoted   self-care encouraged   verbalization of feelings encouraged  Parent-Child Attachment Promotion:   caring behavior modeled   cue recognition promoted   face-to-face positioning promoted   interaction encouraged   parent/caregiver presence  encouraged   participation in care promoted   positive reinforcement provided   strengths emphasized   rooming-in promoted  Goal: Hemostasis  Outcome: Adequate for Care Transition  Goal: Effective Bowel Elimination  Outcome: Adequate for Care Transition  Intervention: Enhance Bowel Motility and Elimination  Recent Flowsheet Documentation  Taken 7/11/2025 0812 by Inocencia Boyle RN  Bowel Motility Enhancement:   ambulation promoted   fluid intake encouraged   oral intake encouraged  Bowel Elimination Promotion:   adequate fluid intake promoted   ambulation promoted  Goal: Fluid and Electrolyte Balance  Outcome: Adequate for Care Transition  Intervention: Monitor and Manage Fluid and Electrolyte Balance  Recent Flowsheet Documentation  Taken 7/11/2025 0812 by Inocencia Boyle RN  Fluid/Electrolyte Management: fluids provided  Goal: Absence of Infection Signs and Symptoms  Outcome: Adequate for Care Transition  Intervention: Prevent or Manage Infection  Recent Flowsheet Documentation  Taken 7/11/2025 0812 by Inocencia Boyle RN  Infection Management: aseptic technique maintained  Goal: Anesthesia/Sedation Recovery  Outcome: Adequate for Care Transition  Goal: Optimal Pain Control and Function  Outcome: Adequate for Care Transition  Intervention: Prevent or Manage Pain  Recent Flowsheet Documentation  Taken 7/11/2025 0812 by Inocencia Boyle RN  Perineal Care: perineal hygiene encouraged  Taken 7/11/2025 0755 by Inocencia Boyle RN  Pain Management Interventions:   medication (see MAR)   rest  Goal: Nausea and Vomiting Relief  Outcome: Adequate for Care Transition  Goal: Effective Urinary Elimination  Outcome: Adequate for Care Transition  Intervention: Monitor and Manage Urinary Retention  Recent Flowsheet Documentation  Taken 7/11/2025 0812 by Inocencia Boyle RN  Urinary Elimination Promotion:   frequent voiding encouraged   voiding relaxation promoted  Goal: Effective Oxygenation and Ventilation  Outcome: Adequate for Care  Transition  Intervention: Optimize Oxygenation and Ventilation  Recent Flowsheet Documentation  Taken 7/11/2025 0812 by Inocencia Boyle, SATISH  Head of Bed ([facility]) Positioning: HOB at 30 degrees  Taken 7/11/2025 0755 by Inocencia Boyle RN  Head of Bed ([facility]) Positioning: HOB at 30 degrees     Problem: Hypertension Acute  Goal: Blood Pressure Within Desired Range  Outcome: Adequate for Care Transition

## 2025-07-11 NOTE — PROGRESS NOTES
"    Obstetrics Post-Op Progress Note         Assessment and Plan:    Assessment: Lucinda Ji is a 34 year old  Post-operative day #4 s/p Low transverse primary  section .     L&D complications: Pregnancy with h/o myomectomy  Chronic hypertension with superimposed pre-eclampsia s/p magnesium   Gestational diabetes   Morbid obesity  Depression   Acute blood loss anemia (9.4), asymptomatic           Plan:   Ambulation encouraged  Start iron supplement at discharge  S/p social work   Montior blood pressures- on labetalol 200 mg TID and nifedipine XL 60 mg qAM. Severe range blood pressures this morning requiring acute treatment. Discussed with her recommendation to stay for 24 hours s/p acute treatment and risk of readmission. She is adamant that she wants to leave regardless and will follow-up with Dr. Ghosh for her blood pressures. Discussed 20 mg dose of lasix PO x 1 to help with edema and she is agreeable to take that. Will increase her labetalol to 300 mg TID along with nifedipine XL 60 mg at bedtime and monitor blood pressures today.  Discussed hopeful we can all agree on discharge later if her blood pressures are improved and she is agreeable to close outpatient follow-up.                Interval History:   Lucinda is very frustrated this morning.  Was woken up to weigh her and weigh the baby. Just wants to rest and be home. Was already angry when blood pressure cuff was put on so thinks that contributed to her elevated blood pressure. Frustrated as \"Dr. Hutchison told her she could go home today\" and she wants to go no matter what. Has had some LE edema, which she thinks has actually gotten better and is not bothersome to her. She has \"2 blood pressure meds and a blood pressure cuff at home\" and needs to get all her meds from Dr. Ghosh. She is bottle feeding. Baby is ready for discharge today.            Physical Exam:   Temp: 97.4  F (36.3  C) Temp src: Oral BP: 139/73 Pulse: 97   Resp: 20 SpO2: 99 % O2 " Device: None (Room air)    Vitals:    07/08/25 0500 07/10/25 0830   Weight: (!) 145 kg (319 lb 9.6 oz) (!) 140.8 kg (310 lb 6.4 oz)     Vital Signs with Ranges  Temp:  [97.4  F (36.3  C)-98.3  F (36.8  C)] 97.4  F (36.3  C)  Pulse:  [80-99] 97  Resp:  [18-22] 20  BP: (125-175)/(64-98) 139/73  SpO2:  [97 %-100 %] 99 %  I/O last 3 completed shifts:  In: 3378 [P.O.:3378]  Out: 3050 [Urine:3050]    Uterine fundus is firm, non-tender and at the level of the umbilicus  Incision C/D/I  Extremities Non-tender. 1+ edema           Data:   No results found for this or any previous visit (from the past 24 hours).  Recent Labs   Lab Test 07/07/25  0536   AS Negative     Recent Labs   Lab Test 07/08/25  0646 07/07/25  0536   HGB 9.4* 11.6*     Recent Labs   Lab Test 01/16/25  1119   RUQIGG Positive       Cecilia Aguirre MD

## 2025-07-11 NOTE — PLAN OF CARE
Problem: Adult Inpatient Plan of Care  Goal: Optimal Comfort and Wellbeing  7/11/2025 0310 by Fady Mendoza, RN  Outcome: Progressing     Problem: Adult Inpatient Plan of Care  Goal: Optimal Comfort and Wellbeing  Intervention: Provide Person-Centered Care  Recent Flowsheet Documentation  Taken 7/11/2025 0040 by Fady Mendoza, RN  Trust Relationship/Rapport:   care explained   choices provided   questions encouraged   questions answered   thoughts/feelings acknowledged   empathic listening provided   Goal Outcome Evaluation:  Patient's VSS and see flow sheet for assessment  Fundus is firm -2 umbilicus with light rubra lochia. Patient is up ad gwen and performing self-cares independently. Reporting 5 pain being treated with oxycodone. Patient's  is alone in the room with her and infant, attentive/supportive and participating in cares. Denies Pre E symptoms. Neg clonus. +2 patella reflexes.     Patient filled out ROP, birth certificate and PPMA, but needs it notarized.          Fady Mendoza, RN

## 2025-07-11 NOTE — PROVIDER NOTIFICATION
07/11/25 0943   Provider Notification   Provider Name/Title Dr. Aguirre   Method of Notification In Department   Request Evaluate in Person   Notification Reason Status Update  (Patient adamant, mad and angry about going home and writer unable to give patient the Furosemide 20 mg. newly ordered by Dr. Aguirre.  MD went to patient's room to talk to patient.)

## 2025-07-11 NOTE — DISCHARGE SUMMARY
Discharge Summary    Lucinda Ji MRN# [id number]   Age: 34 year old YOB: 1990     Date of Admission:  2025  Date of Discharge::  2025  Admitting Physician:  Luther Hutchison MD  Discharge Physician:  Cecilia Aguirre MD     Home clinic: Dr. Ghosh / [facility]          Admission Diagnoses:   Pregnancy w/ hx of uterine myomectomy [O34.29]  Severe preeclampsia [O14.10]  Intrauterine pregnancy at 37w3d          Discharge Diagnosis:   Pregnancy with h/o myomectomy  Chronic hypertension with superimposed pre-eclampsia s/p magnesium   Gestational diabetes   Morbid obesity  Depression   Acute blood loss anemia (9.4), asymptomatic   S/p  delivery          Procedures:     Procedure(s): Low transverse   delivery via Pfannenstiel incision                Medications Prior to Admission:     Medications Prior to Admission   Medication Sig Dispense Refill Last Dose/Taking    albuterol (PROAIR HFA/PROVENTIL HFA/VENTOLIN HFA) 108 (90 Base) MCG/ACT inhaler Inhale 1-2 puffs into the lungs every 4 hours as needed for shortness of breath or wheezing. 18 g 11 Not Taking    budesonide-formoterol (SYMBICORT/BREYNA) 80-4.5 MCG/ACT inhaler Inhale 1-2 puffs into the lungs every 6 hours as needed (shortness of breath). 10.2 g 3 Not Taking    famotidine (PEPCID) 20 MG tablet Take 1 tablet (20 mg) by mouth 2 times daily. 90 tablet 2 Not Taking    hydrOXYzine (VISTARIL) 50 MG capsule Take 1 capsule (50 mg) by mouth nightly as needed for itching. Can take 1 additional pill if needed. 90 capsule 1 Past Week    ondansetron (ZOFRAN) 4 MG tablet Take 1 tablet (4 mg) by mouth every 6 hours as needed for nausea or vomiting. 30 tablet 3 Not Taking    Prenatal Vit-Fe Fumarate-FA (PRENATAL MULTIVITAMIN  WITH IRON) 28-0.8 MG TABS Take 1 tablet by mouth daily. 100 tablet 3 2025    acetaminophen (TYLENOL) 325 MG tablet Take 1-2 tablets (325-650 mg) by mouth every 6 hours as needed for mild pain.  100 tablet 2     Alcohol Swabs PADS 1 Units 4 times daily. 120 each 1     blood glucose (NO BRAND SPECIFIED) lancets standard Use to test blood sugar 4 times daily or as directed. 120 each 1     blood glucose (NO BRAND SPECIFIED) test strip Use to test blood sugar 4 times daily or as directed. 120 strip 1     blood glucose monitoring (NO BRAND SPECIFIED) meter device kit Use to test blood sugar 4 times daily or as directed. 1 kit 0     furosemide (LASIX) 20 MG tablet Take 1 tablet (20 mg) by mouth daily. 7 tablet 0     labetalol (NORMODYNE) 300 MG tablet Take 1 tablet (300 mg) by mouth 3 times daily. 90 tablet 1     NIFEdipine ER OSMOTIC (PROCARDIA XL) 60 MG 24 hr tablet Take 1 tablet (60 mg) by mouth daily. 30 tablet 1     oxyCODONE (ROXICODONE) 5 MG tablet Take 1 tablet (5 mg) by mouth every 6 hours as needed for pain. 12 tablet 0     [DISCONTINUED] labetalol (NORMODYNE) 200 MG tablet Take 1 tablet (200 mg) by mouth 2 times daily. 60 tablet 1 7/6/2025    [DISCONTINUED] NIFEdipine (PROCARDIA) 20 MG capsule Take 1 capsule (20 mg) by mouth every 6 hours. (Patient not taking: Reported on 7/7/2025) 5 capsule 0 Not Taking             Discharge Medications:        Review of your medicines        START taking        Dose / Directions   furosemide 20 MG tablet  Commonly known as: LASIX  Used for: Chronic hypertension affecting pregnancy, Postpartum hypertension      Dose: 20 mg  Take 1 tablet (20 mg) by mouth daily.  Quantity: 7 tablet  Refills: 0     ibuprofen 200 MG tablet  Commonly known as: ADVIL/MOTRIN  Used for: Supervision of high risk pregnancy in third trimester, Chronic hypertension affecting pregnancy      Dose: 600 mg  Take 3 tablets (600 mg) by mouth every 6 hours.  Refills: 0     NIFEdipine ER OSMOTIC 60 MG 24 hr tablet  Commonly known as: PROCARDIA XL  Used for: Chronic hypertension affecting pregnancy, Postpartum hypertension  Replaces: NIFEdipine 20 MG capsule      Dose: 60 mg  Take 1 tablet (60 mg) by  mouth daily.  Quantity: 30 tablet  Refills: 1     oxyCODONE 5 MG tablet  Commonly known as: ROXICODONE  Used for: Status post       Dose: 5 mg  Take 1 tablet (5 mg) by mouth every 6 hours as needed for pain.  Quantity: 12 tablet  Refills: 0            CONTINUE these medicines which may have CHANGED, or have new prescriptions. If we are uncertain of the size of tablets/capsules you have at home, strength may be listed as something that might have changed.        Dose / Directions   labetalol 300 MG tablet  Commonly known as: NORMODYNE  This may have changed:   medication strength  how much to take  when to take this  Used for: Chronic hypertension affecting pregnancy, Postpartum hypertension      Dose: 300 mg  Take 1 tablet (300 mg) by mouth 3 times daily.  Quantity: 90 tablet  Refills: 1            CONTINUE these medicines which have NOT CHANGED        Dose / Directions   acetaminophen 325 MG tablet  Commonly known as: TYLENOL  Used for: Tension headache, High-risk pregnancy in third trimester      Dose: 325-650 mg  Take 1-2 tablets (325-650 mg) by mouth every 6 hours as needed for mild pain.  Quantity: 100 tablet  Refills: 2     albuterol 108 (90 Base) MCG/ACT inhaler  Commonly known as: PROAIR HFA/PROVENTIL HFA/VENTOLIN HFA  Used for: History of asthma      Dose: 1-2 puff  Inhale 1-2 puffs into the lungs every 4 hours as needed for shortness of breath or wheezing.  Quantity: 18 g  Refills: 11     Alcohol Swabs Pads  Used for: Elevated blood sugar level, High-risk pregnancy in third trimester      Dose: 1 Units  1 Units 4 times daily.  Quantity: 120 each  Refills: 1     blood glucose lancets standard  Commonly known as: NO BRAND SPECIFIED  Used for: Elevated blood sugar level, High-risk pregnancy in third trimester      Use to test blood sugar 4 times daily or as directed.  Quantity: 120 each  Refills: 1     blood glucose monitoring meter device kit  Commonly known as: NO BRAND SPECIFIED  Used for:  Elevated blood sugar level, High-risk pregnancy in third trimester      Use to test blood sugar 4 times daily or as directed.  Quantity: 1 kit  Refills: 0     blood glucose test strip  Commonly known as: NO BRAND SPECIFIED  Used for: Elevated blood sugar level, High-risk pregnancy in third trimester      Use to test blood sugar 4 times daily or as directed.  Quantity: 120 strip  Refills: 1     budesonide-formoterol 80-4.5 MCG/ACT inhaler  Commonly known as: SYMBICORT/BREYNA  Used for: Intermittent asthma, unspecified asthma severity, unspecified whether complicated      Dose: 1-2 puff  Inhale 1-2 puffs into the lungs every 6 hours as needed (shortness of breath).  Quantity: 10.2 g  Refills: 3     famotidine 20 MG tablet  Commonly known as: PEPCID  Used for: Supervision of high-risk pregnancy, first trimester      Dose: 20 mg  Take 1 tablet (20 mg) by mouth 2 times daily.  Quantity: 90 tablet  Refills: 2     hydrOXYzine 50 MG capsule  Commonly known as: VISTARIL  Used for: Pelvic pain in female      Dose: 50 mg  Take 1 capsule (50 mg) by mouth nightly as needed for itching. Can take 1 additional pill if needed.  Quantity: 90 capsule  Refills: 1     ondansetron 4 MG tablet  Commonly known as: ZOFRAN  Used for: Nausea/vomiting in pregnancy      Dose: 4 mg  Take 1 tablet (4 mg) by mouth every 6 hours as needed for nausea or vomiting.  Quantity: 30 tablet  Refills: 3     prenatal multivitamin  with iron 28-0.8 MG Tabs  Used for: Supervision of high-risk pregnancy, first trimester      Dose: 1 tablet  Take 1 tablet by mouth daily.  Quantity: 100 tablet  Refills: 3            STOP taking      NIFEdipine 20 MG capsule  Commonly known as: PROCARDIA  Replaced by: NIFEdipine ER OSMOTIC 60 MG 24 hr tablet                  Where to get your medicines        These medications were sent to [id number] - [place], [place] - [address] [place] [address] AT [facility] [address]  [address], [place] [address]      Phone:  "[phone]   furosemide 20 MG tablet  labetalol 300 MG tablet  NIFEdipine ER OSMOTIC 60 MG 24 hr tablet  oxyCODONE 5 MG tablet       Some of these will need a paper prescription and others can be bought over the counter. Ask your nurse if you have questions.    You don't need a prescription for these medications  ibuprofen 200 MG tablet               Consultations:   No consultations were requested during this admission          Brief History of Labor:   Lucinda Ji is a 34 year old  who presented for primary CS.           Hospital Course:   The patient's hospital course was significant for chronic  hypertension with superimposed pre-eclamapsia. She had her blood pressures actively managed. On discharge, her pain was well controlled. Vaginal bleeding is similar to peak menstrual flow.  Voiding without difficulty.  Ambulating well and tolerating a normal diet.  No fever or significant wound drainage.  Bottlefeeding.  Infant is stable.  She was discharged on post-partum day #4. She will resume her prenatal with iron.    Post-partum hemoglobin:   Hemoglobin   Date Value Ref Range Status   2025 9.4 (L) 11.7 - 15.7 g/dL Final   2020 12.8 11.7 - 15.7 g/dL Final       Day of discharge assessment:   BP (!) 141/77 (BP Location: Right arm, Cuff Size: Adult Large)   Pulse 92   Temp 97.4  F (36.3  C) (Oral)   Resp 20   Ht 1.676 m (5' 6\")   Wt (!) 140.8 kg (310 lb 6.4 oz)   LMP 10/20/2024 (Approximate)   SpO2 99%   Breastfeeding Unknown   BMI 50.10 kg/m    Abdomen: Soft, fundus firm, incision C/D/I          Discharge Instructions and Follow-Up:     Discharge diet: Regular   Discharge activity: Your activity upon discharge: no lifting >15 lbs or strenuous exercise for 6 weeks, no driving for 2 weeks or until you can slam on the breaks w/o pain, nothing in the vagina for 6 weeks (no tampons, intercourse, douching).    Discharge follow-up: Tuesday 7/15 with Dr. Ghosh  Two weeks for incision check with " Dr. Hutchison  6 weeks for postpartum visit.   Wound care: Keep incision clean and dry.           Discharge Disposition:     Discharged to home      Attestation:  I have reviewed today's vital signs, notes, medications, labs and imaging.  Amount of time performed on this discharge summary: 20 minutes.  Total time: 60 minutes    Cecilia Aguirre MD

## 2025-07-11 NOTE — PROGRESS NOTES
D:  Patient desires discharge home.  Discharge orders received and entered by provider.  A:  Discharge instructions reviewed with the patient.  All questions and concerns addressed.  R:  Discharge criteria met.  4 Part ID bands double checked.   discharged in car seat with mother.  The nurse escorted patient to car .

## 2025-07-11 NOTE — PROVIDER NOTIFICATION
07/11/25 0800   Provider Notification   Provider Name/Title Dr. Aguirre   Method of Notification Phone   Request Evaluate-Remote   Notification Reason Vital Signs Change;Status Update  (high QI=463/98 /81 with frontal headache throbbing intermittent.  New order for Nifedipine Instant Release Protocol for Pre-Eclampsia Hypertensive Protocol received from Dr. Aguirre, In House OB provider.)

## 2025-07-11 NOTE — PLAN OF CARE
Problem: Adult Inpatient Plan of Care  Goal: Optimal Comfort and Wellbeing  Outcome: Progressing   Goal Outcome Evaluation:      Plan of Care Reviewed With: patient    Overall Patient Progress: improvingOverall Patient Progress: improving    Outcome Evaluation: meds given per MAR. I and O monitored and doing well. pain 6/10 this shift.

## 2025-07-11 NOTE — PROVIDER NOTIFICATION
07/11/25 0923   Provider Notification   Provider Name/Title Dr. Aguirre   Method of Notification Phone   Request Evaluate in Person   Notification Reason Vital Signs Change;Status Update  (Updated Dr. Aguirre, BP down to 130's systolic after Nifedipine IR 10 mg. and additional 20 mg. 2nd dose given orally.  Headache decreased from 7/10 to 0/10.  Bipedal pitting edema +3, ankles, bilateral lower legs +2 pitting edema. Refused to be weighed.)     Dr. Aguirre will come and see patient soon.  Provider will order furosemide 40 mg. For pitting edema and will plan on adjusting Labetalol order for hypertension.

## 2025-07-11 NOTE — PROGRESS NOTES
[facility]    Patient is much more calm. Ready for discharge. Blood pressure on my check was 141/77.   She is due for labetalol right now.  Dr. Ghosh sent in prescriptions and has arranged Follow-up.  Discharge orders placed.    Cecilia Aguirre MD 7/11/2025 1:22 PM

## 2025-07-13 ENCOUNTER — HEALTH MAINTENANCE LETTER (OUTPATIENT)
Age: 35
End: 2025-07-13

## 2025-07-15 ENCOUNTER — OFFICE VISIT (OUTPATIENT)
Dept: FAMILY MEDICINE | Facility: CLINIC | Age: 35
End: 2025-07-15
Payer: COMMERCIAL

## 2025-07-15 VITALS
WEIGHT: 293 LBS | DIASTOLIC BLOOD PRESSURE: 87 MMHG | BODY MASS INDEX: 47.09 KG/M2 | TEMPERATURE: 97.7 F | SYSTOLIC BLOOD PRESSURE: 138 MMHG | RESPIRATION RATE: 24 BRPM | HEART RATE: 123 BPM | HEIGHT: 66 IN | OXYGEN SATURATION: 95 %

## 2025-07-15 DIAGNOSIS — Z98.891 STATUS POST C-SECTION: ICD-10-CM

## 2025-07-15 DIAGNOSIS — O21.9 NAUSEA AND VOMITING IN PREGNANCY: ICD-10-CM

## 2025-07-15 DIAGNOSIS — K59.00 CONSTIPATION, UNSPECIFIED CONSTIPATION TYPE: Primary | ICD-10-CM

## 2025-07-15 DIAGNOSIS — J45.20 MILD INTERMITTENT ASTHMA IN ADULT WITHOUT COMPLICATION: ICD-10-CM

## 2025-07-15 DIAGNOSIS — F32.A ANXIETY AND DEPRESSION: ICD-10-CM

## 2025-07-15 DIAGNOSIS — F41.9 ANXIETY AND DEPRESSION: ICD-10-CM

## 2025-07-15 DIAGNOSIS — O10.919 CHRONIC HYPERTENSION AFFECTING PREGNANCY: ICD-10-CM

## 2025-07-15 RX ORDER — DOCUSATE SODIUM 100 MG/1
100 CAPSULE, LIQUID FILLED ORAL 2 TIMES DAILY
Qty: 60 CAPSULE | Refills: 1 | Status: SHIPPED | OUTPATIENT
Start: 2025-07-15

## 2025-07-15 ASSESSMENT — EDINBURGH POSTNATAL DEPRESSION SCALE (EPDS)
TOTAL SCORE: 0
I HAVE BLAMED MYSELF UNNECESSARILY WHEN THINGS WENT WRONG: NO, NEVER
THE THOUGHT OF HARMING MYSELF HAS OCCURRED TO ME: NEVER
I HAVE BEEN SO UNHAPPY THAT I HAVE HAD DIFFICULTY SLEEPING: NOT AT ALL
I HAVE BEEN SO UNHAPPY THAT I HAVE BEEN CRYING: NO, NEVER
I HAVE BEEN ANXIOUS OR WORRIED FOR NO GOOD REASON: NO, NOT AT ALL
THINGS HAVE BEEN GETTING ON TOP OF ME: NO, I HAVE BEEN COPING AS WELL AS EVER
I HAVE BEEN ABLE TO LAUGH AND SEE THE FUNNY SIDE OF THINGS: AS MUCH AS I ALWAYS COULD
I HAVE LOOKED FORWARD WITH ENJOYMENT TO THINGS: AS MUCH AS I EVER DID
I HAVE FELT SCARED OR PANICKY FOR NO GOOD REASON: NO, NOT AT ALL
I HAVE FELT SAD OR MISERABLE: NO, NOT AT ALL

## 2025-07-15 NOTE — PATIENT INSTRUCTIONS
Keep up the good work!    Check blood pressures at home 2x per day. Write them down!  Bring them in on Friday.      Labetalol 200mg 2x per day until you  the new meds.    Then switch to Labetalol 300mg 2x per day.      Bring all your meds with you on Friday.      Schedule visit for Friday.    
Negative

## 2025-07-15 NOTE — PROGRESS NOTES
Assessment & Plan     Ivonne was seen today for follow-up from hospitalization, as she is status post , and treatment for preeclampsia with severe features with persisting postpartum hypertension on medications.    Diagnoses and all orders for this visit:    Chronic hypertension affecting pregnancy  Status post   Postpartum hypertension  History of preeclampsia with severe features  She has not been able to  the medications from the pharmacy but endorses that she will go after our visit today.  Has instead been taking labetalol 200 mg twice daily.  Blood pressure is in range currently.  I worry if she returns to the full dose of nifedipine and increase labetalol that she will become hypotensive.  -- Continue with the labetalol 200 mg twice daily until she can  her medications.  -- Once she picks up medications, I recommend increasing to 300 mg twice daily.  -- Continue to check blood pressures twice daily and bring a list back to clinic next week.  -- We will see if Effie Prescott can call and check in with patient about home blood pressures and ensure she is able to get her medications.  -- She has follow-up already scheduled for .  -- Reviewed warning signs of postpartum preeclampsia.  She denies any symptoms today.  -- Should follow-up with [facility] for her postsurgical  follow-up visit next week as well.    Constipation, unspecified constipation type  Refilled Colace for constipation.  -     docusate sodium (COLACE) 100 MG capsule; Take 1 capsule (100 mg) by mouth 2 times daily.    Mild intermittent asthma in adult without complication  Asthma well-controlled.  She has inhalers at home.    Anxiety and depression  No issues or concerns with mood today.    Nausea and vomiting in pregnancy  This has resolved.    Follow-up in 1 week to recheck blood pressure.    The longitudinal plan of care for the diagnosis(es)/condition(s) as documented were addressed during this  "visit. Due to the added complexity in care, I will continue to support Ivonne in the subsequent management and with ongoing continuity of care.    BMI  Estimated body mass index is 47.68 kg/m  as calculated from the following:    Height as of this encounter: 1.676 m (5' 6\").    Weight as of this encounter: 134 kg (295 lb 6.4 oz).   Weight management plan: Discussed healthy diet and exercise guidelines      Subjective   Ivonne is a 34 year old, presenting for the following health issues:  Other (Follow up post partum )        7/15/2025    10:38 AM   Additional Questions   Roomed by Sherry DIALLO   Accompanied by Family         7/15/2025    Information    services provided? No     HPI    Ivonne is a 34 y.o. female who presents today s/p  on . Her pregnancy was complicated by   Takes BP at home, typically in the 130-140s. Still taking labetalol, reports no issues or side effects with it. Denies headache or vision chages.     Not breastfeeding,     This is a 34-year-old -2-4-2 presenting for postpartum blood pressure check.  She delivered a viable healthy term female at 37+3 weeks gestation via  .  Upon arrival for her scheduled , she was found to be hypertensive and treated for preeclampsia with severe features.  Hypertension persisted after delivery, requiring increased number of medications, as well as leg swelling, and headaches.    She was discharged from the hospital on 60 mg of nifedipine once daily, 300 mg of labetalol 3 times daily, and Lasix 1 daily as needed, as well as oxycodone for postop pain.    She has not been able to  or take any of these medications.  She has continued to take the 200 mg pills of labetalol that she has at home twice daily, as she has been taking prior to delivery.  She reports checking her blood pressure at home and it is similar to the number she has seen in clinic today.  All systolic blood pressure readings have been less than " "150.  Typically between 130 and 150 with systolics in the  range.  She is feeling well.  Denies headaches, dizziness, lightheadedness, vision changes, chest pain, shortness of breath.  Lower extreme edema has been improving but she still has some around her ankles.    No concerns with mood.  She feels happy, content, and \"whole \"with her baby.  Has been doing formula, and baby is feeding well, regularly every 2-4 hours.  She denies any breast tenderness.  Has some mild lower abdominal discomfort but no severe pain.  Has not been taking medications for her belly pain.    She declines contraception currently.  Shares that she is not sexually active and does not plan to become sexually active soon.  She feels comfortable reaching out to us if she decides she would like to start contraception.    Her nausea, vomiting, heartburn, pelvic pain have all resolved since delivery.  Asthma has been good and she denies difficulty with her breathing.      Objective    /87   Pulse (!) 123   Temp 97.7  F (36.5  C) (Tympanic)   Resp 24   Ht 1.676 m (5' 6\")   Wt 134 kg (295 lb 6.4 oz)   LMP 10/20/2024 (Approximate)   SpO2 95%   Breastfeeding No   BMI 47.68 kg/m    Body mass index is 47.68 kg/m .  Physical Exam   Objective:    Vitals:  Vitals are reviewed and blood pressure is mildly elevated.  She is mildly tachycardic today.  Gen:  Alert, pleasant, no acute distress, appears comfortable  Cardiac:  Regular rate and rhythm, no murmurs, rubs or gallops  Respiratory:  Lungs clear to auscultation bilaterally  Abdomen: Belly is soft, nontender, uterus is firm.  Extremities:  Warm, well-perfused, pulses 2+/4, she has 2+ pitting edema from her ankles up about 3 to 4 inches.  Reports that this is improved from previous.    No labs were obtained today.    Signed Electronically by: Joie Ghosh MD    Preceptor Attestation:   I was present with the medical student who participated in the service and in the " documentation of this note. I have verified the history and personally performed the physical exam and medical decision making. I have verified the content of the note, which accurately reflects my assessment of the patient and the plan of care.   Supervising Physician:  Joie Ghosh MD.

## 2025-08-06 ENCOUNTER — OFFICE VISIT (OUTPATIENT)
Dept: FAMILY MEDICINE | Facility: CLINIC | Age: 35
End: 2025-08-06
Payer: COMMERCIAL

## 2025-08-06 VITALS
HEIGHT: 66 IN | HEART RATE: 94 BPM | WEIGHT: 287.8 LBS | SYSTOLIC BLOOD PRESSURE: 116 MMHG | TEMPERATURE: 98.3 F | OXYGEN SATURATION: 95 % | DIASTOLIC BLOOD PRESSURE: 80 MMHG | RESPIRATION RATE: 20 BRPM | BODY MASS INDEX: 46.25 KG/M2

## 2025-08-06 DIAGNOSIS — G44.209 TENSION HEADACHE: ICD-10-CM

## 2025-08-06 DIAGNOSIS — R11.0 NAUSEA: Primary | ICD-10-CM

## 2025-08-06 DIAGNOSIS — O09.93 HIGH-RISK PREGNANCY IN THIRD TRIMESTER: ICD-10-CM

## 2025-08-06 DIAGNOSIS — O09.93 SUPERVISION OF HIGH RISK PREGNANCY IN THIRD TRIMESTER: ICD-10-CM

## 2025-08-06 DIAGNOSIS — O14.13 SEVERE PRE-ECLAMPSIA IN THIRD TRIMESTER: ICD-10-CM

## 2025-08-06 DIAGNOSIS — O10.919 CHRONIC HYPERTENSION AFFECTING PREGNANCY: ICD-10-CM

## 2025-08-06 PROBLEM — O98.313 CHLAMYDIA TRACHOMATIS INFECTION IN MOTHER DURING THIRD TRIMESTER OF PREGNANCY: Status: RESOLVED | Noted: 2025-05-13 | Resolved: 2025-08-06

## 2025-08-06 PROBLEM — A56.8 CHLAMYDIA TRACHOMATIS INFECTION IN MOTHER DURING THIRD TRIMESTER OF PREGNANCY: Status: RESOLVED | Noted: 2025-05-13 | Resolved: 2025-08-06

## 2025-08-06 PROBLEM — E66.813 CLASS 3 SEVERE OBESITY DUE TO EXCESS CALORIES WITHOUT SERIOUS COMORBIDITY WITH BODY MASS INDEX (BMI) OF 40.0 TO 44.9 IN ADULT (H): Status: RESOLVED | Noted: 2025-01-02 | Resolved: 2025-08-06

## 2025-08-06 PROBLEM — O34.29 PREGNANCY W/ HX OF UTERINE MYOMECTOMY: Status: RESOLVED | Noted: 2025-01-23 | Resolved: 2025-08-06

## 2025-08-06 PROBLEM — O60.00 PRETERM LABOR: Status: RESOLVED | Noted: 2025-06-07 | Resolved: 2025-08-06

## 2025-08-06 PROBLEM — Z36.89 ENCOUNTER FOR TRIAGE IN PREGNANT PATIENT: Status: RESOLVED | Noted: 2025-05-20 | Resolved: 2025-08-06

## 2025-08-06 PROBLEM — O21.9 NAUSEA AND VOMITING IN PREGNANCY: Status: RESOLVED | Noted: 2025-05-01 | Resolved: 2025-08-06

## 2025-08-06 PROBLEM — O09.291 HISTORY OF STILLBIRTH IN PREGNANT PATIENT IN FIRST TRIMESTER, ANTEPARTUM: Status: RESOLVED | Noted: 2025-01-02 | Resolved: 2025-08-06

## 2025-08-06 RX ORDER — ACETAMINOPHEN 325 MG/1
325-650 TABLET ORAL EVERY 6 HOURS PRN
Qty: 100 TABLET | Refills: 2 | Status: SHIPPED | OUTPATIENT
Start: 2025-08-06

## 2025-08-06 RX ORDER — IBUPROFEN 200 MG
600 TABLET ORAL EVERY 6 HOURS
Qty: 100 TABLET | Refills: 0 | Status: SHIPPED | OUTPATIENT
Start: 2025-08-06

## (undated) DEVICE — DAVINCI XI SEAL UNIVERSAL 5-8MM 470361

## (undated) DEVICE — SYSTEM LAPAROVUE VISIBILITY LAPVUE10

## (undated) DEVICE — DAVINCI XI DRAPE ARM 470015

## (undated) DEVICE — LUBRICANT INST ELECTROLUBE EL101

## (undated) DEVICE — SYR 10ML FINGER CONTROL W/O NDL 309695

## (undated) DEVICE — GLOVE SURG PI ULTRA TOUCH M SZ 7 LF 42670

## (undated) DEVICE — GLOVE SURG PI ULTRA TOUCH M SZ 6-1/2 LF

## (undated) DEVICE — DAVINCI XI DRAPE COLUMN 470341

## (undated) DEVICE — DRESSING COVERLET STRIP 3/4 X 3 LF 230

## (undated) DEVICE — RETR PANNICULUS TRAXI FOR PT POSITIONING PRS-1030

## (undated) DEVICE — Device

## (undated) DEVICE — PACK MINOR SINGLE BASIN SSK3001

## (undated) DEVICE — SU MONOCRYL+ 4-0 18IN PS2 UND MCP496G

## (undated) DEVICE — SU ETHILON 4-0 PS-2 18" BLACK 1667H

## (undated) DEVICE — ENDO SHEARS RENEW LAP ENDOCUT SCISSOR TIP 16.5MM 3142

## (undated) DEVICE — BARRIER SEPRAFILM 5X6" SINGLE SHEET 4301-02

## (undated) DEVICE — GLOVE SURG PI ULTRA TOUCH M SZ 7-1/2 LF

## (undated) DEVICE — SOL NACL 0.9% IRRIG 1000ML BOTTLE 2F7124

## (undated) DEVICE — DRAPE U SPLIT 74X120" 29440

## (undated) DEVICE — SOL WATER IRRIG 1000ML BOTTLE 2F7114

## (undated) DEVICE — SYR 50ML SLIP TIP W/O NDL 309654

## (undated) DEVICE — DRSG PRIMAPORE 04X11 3/4" 66000321

## (undated) DEVICE — PREP CHLORAPREP 26ML TINTED HI-LITE ORANGE 930815

## (undated) DEVICE — TRENGUARD 450 PROCEDURAL PACK 111404

## (undated) DEVICE — DAVINCI HOT SHEARS TIP COVER  400180

## (undated) DEVICE — UTERINE MANIPULATOR RUMI 6.7MMX8CM UMB678

## (undated) DEVICE — SUCTION MANIFOLD NEPTUNE 2 SYS 1 PORT 702-025-000

## (undated) DEVICE — TUBING LAP SUCT/IRRIG STRYKER 250070500

## (undated) DEVICE — CUSTOM PACK C-SECTION LHE

## (undated) DEVICE — CUSTOM PACK DA VINCI GYN SMA5BDVHEA

## (undated) DEVICE — SU VICRYL+ 3-0 CT1 36IN UND VCP944H

## (undated) DEVICE — SYR 10ML PREFILLED 0.9% NACL INJ NOT STERILE 306547

## (undated) DEVICE — NDL INSUFFLATION 13GA 120MM C2201

## (undated) DEVICE — PROTECTOR ARM STANDARD ONE STEP

## (undated) DEVICE — TROCAR PORT BLADELESS 5X100MM IAS5-100LP

## (undated) DEVICE — SUTURE VICRYL+ 0 27IN CT-1 UND VCP260H

## (undated) DEVICE — SU VICRYL+ 0 27IN CT-1 UND VCP260H

## (undated) DEVICE — PREP SCRUB SOL EXIDINE 4% CHG 4OZ 29002-404

## (undated) DEVICE — GLOVE BIOGEL PI ULTRATOUCH G SZ 8.0 42180

## (undated) DEVICE — BRIEF STRETCH XL MPS40

## (undated) DEVICE — CATH FOLEY 5CC 16FR SIL/LTX 0165V16S

## (undated) DEVICE — ENDO TROCAR FIRST ENTRY KII FIOS Z-THRD 11X100MM CTF33

## (undated) DEVICE — PAD POS XL 1X20X40IN PINK PIGAZZI

## (undated) DEVICE — GOWN XXL 9575

## (undated) DEVICE — DAVINCI XI OBTURATOR BLADELESS 8MM 470359

## (undated) DEVICE — TUBING FILTER TRI-LUMEN AIRSEAL ASC-EVAC1

## (undated) DEVICE — SYR 50ML CATH TIP W/O NDL 309620

## (undated) DEVICE — SU WND CLOSURE VLOC 180 ABS 0 12" GS-21 VLOCL0316

## (undated) DEVICE — BLADE KNIFE SURG 11 371111

## (undated) DEVICE — ESU GROUND PAD ADULT REM W/15' CORD E7507DB

## (undated) DEVICE — SUTURE VICRYL+ 2-0 27IN SH UND VCP417H

## (undated) DEVICE — NEEDLE SPINAL DISP 22X4-3/4 QUIN 333315

## (undated) RX ORDER — BUPIVACAINE HYDROCHLORIDE 2.5 MG/ML
INJECTION, SOLUTION EPIDURAL; INFILTRATION; INTRACAUDAL
Status: DISPENSED
Start: 2022-09-07

## (undated) RX ORDER — VASOPRESSIN 20 U/ML
INJECTION PARENTERAL
Status: DISPENSED
Start: 2022-09-07

## (undated) RX ORDER — MORPHINE SULFATE 1 MG/ML
INJECTION, SOLUTION EPIDURAL; INTRATHECAL; INTRAVENOUS
Status: DISPENSED
Start: 2025-07-07

## (undated) RX ORDER — FENTANYL CITRATE 50 UG/ML
INJECTION, SOLUTION INTRAMUSCULAR; INTRAVENOUS
Status: DISPENSED
Start: 2022-09-07

## (undated) RX ORDER — PROPOFOL 10 MG/ML
INJECTION, EMULSION INTRAVENOUS
Status: DISPENSED
Start: 2022-09-07

## (undated) RX ORDER — BUPIVACAINE HYDROCHLORIDE 7.5 MG/ML
INJECTION, SOLUTION INTRASPINAL
Status: DISPENSED
Start: 2025-07-07